# Patient Record
Sex: FEMALE | Race: BLACK OR AFRICAN AMERICAN | Employment: OTHER | RURAL
[De-identification: names, ages, dates, MRNs, and addresses within clinical notes are randomized per-mention and may not be internally consistent; named-entity substitution may affect disease eponyms.]

---

## 2017-01-10 ENCOUNTER — PATIENT OUTREACH (OUTPATIENT)
Dept: FAMILY MEDICINE CLINIC | Age: 72
End: 2017-01-10

## 2017-01-10 NOTE — PROGRESS NOTES
NNTOCED: [facility] 7/26/2016 Right hip pain s/p fall    This episode closed. Patient had no readmissions during 30 day POLY.

## 2017-02-15 ENCOUNTER — OFFICE VISIT (OUTPATIENT)
Dept: FAMILY MEDICINE CLINIC | Age: 72
End: 2017-02-15

## 2017-02-15 VITALS
DIASTOLIC BLOOD PRESSURE: 66 MMHG | HEART RATE: 69 BPM | TEMPERATURE: 98.3 F | HEIGHT: 70 IN | OXYGEN SATURATION: 96 % | WEIGHT: 280 LBS | BODY MASS INDEX: 40.09 KG/M2 | SYSTOLIC BLOOD PRESSURE: 134 MMHG | RESPIRATION RATE: 16 BRPM

## 2017-02-15 DIAGNOSIS — Z13.31 SCREENING FOR DEPRESSION: ICD-10-CM

## 2017-02-15 DIAGNOSIS — Z00.00 MEDICARE ANNUAL WELLNESS VISIT, SUBSEQUENT: Primary | ICD-10-CM

## 2017-02-15 DIAGNOSIS — M54.81 OCCIPITAL NEURITIS: Chronic | ICD-10-CM

## 2017-02-15 DIAGNOSIS — Z13.39 SCREENING FOR ALCOHOLISM: ICD-10-CM

## 2017-02-15 RX ORDER — SULFASALAZINE 500 MG/1
TABLET, DELAYED RELEASE ORAL
COMMUNITY
Start: 2017-01-30 | End: 2018-08-20

## 2017-02-15 RX ORDER — GABAPENTIN 300 MG/1
CAPSULE ORAL
Qty: 360 CAP | Refills: 3 | Status: SHIPPED | OUTPATIENT
Start: 2017-02-15 | End: 2018-08-20

## 2017-02-15 NOTE — PROGRESS NOTES
[address], [address] [place] [address]  [phone]           Date of visit: 2/15/2017     Cirilo Zaragoza MD    This is an Subsequent Medicare Annual Wellness Visit (AWV), (Performed more than 12 months after effective date of Medicare Part B enrollment and 12 months after last preventive visit, Once in a lifetime)    I have reviewed the patient's medical history in detail and updated the computerized patient record. Marco Mckeon is a 70 y.o. female   History obtained from: the patient.     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    Colon polyp, hyperplastic K63.5    Asthma J45.909    Primary generalized (osteo)arthritis M15.0    PAC (premature atrial contraction) I49.1    Lumbar spinal stenosis M48.06    Primary localized osteoarthritis of right hip M16.11     Past Medical History   Diagnosis Date    Anemia, unspecified 4/15/2010    Cardiac dysrhythmia, unspecified 4/15/2010     PAT- not on medication/ not seeing cardiologist    High cholesterol 4/30/2010    Obesity, unspecified 4/15/2010    Osteoarthrosis, unspecified whether generalized or localized, other specified sites 4/15/2010    Rheumatoid arthritis(714.0) 4/15/2010    Spinal stenosis, unspecified region other than cervical 4/15/2010    Unspecified asthma(493.90) 4/15/2010     rheumatoid lung disease      Past Surgical History   Procedure Laterality Date    Endoscopy, colon, diagnostic       7-10-13    Hx orthopaedic Right 2002     rotator cuff repair    Pr chest surgery procedure unlisted  lat 1990's     lung biopsy- diagnosed with rheumatoid lung disease     Allergies   Allergen Reactions    Orudis [Ketoprofen] Palpitations    Singulair [Montelukast] Rash    Arthrotec 50 [Diclofenac-Misoprostol] Palpitations    Levaquin [Levofloxacin] Other (comments)     Hip joints swelling and painful per pt report    Lyrica [Pregabalin] Other (comments)     Saw spiders. Current Outpatient Prescriptions   Medication Sig Dispense Refill    acetaminophen (TYLENOL) 500 mg tablet Take 1,000 mg by mouth every six (6) hours as needed for Pain.  traMADol (ULTRAM) 50 mg tablet Take 1 Tab by mouth every six (6) hours as needed for Pain. Max Daily Amount: 200 mg. 60 Tab 0    FOLIC ACID PO Take 3 mg by mouth daily.  gabapentin (NEURONTIN) 100 mg capsule Take 100 mg by mouth two (2) times a day.  GABAPENTIN, BULK, Take 300 mg by mouth nightly.  albuterol (PROVENTIL HFA, VENTOLIN HFA, PROAIR HFA) 90 mcg/actuation inhaler Take 2 Puffs by inhalation every four (4) hours as needed for Wheezing. 1 Inhaler 3    cholecalciferol, vitamin D3, (VITAMIN D3) 4,000 unit cap Take  by mouth daily.  METHOTREXATE 25 mg by IntraMUSCular route every seven (7) days. Saturdays      sulfaSALAzine EC (AZULFIDINE) 500 mg EC tablet       oxyCODONE-acetaminophen (PERCOCET 7.5) 7.5-325 mg per tablet Take 1-2 Tabs by mouth every four (4) hours as needed for Pain. Max Daily Amount: 12 Tabs. 70 Tab 0    oxyCODONE IR (ROXICODONE) 5 mg immediate release tablet Take 1 Tab by mouth every eight (8) hours as needed for Pain. Max Daily Amount: 15 mg. 60 Tab 0    ondansetron (ZOFRAN ODT) 8 mg disintegrating tablet Take 0.5 Tabs by mouth every eight (8) hours as needed for Nausea. 30 Tab 0    bisacodyl (DULCOLAX, BISACODYL,) 10 mg suppository Insert 10 mg into rectum daily.  2 Suppository 0    aspirin (ASPIRIN) 325 mg tablet Take 1 Tab by mouth two (2) times a day. 60 Tab 0    OTHER,NON-FORMULARY, Take 8 oz by mouth daily. Bottle of \"joint juice\" which contains glucosamine.  celecoxib (CELEBREX) 200 mg capsule Take 1 Cap by mouth daily. (Patient taking differently: Take 200 mg by mouth two (2) times a day.) 90 Cap 1    omega-3s-dha-epa-fish oil 300-1,000 mg cpDR Take  by mouth daily.  CALCIUM CARBONATE (CALCIUM 600 PO) Take  by mouth daily. Family History   Problem Relation Age of Onset    Cancer Mother      cervical, breast    Heart Disease Mother     Heart Attack Mother     Hypertension Mother     Arthritis-osteo Mother     Heart Disease Father     Heart Attack Father     Cancer Other      uterine and lung.  High Cholesterol Brother     Stroke Brother     Arthritis-rheumatoid Sister     Migraines Sister     Breast Cancer Maternal Aunt      Social History   Substance Use Topics    Smoking status: Former Smoker     Packs/day: 1.00     Years: 20.00     Quit date: 4/30/1985    Smokeless tobacco: Never Used    Alcohol use No       Specialists/Care Team   [facility] has established care with the following healthcare providers:  Patient Care Team:  Radha Morales MD as PCP - General  Shari Vyas RN as Nurse Navigator (Family Practice)  Danielle Barrera MD (Orthopedic Surgery)  Keke Villaseñor MD (Orthopedic Surgery)  Evette Bower RN as [address] [place] [address]     Demographics   female  70 y.o.     General Health Questions   -During the past 4 weeks:   -how would you rate your health in general? Fair   -how often have you been bothered by feeling dizzy when standing up? occasionally   -how much have you been bothered by bodily pain? mildly   -Have you noticed any hearing difficulties? no   -has your physical and emotional health limited your social activities with family or friends? no    Emotional Health Questions   -Do you have a history of depression, anxiety, or emotional problems? no  -Over the past 2 weeks, have you felt down, depressed or hopeless? no  -Over the past 2 weeks, have you felt little interest or pleasure in doing things? no    Health Habits   Please describe your diet habits: B grade  Do you get 5 servings of fruits or vegetables daily? yes  Do you exercise regularly? Yes, [facility]. Alcohol Risk Factor Screening: On any occasion during the past 3 months, have you had more than 4 drinks containing alcohol? No  Do you average more than 14 drinks per week? No       Activities of Daily Living and Functional Status   -Do you need help with eating, walking, dressing, bathing, toileting, the phone, transportation, shopping, preparing meals, housework, laundry, medications or managing money? no  -In the past four weeks, was someone available to help you if you needed and wanted help with anything? yes  -Are you confident are you that you can control and manage most of your health problems? yes  -Have you been given information to help you keep track of your medications? yes  -How often do you have trouble taking your medications as prescribed? occasionally    Fall Risk and Home Safety   Have you fallen 2 or more times in the past year? Yes, fell after right hip surgery, and outside  Does your home have rugs in the hallway, lack grab bars in the bathroom, lack handrails on the stairs or have poor lighting? no  Do you have smoke detectors and check them regularly? yes  Do you have difficulties driving a car? no  Do you always fasten your seat belt when you are in a car? yes    Review of Systems (if indicated for problems addressed today)   Review of Systems   Constitutional: Negative. Negative for chills, fever, malaise/fatigue and weight loss. HENT: Negative. Negative for hearing loss. Eyes: Negative. Negative for blurred vision and double vision. Respiratory: Positive for cough. Negative for hemoptysis, sputum production and shortness of breath. She has rheumatoid lung and pulmonary fibrosis. She has a chronic cough. Shortness of breath is stable. Cardiovascular: Negative. Negative for chest pain, palpitations and orthopnea. Gastrointestinal: Negative. Negative for abdominal pain, blood in stool, heartburn, nausea and vomiting. Genitourinary: Negative. Negative for dysuria, frequency and urgency. Musculoskeletal: Negative. Negative for back pain, myalgias and neck pain. She has diffuse rheumatoid arthritis. She is status post right hip replacement 7/16. She is still having to use a cane because of gluteal weakness on that side. Her pain is much improved. She is exercising 3 times weekly in the [facility]. Skin: Negative. Negative for rash. Neurological: Negative. Negative for dizziness, tingling, tremors, weakness and headaches. Endo/Heme/Allergies: Negative. Psychiatric/Behavioral: Negative. Negative for depression, substance abuse and suicidal ideas. Physical Examination     Wt Readings from Last 3 Encounters:   02/15/17 280 lb (127 kg)   10/21/16 272 lb 3.2 oz (123.5 kg)   07/26/16 266 lb (120.7 kg)     Temp Readings from Last 3 Encounters:   02/15/17 98.3 °F (36.8 °C) (Oral)   10/21/16 98.5 °F (36.9 °C) (Oral)   07/26/16 98.1 °F (36.7 °C)     BP Readings from Last 3 Encounters:   02/15/17 143/85   10/21/16 129/62   07/26/16 145/73     Pulse Readings from Last 3 Encounters:   02/15/17 69   10/21/16 80   07/26/16 78       Body mass index is 40.18 kg/(m^2). No exam data present  Was the patient's timed Up & Go test unsteady or longer than 10 seconds? yes    Evaluation of Cognitive Function   Mood/affect:  happy  Orientation: Person, Place and Time  Appearance: age appropriate, casually dressed and younger than stated age  Family member/caregiver input: no    Additional exam if indicated for problems addressed today:  Physical Exam   Constitutional: She is oriented to person, place, and time.  She appears well-developed and well-nourished. No distress. HENT:   Head: Normocephalic and atraumatic. Eyes: Conjunctivae are normal. Pupils are equal, round, and reactive to light. No scleral icterus. Neck: Normal range of motion. Neck supple. No JVD present. No tracheal deviation present. Cardiovascular: Normal rate, regular rhythm and normal heart sounds. Exam reveals no gallop and no friction rub. No murmur heard. Pulmonary/Chest: Effort normal. She has no wheezes. She has no rales. She has dry crackles in both bases consistent with fibrosis. Abdominal: Soft. Bowel sounds are normal. She exhibits no distension. Musculoskeletal:   Right hip pain is greatly improved. Her endurance is limited. She has fallen twice in the past year. Neurological: She is alert and oriented to person, place, and time. She has classic symptoms of left-sided occipital neuralgia. I has gotten better but not resolved. I will increase her gabapentin dose is recorded to deal with this. The sleepiness effect is worn off for her. Skin: Skin is warm and dry. No rash noted. She is not diaphoretic. No erythema. Psychiatric: She has a normal mood and affect.  Her behavior is normal. Judgment and thought content normal.         Advice/Referrals/Counseling (as indicated)   Education and counseling provided for any problems identified above:     Preventive Services     (Preventive care checklist to be included in patient instructions)  Discussed today Done Previously Not Needed    x   Pneumococcal vaccines    x  Flu vaccine    x x Hepatitis B vaccine (if at risk)     x Shingles vaccine    x  TDAP vaccine    x  Mammogram     x Pap smear     x Colorectal cancer screening     x Low-dose CT for lung cancer screening     x Bone density test     x Glaucoma screening    x  Cholesterol test    x  Diabetes screening test      x Diabetes self-management class     x Nutritionist referral for diabetes or renal disease     Discussion of Advance Directive   Discussed with Luis Ethan her ability to prepare and advance directive in the case that an injury or illness causes her to be unable to make health care decisions. Assessment/Plan   Z00.00    ICD-10-CM ICD-9-CM    1. Medicare annual wellness visit, subsequent   Z00.00 V70.0 REFERRAL TO OPHTHALMOLOGY   2. Screening for depression-negative   Z13.89 V79.0    3.  Screening for alcoholism-negative Z13.89 V79.1        Orders Placed This Encounter    sulfaSALAzine EC (AZULFIDINE) 500 mg EC tablet       Patient Care Team:  Sylvia Camarillo MD as PCP - General  Maame Aviles RN as Nurse Navigator (Family Practice)  Saurav Eden MD (Orthopedic Surgery)  Lisa York MD (Orthopedic Surgery)  Aaron Cummings RN as Ambulatory Care Navigator      Sylvia Camarillo MD

## 2017-02-15 NOTE — PATIENT INSTRUCTIONS

## 2017-02-15 NOTE — MR AVS SNAPSHOT
Visit Information Date & Time Provider Department Dept. Phone Encounter #  
 2/15/2017 11:30 AM Melanie Gillespie MD [address] [place] [address] [phone] [id number] Follow-up Instructions Return in about 2 months (around 4/15/2017) for Fasting. Fela Cooper Upcoming Health Maintenance Date Due  
 MEDICARE YEARLY EXAM 11/25/2016 Pneumococcal 65+ Low/Medium Risk (2 of 2 - PPSV23) 12/16/2016 GLAUCOMA SCREENING Q2Y 3/18/2017 BREAST CANCER SCRN MAMMOGRAM 11/3/2018 COLONOSCOPY 7/11/2023 DTaP/Tdap/Td series (2 - Td) 9/22/2024 Allergies as of 2/15/2017  Review Complete On: 2/15/2017 By: Melanie Gillespie MD  
  
 Severity Noted Reaction Type Reactions Orudis [Ketoprofen] High 04/29/2010    Palpitations Singulair [Montelukast] High 04/15/2010    Rash Arthrotec 50 [Diclofenac-misoprostol]  06/22/2016    Palpitations Levaquin [Levofloxacin]  07/12/2013    Other (comments) Hip joints swelling and painful per pt report Lyrica [Pregabalin]  09/18/2014    Other (comments) Saw spiders. Current Immunizations  Reviewed on 11/25/2015 Name Date Hepatitis B Vaccine 4/20/2009 Influenza Vaccine 10/6/2016, 11/11/2015, 11/4/2014, 10/24/2013 Influenza Vaccine Whole 10/5/2011, 10/15/2010 PPD 9/20/2011 Pneumococcal Conjugate (PCV-13) 12/16/2015 Pneumococcal Vaccine (Pcv) 2/10/2010 Pneumococcal Vaccine (Unspecified Type) 12/15/2005 Tdap 9/22/2014 Not reviewed this visit You Were Diagnosed With   
  
 Codes Comments Medicare annual wellness visit, subsequent    -  Primary ICD-10-CM: Z00.00 ICD-9-CM: V70.0 Screening for depression     ICD-10-CM: Z13.89 ICD-9-CM: V79.0 Screening for alcoholism     ICD-10-CM: Z13.89 ICD-9-CM: V79.1 Occipital neuritis     ICD-10-CM: M54.81 ICD-9-CM: 723.8 Vitals BP Pulse Temp Height(growth percentile) Weight(growth percentile) SpO2 134/66 69 98.3 °F (36.8 °C) (Oral) 5' 10\" (1.778 m) 280 lb (127 kg) 96% BMI OB Status Smoking Status 40.18 kg/m2 Postmenopausal Former Smoker Vitals History BMI and BSA Data Body Mass Index Body Surface Area  
 40.18 kg/m 2 2.5 m 2 Preferred Pharmacy Pharmacy Name Phone [facility] [address] [address] [phone] Your Updated Medication List  
  
   
This list is accurate as of: 2/15/17 12:23 PM.  Always use your most recent med list.  
  
  
  
  
 acetaminophen 500 mg tablet Commonly known as:  TYLENOL Take 1,000 mg by mouth every six (6) hours as needed for Pain. albuterol 90 mcg/actuation inhaler Commonly known as:  PROVENTIL HFA, VENTOLIN HFA, PROAIR HFA Take 2 Puffs by inhalation every four (4) hours as needed for Wheezing. aspirin 325 mg tablet Commonly known as:  ASPIRIN Take 1 Tab by mouth two (2) times a day. bisacodyl 10 mg suppository Commonly known as:  DULCOLAX (BISACODYL) Insert 10 mg into rectum daily. CALCIUM 600 PO Take  by mouth daily. celecoxib 200 mg capsule Commonly known as:  CeleBREX Take 1 Cap by mouth daily. FOLIC ACID PO Take 3 mg by mouth daily. gabapentin 300 mg capsule Commonly known as:  NEURONTIN  
1 in AM, 1 at lunch AND 2 hs. NEURALGIA. Indications: NEUROPATHIC PAIN  
  
 METHOTREXATE 25 mg by IntraMUSCular route every seven (7) days. Saturdays  
  
 omega-3s-dha-epa-fish oil 300-1,000 mg Cpdr  
Take  by mouth daily. ondansetron 8 mg disintegrating tablet Commonly known as:  ZOFRAN ODT Take 0.5 Tabs by mouth every eight (8) hours as needed for Nausea. OTHER(NON-FORMULARY) Take 8 oz by mouth daily. Bottle of \"joint juice\" which contains glucosamine. oxyCODONE IR 5 mg immediate release tablet Commonly known as:  Alsip Rice Take 1 Tab by mouth every eight (8) hours as needed for Pain.  Max Daily Amount: 15 mg.  
  
 oxyCODONE-acetaminophen 7.5-325 mg per tablet Commonly known as:  PERCOCET 7.5 Take 1-2 Tabs by mouth every four (4) hours as needed for Pain. Max Daily Amount: 12 Tabs. sulfaSALAzine  mg EC tablet Commonly known as:  AZULFIDINE  
  
 traMADol 50 mg tablet Commonly known as:  ULTRAM  
Take 1 Tab by mouth every six (6) hours as needed for Pain. Max Daily Amount: 200 mg. VITAMIN D3 4,000 unit Cap Generic drug:  cholecalciferol (vitamin D3) Take  by mouth daily. Prescriptions Sent to Pharmacy Refills  
 gabapentin (NEURONTIN) 300 mg capsule 3 Si in AM, 1 at lunch AND 2 hs. NEURALGIA. Indications: NEUROPATHIC PAIN Class: Normal  
 Pharmacy: [address] #: [phone] We Performed the Following REFERRAL TO OPHTHALMOLOGY [REF57 Custom] Comments:  
 Please evaluate patient for routine eye exam and pressure check, Dr. Lexus Chavez. Follow-up Instructions Return in about 2 months (around 4/15/2017) for Fasting. Bennynika Rick Referral Information Referral ID Referred By Referred To  
  
 [id number] Omid Birney Not Available Visits Status Start Date End Date 1 New Request 2/15/17 2/15/18 If your referral has a status of pending review or denied, additional information will be sent to support the outcome of this decision. Patient Instructions Preventing Falls: Care Instructions Your Care Instructions Getting around your home safely can be a challenge if you have injuries or health problems that make it easy for you to fall. Loose rugs and furniture in walkways are among the dangers for many older people who have problems walking or who have poor eyesight. People who have conditions such as arthritis, osteoporosis, or dementia also have to be careful not to fall. You can make your home safer with a few simple measures. Follow-up care is a key part of your treatment and safety. Be sure to make and go to all appointments, and call your doctor if you are having problems. It's also a good idea to know your test results and keep a list of the medicines you take. How can you care for yourself at home? Taking care of yourself · You may get dizzy if you do not drink enough water. To prevent dehydration, drink plenty of fluids, enough so that your urine is light yellow or clear like water. Choose water and other caffeine-free clear liquids. If you have kidney, heart, or liver disease and have to limit fluids, talk with your doctor before you increase the amount of fluids you drink. · Exercise regularly to improve your strength, muscle tone, and balance. Walk if you can. Swimming may be a good choice if you cannot walk easily. · Have your vision and hearing checked each year or any time you notice a change. If you have trouble seeing and hearing, you might not be able to avoid objects and could lose your balance. · Know the side effects of the medicines you take. Ask your doctor or pharmacist whether the medicines you take can affect your balance. Sleeping pills or sedatives can affect your balance. · Limit the amount of alcohol you drink. Alcohol can impair your balance and other senses. · Ask your doctor whether calluses or corns on your feet need to be removed. If you wear loose-fitting shoes because of calluses or corns, you can lose your balance and fall. · Talk to your doctor if you have numbness in your feet. Preventing falls at home · Remove raised doorway thresholds, throw rugs, and clutter. Repair loose carpet or raised areas in the floor. · Move furniture and electrical cords to keep them out of walking paths. · Use nonskid floor wax, and wipe up spills right away, especially on ceramic tile floors. · If you use a walker or cane, put rubber tips on it.  If you use crutches, clean the bottoms of them regularly with an abrasive pad, such as steel wool. · Keep your house well lit, especially [place], and outside walkways. Use night-lights in areas such as hallways and bathrooms. Add extra light switches or use remote switches (such as switches that go on or off when you clap your hands) to make it easier to turn lights on if you have to get up during the night. · Install sturdy handrails on stairways. · Move items in your cabinets so that the things you use a lot are on the lower shelves (about waist level). · Keep a cordless phone and a flashlight with new batteries by your bed. If possible, put a phone in each of the main rooms of your house, or carry a cell phone in case you fall and cannot reach a phone. Or, you can wear a device around your neck or wrist. You push a button that sends a signal for help. · Wear low-heeled shoes that fit well and give your feet good support. Use footwear with nonskid soles. Check the heels and soles of your shoes for wear. Repair or replace worn heels or soles. · Do not wear socks without shoes on wood floors. · Walk on the grass when the sidewalks are slippery. If you live in an area that gets snow and ice in the winter, sprinkle salt on slippery steps and sidewalks. Preventing falls in the bath · Install grab bars and nonskid mats inside and outside your shower or tub and near the toilet and sinks. · Use shower chairs and bath benches. · Use a hand-held shower head that will allow you to sit while showering. · Get into a tub or shower by putting the weaker leg in first. Get out of a tub or shower with your strong side first. 
· Repair loose toilet seats and consider installing a raised toilet seat to make getting on and off the toilet easier. · Keep your bathroom door unlocked while you are in the shower. Where can you learn more? Go to http://antonio-adrien.info/. Enter [phone] in the search box to learn more about \"Preventing Falls: Care Instructions. \" Current as of: August 4, 2016 Content Version: 11.1 © 9473-3642 [employer]. Care instructions adapted under license by [employer] (which disclaims liability or warranty for this information). If you have questions about a medical condition or this instruction, always ask your healthcare professional. [address] any warranty or liability for your use of this information. Introducing [facility] [place] [facility]! Dear Migdalia Lott: Thank you for requesting a [employer] account. Our records indicate that you already have an active [employer] account. You can access your account anytime at [email or web address] Did you know that you can access your hospital and ER discharge instructions at any time in [employer]? You can also review all of your test results from your hospital stay or ER visit. Additional Information If you have questions, please visit the Frequently Asked Questions section of the [employer] website at [email or web address] Remember, [employer] is NOT to be used for urgent needs. For medical emergencies, dial 911. Now available from your iPhone and Android! Please provide this summary of care documentation to your next provider. Your primary care clinician is listed as [address]. If you have any questions after today's visit, please call [phone].

## 2017-02-15 NOTE — PROGRESS NOTES
Reviewed record in preparation for visit and have necessary documentation  Pt did not bring medication to office visit for review  Information was offered to pt on Advanced Directives, Living Will, pt declined   opportunity was given for questions  Goals that were addressed and/or need to be completed during or after this appointment include   Health Maintenance Due   Topic Date Due    MEDICARE YEARLY EXAM  11/25/2016    Pneumococcal 65+ Low/Medium Risk (2 of 2 - PPSV23) 12/16/2016    GLAUCOMA SCREENING Q2Y  03/18/2017

## 2017-04-21 ENCOUNTER — OFFICE VISIT (OUTPATIENT)
Dept: FAMILY MEDICINE CLINIC | Age: 72
End: 2017-04-21

## 2017-04-21 VITALS
WEIGHT: 289 LBS | SYSTOLIC BLOOD PRESSURE: 134 MMHG | HEIGHT: 70 IN | TEMPERATURE: 98.1 F | BODY MASS INDEX: 41.37 KG/M2 | DIASTOLIC BLOOD PRESSURE: 62 MMHG | RESPIRATION RATE: 20 BRPM | HEART RATE: 82 BPM

## 2017-04-21 DIAGNOSIS — D64.9 ANEMIA, UNSPECIFIED TYPE: ICD-10-CM

## 2017-04-21 DIAGNOSIS — K11.5 SALIVARY DUCT STONE: Primary | ICD-10-CM

## 2017-04-21 DIAGNOSIS — E78.00 HIGH CHOLESTEROL: Chronic | ICD-10-CM

## 2017-04-21 DIAGNOSIS — J45.20 MILD INTERMITTENT ASTHMA WITHOUT COMPLICATION: Chronic | ICD-10-CM

## 2017-04-21 DIAGNOSIS — M06.9 RHEUMATOID ARTHRITIS INVOLVING MULTIPLE SITES, UNSPECIFIED RHEUMATOID FACTOR PRESENCE: Chronic | ICD-10-CM

## 2017-04-21 DIAGNOSIS — Z91.09 ENVIRONMENTAL ALLERGIES: ICD-10-CM

## 2017-04-21 DIAGNOSIS — Z96.641 S/P HIP REPLACEMENT, RIGHT: ICD-10-CM

## 2017-04-21 RX ORDER — CEPHALEXIN 500 MG/1
500 CAPSULE ORAL 4 TIMES DAILY
Qty: 40 CAP | Refills: 0 | Status: SHIPPED | OUTPATIENT
Start: 2017-04-21 | End: 2017-05-01

## 2017-04-21 NOTE — PATIENT INSTRUCTIONS
Salivary Gland Stone: Care Instructions  Your Care Instructions  Salivary glands make saliva, or spit. A salivary gland stone is a piece of hard material, usually calcium, that can form in any of the three main salivary glands in the mouth. Salivary gland stones are also called salivary duct stones. Stones form most often in the gland that releases saliva below the tongue. A stone can block saliva from flowing out of the gland. When saliva backs up behind the stone, it can make the gland swell. The gland swells while you are eating, and then the swelling goes down slowly afterward. The swelling and pain may be under the jaw or in the area in front of the ear, depending on which gland is affected. Your doctor will ask you about your symptoms. He or she may be able to see and feel the gland under your skin or in the floor of your mouth. You may get an imaging test, such as a CT scan or ultrasound. This will help your doctor know if you have a stone and not some other problem. Most stones come out into the mouth on their own. While the stone is in the gland, your doctor may have you take medicine for pain. There are also some things you can do at home to help move the stone. If the stone in your gland hasn't come out within a few weeks, your doctor will discuss treatment options with you. Follow-up care is a key part of your treatment and safety. Be sure to make and go to all appointments, and call your doctor if you are having problems. It's also a good idea to know your test results and keep a list of the medicines you take. How can you care for yourself at home? · Be safe with medicines. Read and follow all instructions on the label. ¨ If the doctor gave you a prescription medicine for pain, take it as prescribed. ¨ If you are not taking a prescription pain medicine, ask your doctor if you can take an over-the-counter medicine. · If your doctor prescribed antibiotics, take them as directed.  Do not stop taking them just because you feel better. You need to take the full course of antibiotics. · Use sugar-free gum or candies such as lemon drops, or suck on a lemon wedge. They increase saliva, which may help push the stone out. · Gently massage the affected gland to help move the stone. When should you call for help? Call your doctor now or seek immediate medical care if:  · You have symptoms of infection, such as:  ¨ Increased pain, swelling, warmth, or redness. ¨ Red streaks leading from the salivary gland. ¨ Pus draining from the area where the saliva comes out into the mouth. ¨ A fever. Watch closely for changes in your health, and be sure to contact your doctor if:  · You do not get better as expected. Where can you learn more? Go to http://antonio-adrien.info/. Enter D298 in the search box to learn more about \"Salivary Gland Stone: Care Instructions. \"  Current as of: July 29, 2016  Content Version: 11.2  © 1789-1610 [email or web address]. Care instructions adapted under license by [employer] (which disclaims liability or warranty for this information). If you have questions about a medical condition or this instruction, always ask your healthcare professional. [place] [address] any warranty or liability for your use of this information.

## 2017-04-21 NOTE — MR AVS SNAPSHOT
Visit Information Date & Time Provider Department Dept. Phone Encounter #  
 4/21/2017 10:30 AM Marichuy Mcduffie MD [address] [id number] Follow-up Instructions Return in about 4 months (around 8/21/2017) for fasting labs on Tuesday. Dahiana Turk Upcoming Health Maintenance Date Due  
 GLAUCOMA SCREENING Q2Y 3/18/2017 MEDICARE YEARLY EXAM 2/16/2018 BREAST CANCER SCRN MAMMOGRAM 11/3/2018 COLONOSCOPY 7/11/2023 DTaP/Tdap/Td series (2 - Td) 9/22/2024 Allergies as of 4/21/2017  Review Complete On: 4/21/2017 By: Cecilia Stokes LPN Severity Noted Reaction Type Reactions Orudis [Ketoprofen] High 04/29/2010    Palpitations Singulair [Montelukast] High 04/15/2010    Rash Arthrotec 50 [Diclofenac-misoprostol]  06/22/2016    Palpitations Levaquin [Levofloxacin]  07/12/2013    Other (comments) Hip joints swelling and painful per pt report Lyrica [Pregabalin]  09/18/2014    Other (comments) Saw spiders. Current Immunizations  Reviewed on 11/25/2015 Name Date Hepatitis B Vaccine 4/20/2009 Influenza Vaccine 10/6/2016, 11/11/2015, 11/4/2014, 10/24/2013 Influenza Vaccine Whole 10/5/2011, 10/15/2010 PPD 9/20/2011 Pneumococcal Conjugate (PCV-13) 12/16/2015 Pneumococcal Vaccine (Pcv) 2/10/2010 Pneumococcal Vaccine (Unspecified Type) 12/15/2005 Tdap 9/22/2014 Not reviewed this visit You Were Diagnosed With   
  
 Codes Comments Salivary duct stone    -  Primary ICD-10-CM: K11.5 ICD-9-CM: 527.5 High cholesterol     ICD-10-CM: E78.00 ICD-9-CM: 272.0 Rheumatoid arthritis involving multiple sites, unspecified rheumatoid factor presence ([facility]     ICD-10-CM: M06.9 ICD-9-CM: 714.0 S/P hip replacement, right     ICD-10-CM: S89.271 ICD-9-CM: V43.64 Mild intermittent asthma without complication     KAF-66-YT: J45.20 ICD-9-CM: 493.90   
 Environmental allergies     ICD-10-CM: Z91.09 
ICD-9-CM: V15.09 Anemia, unspecified type     ICD-10-CM: D64.9 ICD-9-CM: 670. 9 Vitals BP Pulse Temp Resp Height(growth percentile) Weight(growth percentile) 134/62 (BP 1 Location: Left arm, BP Patient Position: At rest) 82 98.1 °F (36.7 °C) (Oral) 20 5' 10\" (1.778 m) 289 lb (131.1 kg) BMI OB Status Smoking Status 41.47 kg/m2 Postmenopausal Former Smoker Vitals History BMI and BSA Data Body Mass Index Body Surface Area  
 41.47 kg/m 2 2.54 m 2 Preferred Pharmacy Pharmacy Name Phone [address] [place], [place] - [address] [phone] Your Updated Medication List  
  
   
This list is accurate as of: 4/21/17 11:06 AM.  Always use your most recent med list.  
  
  
  
  
 acetaminophen 500 mg tablet Commonly known as:  TYLENOL Take 1,000 mg by mouth every six (6) hours as needed for Pain. albuterol 90 mcg/actuation inhaler Commonly known as:  PROVENTIL HFA, VENTOLIN HFA, PROAIR HFA Take 2 Puffs by inhalation every four (4) hours as needed for Wheezing. CALCIUM 600 PO Take  by mouth daily. celecoxib 200 mg capsule Commonly known as:  CeleBREX Take 1 Cap by mouth daily. cephALEXin 500 mg capsule Commonly known as:  Olga Pluck Take 1 Cap by mouth four (4) times daily for 10 days. FOLIC ACID PO Take 3 mg by mouth daily. gabapentin 300 mg capsule Commonly known as:  NEURONTIN  
1 in AM, 1 at lunch AND 2 hs. NEURALGIA. Indications: NEUROPATHIC PAIN  
  
 METHOTREXATE 25 mg by IntraMUSCular route every seven (7) days. Saturdays  
  
 omega-3s-dha-epa-fish oil 300-1,000 mg Cpdr  
Take  by mouth daily. OTHER(NON-FORMULARY) Take 8 oz by mouth daily. Bottle of \"joint juice\" which contains glucosamine. sulfaSALAzine  mg EC tablet Commonly known as:  AZULFIDINE  
  
 traMADol 50 mg tablet Commonly known as:  ULTRAM  
Take 1 Tab by mouth every six (6) hours as needed for Pain. Max Daily Amount: 200 mg. VITAMIN D3 4,000 unit Cap Generic drug:  cholecalciferol (vitamin D3) Take  by mouth daily. Prescriptions Sent to Pharmacy Refills  
 cephALEXin (KEFLEX) 500 mg capsule 0 Sig: Take 1 Cap by mouth four (4) times daily for 10 days. Class: Normal  
 Pharmacy: [address] [place] [address] #: [phone] Route: Oral  
  
We Performed the Following CBC WITH AUTOMATED DIFF [57348 CPT(R)] IRON PROFILE [id number] CPT(R)] LIPID PANEL [35008 CPT(R)] METABOLIC PANEL, COMPREHENSIVE [72209 CPT(R)] RETICULOCYTE COUNT [id number] CPT(R)] Follow-up Instructions Return in about 4 months (around 8/21/2017) for fasting labs on Tuesday. Jacoby June Patient Instructions Salivary Gland Stone: Care Instructions Your Care Instructions Salivary glands make saliva, or spit. A salivary gland stone is a piece of hard material, usually calcium, that can form in any of the three main salivary glands in the mouth. Salivary gland stones are also called salivary duct stones. Stones form most often in the gland that releases saliva below the tongue. A stone can block saliva from flowing out of the gland. When saliva backs up behind the stone, it can make the gland swell. The gland swells while you are eating, and then the swelling goes down slowly afterward. The swelling and pain may be under the jaw or in the area in front of the ear, depending on which gland is affected. Your doctor will ask you about your symptoms. He or she may be able to see and feel the gland under your skin or in the floor of your mouth. You may get an imaging test, such as a CT scan or ultrasound. This will help your doctor know if you have a stone and not some other problem. Most stones come out into the mouth on their own.  While the stone is in the gland, your doctor may have you take medicine for pain. There are also some things you can do at home to help move the stone. If the stone in your gland hasn't come out within a few weeks, your doctor will discuss treatment options with you. Follow-up care is a key part of your treatment and safety. Be sure to make and go to all appointments, and call your doctor if you are having problems. It's also a good idea to know your test results and keep a list of the medicines you take. How can you care for yourself at home? · Be safe with medicines. Read and follow all instructions on the label. ¨ If the doctor gave you a prescription medicine for pain, take it as prescribed. ¨ If you are not taking a prescription pain medicine, ask your doctor if you can take an over-the-counter medicine. · If your doctor prescribed antibiotics, take them as directed. Do not stop taking them just because you feel better. You need to take the full course of antibiotics. · Use sugar-free gum or candies such as lemon drops, or suck on a lemon wedge. They increase saliva, which may help push the stone out. · Gently massage the affected gland to help move the stone. When should you call for help? Call your doctor now or seek immediate medical care if: 
· You have symptoms of infection, such as: 
¨ Increased pain, swelling, warmth, or redness. ¨ Red streaks leading from the salivary gland. ¨ Pus draining from the area where the saliva comes out into the mouth. ¨ A fever. Watch closely for changes in your health, and be sure to contact your doctor if: 
· You do not get better as expected. Where can you learn more? Go to http://antonio-adrien.info/. Enter S208 in the search box to learn more about \"Salivary Gland Stone: Care Instructions. \" Current as of: July 29, 2016 Content Version: 11.2 © 2956-7565 [employer].  Care instructions adapted under license by [address] (which disclaims liability or warranty for this information). If you have questions about a medical condition or this instruction, always ask your healthcare professional. [address] any warranty or liability for your use of this information. Introducing [facility]! Dear Mars Dhillon: Thank you for requesting a [employer] account. Our records indicate that you already have an active [employer] account. You can access your account anytime at [email or web address]. [email or web address] Did you know that you can access your hospital and ER discharge instructions at any time in [employer]? You can also review all of your test results from your hospital stay or ER visit. Additional Information If you have questions, please visit the Frequently Asked Questions section of the [employer] website at [email or web address] Remember, [employer] is NOT to be used for urgent needs. For medical emergencies, dial 911. Now available from your iPhone and Android! Please provide this summary of care documentation to your next provider. Your primary care clinician is listed as [address]. If you have any questions after today's visit, please call [phone].

## 2017-04-21 NOTE — PROGRESS NOTES
Reviewed record in preparation for visit and have necessary documentation  Pt did not bring medication to office visit for review    Goals that were addressed and/or need to be completed during or after this appointment include   Health Maintenance Due   Topic Date Due    Pneumococcal 65+ Low/Medium Risk (2 of 2 - PPSV23) 12/16/2016    GLAUCOMA SCREENING Q2Y  03/18/2017

## 2017-04-24 ENCOUNTER — TELEPHONE (OUTPATIENT)
Dept: FAMILY MEDICINE CLINIC | Age: 72
End: 2017-04-24

## 2017-04-24 DIAGNOSIS — K11.5 SIALOLITHIASIS: Primary | ICD-10-CM

## 2017-04-24 NOTE — TELEPHONE ENCOUNTER
Pt states she is still having issues with her mouth and that Dr. Erendira Loco said is she continues to call back today and he would send her to the ENT specialist. As soon as possible please. .thanks

## 2017-04-24 NOTE — PROGRESS NOTES
Progress Note    Patient: Caio Tena MRN: [id number]  SSN: [id number]    YOB: 1945  Age: 70 y.o. Sex: female        Chief Complaint   Patient presents with    Mouth Lesions         Subjective:     Encounter Diagnoses   Name Primary?  Salivary duct stone: Left submandibular-palpable on exam.  She had some swelling underneath her left jaw but is better now. I explained to her that these duct stones can clog because of cellulitis in the gland. I asked her to try to massage the tip of the opening where the stone appears to be lodged and if she is not better by Monday she is to call me for referral to ENT. Yes    High cholesterol:  Cardiovascular risks for her are: hyperlipidemia. Currently she takes no statin. LDL is borderline. Lab Results   Component Value Date/Time    Cholesterol, total 170 10/24/2016 08:58 AM    HDL Cholesterol 52 10/24/2016 08:58 AM    LDL, calculated 103 10/24/2016 08:58 AM    Triglyceride 73 10/24/2016 08:58 AM    CHOL/HDL Ratio 3.2 04/30/2010 10:37 AM     Lab Results   Component Value Date/Time    ALT (SGPT) 13 10/24/2016 08:58 AM    AST (SGOT) 16 10/24/2016 08:58 AM    Alk. phosphatase 80 10/24/2016 08:58 AM    Bilirubin, total 0.5 10/24/2016 08:58 AM      Myalgias: No   Fatigue: No   Other side effects: no  Wt Readings from Last 3 Encounters:   04/21/17 289 lb (131.1 kg)   02/15/17 280 lb (127 kg)   10/21/16 272 lb 3.2 oz (123.5 kg)     The patient is aware of our goal to reduce or eliminate the long term problems (such as strokes and heart attacks) related to poorly controlled hyperlipidemia. 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 She is seen regularly by Dr. Jay Maravilla.  S/P hip replacement, right: She has much less pain; she is still walking with a cane as she gets all of her strength back.            Mild intermittent asthma without complication: Combined with rheumatoid lung. asthma, not currently in exacerbation. Asthma symptoms occur: daily. Wheezing when present is described as moderate and easily relieved with rescue bronchodilator. Current limitations in activity from asthma: none. Frequency of use of quick-relief meds: rare. Medication compliance: Good  Patient does not smoke cigarettes. Monitors Peak Flow at home: no       Environmental allergies: Always better at this time of year.  Anemia, unspecified type: Felt to be ACD. Followed by Dr. Mars kitchen. Lab Results   Component Value Date/Time    HGB 11.1 10/24/2016 08:58 AM             Current and past medical information:    Current Medications after this visit[de-identified]   Current Outpatient Prescriptions   Medication Sig    cephALEXin (KEFLEX) 500 mg capsule Take 1 Cap by mouth four (4) times daily for 10 days.  sulfaSALAzine EC (AZULFIDINE) 500 mg EC tablet     gabapentin (NEURONTIN) 300 mg capsule 1 in AM, 1 at lunch AND 2 hs. NEURALGIA. Indications: NEUROPATHIC PAIN    traMADol (ULTRAM) 50 mg tablet Take 1 Tab by mouth every six (6) hours as needed for Pain. Max Daily Amount: 200 mg.  FOLIC ACID PO Take 3 mg by mouth daily.  OTHER,NON-FORMULARY, Take 8 oz by mouth daily. Bottle of \"joint juice\" which contains glucosamine.  albuterol (PROVENTIL HFA, VENTOLIN HFA, PROAIR HFA) 90 mcg/actuation inhaler Take 2 Puffs by inhalation every four (4) hours as needed for Wheezing.  celecoxib (CELEBREX) 200 mg capsule Take 1 Cap by mouth daily. (Patient taking differently: Take 200 mg by mouth two (2) times a day.)    cholecalciferol, vitamin D3, (VITAMIN D3) 4,000 unit cap Take  by mouth daily.  METHOTREXATE 25 mg by IntraMUSCular route every seven (7) days. Saturdays    CALCIUM CARBONATE (CALCIUM 600 PO) Take  by mouth daily.     acetaminophen (TYLENOL) 500 mg tablet Take 1,000 mg by mouth every six (6) hours as needed for Pain.    omega-3s-dha-epa-fish oil 300-1,000 mg cpDR Take  by mouth daily. No current facility-administered medications for this visit. Patient Active Problem List    Diagnosis Date Noted    High cholesterol 04/30/2010     Priority: 1 - One    Rheumatoid arthritis, adult ([facility] 04/15/2010     Priority: 1 - One    Unspecified asthma 04/15/2010     Priority: 1 - One    OA (osteoarthritis) 04/15/2010     Priority: 1 - One    Obesity, unspecified 04/15/2010     Priority: 1 - One    Spinal stenosis, unspecified region other than cervical 04/15/2010     Priority: 1 - One    Cardiac dysrhythmia, unspecified 04/15/2010     Priority: 2 - Two    Primary localized osteoarthritis of right hip 07/11/2016    Asthma 10/06/2015    Primary generalized (osteo)arthritis 10/06/2015    PAC (premature atrial contraction) 10/06/2015    Lumbar spinal stenosis 10/06/2015    Colon polyp, hyperplastic 06/02/2015    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[facility] 12/15/2010       Past Medical History:   Diagnosis Date    Anemia, unspecified 4/15/2010    Cardiac dysrhythmia, unspecified 4/15/2010    PAT- not on medication/ not seeing cardiologist    High cholesterol 4/30/2010    Obesity, unspecified 4/15/2010    Osteoarthrosis, unspecified whether generalized or localized, other specified sites 4/15/2010    Rheumatoid arthritis ([facility] 4/15/2010    Spinal stenosis, unspecified region other than cervical 4/15/2010    Unspecified asthma 4/15/2010    rheumatoid lung disease       Allergies   Allergen Reactions    Orudis [Ketoprofen] Palpitations    Singulair [Montelukast] Rash    Arthrotec 50 [Diclofenac-Misoprostol] Palpitations    Levaquin [Levofloxacin] Other (comments)     Hip joints swelling and painful per pt report    Lyrica [Pregabalin] Other (comments)     Saw spiders.        Past Surgical History:   Procedure Laterality Date    CHEST SURGERY PROCEDURE UNLISTED  lat 7161'A    lung biopsy- diagnosed with rheumatoid lung disease    ENDOSCOPY, COLON, DIAGNOSTIC      7-10-13    HX ORTHOPAEDIC Right 2002    rotator cuff repair       Social History     Social History    Marital status: SINGLE     Spouse name: N/A    Number of children: N/A    Years of education: N/A     Social History Main Topics    Smoking status: Former Smoker     Packs/day: 1.00     Years: 20.00     Quit date: 4/30/1985    Smokeless tobacco: Never Used    Alcohol use No    Drug use: No    Sexual activity: No     Other Topics Concern    None     Social History Narrative       Review of Systems   Constitutional: Negative. Negative for chills, fever, malaise/fatigue and weight loss. HENT: Negative. Negative for hearing loss. Eyes: Negative. Negative for blurred vision and double vision. Respiratory: Positive for sputum production and wheezing. Negative for cough, hemoptysis and shortness of breath. She has a very mild shortness of breath with exercise. Cardiovascular: Negative. Negative for chest pain, palpitations and orthopnea. Gastrointestinal: Negative. Negative for abdominal pain, blood in stool, heartburn, nausea and vomiting. Genitourinary: Negative. Negative for dysuria, frequency and urgency. Musculoskeletal: Positive for joint pain. Negative for back pain, myalgias and neck pain. Skin: Negative. Negative for rash. Neurological: Negative. Negative for dizziness, tingling, tremors, weakness and headaches. Endo/Heme/Allergies: Negative. Psychiatric/Behavioral: Negative. Negative for depression. Objective:     Vitals:    04/21/17 1039 04/21/17 1047   BP: 143/69 134/62   Pulse: 82    Resp: 20    Temp: 98.1 °F (36.7 °C)    TempSrc: Oral    Weight: 289 lb (131.1 kg)    Height: 5' 10\" (1.778 m)       Body mass index is 41.47 kg/(m^2). Physical Exam   Constitutional: She is oriented to person, place, and time and well-developed, well-nourished, and in no distress. No distress.    HENT:   Head: Normocephalic and atraumatic. Mouth/Throat: Oropharynx is clear and moist.   Eyes: Conjunctivae are normal.   Neck: No tracheal deviation present. No thyromegaly present. Cardiovascular: Normal rate, regular rhythm and normal heart sounds. No murmur heard. Pulmonary/Chest: Effort normal. No respiratory distress. She has decreased breath sounds. Abdominal: Soft. She exhibits no distension. Lymphadenopathy:     She has no cervical adenopathy. Neurological: She is alert and oriented to person, place, and time. Skin: Skin is warm. No rash noted. She is not diaphoretic. No erythema. Psychiatric: Mood and affect normal.   Nursing note and vitals reviewed. Health Maintenance Due   Topic Date Due    GLAUCOMA SCREENING Q2Y  03/18/2017         Assessment and orders:       ICD-10-CM ICD-9-CM    1. Salivary duct stone-started on Keflex she is to call me Monday if not better. K11.5 527.5    2. High cholesterol-retest  L56.17 254.2 METABOLIC PANEL, COMPREHENSIVE      LIPID PANEL   3. Rheumatoid arthritis involving multiple sites, unspecified rheumatoid factor presence ([facility]  -Continue follow-up with Dr. Arturo Fiore    M06.9 714.0 CBC WITH AUTOMATED DIFF   4. S/P hip replacement, right-continue PT    Z96.641 V43.64    5. Mild intermittent asthma without complication-no significant change    J45.20 493.90    6. Environmental allergies-she can use Flonase and antihistamines as needed  Z91.09 V15.09    7. Anemia, unspecified type D64.9 285.9 IRON PROFILE      CBC WITH AUTOMATED DIFF      RETICULOCYTE COUNT         Plan of care:  Discussed diagnoses in detail with patient. Medication risks/benefits/side effects discussed with patient. All of the patient's questions were addressed. The patient understands and agrees with our plan of care. The patient knows to call back if they are unsure of or forget any changes we discussed today or if the symptoms change.      The patient received an After-Visit Summary which contains VS, orders, medication list and allergy list. This can be used as a \"mini-medical record\" should they have to seek medical care while out of town. Patient Care Team:  Jp Marcum MD as PCP - General  Jennifer Almodovar RN as Nurse Navigator (Family Practice)  Sanna Benjamin MD (Orthopedic Surgery)  Noris Briseno MD (Orthopedic Surgery)  Damari Doan, APOLLO as Ambulatory Care Navigator    Follow-up Disposition:  Return in about 4 months (around 8/21/2017) for fasting labs on Tuesday. .    Future Appointments  Date Time Provider Department Center   8/21/2017 8:40 AM Jp Marcum MD [facility] SCHED       Signed By: Jp Marcum MD     April 24, 2017

## 2017-04-26 LAB
ALBUMIN SERPL-MCNC: 3.9 G/DL (ref 3.5–4.8)
ALBUMIN/GLOB SERPL: 1.3 {RATIO} (ref 1.2–2.2)
ALP SERPL-CCNC: 84 IU/L (ref 39–117)
ALT SERPL-CCNC: 13 IU/L (ref 0–32)
AST SERPL-CCNC: 20 IU/L (ref 0–40)
BASOPHILS # BLD AUTO: 0 X10E3/UL (ref 0–0.2)
BASOPHILS NFR BLD AUTO: 0 %
BILIRUB SERPL-MCNC: 0.4 MG/DL (ref 0–1.2)
BUN SERPL-MCNC: 20 MG/DL (ref 8–27)
BUN/CREAT SERPL: 32 (ref 12–28)
CALCIUM SERPL-MCNC: 8.9 MG/DL (ref 8.7–10.3)
CHLORIDE SERPL-SCNC: 104 MMOL/L (ref 96–106)
CHOLEST SERPL-MCNC: 189 MG/DL (ref 100–199)
CO2 SERPL-SCNC: 23 MMOL/L (ref 18–29)
CREAT SERPL-MCNC: 0.62 MG/DL (ref 0.57–1)
EOSINOPHIL # BLD AUTO: 0.2 X10E3/UL (ref 0–0.4)
EOSINOPHIL NFR BLD AUTO: 4 %
ERYTHROCYTE [DISTWIDTH] IN BLOOD BY AUTOMATED COUNT: 15.9 % (ref 12.3–15.4)
GLOBULIN SER CALC-MCNC: 3 G/DL (ref 1.5–4.5)
GLUCOSE SERPL-MCNC: 76 MG/DL (ref 65–99)
HCT VFR BLD AUTO: 34.2 % (ref 34–46.6)
HDLC SERPL-MCNC: 64 MG/DL
HGB BLD-MCNC: 11.1 G/DL (ref 11.1–15.9)
IMM GRANULOCYTES # BLD: 0 X10E3/UL (ref 0–0.1)
IMM GRANULOCYTES NFR BLD: 0 %
IRON SATN MFR SERPL: 27 % (ref 15–55)
IRON SERPL-MCNC: 68 UG/DL (ref 27–139)
LDLC SERPL CALC-MCNC: 117 MG/DL (ref 0–99)
LYMPHOCYTES # BLD AUTO: 1.1 X10E3/UL (ref 0.7–3.1)
LYMPHOCYTES NFR BLD AUTO: 22 %
MCH RBC QN AUTO: 29.6 PG (ref 26.6–33)
MCHC RBC AUTO-ENTMCNC: 32.5 G/DL (ref 31.5–35.7)
MCV RBC AUTO: 91 FL (ref 79–97)
MONOCYTES # BLD AUTO: 0.3 X10E3/UL (ref 0.1–0.9)
MONOCYTES NFR BLD AUTO: 6 %
NEUTROPHILS # BLD AUTO: 3.3 X10E3/UL (ref 1.4–7)
NEUTROPHILS NFR BLD AUTO: 68 %
PLATELET # BLD AUTO: 187 X10E3/UL (ref 150–379)
POTASSIUM SERPL-SCNC: 4 MMOL/L (ref 3.5–5.2)
PROT SERPL-MCNC: 6.9 G/DL (ref 6–8.5)
RBC # BLD AUTO: 3.75 X10E6/UL (ref 3.77–5.28)
RETICS/RBC NFR AUTO: 1.4 % (ref 0.6–2.6)
SODIUM SERPL-SCNC: 142 MMOL/L (ref 134–144)
TIBC SERPL-MCNC: 256 UG/DL (ref 250–450)
TRIGL SERPL-MCNC: 42 MG/DL (ref 0–149)
UIBC SERPL-MCNC: 188 UG/DL (ref 118–369)
VLDLC SERPL CALC-MCNC: 8 MG/DL (ref 5–40)
WBC # BLD AUTO: 5 X10E3/UL (ref 3.4–10.8)

## 2017-05-02 ENCOUNTER — HOSPITAL ENCOUNTER (OUTPATIENT)
Dept: MAMMOGRAPHY | Age: 72
Discharge: HOME | End: 2017-05-02
Attending: INTERNAL MEDICINE
Payer: MEDICARE

## 2017-05-02 DIAGNOSIS — M85.89 DISAPPEARING BONE DISEASE: ICD-10-CM

## 2017-05-02 PROCEDURE — 77080 DXA BONE DENSITY AXIAL: CPT

## 2017-05-05 ENCOUNTER — TELEPHONE (OUTPATIENT)
Dept: FAMILY MEDICINE CLINIC | Age: 72
End: 2017-05-05

## 2017-05-05 NOTE — TELEPHONE ENCOUNTER
Patient is willing to take statins. Please send it to [facility]. ENT didn't find anything but told her if she had problems with her glands swelling again, to come back. The place in her mouth had gone away. She can be reached Monday at [phone].

## 2017-05-05 NOTE — LETTER
5/8/2017 4:39 PM 
 
Ms. Zayra Bush [phone] [place] [phone] I have sent in Lipitor with refills to your pharmacy.  You should have your first lipid profile in approximately 2 months.  The labs are in the computer so all you have to do is call and make a fasting lab appointment.   
 
 
 
Sincerely, 
 
 
Candido Urbina MD

## 2017-05-08 DIAGNOSIS — E78.00 PURE HYPERCHOLESTEROLEMIA: Primary | ICD-10-CM

## 2017-05-08 DIAGNOSIS — E78.00 PURE HYPERCHOLESTEROLEMIA: ICD-10-CM

## 2017-05-08 RX ORDER — ATORVASTATIN CALCIUM 10 MG/1
10 TABLET, FILM COATED ORAL DAILY
Qty: 30 TAB | Refills: 5 | Status: SHIPPED | OUTPATIENT
Start: 2017-05-08 | End: 2018-04-02 | Stop reason: SDUPTHER

## 2017-05-08 NOTE — TELEPHONE ENCOUNTER
Lab mailed to patient informing her per Dr. Sigifredo To: Eugenie Loss in Lipitor with refills. Juliet Hartman should have her first lipid profile in approximately 2 months.  The labs are in the computer so all she has to do is call and make a fasting lab appointment.

## 2017-07-06 ENCOUNTER — TELEPHONE (OUTPATIENT)
Dept: FAMILY MEDICINE CLINIC | Age: 72
End: 2017-07-06

## 2017-07-06 NOTE — TELEPHONE ENCOUNTER
Patient stated that Dr. Mima Oconnor started her on a new statin and she is to get blood work. Please order.   She can be reached at [phone] or [phone]

## 2017-07-08 LAB
ALT SERPL-CCNC: 11 IU/L (ref 0–32)
AST SERPL-CCNC: 18 IU/L (ref 0–40)
CHOLEST SERPL-MCNC: 155 MG/DL (ref 100–199)
HDLC SERPL-MCNC: 61 MG/DL
LDLC SERPL CALC-MCNC: 84 MG/DL (ref 0–99)
TRIGL SERPL-MCNC: 52 MG/DL (ref 0–149)
VLDLC SERPL CALC-MCNC: 10 MG/DL (ref 5–40)

## 2017-08-21 ENCOUNTER — OFFICE VISIT (OUTPATIENT)
Dept: FAMILY MEDICINE CLINIC | Age: 72
End: 2017-08-21

## 2017-08-21 VITALS
OXYGEN SATURATION: 93 % | HEIGHT: 70 IN | SYSTOLIC BLOOD PRESSURE: 148 MMHG | TEMPERATURE: 98.3 F | WEIGHT: 293 LBS | DIASTOLIC BLOOD PRESSURE: 73 MMHG | BODY MASS INDEX: 41.95 KG/M2 | RESPIRATION RATE: 20 BRPM | HEART RATE: 77 BPM

## 2017-08-21 DIAGNOSIS — M05.10 RHEUMATOID LUNG DISEASE (HCC): Chronic | ICD-10-CM

## 2017-08-21 DIAGNOSIS — M48.00 SPINAL STENOSIS, UNSPECIFIED REGION OTHER THAN CERVICAL: Chronic | ICD-10-CM

## 2017-08-21 DIAGNOSIS — E78.00 HIGH CHOLESTEROL: Primary | Chronic | ICD-10-CM

## 2017-08-21 DIAGNOSIS — J45.20 MILD INTERMITTENT ASTHMA WITHOUT COMPLICATION: Chronic | ICD-10-CM

## 2017-08-21 DIAGNOSIS — M19.90 OSTEOARTHRITIS, UNSPECIFIED OSTEOARTHRITIS TYPE, UNSPECIFIED SITE: Chronic | ICD-10-CM

## 2017-08-21 DIAGNOSIS — M06.9 RHEUMATOID ARTHRITIS INVOLVING MULTIPLE SITES, UNSPECIFIED RHEUMATOID FACTOR PRESENCE: Chronic | ICD-10-CM

## 2017-08-21 DIAGNOSIS — M15.9 PRIMARY OSTEOARTHRITIS INVOLVING MULTIPLE JOINTS: Chronic | ICD-10-CM

## 2017-08-21 DIAGNOSIS — M15.0 PRIMARY GENERALIZED (OSTEO)ARTHRITIS: Chronic | ICD-10-CM

## 2017-08-21 DIAGNOSIS — H52.7 REFRACTION ERROR: ICD-10-CM

## 2017-08-21 RX ORDER — PREDNISONE 5 MG/1
TABLET ORAL
Qty: 21 TAB | Refills: 0 | Status: SHIPPED | OUTPATIENT
Start: 2017-08-21 | End: 2017-12-11 | Stop reason: SDUPTHER

## 2017-08-21 RX ORDER — CELECOXIB 200 MG/1
200 CAPSULE ORAL DAILY
Qty: 90 CAP | Refills: 1
Start: 2017-08-21 | End: 2019-03-07

## 2017-08-21 RX ORDER — GABAPENTIN 100 MG/1
CAPSULE ORAL
COMMUNITY
Start: 2017-08-01 | End: 2020-02-19 | Stop reason: DRUGHIGH

## 2017-08-21 NOTE — MR AVS SNAPSHOT
Visit Information Date & Time Provider Department Dept. Phone Encounter #  
 8/21/2017  8:40 AM Bob Hernandez MD [address] [id number] Follow-up Instructions Return in about 3 months (around 11/21/2017) for -Due for a Medicare Wellness Visit. Upcoming Health Maintenance Date Due  
 GLAUCOMA SCREENING Q2Y 3/18/2017 INFLUENZA AGE 9 TO ADULT 8/1/2017 MEDICARE YEARLY EXAM 2/16/2018 BREAST CANCER SCRN MAMMOGRAM 11/3/2018 COLONOSCOPY 7/11/2023 DTaP/Tdap/Td series (2 - Td) 9/22/2024 Allergies as of 8/21/2017  Review Complete On: 8/21/2017 By: Bob Hernandez MD  
  
 Severity Noted Reaction Type Reactions Orudis [Ketoprofen] High 04/29/2010    Palpitations Singulair [Montelukast] High 04/15/2010    Rash Arthrotec 50 [Diclofenac-misoprostol]  06/22/2016    Palpitations Levaquin [Levofloxacin]  07/12/2013    Other (comments) Hip joints swelling and painful per pt report Lyrica [Pregabalin]  09/18/2014    Other (comments) Saw spiders. Current Immunizations  Reviewed on 11/25/2015 Name Date Hepatitis B Vaccine 4/20/2009 Influenza Vaccine 10/6/2016, 11/11/2015, 11/4/2014, 10/24/2013 Influenza Vaccine Whole 10/5/2011, 10/15/2010 PPD 9/20/2011 Pneumococcal Conjugate (PCV-13) 12/16/2015 Pneumococcal Vaccine (Pcv) 2/10/2010 Tdap 9/22/2014 ZZZ-RETIRED (DO NOT USE) Pneumococcal Vaccine (Unspecified Type) 12/15/2005 Not reviewed this visit You Were Diagnosed With   
  
 Codes Comments High cholesterol    -  Primary ICD-10-CM: E78.00 ICD-9-CM: 272.0 Rheumatoid arthritis involving multiple sites, unspecified rheumatoid factor presence ([facility]     ICD-10-CM: M06.9 ICD-9-CM: 714.0 Spinal stenosis, unspecified region other than cervical     ICD-10-CM: M48.00 ICD-9-CM: 724.00 Primary osteoarthritis involving multiple joints     ICD-10-CM: M15.0 ICD-9-CM: 715.09 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[facility]     ICD-10-CM: M05.10 ICD-9-CM: 714.81 Primary generalized (osteo)arthritis     ICD-10-CM: M15.0 ICD-9-CM: 715.09 Mild intermittent asthma without complication     YPT-19-YW: J45.20 ICD-9-CM: 493.90 Refraction error     ICD-10-CM: H52.7 ICD-9-CM: 367.9 Osteoarthritis, unspecified osteoarthritis type, unspecified site     ICD-10-CM: M19.90 ICD-9-CM: 715.90 Vitals BP Pulse Temp Resp Height(growth percentile) Weight(growth percentile) 148/73 (BP 1 Location: Right arm, BP Patient Position: Sitting) 77 98.3 °F (36.8 °C) (Oral) 20 5' 10\" (1.778 m) 294 lb (133.4 kg) SpO2 BMI OB Status Smoking Status 93% 42.18 kg/m2 Postmenopausal Former Smoker Vitals History BMI and BSA Data Body Mass Index Body Surface Area  
 42.18 kg/m 2 2.57 m 2 Preferred Pharmacy Pharmacy Name Phone [address] [address] [phone] Your Updated Medication List  
  
   
This list is accurate as of: 8/21/17  9:28 AM.  Always use your most recent med list.  
  
  
  
  
 acetaminophen 500 mg tablet Commonly known as:  TYLENOL Take 1,000 mg by mouth every six (6) hours as needed for Pain. albuterol 90 mcg/actuation inhaler Commonly known as:  PROVENTIL HFA, VENTOLIN HFA, PROAIR HFA Take 2 Puffs by inhalation every four (4) hours as needed for Wheezing. atorvastatin 10 mg tablet Commonly known as:  LIPITOR Take 1 Tab by mouth daily. Indications: hypercholesterolemia CALCIUM 600 PO Take  by mouth daily. celecoxib 200 mg capsule Commonly known as:  CeleBREX Take 1 Cap by mouth daily. FOLIC ACID PO Take 3 mg by mouth daily. * gabapentin 300 mg capsule Commonly known as:  NEURONTIN  
1 in AM, 1 at lunch AND 2 hs. NEURALGIA. Indications: NEUROPATHIC PAIN  
  
 * gabapentin 100 mg capsule Commonly known as:  NEURONTIN  
  
 METHOTREXATE 25 mg by IntraMUSCular route every seven (7) days. Saturdays  
  
 omega-3s-dha-epa-fish oil 300-1,000 mg Cpdr  
Take  by mouth daily. OTHER(NON-FORMULARY) Take 8 oz by mouth daily. Bottle of \"joint juice\" which contains glucosamine. predniSONE 5 mg dose pack Commonly known as:  STERAPRED See administration instruction per 5mg dose pack  
  
 sulfaSALAzine  mg EC tablet Commonly known as:  AZULFIDINE  
  
 traMADol 50 mg tablet Commonly known as:  ULTRAM  
Take 1 Tab by mouth every six (6) hours as needed for Pain. Max Daily Amount: 200 mg. VITAMIN D3 4,000 unit Cap Generic drug:  cholecalciferol (vitamin D3) Take  by mouth daily. * Notice: This list has 2 medication(s) that are the same as other medications prescribed for you. Read the directions carefully, and ask your doctor or other care provider to review them with you. Prescriptions Sent to Pharmacy Refills  
 predniSONE (STERAPRED) 5 mg dose pack 0 Sig: See administration instruction per 5mg dose pack Class: Normal  
 Pharmacy: [address] [place] [address] #: [phone] We Performed the Following [facility] MYCHART BP FLOWSHEET [0383068201 CPT(R)] CBC W/O DIFF [48147 CPT(R)] LIPID PANEL [05248 CPT(R)] METABOLIC PANEL, COMPREHENSIVE [22018 CPT(R)] REFERRAL TO OPTOMETRY [id number] Custom] Comments:  
 Please evaluate patient for eye check with Dr. Basilia Solis. Follow-up Instructions Return in about 3 months (around 11/21/2017) for -Due for a Medicare Wellness Visit. Referral Information Referral ID Referred By Referred To  
  
 [id number] Dana Hernandez Not Available Visits Status Start Date End Date 1 New Request 8/21/17 8/21/18 If your referral has a status of pending review or denied, additional information will be sent to support the outcome of this decision. Patient Instructions Home Blood Pressure Test: About This Test 
What is it? A home blood pressure test allows you to keep track of your blood pressure at home. Blood pressure is a measure of the force of blood against the walls of your arteries. Blood pressure readings include two numbers, such as 130/80 (say \"130 over 80\"). The first number is the systolic pressure. The second number is the diastolic pressure. Why is this test done? You may do this test at home to: · Find out if you have high blood pressure. · Track your blood pressure if you have high blood pressure. · Track how well medicine is working to reduce high blood pressure. · Check how lifestyle changes, such as weight loss and exercise, are affecting blood pressure. How can you prepare for the test? 
· Do not use caffeine, tobacco, or medicines known to raise blood pressure (such as nasal decongestant sprays) for at least 30 minutes before taking your blood pressure. · Do not exercise for at least 30 minutes before taking your blood pressure. What happens before the test? 
Take your blood pressure while you feel comfortable and relaxed. Sit quietly with both feet on the floor for at least 5 minutes before the test. 
What happens during the test? 
· Sit with your arm slightly bent and resting on a table so that your upper arm is at the same level as your heart. · Roll up your sleeve or take off your shirt to expose your upper arm. · Wrap the blood pressure cuff around your upper arm so that the lower edge of the cuff is about 1 inch above the bend of your elbow. Proceed with the following steps depending on if you are using an automatic or manual pressure monitor. Automatic blood pressure monitors · Press the on/off button on the automatic monitor and wait until the ready-to-measure \"heart\" symbol appears next to zero in the display window. · Press the start button. The cuff will inflate and deflate by itself. · Your blood pressure numbers will appear on the screen. · Write your numbers in your log book, along with the date and time. Manual blood pressure monitors · Place the earpieces of a stethoscope in your ears, and place the bell of the stethoscope over the artery, just below the cuff. · Close the valve on the rubber inflating bulb. · Squeeze the bulb rapidly with your opposite hand to inflate the cuff until the dial or column of mercury reads about 30 mm Hg higher than your usual systolic pressure. If you do not know your usual pressure, inflate the cuff to 210 mm Hg or until the pulse at your wrist disappears. · Open the pressure valve just slightly by twisting or pressing the valve on the bulb. · As you watch the pressure slowly fall, note the level on the dial at which you first start to hear a pulsing or tapping sound through the stethoscope. This is your systolic blood pressure. · Continue letting the air out slowly. The sounds will become muffled and will finally disappear. Note the pressure when the sounds completely disappear. This is your diastolic blood pressure. Let out all the remaining air. · Write your numbers in your log book, along with the date and time. What else should you know about the test? 
Results for adults ages 25 and older (mm Hg): · Normal (ideal): Systolic 671 or below. Diastolic 79 or below. · Prehypertension: Systolic 547 to 097. Diastolic 80 to 89. · Hypertension: Systolic 189 or above. Diastolic 90 or above. Follow-up care is a key part of your treatment and safety. Be sure to make and go to all appointments, and call your doctor if you are having problems. It's also a good idea to keep a list of the medicines you take. Where can you learn more? Go to http://antonio-adrien.info/. Enter C427 in the search box to learn more about \"Home Blood Pressure Test: About This Test.\" Current as of: November 3, 2016 Content Version: 11.3 © 1767-5223 [employer]wise, Incorporated. Care instructions adapted under license by [employer] (which disclaims liability or warranty for this information). If you have questions about a medical condition or this instruction, always ask your healthcare professional. Martha Reeves any warranty or liability for your use of this information. [address] Affiliated with [address] [place] [address]., [address] 
[phone] Monitor blood pressure outside the office several times weekly at different times during the day and evening. Bring the record to me in 3 weeks for review. Blood Pressure Record Patient Name:  ______________________ :  ______________________ Date/Time BP Reading Pulse Introducing [facility] SERVICES! Dear Neena Chao: Thank you for requesting a [employer] account. Our records indicate that you already have an active [employer] account. You can access your account anytime at [email or web address]. "[employer]"/[employer] Did you know that you can access your hospital and ER discharge instructions at any time in [employer] Access? You can also review all of your test results from your hospital stay or ER visit. Additional Information If you have questions, please visit the Frequently Asked Questions section of the [employer] Access website at [email or web address]. "[employer]/. Remember, Wealth Access is NOT to be used for urgent needs. For medical emergencies, dial 911. Now available from your iPhone and Android! Please provide this summary of care documentation to your next provider. Your primary care clinician is listed as [address].  If you have any questions after today's visit, please call [phone].

## 2017-08-21 NOTE — PATIENT INSTRUCTIONS
Home Blood Pressure Test: About This Test  What is it? A home blood pressure test allows you to keep track of your blood pressure at home. Blood pressure is a measure of the force of blood against the walls of your arteries. Blood pressure readings include two numbers, such as 130/80 (say \"130 over 80\"). The first number is the systolic pressure. The second number is the diastolic pressure. Why is this test done? You may do this test at home to:  · Find out if you have high blood pressure. · Track your blood pressure if you have high blood pressure. · Track how well medicine is working to reduce high blood pressure. · Check how lifestyle changes, such as weight loss and exercise, are affecting blood pressure. How can you prepare for the test?  · Do not use caffeine, tobacco, or medicines known to raise blood pressure (such as nasal decongestant sprays) for at least 30 minutes before taking your blood pressure. · Do not exercise for at least 30 minutes before taking your blood pressure. What happens before the test?  Take your blood pressure while you feel comfortable and relaxed. Sit quietly with both feet on the floor for at least 5 minutes before the test.  What happens during the test?  · Sit with your arm slightly bent and resting on a table so that your upper arm is at the same level as your heart. · Roll up your sleeve or take off your shirt to expose your upper arm. · Wrap the blood pressure cuff around your upper arm so that the lower edge of the cuff is about 1 inch above the bend of your elbow. Proceed with the following steps depending on if you are using an automatic or manual pressure monitor. Automatic blood pressure monitors  · Press the on/off button on the automatic monitor and wait until the ready-to-measure \"heart\" symbol appears next to zero in the display window. · Press the start button. The cuff will inflate and deflate by itself.   · Your blood pressure numbers will appear on the screen. · Write your numbers in your log book, along with the date and time. Manual blood pressure monitors  · Place the earpieces of a stethoscope in your ears, and place the bell of the stethoscope over the artery, just below the cuff. · Close the valve on the rubber inflating bulb. · Squeeze the bulb rapidly with your opposite hand to inflate the cuff until the dial or column of mercury reads about 30 mm Hg higher than your usual systolic pressure. If you do not know your usual pressure, inflate the cuff to 210 mm Hg or until the pulse at your wrist disappears. · Open the pressure valve just slightly by twisting or pressing the valve on the bulb. · As you watch the pressure slowly fall, note the level on the dial at which you first start to hear a pulsing or tapping sound through the stethoscope. This is your systolic blood pressure. · Continue letting the air out slowly. The sounds will become muffled and will finally disappear. Note the pressure when the sounds completely disappear. This is your diastolic blood pressure. Let out all the remaining air. · Write your numbers in your log book, along with the date and time. What else should you know about the test?  Results for adults ages 25 and older (mm Hg):  · Normal (ideal): Systolic 609 or below. Diastolic 79 or below. · Prehypertension: Systolic 507 to 815. Diastolic 80 to 89. · Hypertension: Systolic 250 or above. Diastolic 90 or above. Follow-up care is a key part of your treatment and safety. Be sure to make and go to all appointments, and call your doctor if you are having problems. It's also a good idea to keep a list of the medicines you take. Where can you learn more? Go to http://antonio-adrien.info/. Enter C427 in the search box to learn more about \"Home Blood Pressure Test: About This Test.\"  Current as of: November 3, 2016  Content Version: 11.3  © 2132-7792 [employer].  Care instructions adapted under license by [employer] (which disclaims liability or warranty for this information). If you have questions about a medical condition or this instruction, always ask your healthcare professional. Martha Reeves any warranty or liability for your use of this information. Toby Mcmanus with [facility]  [address] [place] [address], [facility]., [address]  [phone]    Monitor blood pressure outside the office several times weekly at different times during the day and evening. Bring the record to me in 3 weeks for review.     Blood Pressure Record     Patient Name:  ______________________ :  ______________________    Date/Time BP Reading Pulse

## 2017-08-21 NOTE — PROGRESS NOTES
Reviewed record in preparation for visit and have necessary documentation  Pt did not bring medication to office visit for review    Goals that were addressed and/or need to be completed during or after this appointment include   Health Maintenance Due   Topic Date Due    GLAUCOMA SCREENING Q2Y  03/18/2017    INFLUENZA AGE 9 TO ADULT  08/01/2017

## 2017-08-22 LAB
ALBUMIN SERPL-MCNC: 4 G/DL (ref 3.5–4.8)
ALBUMIN/GLOB SERPL: 1.3 {RATIO} (ref 1.2–2.2)
ALP SERPL-CCNC: 86 IU/L (ref 39–117)
ALT SERPL-CCNC: 13 IU/L (ref 0–32)
AST SERPL-CCNC: 17 IU/L (ref 0–40)
BILIRUB SERPL-MCNC: 0.5 MG/DL (ref 0–1.2)
BUN SERPL-MCNC: 16 MG/DL (ref 8–27)
BUN/CREAT SERPL: 26 (ref 12–28)
CALCIUM SERPL-MCNC: 9.2 MG/DL (ref 8.7–10.3)
CHLORIDE SERPL-SCNC: 104 MMOL/L (ref 96–106)
CHOLEST SERPL-MCNC: 176 MG/DL (ref 100–199)
CO2 SERPL-SCNC: 26 MMOL/L (ref 18–29)
CREAT SERPL-MCNC: 0.61 MG/DL (ref 0.57–1)
ERYTHROCYTE [DISTWIDTH] IN BLOOD BY AUTOMATED COUNT: 14.9 % (ref 12.3–15.4)
GLOBULIN SER CALC-MCNC: 3.1 G/DL (ref 1.5–4.5)
GLUCOSE SERPL-MCNC: 73 MG/DL (ref 65–99)
HCT VFR BLD AUTO: 34.5 % (ref 34–46.6)
HDLC SERPL-MCNC: 63 MG/DL
HGB BLD-MCNC: 11.2 G/DL (ref 11.1–15.9)
LDLC SERPL CALC-MCNC: 101 MG/DL (ref 0–99)
MCH RBC QN AUTO: 29.3 PG (ref 26.6–33)
MCHC RBC AUTO-ENTMCNC: 32.5 G/DL (ref 31.5–35.7)
MCV RBC AUTO: 90 FL (ref 79–97)
PLATELET # BLD AUTO: 180 X10E3/UL (ref 150–379)
POTASSIUM SERPL-SCNC: 4.4 MMOL/L (ref 3.5–5.2)
PROT SERPL-MCNC: 7.1 G/DL (ref 6–8.5)
RBC # BLD AUTO: 3.82 X10E6/UL (ref 3.77–5.28)
SODIUM SERPL-SCNC: 142 MMOL/L (ref 134–144)
TRIGL SERPL-MCNC: 60 MG/DL (ref 0–149)
VLDLC SERPL CALC-MCNC: 12 MG/DL (ref 5–40)
WBC # BLD AUTO: 6.6 X10E3/UL (ref 3.4–10.8)

## 2017-08-22 NOTE — PROGRESS NOTES
Progress Note    Patient: Ilsa Toribio MRN: [id number]  SSN: [id number]    YOB: 1945  Age: 70 y.o. Sex: female        No chief complaint on file. Subjective:     Encounter Diagnoses   Name Primary?  High cholesterol:  Cardiovascular risksName for her are: LDL goal is under 100  hypertension  hyperlipidemia. Currently she takes Lipitor (atorvastatin) , 10 mg. Lab Results   Component Value Date/Time    Cholesterol, total 176 08/21/2017 12:09 PM    HDL Cholesterol 63 08/21/2017 12:09 PM    LDL, calculated 101 08/21/2017 12:09 PM    Triglyceride 60 08/21/2017 12:09 PM    CHOL/HDL Ratio 3.2 04/30/2010 10:37 AM     Lab Results   Component Value Date/Time    ALT (SGPT) 13 08/21/2017 12:09 PM    AST (SGOT) 17 08/21/2017 12:09 PM    Alk. phosphatase 86 08/21/2017 12:09 PM    Bilirubin, total 0.5 08/21/2017 12:09 PM      Myalgias: No   Fatigue: No   Other side effects: no  Wt Readings from Last 3 Encounters:   08/21/17 294 lb (133.4 kg)   04/21/17 289 lb (131.1 kg)   02/15/17 280 lb (127 kg)     The patient is aware of our goal to reduce or eliminate the long term problems (such as strokes and heart attacks) related to poorly controlled hyperlipidemia. Yes    Rheumatoid arthritis involving multiple sites, unspecified rheumatoid factor presence (HCC):Chronic pain and severe.  Spinal stenosis, unspecified region other than cervical: She has a flair of her low back pain symptoms near the right sciatic notch. Medication most likely to help this is prednisone. I have given her low dose prednisone dose pack to try to improve the symptoms         Primary osteoarthritis involving multiple joints: With osteoarthritis on top of rheumatoid arthritis she has many joints are painful. 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 She has a chronic productive cough easily with some color in it.  Primary generalized (osteo)arthritis: See above.  Mild intermittent asthma without complication: Her asthma and wheezing are stable. She takes albuterol as needed.  Refraction error: She will see Dr. Xochitl Rodgers soon. Current and past medical information:    Current Medications after this visit[de-identified]     Current Outpatient Prescriptions   Medication Sig    gabapentin (NEURONTIN) 100 mg capsule     predniSONE (STERAPRED) 5 mg dose pack See administration instruction per 5mg dose pack    celecoxib (CELEBREX) 200 mg capsule Take 1 Cap by mouth daily.  gabapentin (NEURONTIN) 300 mg capsule 1 in AM, 1 at lunch AND 2 hs. NEURALGIA. Indications: NEUROPATHIC PAIN    acetaminophen (TYLENOL) 500 mg tablet Take 1,000 mg by mouth every six (6) hours as needed for Pain.  traMADol (ULTRAM) 50 mg tablet Take 1 Tab by mouth every six (6) hours as needed for Pain. Max Daily Amount: 200 mg.  FOLIC ACID PO Take 3 mg by mouth daily.  OTHER,NON-FORMULARY, Take 8 oz by mouth daily. Bottle of \"joint juice\" which contains glucosamine.  albuterol (PROVENTIL HFA, VENTOLIN HFA, PROAIR HFA) 90 mcg/actuation inhaler Take 2 Puffs by inhalation every four (4) hours as needed for Wheezing.  omega-3s-dha-epa-fish oil 300-1,000 mg cpDR Take  by mouth daily.  cholecalciferol, vitamin D3, (VITAMIN D3) 4,000 unit cap Take  by mouth daily.  METHOTREXATE 25 mg by IntraMUSCular route every seven (7) days. Saturdays    CALCIUM CARBONATE (CALCIUM 600 PO) Take  by mouth daily.  atorvastatin (LIPITOR) 10 mg tablet Take 1 Tab by mouth daily. Indications: hypercholesterolemia    sulfaSALAzine EC (AZULFIDINE) 500 mg EC tablet      No current facility-administered medications for this visit.         Patient Active Problem List    Diagnosis Date Noted    High cholesterol 04/30/2010     Priority: 1 - One    Rheumatoid arthritis, adult ([facility] [address] 04/15/2010     Priority: 1 - One    Unspecified asthma 04/15/2010     Priority: 1 - One    OA (osteoarthritis) 04/15/2010     Priority: 1 - One    Obesity, unspecified 04/15/2010     Priority: 1 - One    Spinal stenosis, unspecified region other than cervical 04/15/2010     Priority: 1 - One    Cardiac dysrhythmia, unspecified 04/15/2010     Priority: 2 - Two    Primary localized osteoarthritis of right hip 07/11/2016    Asthma 10/06/2015    Primary generalized (osteo)arthritis 10/06/2015    PAC (premature atrial contraction) 10/06/2015    Lumbar spinal stenosis 10/06/2015    Colon polyp, hyperplastic 06/02/2015    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[facility] 12/15/2010       Past Medical History:   Diagnosis Date    Anemia, unspecified 4/15/2010    Cardiac dysrhythmia, unspecified 4/15/2010    PAT- not on medication/ not seeing cardiologist    High cholesterol 4/30/2010    Obesity, unspecified 4/15/2010    Osteoarthrosis, unspecified whether generalized or localized, other specified sites 4/15/2010    Rheumatoid arthritis ([facility] 4/15/2010    Spinal stenosis, unspecified region other than cervical 4/15/2010    Unspecified asthma 4/15/2010    rheumatoid lung disease       Allergies   Allergen Reactions    Orudis [Ketoprofen] Palpitations    Singulair [Montelukast] Rash    Arthrotec 50 [Diclofenac-Misoprostol] Palpitations    Levaquin [Levofloxacin] Other (comments)     Hip joints swelling and painful per pt report    Lyrica [Pregabalin] Other (comments)     Saw spiders.        Past Surgical History:   Procedure Laterality Date    CHEST SURGERY PROCEDURE UNLISTED  [address]    lung biopsy- diagnosed with rheumatoid lung disease    ENDOSCOPY, COLON, DIAGNOSTIC      7-10-13    HX ORTHOPAEDIC Right 2002    rotator cuff repair       Social History     Social History    Marital status: SINGLE     Spouse name: N/A    Number of children: N/A    Years of education: N/A     Social History Main Topics    Smoking status: Former Smoker     Packs/day: 1.00     Years: 20.00     Quit date: 4/30/1985    Smokeless tobacco: Never Used    Alcohol use No    Drug use: No    Sexual activity: No     Other Topics Concern    None     Social History Narrative       Review of Systems   Constitutional: Negative. Negative for chills, fever, malaise/fatigue and weight loss. HENT: Negative. Negative for hearing loss. Eyes: Negative. Negative for blurred vision and double vision. Respiratory: Negative. Negative for cough, hemoptysis, sputum production and shortness of breath. Cardiovascular: Negative. Negative for chest pain, palpitations and orthopnea. Gastrointestinal: Negative. Negative for abdominal pain, blood in stool, heartburn, nausea and vomiting. Genitourinary: Negative. Negative for dysuria, frequency and urgency. Musculoskeletal: Positive for joint pain. Negative for falls, myalgias and neck pain. Skin: Negative. Negative for rash. Neurological: Positive for sensory change. Negative for dizziness, tingling, tremors, weakness and headaches. Endo/Heme/Allergies: Negative. Psychiatric/Behavioral: Negative. Negative for depression. Objective:     Vitals:    08/21/17 0857 08/21/17 0901   BP: 161/76 148/73   Pulse: 77    Resp: 20    Temp: 98.3 °F (36.8 °C)    TempSrc: Oral    SpO2: 93%    Weight: 294 lb (133.4 kg)    Height: 5' 10\" (1.778 m)       Body mass index is 42.18 kg/(m^2). Physical Exam   Constitutional: She is oriented to person, place, and time and well-developed, well-nourished, and in no distress. No distress. Her blood pressure is elevated today without a history of hypertension. HENT:   Head: Normocephalic and atraumatic. Mouth/Throat: Oropharynx is clear and moist.   Eyes: Conjunctivae are normal.   Neck: No tracheal deviation present. No thyromegaly present. Cardiovascular: Normal rate, regular rhythm and normal heart sounds. No murmur heard. Pulmonary/Chest: Effort normal and breath sounds normal. No respiratory distress. Abdominal: Soft. She exhibits no distension. Lymphadenopathy:     She has no cervical adenopathy. Neurological: She is alert and oriented to person, place, and time. Skin: Skin is warm. No rash noted. She is not diaphoretic. No erythema. Psychiatric: Mood and affect normal.   Nursing note and vitals reviewed. Health Maintenance Due   Topic Date Due    GLAUCOMA SCREENING Q2Y  03/18/2017    INFLUENZA AGE 9 TO ADULT  08/01/2017         Assessment and orders:       ICD-10-CM ICD-9-CM    1. High cholesterol E78.00 272.0 BSHSI MYCHART BP FLOWSHEET      LIPID PANEL      METABOLIC PANEL, COMPREHENSIVE   2. Rheumatoid arthritis involving multiple sites, unspecified rheumatoid factor presence (HCC)   M06.9 714.0 CBC W/O DIFF   3. Spinal stenosis, unspecified region other than cervical   M48.00 724.00 Prednisone dose pack. 4. Primary osteoarthritis involving multiple joints   M15.0 715.09    5. 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[facility] [address]   M05.10 714.81 Stable   6. Primary generalized (osteo)arthritis   M15.0 715.09    7. Mild intermittent asthma without complication   R70.03 413.90    8. Refraction error   H52.7 367.9 REFERRAL TO OPTOMETRY       Plan of care:  Discussed diagnoses in detail with patient. Medication risks/benefits/side effects discussed with patient. All of the patient's questions were addressed. The patient understands and agrees with our plan of care. The patient knows to call back if they are unsure of or forget any changes we discussed today or if the symptoms change. The patient received an After-Visit Summary which contains VS, orders, medication list and allergy list. This can be used as a \"mini-medical record\" should they have to seek medical care while out of town.     Patient Care Team:  Zakia Fernandes MD as PCP - General  Felice Ferrer RN as Nurse Navigator (Family Practice)  Christine Richardson MD (Orthopedic Surgery)  Janelle Kumar MD (Orthopedic Surgery)  Arcelia Saldivar, RN as Ambulatory Care Navigator  Kristin Kruse Juwan Mandel MD (Otolaryngology)    Follow-up Disposition:  Return in about 3 months (around 11/21/2017) for -Due for a Medicare Wellness Visit. No future appointments.     Signed By: Nikkie Johnson MD     August 22, 2017

## 2017-08-28 ENCOUNTER — TELEPHONE (OUTPATIENT)
Dept: FAMILY MEDICINE CLINIC | Age: 72
End: 2017-08-28

## 2017-08-28 NOTE — TELEPHONE ENCOUNTER
Pt's BP is up. On  08/27 Sunday - Mid day - 195/83  And   Sunday Afternoon -183/76    Monday 186/86 - now. Pt has been on Prednisone since Tuesday 08/22. Could that make it go up?

## 2017-08-29 DIAGNOSIS — I10 ESSENTIAL HYPERTENSION: Primary | ICD-10-CM

## 2017-08-29 RX ORDER — AMLODIPINE BESYLATE 5 MG/1
5 TABLET ORAL DAILY
Qty: 30 TAB | Refills: 5 | Status: SHIPPED | OUTPATIENT
Start: 2017-08-29 | End: 2018-05-02 | Stop reason: SDUPTHER

## 2017-08-29 NOTE — TELEPHONE ENCOUNTER
Per Dr. Shearer All: I have sent in a new blood pressure medication for her called Amlodipine. She is to take her BP daily and call us in one week with the readings. I want her to f/u with us in 2 weeks. Call us if she has any side effects to the medication. Patient verbalized understanding. Appointment scheduled.

## 2017-08-29 NOTE — PROGRESS NOTES
See telephone call. New-onset hypertension. Amlodipine was started. Blood pressure check in 2 weeks.

## 2017-08-29 NOTE — TELEPHONE ENCOUNTER
Patient went to [facility] yesterday. /103 and has been higher since she saw Dr. Leslie Hickman on the 21st.  She would like to see Dr. Leslie Hickman to maybe get started on some medicine for her BP.   Please advise at [phone]

## 2017-09-11 ENCOUNTER — OFFICE VISIT (OUTPATIENT)
Dept: FAMILY MEDICINE CLINIC | Age: 72
End: 2017-09-11

## 2017-09-11 VITALS
DIASTOLIC BLOOD PRESSURE: 60 MMHG | RESPIRATION RATE: 18 BRPM | WEIGHT: 291.2 LBS | BODY MASS INDEX: 41.69 KG/M2 | HEART RATE: 84 BPM | TEMPERATURE: 97.1 F | SYSTOLIC BLOOD PRESSURE: 116 MMHG | OXYGEN SATURATION: 97 % | HEIGHT: 70 IN

## 2017-09-11 DIAGNOSIS — I10 ESSENTIAL HYPERTENSION: Primary | ICD-10-CM

## 2017-09-11 RX ORDER — ASPIRIN 81 MG/1
TABLET ORAL DAILY
COMMUNITY
End: 2019-03-07

## 2017-09-11 NOTE — PROGRESS NOTES
Chief Complaint   Patient presents with    Hypertension     Body mass index is 41.78 kg/(m^2).     Reviewed record in preparation for visit and have necessary documentation  Pt did not bring medication to office visit for review  Information was given to pt on Advanced Directives, Living Will  Information was given on Shingles Vaccine  Opportunity was given for questions  Goals that were addressed and/or need to be completed after this appointment include:     Health Maintenance Due   Topic Date Due    GLAUCOMA SCREENING Q2Y  03/18/2017    INFLUENZA AGE 9 TO ADULT  08/01/2017

## 2017-09-11 NOTE — PATIENT INSTRUCTIONS
Toby Mcmanus with [address]  [address] [place] [address],  [address] [place], [facility]  [phone]    Monitor blood pressure outside the office several times weekly at different times during the day and evening. Bring the record to me in 3 weeks for review.     Blood Pressure Record     Patient Name:  ______________________ :  ______________________    Date/Time BP Reading Pulse

## 2017-09-11 NOTE — MR AVS SNAPSHOT
Visit Information Date & Time Provider Department Dept. Phone Encounter #  
 9/11/2017  3:05 PM Rolly Monique MD Frankie Olmos [id number] Follow-up Instructions Return in about 1 month (around 10/11/2017). Upcoming Health Maintenance Date Due  
 GLAUCOMA SCREENING Q2Y 3/18/2017 INFLUENZA AGE 9 TO ADULT 8/1/2017 MEDICARE YEARLY EXAM 2/16/2018 BREAST CANCER SCRN MAMMOGRAM 11/3/2018 COLONOSCOPY 7/11/2023 DTaP/Tdap/Td series (2 - Td) 9/22/2024 Allergies as of 9/11/2017  Review Complete On: 9/11/2017 By: Rickey Rodriguez LPN Severity Noted Reaction Type Reactions Orudis [Ketoprofen] High 04/29/2010    Palpitations Singulair [Montelukast] High 04/15/2010    Rash Arthrotec 50 [Diclofenac-misoprostol]  06/22/2016    Palpitations Levaquin [Levofloxacin]  07/12/2013    Other (comments) Hip joints swelling and painful per pt report Lyrica [Pregabalin]  09/18/2014    Other (comments) Saw spiders. Current Immunizations  Reviewed on 11/25/2015 Name Date Hepatitis B Vaccine 4/20/2009 Influenza Vaccine 10/6/2016, 11/11/2015, 11/4/2014, 10/24/2013 Influenza Vaccine Whole 10/5/2011, 10/15/2010 PPD 9/20/2011 Pneumococcal Conjugate (PCV-13) 12/16/2015 Pneumococcal Vaccine (Pcv) 2/10/2010 Tdap 9/22/2014 ZZZ-RETIRED (DO NOT USE) Pneumococcal Vaccine (Unspecified Type) 12/15/2005 Not reviewed this visit You Were Diagnosed With   
  
 Codes Comments Essential hypertension    -  Primary ICD-10-CM: I10 
ICD-9-CM: 401.9 Vitals BP Pulse Temp Resp Height(growth percentile) Weight(growth percentile) 116/60 (BP 1 Location: Left arm, BP Patient Position: Sitting) 84 97.1 °F (36.2 °C) (Oral) 18 5' 10\" (1.778 m) 291 lb 3.2 oz (132.1 kg) SpO2 BMI OB Status Smoking Status 97% 41.78 kg/m2 Postmenopausal Former Smoker Vitals History BMI and BSA Data Body Mass Index Body Surface Area 41.78 kg/m 2 2.55 m 2 Preferred Pharmacy Pharmacy Name Phone [address] [facility] [address] [place] [address] [facility] [phone] Your Updated Medication List  
  
   
This list is accurate as of: 9/11/17  3:29 PM.  Always use your most recent med list.  
  
  
  
  
 acetaminophen 500 mg tablet Commonly known as:  TYLENOL Take 1,000 mg by mouth every six (6) hours as needed for Pain. albuterol 90 mcg/actuation inhaler Commonly known as:  PROVENTIL HFA, VENTOLIN HFA, PROAIR HFA Take 2 Puffs by inhalation every four (4) hours as needed for Wheezing. amLODIPine 5 mg tablet Commonly known as:  Jacraheema Couch Take 1 Tab by mouth daily. Indications: hypertension  
  
 aspirin delayed-release 81 mg tablet Take  by mouth daily. atorvastatin 10 mg tablet Commonly known as:  LIPITOR Take 1 Tab by mouth daily. Indications: hypercholesterolemia CALCIUM 600 PO Take  by mouth daily. celecoxib 200 mg capsule Commonly known as:  CeleBREX Take 1 Cap by mouth daily. COQ-10 PO Take  by mouth. FOLIC ACID PO Take 3 mg by mouth daily. * gabapentin 300 mg capsule Commonly known as:  NEURONTIN  
1 in AM, 1 at lunch AND 2 hs. NEURALGIA. Indications: NEUROPATHIC PAIN  
  
 * gabapentin 100 mg capsule Commonly known as:  NEURONTIN METHOTREXATE 25 mg by IntraMUSCular route every seven (7) days. Saturdays  
  
 omega-3s-dha-epa-fish oil 300-1,000 mg Cpdr  
Take  by mouth daily. OTHER(NON-FORMULARY) Take 8 oz by mouth daily. Bottle of \"joint juice\" which contains glucosamine. predniSONE 5 mg dose pack Commonly known as:  STERAPRED See administration instruction per 5mg dose pack  
  
 sulfaSALAzine  mg EC tablet Commonly known as:  AZULFIDINE  
  
 traMADol 50 mg tablet Commonly known as:  Susie Woodall  
 Take 1 Tab by mouth every six (6) hours as needed for Pain. Max Daily Amount: 200 mg. VITAMIN D3 4,000 unit Cap Generic drug:  cholecalciferol (vitamin D3) Take  by mouth daily. * Notice: This list has 2 medication(s) that are the same as other medications prescribed for you. Read the directions carefully, and ask your doctor or other care provider to review them with you. Follow-up Instructions Return in about 1 month (around 10/11/2017). Patient Instructions Kalyan Steele Affiliated with [address] [place] [address]., [facility], [address] [place] [address] 
[phone] Monitor blood pressure outside the office several times weekly at different times during the day and evening. Bring the record to me in 3 weeks for review. Blood Pressure Record Patient Name:  ______________________ :  ______________________ Date/Time BP Reading Pulse Introducing \[facility] [place] [facility]\"" & [facility] SERVICES! Dear Ml Gould: Thank you for requesting a [employer] account. Our records indicate that you already have an active [employer] account. You can access your account anytime at [email or web address] Did you know that you can access your hospital and ER discharge instructions at any time in [employer]? You can also review all of your test results from your hospital stay or ER visit. Additional Information If you have questions, please visit the Frequently Asked Questions section of the [employer] website at [email or web address] Remember, [employer] is NOT to be used for urgent needs. For medical emergencies, dial 911. Now available from your iPhone and Android! Please provide this summary of care documentation to your next provider. Your primary care clinician is listed as [address]. If you have any questions after today's visit, please call [phone].

## 2017-09-11 NOTE — PROGRESS NOTES
Progress Note    Patient: Cynthia Max MRN: [id number]  SSN: [id number]    YOB: 1945  Age: 70 y.o. Sex: female        Chief Complaint   Patient presents with    Hypertension         Subjective:     Encounter Diagnoses   Name Primary?  Essential hypertension: Readings from home as high as 978 systolic and as high as 695 diastolic. Reading here today is dramatically different than that. She went home to get her cuff for comparison and in fact her cuff is reading inaccurately even though it is relatively new and has a large cuff attached. She will either return or get a new cuff so that we can get accurate blood pressures for her. Yes       Current and past medical information:    Current Medications after this visit[de-identified]     Current Outpatient Prescriptions   Medication Sig    UBIDECARENONE (COQ-10 PO) Take  by mouth.  aspirin delayed-release 81 mg tablet Take  by mouth daily.  amLODIPine (NORVASC) 5 mg tablet Take 1 Tab by mouth daily. Indications: hypertension    gabapentin (NEURONTIN) 100 mg capsule     celecoxib (CELEBREX) 200 mg capsule Take 1 Cap by mouth daily.  atorvastatin (LIPITOR) 10 mg tablet Take 1 Tab by mouth daily. Indications: hypercholesterolemia    gabapentin (NEURONTIN) 300 mg capsule 1 in AM, 1 at lunch AND 2 hs. NEURALGIA. Indications: NEUROPATHIC PAIN    traMADol (ULTRAM) 50 mg tablet Take 1 Tab by mouth every six (6) hours as needed for Pain. Max Daily Amount: 200 mg.  FOLIC ACID PO Take 3 mg by mouth daily.  omega-3s-dha-epa-fish oil 300-1,000 mg cpDR Take  by mouth daily.  cholecalciferol, vitamin D3, (VITAMIN D3) 4,000 unit cap Take  by mouth daily.  METHOTREXATE 25 mg by IntraMUSCular route every seven (7) days.  Saturdays    predniSONE (STERAPRED) 5 mg dose pack See administration instruction per 5mg dose pack    sulfaSALAzine EC (AZULFIDINE) 500 mg EC tablet     acetaminophen (TYLENOL) 500 mg tablet Take 1,000 mg by mouth every six (6) hours as needed for Pain.  OTHER,NON-FORMULARY, Take 8 oz by mouth daily. Bottle of \"joint juice\" which contains glucosamine.  albuterol (PROVENTIL HFA, VENTOLIN HFA, PROAIR HFA) 90 mcg/actuation inhaler Take 2 Puffs by inhalation every four (4) hours as needed for Wheezing.  CALCIUM CARBONATE (CALCIUM 600 PO) Take  by mouth daily. No current facility-administered medications for this visit.         Patient Active Problem List    Diagnosis Date Noted    High cholesterol 04/30/2010     Priority: 1 - One    [facility] 04/15/2010     Priority: 1 - One    Unspecified asthma 04/15/2010     Priority: 1 - One    OA (osteoarthritis) 04/15/2010     Priority: 1 - One    Obesity, unspecified 04/15/2010     Priority: 1 - One    Spinal stenosis, unspecified region other than cervical 04/15/2010     Priority: 1 - One    Cardiac dysrhythmia, unspecified 04/15/2010     Priority: 2 - Two    Primary localized osteoarthritis of right hip 07/11/2016    Asthma 10/06/2015    Primary generalized (osteo)arthritis 10/06/2015    PAC (premature atrial contraction) 10/06/2015    Lumbar spinal stenosis 10/06/2015    Colon polyp, hyperplastic 06/02/2015    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[facility] 12/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       Past Medical History:   Diagnosis Date    Anemia, unspecified 4/15/2010    Cardiac dysrhythmia, unspecified 4/15/2010    PAT- not on medication/ not seeing cardiologist    High cholesterol 4/30/2010    Obesity, unspecified 4/15/2010    Osteoarthrosis, unspecified whether generalized or localized, other specified sites 4/15/2010    Rheumatoid arthritis ([facility] 4/15/2010    Spinal stenosis, unspecified region other than cervical 4/15/2010    Unspecified asthma 4/15/2010    rheumatoid lung disease       Allergies   Allergen Reactions    Orudis [Ketoprofen] Palpitations    Singulair [Montelukast] Rash    Arthrotec 50 [Diclofenac-Misoprostol] Palpitations    Levaquin [Levofloxacin] Other (comments)     Hip joints swelling and painful per pt report    Lyrica [Pregabalin] Other (comments)     Saw spiders. Past Surgical History:   Procedure Laterality Date    CHEST SURGERY PROCEDURE UNLISTED  lat 7866'A    lung biopsy- diagnosed with rheumatoid lung disease    ENDOSCOPY, COLON, DIAGNOSTIC      7-10-13    HX ORTHOPAEDIC Right 2002    rotator cuff repair       Social History     Social History    Marital status: SINGLE     Spouse name: N/A    Number of children: N/A    Years of education: N/A     Social History Main Topics    Smoking status: Former Smoker     Packs/day: 1.00     Years: 20.00     Quit date: 4/30/1985    Smokeless tobacco: Never Used    Alcohol use No    Drug use: No    Sexual activity: No     Other Topics Concern    None     Social History Narrative       Review of Systems   Constitutional: Negative. Negative for chills, fever, malaise/fatigue and weight loss. Respiratory: Negative. Negative for cough, hemoptysis, sputum production and shortness of breath. Cardiovascular: Negative. Negative for chest pain, palpitations and orthopnea. Gastrointestinal: Negative for blood in stool. Musculoskeletal: Positive for back pain and joint pain. Negative for myalgias and neck pain. She is having trouble sitting at [employer] because it makes her back hurt. I have advised her to get a desk jockey therapeutic cushion to sit on. I think this will help her back as well as comfort level. Skin: Negative for rash. Neurological: Negative. Negative for dizziness, tingling, tremors, weakness and headaches. Endo/Heme/Allergies: Negative. Psychiatric/Behavioral: Negative. Negative for depression. Objective:     Vitals:    09/11/17 1503   BP: 116/60   Pulse: 84   Resp: 18   Temp: 97.1 °F (36.2 °C)   TempSrc: Oral   SpO2: 97%   Weight: 291 lb 3.2 oz (132.1 kg)   Height: 5' 10\" (1.778 m)      Body mass index is 41.78 kg/(m^2).     Physical Exam   Constitutional: She is oriented to person, place, and time and well-developed, well-nourished, and in no distress. HENT:   Head: Normocephalic and atraumatic. Cardiovascular: Normal rate and regular rhythm. Pulmonary/Chest: Effort normal.   Musculoskeletal: She exhibits no edema. Neurological: She is alert and oriented to person, place, and time. Skin: No rash noted. Psychiatric: Memory and affect normal.         Health Maintenance Due   Topic Date Due    GLAUCOMA SCREENING Q2Y  03/18/2017    INFLUENZA AGE 9 TO ADULT  08/01/2017         Assessment and orders:       ICD-10-CM ICD-9-CM    1. Essential hypertension I10 401.9  no changes for now unless new blood pressure cuff shows she is having spikes at home. Plan of care:  Discussed diagnoses in detail with patient. Medication risks/benefits/side effects discussed with patient. All of the patient's questions were addressed. The patient understands and agrees with our plan of care. The patient knows to call back if they are unsure of or forget any changes we discussed today or if the symptoms change. The patient received an After-Visit Summary which contains VS, orders, medication list and allergy list. This can be used as a \"mini-medical record\" should they have to seek medical care while out of town. Patient Care Team:  Zakia Fernandes MD as PCP - General  Felice Ferrer RN as Nurse Navigator (Family Practice)  Christine Richardson MD (Orthopedic Surgery)  Janelle Kumar MD (Orthopedic Surgery)  Arcelia Saldivar, RN as Ambulatory Care Navigator  Billee Schwab, MD (Otolaryngology)    Follow-up Disposition:  Return in about 1 month (around 10/11/2017). No future appointments. Signed By: Zakia Fernandes MD     September 11, 2017            The patient reports:  taking medications as instructed, no medication side effects noted, no TIA's, no chest pain on exertion, no dyspnea on exertion, no swelling of ankles.      Lab Results Component Value Date/Time    Sodium 142 08/21/2017 12:09 PM    Potassium 4.4 08/21/2017 12:09 PM    Chloride 104 08/21/2017 12:09 PM    CO2 26 08/21/2017 12:09 PM    Anion gap 2 07/13/2016 03:25 AM    Glucose 73 08/21/2017 12:09 PM    BUN 16 08/21/2017 12:09 PM    Creatinine 0.61 08/21/2017 12:09 PM    BUN/Creatinine ratio 26 08/21/2017 12:09 PM    GFR est  08/21/2017 12:09 PM    GFR est non-AA 92 08/21/2017 12:09 PM    Calcium 9.2 08/21/2017 12:09 PM    Bilirubin, total 0.5 08/21/2017 12:09 PM    AST (SGOT) 17 08/21/2017 12:09 PM    Alk. phosphatase 86 08/21/2017 12:09 PM    Protein, total 7.1 08/21/2017 12:09 PM    Albumin 4.0 08/21/2017 12:09 PM    Globulin 4.2 04/19/2015 07:37 AM    A-G Ratio 1.3 08/21/2017 12:09 PM    ALT (SGPT) 13 08/21/2017 12:09 PM     Our goal is to normalize the blood pressure to decrease the risks of strokes and heart attacks. The patient is in agreement with the plan.

## 2017-12-04 ENCOUNTER — OFFICE VISIT (OUTPATIENT)
Dept: FAMILY MEDICINE CLINIC | Age: 72
End: 2017-12-04

## 2017-12-04 VITALS
WEIGHT: 286 LBS | DIASTOLIC BLOOD PRESSURE: 78 MMHG | SYSTOLIC BLOOD PRESSURE: 152 MMHG | OXYGEN SATURATION: 95 % | BODY MASS INDEX: 40.94 KG/M2 | TEMPERATURE: 98.4 F | RESPIRATION RATE: 22 BRPM | HEART RATE: 84 BPM | HEIGHT: 70 IN

## 2017-12-04 DIAGNOSIS — M05.10 RHEUMATOID LUNG DISEASE (HCC): Chronic | ICD-10-CM

## 2017-12-04 DIAGNOSIS — J45.909 ACUTE ASTHMATIC BRONCHITIS: Primary | ICD-10-CM

## 2017-12-04 DIAGNOSIS — R03.0 ELEVATED BP WITHOUT DIAGNOSIS OF HYPERTENSION: ICD-10-CM

## 2017-12-04 PROBLEM — E66.01 OBESITY, MORBID (HCC): Status: ACTIVE | Noted: 2017-12-04

## 2017-12-04 RX ORDER — CEFPROZIL 500 MG/1
500 TABLET, FILM COATED ORAL 2 TIMES DAILY
Qty: 20 TAB | Refills: 0 | Status: SHIPPED | OUTPATIENT
Start: 2017-12-04 | End: 2017-12-11 | Stop reason: SDUPTHER

## 2017-12-04 RX ORDER — ALBUTEROL SULFATE 0.83 MG/ML
2.5 SOLUTION RESPIRATORY (INHALATION)
Qty: 24 EACH | Refills: 5 | Status: SHIPPED | OUTPATIENT
Start: 2017-12-04 | End: 2018-12-14 | Stop reason: SDUPTHER

## 2017-12-04 RX ORDER — PREDNISONE 20 MG/1
20 TABLET ORAL 2 TIMES DAILY
Qty: 10 TAB | Refills: 1 | Status: SHIPPED | OUTPATIENT
Start: 2017-12-04 | End: 2017-12-11 | Stop reason: SDUPTHER

## 2017-12-04 RX ORDER — ALBUTEROL SULFATE 90 UG/1
2 AEROSOL, METERED RESPIRATORY (INHALATION)
Qty: 1 INHALER | Refills: 3 | Status: SHIPPED | OUTPATIENT
Start: 2017-12-04 | End: 2018-12-31 | Stop reason: SDUPTHER

## 2017-12-04 NOTE — MR AVS SNAPSHOT
Visit Information Date & Time Provider Department Dept. Phone Encounter #  
 12/4/2017  9:50 AM Malika Shankar MD [facility] [id number] Follow-up Instructions Return in about 1 week (around 12/11/2017). Your Appointments 2/7/2018 11:30 AM  
Medicare Physical with Malika Shankar MD  
[address]) Appt Note: -letter mailed [address] [address]  
[place] [address] [address] Upcoming Health Maintenance Date Due  
 GLAUCOMA SCREENING Q2Y 3/18/2017 Influenza Age 5 to Adult 8/1/2017 MEDICARE YEARLY EXAM 2/16/2018 BREAST CANCER SCRN MAMMOGRAM 11/3/2018 COLONOSCOPY 7/11/2023 DTaP/Tdap/Td series (2 - Td) 9/22/2024 Allergies as of 12/4/2017  Review Complete On: 12/4/2017 By: Malika Shankar MD  
  
 Severity Noted Reaction Type Reactions Orudis [Ketoprofen] High 04/29/2010    Palpitations Singulair [Montelukast] High 04/15/2010    Rash Arthrotec 50 [Diclofenac-misoprostol]  06/22/2016    Palpitations Levaquin [Levofloxacin]  07/12/2013    Other (comments) Hip joints swelling and painful per pt report Lyrica [Pregabalin]  09/18/2014    Other (comments) Saw spiders. Current Immunizations  Reviewed on 11/25/2015 Name Date Hepatitis B Vaccine 4/20/2009 Influenza Vaccine 10/6/2016, 11/11/2015, 11/4/2014, 10/24/2013 Influenza Vaccine Whole 10/5/2011, 10/15/2010 PPD 9/20/2011 Pneumococcal Conjugate (PCV-13) 12/16/2015 Pneumococcal Vaccine (Pcv) 2/10/2010 Tdap 9/22/2014 ZZZ-RETIRED (DO NOT USE) Pneumococcal Vaccine (Unspecified Type) 12/15/2005 Not reviewed this visit You Were Diagnosed With   
  
 Codes Comments Acute asthmatic bronchitis    -  Primary ICD-10-CM: J45.909 ICD-9-CM: 493.90   
 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[facility]     ICD-10-CM: M05.10 ICD-9-CM: 714.81 Elevated BP without diagnosis of hypertension     ICD-10-CM: R03.0 ICD-9-CM: 796.2 Vitals BP Pulse Temp Resp Height(growth percentile) Weight(growth percentile) 152/78 (BP 1 Location: Left arm, BP Patient Position: Sitting) 84 98.4 °F (36.9 °C) (Oral) 22 5' 10\" (1.778 m) 286 lb (129.7 kg) SpO2 BMI OB Status Smoking Status 95% 41.04 kg/m2 Postmenopausal Former Smoker Vitals History BMI and BSA Data Body Mass Index Body Surface Area 41.04 kg/m 2 2.53 m 2 [facility]erred [facility] [address] [facility] [address] [facility] [phone] Your Updated Medication List  
  
   
This list is accurate as of: 12/4/17 10:29 AM.  Always use your most recent med list.  
  
  
  
  
 acetaminophen 500 mg tablet Commonly known as:  TYLENOL Take 1,000 mg by mouth every six (6) hours as needed for Pain. * albuterol 90 mcg/actuation inhaler Commonly known as:  PROVENTIL HFA, VENTOLIN HFA, PROAIR HFA Take 2 Puffs by inhalation every four (4) hours as needed for Wheezing. * albuterol 2.5 mg /3 mL (0.083 %) nebulizer solution Commonly known as:  PROVENTIL VENTOLIN  
3 mL by Nebulization route every four (4) hours as needed for Wheezing. Indications: Acute Asthma Attack  
  
 amLODIPine 5 mg tablet Commonly known as:  Lennis Eagles Take 1 Tab by mouth daily. Indications: hypertension  
  
 aspirin delayed-release 81 mg tablet Take  by mouth daily. atorvastatin 10 mg tablet Commonly known as:  LIPITOR Take 1 Tab by mouth daily. Indications: hypercholesterolemia CALCIUM 600 PO Take  by mouth daily. cefPROZIL 500 mg tablet Commonly known as:  CEFZIL Take 1 Tab by mouth two (2) times a day for 10 days. celecoxib 200 mg capsule Commonly known as:  CeleBREX Take 1 Cap by mouth daily.   
  
 COQ-10 PO  
 Take  by mouth. FOLIC ACID PO Take 3 mg by mouth daily. * gabapentin 300 mg capsule Commonly known as:  NEURONTIN  
1 in AM, 1 at lunch AND 2 hs. NEURALGIA. Indications: NEUROPATHIC PAIN  
  
 * gabapentin 100 mg capsule Commonly known as:  NEURONTIN METHOTREXATE 25 mg by IntraMUSCular route every seven (7) days. Saturdays  
  
 omega-3s-dha-epa-fish oil 300-1,000 mg Cpdr  
Take  by mouth daily. OTHER(NON-FORMULARY) Take 8 oz by mouth daily. Bottle of \"joint juice\" which contains glucosamine. * predniSONE 5 mg dose pack Commonly known as:  STERAPRED See administration instruction per 5mg dose pack * predniSONE 20 mg tablet Commonly known as:  Bosie Spittle Take 1 Tab by mouth two (2) times a day. For 5 days. sulfaSALAzine  mg EC tablet Commonly known as:  AZULFIDINE  
  
 traMADol 50 mg tablet Commonly known as:  ULTRAM  
Take 1 Tab by mouth every six (6) hours as needed for Pain. Max Daily Amount: 200 mg. VITAMIN D3 4,000 unit Cap Generic drug:  cholecalciferol (vitamin D3) Take  by mouth daily. * Notice: This list has 6 medication(s) that are the same as other medications prescribed for you. Read the directions carefully, and ask your doctor or other care provider to review them with you. Prescriptions Sent to Pharmacy Refills  
 albuterol (PROVENTIL HFA, VENTOLIN HFA, PROAIR HFA) 90 mcg/actuation inhaler 3 Sig: Take 2 Puffs by inhalation every four (4) hours as needed for Wheezing. Class: Normal  
 Pharmacy: [address] [place] [address] Ph #: [phone] Route: Inhalation  
 cefPROZIL (CEFZIL) 500 mg tablet 0 Sig: Take 1 Tab by mouth two (2) times a day for 10 days. Class: Normal  
 Pharmacy: [address] [place] [address] Ph #: [phone]  Route: Oral  
 albuterol (PROVENTIL VENTOLIN) 2.5 mg /3 mL (0.083 %) nebulizer solution 5  
 Sig: 3 mL by Nebulization route every four (4) hours as needed for Wheezing. Indications: Acute Asthma Attack Class: Normal  
 Pharmacy: [address] [place] [address] #: [phone] Route: Nebulization  
 predniSONE (DELTASONE) 20 mg tablet 1 Sig: Take 1 Tab by mouth two (2) times a day. For 5 days. Class: Normal  
 Pharmacy: [address] [place] [address] #: [phone] Route: Oral  
  
Follow-up Instructions Return in about 1 week (around 12/11/2017). Patient Instructions Preventing Falls: Care Instructions Your Care Instructions Getting around your home safely can be a challenge if you have injuries or health problems that make it easy for you to fall. Loose rugs and furniture in walkways are among the dangers for many older people who have problems walking or who have poor eyesight. People who have conditions such as arthritis, osteoporosis, or dementia also have to be careful not to fall. You can make your home safer with a few simple measures. Follow-up care is a key part of your treatment and safety. Be sure to make and go to all appointments, and call your doctor if you are having problems. It's also a good idea to know your test results and keep a list of the medicines you take. How can you care for yourself at home? Taking care of yourself · You may get dizzy if you do not drink enough water. To prevent dehydration, drink plenty of fluids, enough so that your urine is light yellow or clear like water. Choose water and other caffeine-free clear liquids. If you have kidney, heart, or liver disease and have to limit fluids, talk with your doctor before you increase the amount of fluids you drink. · Exercise regularly to improve your strength, muscle tone, and balance. Walk if you can. Swimming may be a good choice if you cannot walk easily. · Have your vision and hearing checked each year or any time you notice a change. If you have trouble seeing and hearing, you might not be able to avoid objects and could lose your balance. · Know the side effects of the medicines you take. Ask your doctor or pharmacist whether the medicines you take can affect your balance. Sleeping pills or sedatives can affect your balance. · Limit the amount of alcohol you drink. Alcohol can impair your balance and other senses. · Ask your doctor whether calluses or corns on your feet need to be removed. If you wear loose-fitting shoes because of calluses or corns, you can lose your balance and fall. · Talk to your doctor if you have numbness in your feet. Preventing falls at home · Remove raised doorway thresholds, throw rugs, and clutter. Repair loose carpet or raised areas in the floor. · Move furniture and electrical cords to keep them out of walking paths. · Use nonskid floor wax, and wipe up spills right away, especially on ceramic tile floors. · If you use a walker or cane, put rubber tips on it. If you use crutches, clean the bottoms of them regularly with an abrasive pad, such as steel wool. · Keep your house well lit, especially Luciana Push, and outside walkways. Use night-lights in areas such as hallways and bathrooms. Add extra light switches or use remote switches (such as switches that go on or off when you clap your hands) to make it easier to turn lights on if you have to get up during the night. · Install sturdy handrails on stairways. · Move items in your cabinets so that the things you use a lot are on the lower shelves (about waist level). · Keep a cordless phone and a flashlight with new batteries by your bed. If possible, put a phone in each of the main rooms of your house, or carry a cell phone in case you fall and cannot reach a phone. Or, you can wear a device around your neck or wrist. You push a button that sends a signal for help. · Wear low-heeled shoes that fit well and give your feet good support. Use footwear with nonskid soles. Check the heels and soles of your shoes for wear. Repair or replace worn heels or soles. · Do not wear socks without shoes on wood floors. · Walk on the grass when the sidewalks are slippery. If you live in an area that gets snow and ice in the winter, sprinkle salt on slippery steps and sidewalks. Preventing falls in the bath · Install grab bars and nonskid mats inside and outside your shower or tub and near the toilet and sinks. · Use shower chairs and bath benches. · Use a hand-held shower head that will allow you to sit while showering. · Get into a tub or shower by putting the weaker leg in first. Get out of a tub or shower with your strong side first. 
· Repair loose toilet seats and consider installing a raised toilet seat to make getting on and off the toilet easier. · Keep your bathroom door unlocked while you are in the shower. Where can you learn more? Go to http://antonioDiavibeadrien.info/. Enter [id number] [id number] [phone] in the search box to learn more about \"Preventing Falls: Care Instructions. \" Current as of: May 12, 2017 Content Version: 11.4 © 6321-9231 [employer]. Care instructions adapted under license by [employer] (which disclaims liability or warranty for this information). If you have questions about a medical condition or this instruction, always ask your healthcare professional. [place] [address] any warranty or liability for your use of this information. Preventing Outdoor Falls: Care Instructions Your Care Instructions Worries about falls don't need to keep you indoors. Outdoor activities like walking have big benefits for your health. You will need to watch your step and learn a few safety measures.  
If you are worried about having a fall outdoors, ask your doctor about exercises, classes, or physical therapy that may help. You can learn ways to gain strength, flexibility, and balance. Ask if it might help to use a cane or walker. You can make your time outdoors safer with a few simple measures. Follow-up care is a key part of your treatment and safety. Be sure to make and go to all appointments, and call your doctor if you are having problems. It's also a good idea to know your test results and keep a list of the medicines you take. How can you prevent falls outdoors? · Wear shoes with firm soles and low heels. If you have to walk on an icy surface, use grippers that can be worn over your shoes in bad weather. · Be extra careful if weather is bad. Walk on the grass when the sidewalks are slick. If you live in a place that gets snow and ice in the winter, sprinkle salt on slippery stairs and sidewalks. · Be careful getting on or off buses and trains or getting in and out of cars. If handrails are available, use them. · Be careful when you cross the street. Look for crosswalks or places where curb cuts or ramps are present. · Try not to hurry, especially if you are carrying something. · Be cautious in parking lots or garages. There may be curbs or changes in pavement, or the height of the pavement may vary. · Make sure to wear the correct eyeglasses, if you need them. Reading glasses or bifocals can make it harder to see hazards that might be in your way. · If you are walking outdoors for exercise, try to: 
¨ Walk in well-lighted, well-maintained areas. These include high school or college tracks, shopping malls, and public spaces. ¨ Walk with a partner. ¨ Watch out for cracked sidewalks, curbs, changes in the height of the pavement, exposed tree roots, and debris such as fallen leaves or branches. Where can you learn more? Go to http://antonio-adrien.info/.  
Enter C529 in the search box to learn more about \"Preventing Outdoor Falls: Care Instructions. \" Current as of: May 12, 2017 Content Version: 11.4 © 0664-0192 [employer]. Care instructions adapted under license by [employer] (which disclaims liability or warranty for this information). If you have questions about a medical condition or this instruction, always ask your healthcare professional. [address]  any warranty or liability for your use of this information. Introducing [facility] & HEALTH SERVICES! Dear Uyen Borden: Thank you for requesting a [employer] account. Our records indicate that you already have an active [employer] account. You can access your account anytime at [email or web address] Did you know that you can access your hospital and ER discharge instructions at any time in [employer]? You can also review all of your test results from your hospital stay or ER visit. Additional Information If you have questions, please visit the Frequently Asked Questions section of the [employer] website at [email or web address] Remember, [employer] is NOT to be used for urgent needs. For medical emergencies, dial 911. Now available from your iPhone and Android! Please provide this summary of care documentation to your next provider. Your primary care clinician is listed as [address]. If you have any questions after today's visit, please call [phone].

## 2017-12-04 NOTE — PATIENT INSTRUCTIONS
Preventing Falls: Care Instructions  Your Care Instructions    Getting around your home safely can be a challenge if you have injuries or health problems that make it easy for you to fall. Loose rugs and furniture in walkways are among the dangers for many older people who have problems walking or who have poor eyesight. People who have conditions such as arthritis, osteoporosis, or dementia also have to be careful not to fall. You can make your home safer with a few simple measures. Follow-up care is a key part of your treatment and safety. Be sure to make and go to all appointments, and call your doctor if you are having problems. It's also a good idea to know your test results and keep a list of the medicines you take. How can you care for yourself at home? Taking care of yourself  · You may get dizzy if you do not drink enough water. To prevent dehydration, drink plenty of fluids, enough so that your urine is light yellow or clear like water. Choose water and other caffeine-free clear liquids. If you have kidney, heart, or liver disease and have to limit fluids, talk with your doctor before you increase the amount of fluids you drink. · Exercise regularly to improve your strength, muscle tone, and balance. Walk if you can. Swimming may be a good choice if you cannot walk easily. · Have your vision and hearing checked each year or any time you notice a change. If you have trouble seeing and hearing, you might not be able to avoid objects and could lose your balance. · Know the side effects of the medicines you take. Ask your doctor or pharmacist whether the medicines you take can affect your balance. Sleeping pills or sedatives can affect your balance. · Limit the amount of alcohol you drink. Alcohol can impair your balance and other senses. · Ask your doctor whether calluses or corns on your feet need to be removed.  If you wear loose-fitting shoes because of calluses or corns, you can lose your balance and fall. · Talk to your doctor if you have numbness in your feet. Preventing falls at home  · Remove raised doorway thresholds, throw rugs, and clutter. Repair loose carpet or raised areas in the floor. · Move furniture and electrical cords to keep them out of walking paths. · Use nonskid floor wax, and wipe up spills right away, especially on ceramic tile floors. · If you use a walker or cane, put rubber tips on it. If you use crutches, clean the bottoms of them regularly with an abrasive pad, such as steel wool. · Keep your house well lit, especially [place], and outside walkways. Use night-lights in areas such as hallways and bathrooms. Add extra light switches or use remote switches (such as switches that go on or off when you clap your hands) to make it easier to turn lights on if you have to get up during the night. · Install sturdy handrails on stairways. · Move items in your cabinets so that the things you use a lot are on the lower shelves (about waist level). · Keep a cordless phone and a flashlight with new batteries by your bed. If possible, put a phone in each of the main rooms of your house, or carry a cell phone in case you fall and cannot reach a phone. Or, you can wear a device around your neck or wrist. You push a button that sends a signal for help. · Wear low-heeled shoes that fit well and give your feet good support. Use footwear with nonskid soles. Check the heels and soles of your shoes for wear. Repair or replace worn heels or soles. · Do not wear socks without shoes on wood floors. · Walk on the grass when the sidewalks are slippery. If you live in an area that gets snow and ice in the winter, sprinkle salt on slippery steps and sidewalks. Preventing falls in the bath  · Install grab bars and nonskid mats inside and outside your shower or tub and near the toilet and sinks. · Use shower chairs and bath benches.   · Use a hand-held shower head that will allow you to sit while showering. · Get into a tub or shower by putting the weaker leg in first. Get out of a tub or shower with your strong side first.  · Repair loose toilet seats and consider installing a raised toilet seat to make getting on and off the toilet easier. · Keep your bathroom door unlocked while you are in the shower. Where can you learn more? Go to http://antonio-adrien.info/. Enter 0476 [id number] 69 [id number] in the search box to learn more about \"Preventing Falls: Care Instructions. \"  Current as of: May 12, 2017  Content Version: 11.4  © 1614-2549 [employer]. Care instructions adapted under license by [employer] (which disclaims liability or warranty for this information). If you have questions about a medical condition or this instruction, always ask your healthcare professional. [place] [address] any warranty or liability for your use of this information. Preventing Outdoor Falls: Care Instructions  Your Care Instructions    Worries about falls don't need to keep you indoors. Outdoor activities like walking have big benefits for your health. You will need to watch your step and learn a few safety measures. If you are worried about having a fall outdoors, ask your doctor about exercises, classes, or physical therapy that may help. You can learn ways to gain strength, flexibility, and balance. Ask if it might help to use a cane or walker. You can make your time outdoors safer with a few simple measures. Follow-up care is a key part of your treatment and safety. Be sure to make and go to all appointments, and call your doctor if you are having problems. It's also a good idea to know your test results and keep a list of the medicines you take. How can you prevent falls outdoors? · Wear shoes with firm soles and low heels. If you have to walk on an icy surface, use grippers that can be worn over your shoes in bad weather.   · Be extra careful if weather is bad. Walk on the grass when the sidewalks are slick. If you live in a place that gets snow and ice in the winter, sprinkle salt on slippery stairs and sidewalks. · Be careful getting on or off buses and trains or getting in and out of cars. If handrails are available, use them. · Be careful when you cross the street. Look for crosswalks or places where curb cuts or ramps are present. · Try not to hurry, especially if you are carrying something. · Be cautious in parking lots or garages. There may be curbs or changes in pavement, or the height of the pavement may vary. · Make sure to wear the correct eyeglasses, if you need them. Reading glasses or bifocals can make it harder to see hazards that might be in your way. · If you are walking outdoors for exercise, try to:  ¨ Walk in well-lighted, well-maintained areas. These include high school or college tracks, shopping malls, and public spaces. ¨ Walk with a partner. ¨ Watch out for cracked sidewalks, curbs, changes in the height of the pavement, exposed tree roots, and debris such as fallen leaves or branches. Where can you learn more? Go to http://antonio-adrien.info/. Enter C169 in the search box to learn more about \"Preventing Outdoor Falls: Care Instructions. \"  Current as of: May 12, 2017  Content Version: 11.4  © 5185-0005 [employer]. Care instructions adapted under license by [employer] (which disclaims liability or warranty for this information). If you have questions about a medical condition or this instruction, always ask your healthcare professional. [place] [address] any warranty or liability for your use of this information.

## 2017-12-04 NOTE — PROGRESS NOTES
Progress Note    Patient: Magalie Edwards MRN: [id number]  SSN: [id number]    YOB: 1945  Age: 67 y.o. Sex: female        Chief Complaint   Patient presents with    Hypertension    Cough     Possible bronchitis? Subjective:     Encounter Diagnoses   Name Primary?  Acute asthmatic bronchitis:   Duration of symptoms:2 weeks, started as an upper respiratory infection. Smoker: No   Fever: No   Chills: No   Sweats: No   Shortness of breath: Yes- last night   Wheezing: Yes, last night   History of asthma: No but she has rheumatoid lung disease. Sputum: Yellow-green     Yes    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HCC)-predisposes her to get lung infections as well as wheeze         Elevated BP without diagnosis of hypertension: She has had some elevated blood pressures at home in the 150-155 range. We will need to reassess this in a week after her acute infection is better  BP Readings from Last 3 Encounters:   12/04/17 152/78   09/11/17 116/60   08/21/17 148/73           Current and past medical information:    Current Medications after this visit[de-identified]   Current Outpatient Prescriptions   Medication Sig    albuterol (PROVENTIL HFA, VENTOLIN HFA, PROAIR HFA) 90 mcg/actuation inhaler Take 2 Puffs by inhalation every four (4) hours as needed for Wheezing.  cefPROZIL (CEFZIL) 500 mg tablet Take 1 Tab by mouth two (2) times a day for 10 days.  albuterol (PROVENTIL VENTOLIN) 2.5 mg /3 mL (0.083 %) nebulizer solution 3 mL by Nebulization route every four (4) hours as needed for Wheezing. Indications: Acute Asthma Attack    predniSONE (DELTASONE) 20 mg tablet Take 1 Tab by mouth two (2) times a day. For 5 days.  UBIDECARENONE (COQ-10 PO) Take  by mouth.  amLODIPine (NORVASC) 5 mg tablet Take 1 Tab by mouth daily. Indications: hypertension    gabapentin (NEURONTIN) 100 mg capsule     celecoxib (CELEBREX) 200 mg capsule Take 1 Cap by mouth daily.     atorvastatin (LIPITOR) 10 mg tablet Take 1 Tab by mouth daily. Indications: hypercholesterolemia    gabapentin (NEURONTIN) 300 mg capsule 1 in AM, 1 at lunch AND 2 hs. NEURALGIA. Indications: NEUROPATHIC PAIN    traMADol (ULTRAM) 50 mg tablet Take 1 Tab by mouth every six (6) hours as needed for Pain. Max Daily Amount: 200 mg.  FOLIC ACID PO Take 3 mg by mouth daily.  omega-3s-dha-epa-fish oil 300-1,000 mg cpDR Take  by mouth daily.  cholecalciferol, vitamin D3, (VITAMIN D3) 4,000 unit cap Take  by mouth daily.  METHOTREXATE 25 mg by IntraMUSCular route every seven (7) days. Saturdays    aspirin delayed-release 81 mg tablet Take  by mouth daily.  predniSONE (STERAPRED) 5 mg dose pack See administration instruction per 5mg dose pack    sulfaSALAzine EC (AZULFIDINE) 500 mg EC tablet     acetaminophen (TYLENOL) 500 mg tablet Take 1,000 mg by mouth every six (6) hours as needed for Pain.  OTHER,NON-FORMULARY, Take 8 oz by mouth daily. Bottle of \"joint juice\" which contains glucosamine.  CALCIUM CARBONATE (CALCIUM 600 PO) Take  by mouth daily. No current facility-administered medications for this visit.         Patient Active Problem List    Diagnosis Date Noted    High cholesterol 04/30/2010     Priority: 1 - One    Rheumatoid arthritis, adult ([facility] [address] 04/15/2010     Priority: 1 - One    Unspecified asthma(493.90) 04/15/2010     Priority: 1 - One    OA (osteoarthritis) 04/15/2010     Priority: 1 - One    Obesity, unspecified 04/15/2010     Priority: 1 - One    Spinal stenosis, unspecified region other than cervical 04/15/2010     Priority: 1 - One    Cardiac dysrhythmia, unspecified 04/15/2010     Priority: 2 - Two    Obesity, morbid ([facility] [address] 12/04/2017    Primary localized osteoarthritis of right hip 07/11/2016    Asthma 10/06/2015    Primary generalized (osteo)arthritis 10/06/2015    PAC (premature atrial contraction) 10/06/2015    Lumbar spinal stenosis 10/06/2015    Colon polyp, hyperplastic 06/02/2015    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[facility] [address] 12/15/2010       Past Medical History:   Diagnosis Date    Anemia, unspecified 4/15/2010    Cardiac dysrhythmia, unspecified 4/15/2010    PAT- not on medication/ not seeing cardiologist    High cholesterol 4/30/2010    Obesity, unspecified 4/15/2010    Osteoarthrosis, unspecified whether generalized or localized, other specified sites 4/15/2010    Rheumatoid arthritis(714.0) 4/15/2010    Spinal stenosis, unspecified region other than cervical 4/15/2010    Unspecified asthma(493.90) 4/15/2010    rheumatoid lung disease       Allergies   Allergen Reactions    Orudis [Ketoprofen] Palpitations    Singulair [Montelukast] Rash    Arthrotec 50 [Diclofenac-Misoprostol] Palpitations    Levaquin [Levofloxacin] Other (comments)     Hip joints swelling and painful per pt report    Lyrica [Pregabalin] Other (comments)     Saw spiders. Past Surgical History:   Procedure Laterality Date    CHEST SURGERY PROCEDURE UNLISTED  lat 7742'R    lung biopsy- diagnosed with rheumatoid lung disease    ENDOSCOPY, COLON, DIAGNOSTIC      7-10-13    HX ORTHOPAEDIC Right 2002    rotator cuff repair       Social History     Social History    Marital status: SINGLE     Spouse name: N/A    Number of children: N/A    Years of education: N/A     Social History Main Topics    Smoking status: Former Smoker     Packs/day: 1.00     Years: 20.00     Quit date: 4/30/1985    Smokeless tobacco: Never Used    Alcohol use No    Drug use: No    Sexual activity: No     Other Topics Concern    None     Social History Narrative       Review of Systems   Constitutional: Negative. Negative for chills, fever, malaise/fatigue and weight loss. HENT: Negative. Negative for hearing loss. Eyes: Negative. Negative for blurred vision and double vision. Respiratory: Positive for cough, sputum production, shortness of breath and wheezing. Negative for hemoptysis.          And severe wheezing last night and did not have her inhaler. Cardiovascular: Negative. Negative for chest pain, palpitations and orthopnea. Gastrointestinal: Negative. Negative for abdominal pain, blood in stool, heartburn, nausea and vomiting. Genitourinary: Negative. Negative for dysuria, frequency and urgency. Musculoskeletal: Positive for back pain and joint pain. Negative for myalgias and neck pain. She walks with a cane in her left hand. She walks leaning forward. She is taking aqua therapy for weak muscles that she can stand up straighter. She has severe rheumatoid arthritis. She is status post hip replacement. She has been off her methotrexate for 2 weeks trying to fight this respiratory infection. We will keep her off of it another week since I am starting prednisone. Skin: Negative. Negative for rash. Neurological: Negative. Negative for dizziness, tingling, tremors, weakness and headaches. Endo/Heme/Allergies: Negative. Psychiatric/Behavioral: Negative. Negative for depression. Objective:     Vitals:    12/04/17 1006   BP: 152/78   Pulse: 84   Resp: 22   Temp: 98.4 °F (36.9 °C)   TempSrc: Oral   SpO2: 95%   Weight: 286 lb (129.7 kg)   Height: 5' 10\" (1.778 m)      Body mass index is 41.04 kg/(m^2). Physical Exam   Constitutional: She is oriented to person, place, and time and well-developed, well-nourished, and in no distress. No distress. HENT:   Head: Normocephalic and atraumatic. Mouth/Throat: Oropharynx is clear and moist.   Eyes: Conjunctivae are normal.   Neck: No tracheal deviation present. No thyromegaly present. Cardiovascular: Normal rate, regular rhythm and normal heart sounds. No murmur heard. Pulmonary/Chest: Effort normal. No respiratory distress. She has faint wheezing bilaterally. She has wet rhonchi with coughing. Her respiratory rate is slightly elevated but her pulse ox is normal.   Abdominal: Soft. She exhibits no distension.    Lymphadenopathy:     She has no cervical adenopathy. Neurological: She is alert and oriented to person, place, and time. Skin: Skin is warm. No rash noted. She is not diaphoretic. No erythema. Psychiatric: Mood and affect normal.   Nursing note and vitals reviewed. Health Maintenance Due   Topic Date Due    GLAUCOMA SCREENING Q2Y  03/18/2017    Influenza Age 5 to Adult  08/01/2017         Assessment and orders:       ICD-10-CM ICD-9-CM    1. Acute asthmatic bronchitis: severe    Patient was seen today in the office and we discussed ordering a nebulizer for her due to her Rheumatoid Lung Disease. J45.909 493.90  Cefzil 500 mg twice daily ×10 days  Albuterol inhaler every 6 hours as needed  Prednisone 20 mg twice daily for 5 days  Home nebulizer kit with albuterol treatment every 4 hours as needed     2. 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HCC)-predisposes her to get secondary bronchial infection   M05.10 714.81  prednisone should help this also   3. Elevated BP without diagnosis of hypertension-reassess next week. R03.0 796.2  hold off on starting any blood pressure medication while sick         Plan of care:  Discussed diagnoses in detail with patient. Medication risks/benefits/side effects discussed with patient. All of the patient's questions were addressed. The patient understands and agrees with our plan of care. The patient knows to call back if they are unsure of or forget any changes we discussed today or if the symptoms change. The patient received an After-Visit Summary which contains VS, orders, medication list and allergy list. This can be used as a \"mini-medical record\" should they have to seek medical care while out of town.     Patient Care Team:  Sylvia Camarillo MD as PCP - General  Maame Aviles RN as Nurse Navigator (Family Practice)  Saurav Eden MD (Orthopedic Surgery)  Annika Hartman MD (Orthopedic Surgery)  Aaron Cummings RN as Ambulatory Care Navigator  Betito Romero MD (Otolaryngology)    Follow-up Disposition:  Return in about 1 week (around 12/11/2017).     Future Appointments  Date Time Provider David [place]   2/7/2018 11:30 AM Shonda Byrne MD [facility] SCHED       Signed By: Shonda Byrne MD     December 4, 2017

## 2017-12-04 NOTE — PROGRESS NOTES
Chief Complaint   Patient presents with    Hypertension    Cough     Possible bronchitis? Body mass index is 41.04 kg/(m^2). 1. Have you been to the ER, urgent care clinic since your last visit? Hospitalized since your last visit? No    2. Have you seen or consulted any other health care providers outside of the [address] [place] [address] since your last visit? Include any pap smears or colon screening.  No    Reviewed record in preparation for visit and have necessary documentation  Pt did not bring medication to office visit for review  Information was given to pt on Advanced Directives, Living Will  Information was given on Shingles Vaccine  Opportunity was given for questions  Goals that were addressed and/or need to be completed after this appointment include:     Health Maintenance Due   Topic Date Due    GLAUCOMA SCREENING Q2Y  03/18/2017    Influenza Age 5 to Adult  08/01/2017     Patient reports that she had her Flu Shot at [address] [place] [address] request records  Patient reports that she is due for an eye exam and will schedule her own appointment

## 2017-12-04 NOTE — PROGRESS NOTES
Patient was seen today in the office and we discussed ordering a nebulizer for her due to her Rheumatoid Lung Disease and acute asthmatic bronchitis. Dawna Sabillon

## 2017-12-08 ENCOUNTER — TELEPHONE (OUTPATIENT)
Dept: FAMILY MEDICINE CLINIC | Age: 72
End: 2017-12-08

## 2017-12-08 NOTE — TELEPHONE ENCOUNTER
Anne Smith from [facility] called about the nebulizer that they placed with the patient. She needs an order and insurance will not pay for it with the diagnosis listed. Asthma is listed on her problem list.  Can this be used? Also, the order needs to be dated for 12/4/17 when the nebulizer was placed in the home.   Anne Smith can be reached at [phone]

## 2017-12-08 NOTE — TELEPHONE ENCOUNTER
Faxed new order to [address] with the date of 12/4/17. Okay to use Asthma as a diagnosis code.

## 2017-12-11 ENCOUNTER — OFFICE VISIT (OUTPATIENT)
Dept: FAMILY MEDICINE CLINIC | Age: 72
End: 2017-12-11

## 2017-12-11 ENCOUNTER — TELEPHONE (OUTPATIENT)
Dept: FAMILY MEDICINE CLINIC | Age: 72
End: 2017-12-11

## 2017-12-11 VITALS
WEIGHT: 291.8 LBS | TEMPERATURE: 97.5 F | BODY MASS INDEX: 41.78 KG/M2 | RESPIRATION RATE: 20 BRPM | OXYGEN SATURATION: 96 % | HEIGHT: 70 IN | HEART RATE: 82 BPM | DIASTOLIC BLOOD PRESSURE: 60 MMHG | SYSTOLIC BLOOD PRESSURE: 136 MMHG

## 2017-12-11 DIAGNOSIS — J45.909 ACUTE ASTHMATIC BRONCHITIS: ICD-10-CM

## 2017-12-11 DIAGNOSIS — M05.10 RHEUMATOID LUNG DISEASE (HCC): Primary | Chronic | ICD-10-CM

## 2017-12-11 RX ORDER — CEFPROZIL 500 MG/1
500 TABLET, FILM COATED ORAL 2 TIMES DAILY
Qty: 8 TAB | Refills: 0 | Status: SHIPPED | OUTPATIENT
Start: 2017-12-11 | End: 2017-12-15

## 2017-12-11 RX ORDER — PREDNISONE 20 MG/1
20 TABLET ORAL 2 TIMES DAILY
Qty: 8 TAB | Refills: 0 | Status: SHIPPED | OUTPATIENT
Start: 2017-12-11 | End: 2017-12-15

## 2017-12-11 NOTE — TELEPHONE ENCOUNTER
Patient called back. Informed her per Dr. Mendel Mancia - lung scarring seen, but no pneumonia. Patient voiced understanding.

## 2017-12-11 NOTE — MR AVS SNAPSHOT
Ish Banks 
 
 
 [address] [facility] [address] [place] [id number] 
[phone] Patient: Steffany Muro MRN: [id number] MHR:[id number] Visit Information Date & Time Provider Department Dept. Phone Encounter #  
 12/11/2017 10:10 AM Jeanne Rg MD [address] [id number] Follow-up Instructions Return if symptoms worsen or fail to improve. Your Appointments 2/7/2018 11:30 AM  
Medicare Physical with Jeanne Rg MD  
[address]) Appt Note: -letter mailed [address] [facility] [address] [facility] [address] [address]  
[place] [address] [place] [id number] Upcoming Health Maintenance Date Due  
 GLAUCOMA SCREENING Q2Y 3/18/2017 Influenza Age 5 to Adult 8/1/2017 MEDICARE YEARLY EXAM 2/16/2018 BREAST CANCER SCRN MAMMOGRAM 11/3/2018 COLONOSCOPY 7/11/2023 DTaP/Tdap/Td series (2 - Td) 9/22/2024 Allergies as of 12/11/2017  Review Complete On: 12/4/2017 By: Jeanne Rg MD  
  
 Severity Noted Reaction Type Reactions Orudis [Ketoprofen] High 04/29/2010    Palpitations Singulair [Montelukast] High 04/15/2010    Rash Arthrotec 50 [Diclofenac-misoprostol]  06/22/2016    Palpitations Levaquin [Levofloxacin]  07/12/2013    Other (comments) Hip joints swelling and painful per pt report Lyrica [Pregabalin]  09/18/2014    Other (comments) Saw spiders. Current Immunizations  Reviewed on 11/25/2015 Name Date Hepatitis B Vaccine 4/20/2009 Influenza Vaccine 10/6/2016, 11/11/2015, 11/4/2014, 10/24/2013 Influenza Vaccine Whole 10/5/2011, 10/15/2010 PPD 9/20/2011 Pneumococcal Conjugate (PCV-13) 12/16/2015 Pneumococcal Vaccine (Pcv) 2/10/2010 Tdap 9/22/2014 ZZZ-RETIRED (DO NOT USE) Pneumococcal Vaccine (Unspecified Type) 12/15/2005 Not reviewed this visit You Were Diagnosed With   
  
 Codes Comments 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[facility]    -  Primary ICD-10-CM: M05.10 ICD-9-CM: 714.81 Acute asthmatic bronchitis     ICD-10-CM: J45.909 ICD-9-CM: 493.90 Vitals BP Pulse Temp Resp Height(growth percentile) Weight(growth percentile) 136/60 (BP 1 Location: Left arm, BP Patient Position: Sitting) 82 97.5 °F (36.4 °C) (Oral) 20 5' 10\" (1.778 m) 291 lb 12.8 oz (132.4 kg) SpO2 BMI OB Status Smoking Status 96% 41.87 kg/m2 Postmenopausal Former Smoker Vitals History BMI and BSA Data Body Mass Index Body Surface Area  
 41.87 kg/m 2 2.56 m 2 Preferred Pharmacy Pharmacy Name Phone [address] [place], [place] - [address] [phone] Your Updated Medication List  
  
   
This list is accurate as of: 12/11/17 10:34 AM.  Always use your most recent med list.  
  
  
  
  
 acetaminophen 500 mg tablet Commonly known as:  TYLENOL Take 1,000 mg by mouth every six (6) hours as needed for Pain. * albuterol 90 mcg/actuation inhaler Commonly known as:  PROVENTIL HFA, VENTOLIN HFA, PROAIR HFA Take 2 Puffs by inhalation every four (4) hours as needed for Wheezing. * albuterol 2.5 mg /3 mL (0.083 %) nebulizer solution Commonly known as:  PROVENTIL VENTOLIN  
3 mL by Nebulization route every four (4) hours as needed for Wheezing. Indications: Acute Asthma Attack  
  
 amLODIPine 5 mg tablet Commonly known as:  Geena Colon Take 1 Tab by mouth daily. Indications: hypertension  
  
 aspirin delayed-release 81 mg tablet Take  by mouth daily. atorvastatin 10 mg tablet Commonly known as:  LIPITOR Take 1 Tab by mouth daily. Indications: hypercholesterolemia CALCIUM 600 PO Take  by mouth daily. cefPROZIL 500 mg tablet Commonly known as:  CEFZIL Take 1 Tab by mouth two (2) times a day for 4 days. Additional 4 days. celecoxib 200 mg capsule Commonly known as:  CeleBREX Take 1 Cap by mouth daily. COQ-10 PO Take  by mouth. FOLIC ACID PO Take 3 mg by mouth daily. * gabapentin 300 mg capsule Commonly known as:  NEURONTIN  
1 in AM, 1 at lunch AND 2 hs. NEURALGIA. Indications: NEUROPATHIC PAIN  
  
 * gabapentin 100 mg capsule Commonly known as:  NEURONTIN METHOTREXATE 25 mg by IntraMUSCular route every seven (7) days. Saturdays  
  
 omega-3s-dha-epa-fish oil 300-1,000 mg Cpdr  
Take  by mouth daily. OTHER(NON-FORMULARY) Take 8 oz by mouth daily. Bottle of \"joint juice\" which contains glucosamine. predniSONE 20 mg tablet Commonly known as:  Qian Jorge Take 1 Tab by mouth two (2) times a day for 4 days. sulfaSALAzine  mg EC tablet Commonly known as:  AZULFIDINE  
  
 traMADol 50 mg tablet Commonly known as:  ULTRAM  
Take 1 Tab by mouth every six (6) hours as needed for Pain. Max Daily Amount: 200 mg. VITAMIN D3 4,000 unit Cap Generic drug:  cholecalciferol (vitamin D3) Take  by mouth daily. * Notice: This list has 4 medication(s) that are the same as other medications prescribed for you. Read the directions carefully, and ask your doctor or other care provider to review them with you. Prescriptions Sent to Pharmacy Refills  
 cefPROZIL (CEFZIL) 500 mg tablet 0 Sig: Take 1 Tab by mouth two (2) times a day for 4 days. Additional 4 days. Class: Normal  
 Pharmacy: [address] [place] [address] Ph #: [phone] Route: Oral  
 predniSONE (DELTASONE) 20 mg tablet 0 Sig: Take 1 Tab by mouth two (2) times a day for 4 days. Class: Normal  
 Pharmacy: [address] [place] [address] Ph #: [phone] Route: Oral  
  
Follow-up Instructions Return if symptoms worsen or fail to improve. To-Do List   
 12/11/2017   Imaging:  XR CHEST PA LAT   
  
 12/11/2017 10:35 AM  
 [facility] [address] [place] [address] ([phone])  
  
 12/14/2017 3:30 PM  
  Appointment with [facility] ARYA 2 at [facility] Mammography ([phone]) Shower or bathe using soap and water. Do not use deodorant, powder, perfumes, or lotion the day of your exam.  If your prior mammograms were not performed at [place] [address] please bring films with you or forward prior images 2 days before your procedure. Check in at registration 15min before your appointment time unless you were instructed to do otherwise. A script is not necessary, but if you have one, please bring it on the day of the mammogram or have it faxed to the department. [facility] [phone] [facility]  [phone] [facility] [address] JU[address]  [phone] [phone] [place] [id number] [facility] [phone] Patient Instructions Reactive Airway Disease: Care Instructions Your Care Instructions Reactive airway disease is a breathing problem that appears as wheezing, a whistling noise in your airways. It may be caused by a viral or bacterial infection, allergies, tobacco smoke, or something else in the environment. When you are around these triggers, your body releases chemicals that make the airways get tight. Reactive airway disease is a lot like asthma. Both can cause wheezing. But asthma is ongoing, while reactive airway disease may occur only now and then. Tests can be done to tell whether you have asthma. You may take the same medicines used to treat asthma. Good home care and follow-up care with your doctor can help you recover. Follow-up care is a key part of your treatment and safety. Be sure to make and go to all appointments, and call your doctor if you are having problems. It's also a good idea to know your test results and keep a list of the medicines you take. How can you care for yourself at home? · Take your medicines exactly as prescribed.  Call your doctor if you think you are having a problem with your medicine. · Do not smoke or allow others to smoke around you. If you need help quitting, talk to your doctor about stop-smoking programs and medicines. These can increase your chances of quitting for good. · If you know what caused your wheezing (such as perfume or the odor of household chemicals), try to avoid it in the future. · Wash your hands several times a day, and consider using hand gels or wipes that contain alcohol. This can prevent colds and other infections. When should you call for help? Call 911 anytime you think you may need emergency care. For example, call if: 
? · You have severe trouble breathing. ? Watch closely for changes in your health, and be sure to contact your doctor if: 
? · You cough up yellow, dark brown, or bloody mucus. ? · You have a fever. ? · Your wheezing gets worse. Where can you learn more? Go to http://antonio-adrien.info/. Enter U355 in the search box to learn more about \"Reactive Airway Disease: Care Instructions. \" Current as of: May 12, 2017 Content Version: 11.4 © 7584-3129 [employer]. Care instructions adapted under license by BuzzVote (which disclaims liability or warranty for this information). If you have questions about a medical condition or this instruction, always ask your healthcare professional. [address] any warranty or liability for your use of this information. Introducing [facility]! Dear Elvira Bear: Thank you for requesting a ZoomSafer account. Our records indicate that you already have an active ZoomSafer account. You can access your account anytime at [email or web address] Did you know that you can access your hospital and ER discharge instructions at any time in ZoomSafer? You can also review all of your test results from your hospital stay or ER visit. Additional Information If you have questions, please visit the Frequently Asked Questions section of the Graphiclyt website at [email or web address] Remember, Twinklr is NOT to be used for urgent needs. For medical emergencies, dial 911. Now available from your iPhone and Android! Please provide this summary of care documentation to your next provider. Your primary care clinician is listed as [address]. If you have any questions after today's visit, please call [phone].

## 2017-12-11 NOTE — PROGRESS NOTES
Chief Complaint   Patient presents with    Hypertension    Wheezing     Body mass index is 41.87 kg/(m^2).'    1. Have you been to the ER, urgent care clinic since your last visit? Hospitalized since your last visit? No    2. Have you seen or consulted any other health care providers outside of the [address] [place] [address] since your last visit? Include any pap smears or colon screening.  No    Reviewed record in preparation for visit and have necessary documentation  Pt did not bring medication to office visit for review  Information was given to pt on Advanced Directives, Living Will  Opportunity was given for questions  Goals that were addressed and/or need to be completed after this appointment include:     Health Maintenance Due   Topic Date Due    GLAUCOMA SCREENING Q2Y  03/18/2017    Influenza Age 9 to Adult  08/01/2017

## 2017-12-11 NOTE — PATIENT INSTRUCTIONS
Reactive Airway Disease: Care Instructions  Your Care Instructions    Reactive airway disease is a breathing problem that appears as wheezing, a whistling noise in your airways. It may be caused by a viral or bacterial infection, allergies, tobacco smoke, or something else in the environment. When you are around these triggers, your body releases chemicals that make the airways get tight. Reactive airway disease is a lot like asthma. Both can cause wheezing. But asthma is ongoing, while reactive airway disease may occur only now and then. Tests can be done to tell whether you have asthma. You may take the same medicines used to treat asthma. Good home care and follow-up care with your doctor can help you recover. Follow-up care is a key part of your treatment and safety. Be sure to make and go to all appointments, and call your doctor if you are having problems. It's also a good idea to know your test results and keep a list of the medicines you take. How can you care for yourself at home? · Take your medicines exactly as prescribed. Call your doctor if you think you are having a problem with your medicine. · Do not smoke or allow others to smoke around you. If you need help quitting, talk to your doctor about stop-smoking programs and medicines. These can increase your chances of quitting for good. · If you know what caused your wheezing (such as perfume or the odor of household chemicals), try to avoid it in the future. · Wash your hands several times a day, and consider using hand gels or wipes that contain alcohol. This can prevent colds and other infections. When should you call for help? Call 911 anytime you think you may need emergency care. For example, call if:  ? · You have severe trouble breathing. ? Watch closely for changes in your health, and be sure to contact your doctor if:  ? · You cough up yellow, dark brown, or bloody mucus. ? · You have a fever. ? · Your wheezing gets worse. Where can you learn more? Go to http://antonio-adrien.info/. Enter O024 in the search box to learn more about \"Reactive Airway Disease: Care Instructions. \"  Current as of: May 12, 2017  Content Version: 11.4  © 6394-0436 Healthwise, Ziebel. Care instructions adapted under license by Likeastore (which disclaims liability or warranty for this information). If you have questions about a medical condition or this instruction, always ask your healthcare professional. [address] any warranty or liability for your use of this information.

## 2017-12-11 NOTE — PROGRESS NOTES
tablet Take 1 Tab by mouth every six (6) hours as needed for Pain. Max Daily Amount: 200 mg.  FOLIC ACID PO Take 3 mg by mouth daily.  omega-3s-dha-epa-fish oil 300-1,000 mg cpDR Take  by mouth daily.  cholecalciferol, vitamin D3, (VITAMIN D3) 4,000 unit cap Take  by mouth daily.  METHOTREXATE 25 mg by IntraMUSCular route every seven (7) days. Saturdays    aspirin delayed-release 81 mg tablet Take  by mouth daily.  sulfaSALAzine EC (AZULFIDINE) 500 mg EC tablet     OTHER,NON-FORMULARY, Take 8 oz by mouth daily. Bottle of \"joint juice\" which contains glucosamine.  CALCIUM CARBONATE (CALCIUM 600 PO) Take  by mouth daily. No current facility-administered medications for this visit.         Patient Active Problem List    Diagnosis Date Noted    High cholesterol 04/30/2010     Priority: 1 - One    [facility] 04/15/2010     Priority: 1 - One    Unspecified asthma(493.90) 04/15/2010     Priority: 1 - One    OA (osteoarthritis) 04/15/2010     Priority: 1 - One    Obesity, unspecified 04/15/2010     Priority: 1 - One    Spinal stenosis, unspecified region other than cervical 04/15/2010     Priority: 1 - One    Cardiac dysrhythmia, unspecified 04/15/2010     Priority: 2 - Two    Obesity, morbid ([facility] 12/04/2017    Primary localized osteoarthritis of right hip 07/11/2016    Asthma 10/06/2015    Primary generalized (osteo)arthritis 10/06/2015    PAC (premature atrial contraction) 10/06/2015    Lumbar spinal stenosis 10/06/2015    Colon polyp, hyperplastic 06/02/2015    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[facility] 12/15/2010       Past Medical History:   Diagnosis Date    Anemia, unspecified 4/15/2010    Cardiac dysrhythmia, unspecified 4/15/2010    PAT- not on medication/ not seeing cardiologist    High cholesterol 4/30/2010    Obesity, unspecified 4/15/2010    Osteoarthrosis, unspecified whether generalized or localized, other specified sites 4/15/2010    Rheumatoid arthritis(714.0) 4/15/2010    Spinal stenosis, unspecified region other than cervical 4/15/2010    Unspecified asthma(493.90) 4/15/2010    rheumatoid lung disease       Allergies   Allergen Reactions    Orudis [Ketoprofen] Palpitations    Singulair [Montelukast] Rash    Arthrotec 50 [Diclofenac-Misoprostol] Palpitations    Levaquin [Levofloxacin] Other (comments)     Hip joints swelling and painful per pt report    Lyrica [Pregabalin] Other (comments)     Saw spiders. Past Surgical History:   Procedure Laterality Date    CHEST SURGERY PROCEDURE UNLISTED  lat 9217'D    lung biopsy- diagnosed with rheumatoid lung disease    ENDOSCOPY, COLON, DIAGNOSTIC      7-10-13    HX ORTHOPAEDIC Right 2002    rotator cuff repair       Social History     Social History    Marital status: SINGLE     Spouse name: N/A    Number of children: N/A    Years of education: N/A     Social History Main Topics    Smoking status: Former Smoker     Packs/day: 1.00     Years: 20.00     Quit date: 4/30/1985    Smokeless tobacco: Never Used    Alcohol use No    Drug use: No    Sexual activity: No     Other Topics Concern    Not on file     Social History Narrative       Review of Systems   Constitutional: Negative. Negative for chills, fever, malaise/fatigue and weight loss. HENT: Negative. Negative for hearing loss. Eyes: Negative. Negative for blurred vision and double vision. Respiratory: Positive for sputum production and wheezing. Negative for cough, hemoptysis and shortness of breath. Cardiovascular: Negative. Negative for chest pain, palpitations and orthopnea. Gastrointestinal: Negative. Negative for abdominal pain, blood in stool, heartburn, nausea and vomiting. Genitourinary: Negative. Negative for dysuria, frequency and urgency. Musculoskeletal: Negative. Negative for back pain, myalgias and neck pain. Skin: Negative. Negative for rash. Neurological: Negative.   Negative for dizziness, tingling, tremors, weakness and headaches. Endo/Heme/Allergies: Negative. Psychiatric/Behavioral: Negative. Negative for depression. Objective:     Vitals:    12/11/17 1016   BP: 136/60   Pulse: 82   Resp: 20   Temp: 97.5 °F (36.4 °C)   TempSrc: Oral   SpO2: 96%   Weight: 291 lb 12.8 oz (132.4 kg)   Height: 5' 10\" (1.778 m)      Body mass index is 41.87 kg/(m^2). Physical Exam   Constitutional: She is oriented to person, place, and time and well-developed, well-nourished, and in no distress. No distress. HENT:   Head: Normocephalic and atraumatic. Mouth/Throat: Oropharynx is clear and moist.   Eyes: Conjunctivae are normal.   Neck: No tracheal deviation present. No thyromegaly present. Cardiovascular: Normal rate, regular rhythm and normal heart sounds. No murmur heard. Pulmonary/Chest: Effort normal. No respiratory distress. She has wheezes. She has bilateral wet rhonchi and some dry crackles at the base. I do not hear any significant wheezing except on forced expiration today. Abdominal: Soft. She exhibits no distension. Lymphadenopathy:     She has no cervical adenopathy. Neurological: She is alert and oriented to person, place, and time. Skin: Skin is warm. No rash noted. She is not diaphoretic. No erythema. Psychiatric: Mood and affect normal.   Nursing note and vitals reviewed. Health Maintenance Due   Topic Date Due    GLAUCOMA SCREENING Q2Y  03/18/2017    Influenza Age 5 to Adult  08/01/2017         Assessment and orders:       ICD-10-CM ICD-9-CM    1. 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HCC)-I will extend her antibiotic and prednisone for an additional 4 days. As soon as she finishes her prednisone she can go back on her methotrexate for her rheumatoid arthritis. M05.10 714.81 cefPROZIL (CEFZIL) 500 mg tablet      predniSONE (DELTASONE) 20 mg tablet      XR CHEST PA LAT   2. Acute asthmatic bronchitis-chest x-ray showed some pulmonary scarring but no acute infiltrate. With her oxygen level at 96% I doubt that she has any pneumonia. J45.909 493.90 cefPROZIL (CEFZIL) 500 mg tablet      predniSONE (DELTASONE) 20 mg tablet      XR CHEST PA LAT       Antonette Pham is a back  Plan of care:  Discussed diagnoses in detail with patient. Medication risks/benefits/side effects discussed with patient. All of the patient's questions were addressed. The patient understands and agrees with our plan of care. The patient knows to call back if they are unsure of or forget any changes we discussed today or if the symptoms change. The patient received an After-Visit Summary which contains VS, orders, medication list and allergy list. This can be used as a \"mini-medical record\" should they have to seek medical care while out of town. Patient Care Team:  Hue Fernández MD as PCP - General Radhika Virk RN as Nurse Navigator (Family Practice)  Nirali Tinsley MD (Orthopedic Surgery)  Kvng Romero MD (Orthopedic Surgery)  Aracely Hinds RN as Ambulatory Care Navigator  Shar Carpio MD (Otolaryngology)    Follow-up Disposition:  Return if symptoms worsen or fail to improve. Future Appointments  Date Time Provider David Chester   12/14/2017 3:30 PM [facility] 2 [facility] Taina Kramer   2/7/2018 11:30 AM Hue Fernández MD [facility] KATI SCHED       Signed By: Hue Fernández MD     December 11, 2017

## 2017-12-11 NOTE — TELEPHONE ENCOUNTER
----- Message from Catalina Ching MD sent at 12/11/2017 11:26 AM EST -----  Please call the patient. She has lung scarring. No pneumonia seen.

## 2017-12-14 ENCOUNTER — HOSPITAL ENCOUNTER (OUTPATIENT)
Dept: MAMMOGRAPHY | Age: 72
Discharge: HOME | End: 2017-12-14
Attending: FAMILY MEDICINE
Payer: MEDICARE

## 2017-12-14 DIAGNOSIS — Z12.39 SCREENING BREAST EXAMINATION: ICD-10-CM

## 2017-12-14 PROCEDURE — 77063 BREAST TOMOSYNTHESIS BI: CPT

## 2018-02-07 ENCOUNTER — OFFICE VISIT (OUTPATIENT)
Dept: FAMILY MEDICINE CLINIC | Age: 73
End: 2018-02-07

## 2018-02-07 ENCOUNTER — TELEPHONE (OUTPATIENT)
Dept: FAMILY MEDICINE CLINIC | Age: 73
End: 2018-02-07

## 2018-02-07 VITALS
HEIGHT: 70 IN | OXYGEN SATURATION: 94 % | DIASTOLIC BLOOD PRESSURE: 69 MMHG | RESPIRATION RATE: 22 BRPM | BODY MASS INDEX: 41.57 KG/M2 | WEIGHT: 290.4 LBS | HEART RATE: 76 BPM | TEMPERATURE: 98.5 F | SYSTOLIC BLOOD PRESSURE: 150 MMHG

## 2018-02-07 DIAGNOSIS — Z13.39 SCREENING FOR ALCOHOLISM: ICD-10-CM

## 2018-02-07 DIAGNOSIS — Z13.31 SCREENING FOR DEPRESSION: ICD-10-CM

## 2018-02-07 DIAGNOSIS — J32.0 CHRONIC MAXILLARY SINUSITIS: ICD-10-CM

## 2018-02-07 DIAGNOSIS — Z00.00 MEDICARE ANNUAL WELLNESS VISIT, SUBSEQUENT: Primary | ICD-10-CM

## 2018-02-07 DIAGNOSIS — J47.1 BRONCHIECTASIS WITH ACUTE EXACERBATION (HCC): ICD-10-CM

## 2018-02-07 RX ORDER — DOXYCYCLINE 100 MG/1
100 CAPSULE ORAL 2 TIMES DAILY
Qty: 28 CAP | Refills: 0 | Status: SHIPPED | OUTPATIENT
Start: 2018-02-07 | End: 2018-02-21

## 2018-02-07 NOTE — PROGRESS NOTES
Health Maintenance Due   Topic Date Due    GLAUCOMA SCREENING Q2Y  03/18/2017    Influenza Age 5 to Adult  08/01/2017     There is no height or weight on file to calculate BMI. 1. Have you been to the ER, urgent care clinic since your last visit? Hospitalized since your last visit? No    2. Have you seen or consulted any other health care providers outside of the [address] [place] [address] since your last visit? Include any pap smears or colon screening.  No  Reviewed record in preparation for visit and have necessary documentation  Pt did not bring medication to office visit for review  Information was given to pt on Advanced Directives, Living Will  Information was given on Shingles Vaccine  opportunity was given for questions  Goals that were addressed and/or need to be completed during or after this appointment include

## 2018-02-07 NOTE — TELEPHONE ENCOUNTER
----- Message from Jarad Machado MD sent at 2/7/2018 12:03 PM EST -----  Please call the patient. Her sinus x-rays were unremarkable however with her symptoms I want her to take 2 weeks worth of doxycycline twice daily. I have sent in this prescription for her. Hopefully she can get to see the pulmonary specialist by then. Doxycycline is a good medication for bronchiectasis.   She needs to hold calcium supplements while she is on this medication

## 2018-02-07 NOTE — PATIENT INSTRUCTIONS

## 2018-02-07 NOTE — MR AVS SNAPSHOT
[facility] [address] 
 
 
 [address] [facility] [address] [address] 
[phone] Patient: Madelin Martins MRN: [id number] Visit Information Date & Time Provider Department Dept. Phone Encounter #  
 2/7/2018 11:30 AM Roberto Lanes, [facility] [id number] Follow-up Instructions Return in about 3 months (around 5/7/2018). Your Appointments 2/7/2018 11:30 AM  
Medicare Physical with Roberto Lanes, MD  
[address] [place] [address] Appt Note: -letter mailed [address] [facility] [address] [address]  
[place] [address] [place] [address] [address] [address] Upcoming Health Maintenance Date Due  
 GLAUCOMA SCREENING Q2Y 3/18/2017 MEDICARE YEARLY EXAM 2/16/2018 BREAST CANCER SCRN MAMMOGRAM 12/14/2019 COLONOSCOPY 7/11/2023 DTaP/Tdap/Td series (2 - Td) 9/22/2024 Allergies as of 2/7/2018  Review Complete On: 2/7/2018 By: Roberto Lanes, MD  
  
 Severity Noted Reaction Type Reactions Orudis [Ketoprofen] High 04/29/2010    Palpitations Singulair [Montelukast] High 04/15/2010    Rash Arthrotec 50 [Diclofenac-misoprostol]  06/22/2016    Palpitations Levaquin [Levofloxacin]  07/12/2013    Other (comments) Hip joints swelling and painful per pt report Lyrica [Pregabalin]  09/18/2014    Other (comments) Saw spiders. Current Immunizations  Reviewed on 11/25/2015 Name Date Hepatitis B Vaccine 4/20/2009 Influenza Vaccine 10/6/2016, 11/11/2015, 11/4/2014, 10/24/2013 Influenza Vaccine Whole 10/5/2011, 10/15/2010 PPD 9/20/2011 Pneumococcal Conjugate (PCV-13) 12/16/2015 Pneumococcal Vaccine (Pcv) 2/10/2010 Tdap 9/22/2014 ZZZ-RETIRED (DO NOT USE) Pneumococcal Vaccine (Unspecified Type) 12/15/2005 Not reviewed this visit You Were Diagnosed With   
  
 Codes Comments Medicare annual wellness visit, subsequent    -  Primary ICD-10-CM: Z00.00 ICD-9-CM: V70.0 Chronic maxillary sinusitis     ICD-10-CM: J32.0 ICD-9-CM: 473.0 Screening for depression     ICD-10-CM: Z13.89 ICD-9-CM: V79.0 Screening for alcoholism     ICD-10-CM: Z13.89 ICD-9-CM: V79.1 Bronchiectasis with acute exacerbation ([address]     ICD-10-CM: J47.1 ICD-9-CM: 494.1 Vitals BP Pulse Temp Resp Height(growth percentile) Weight(growth percentile) 150/69 76 98.5 °F (36.9 °C) (Oral) 22 5' 10\" (1.778 m) 290 lb 6.4 oz (131.7 kg) SpO2 BMI OB Status Smoking Status 94% 41.67 kg/m2 Postmenopausal Former Smoker Vitals History BMI and BSA Data Body Mass Index Body Surface Area  
 41.67 kg/m 2 2.55 m 2 Preferred Pharmacy Pharmacy Name Phone [address] [place], [place] - [address] [phone] Your Updated Medication List  
  
   
This list is accurate as of: 2/7/18 10:59 AM.  Always use your most recent med list.  
  
  
  
  
 acetaminophen 500 mg tablet Commonly known as:  TYLENOL Take 1,000 mg by mouth every six (6) hours as needed for Pain. * albuterol 90 mcg/actuation inhaler Commonly known as:  PROVENTIL HFA, VENTOLIN HFA, PROAIR HFA Take 2 Puffs by inhalation every four (4) hours as needed for Wheezing. * albuterol 2.5 mg /3 mL (0.083 %) nebulizer solution Commonly known as:  PROVENTIL VENTOLIN  
3 mL by Nebulization route every four (4) hours as needed for Wheezing. Indications: Acute Asthma Attack  
  
 amLODIPine 5 mg tablet Commonly known as:  He[place]ly [place] Take 1 Tab by mouth daily. Indications: hypertension  
  
 aspirin delayed-release 81 mg tablet Take  by mouth daily. atorvastatin 10 mg tablet Commonly known as:  LIPITOR Take 1 Tab by mouth daily. Indications: hypercholesterolemia CALCIUM 600 PO Take  by mouth daily. celecoxib 200 mg capsule Commonly known as:  CeleBREX Take 1 Cap by mouth daily. COQ-10 PO Take  by mouth. FOLIC ACID PO Take 3 mg by mouth daily. * gabapentin 300 mg capsule Commonly known as:  NEURONTIN  
1 in AM, 1 at lunch AND 2 hs. NEURALGIA. Indications: NEUROPATHIC PAIN  
  
 * gabapentin 100 mg capsule Commonly known as:  NEURONTIN METHOTREXATE 25 mg by IntraMUSCular route every seven (7) days. Saturdays  
  
 omega-3s-dha-epa-fish oil 300-1,000 mg Cpdr  
Take  by mouth daily. OTHER(NON-FORMULARY) Take 8 oz by mouth daily. Bottle of \"joint juice\" which contains glucosamine. sulfaSALAzine  mg EC tablet Commonly known as:  AZULFIDINE  
  
 traMADol 50 mg tablet Commonly known as:  ULTRAM  
Take 1 Tab by mouth every six (6) hours as needed for Pain. Max Daily Amount: 200 mg. VITAMIN D3 4,000 unit Cap Generic drug:  cholecalciferol (vitamin D3) Take  by mouth daily. * Notice: This list has 4 medication(s) that are the same as other medications prescribed for you. Read the directions carefully, and ask your doctor or other care provider to review them with you. Follow-up Instructions Return in about 3 months (around 5/7/2018). To-Do List   
 02/07/2018 Imaging:  XR SINUSES PARANASAL MIN 3 V Patient Instructions Well Visit, Over 72: Care Instructions Your Care Instructions Physical exams can help you stay healthy. Your doctor has checked your overall health and may have suggested ways to take good care of yourself. He or she also may have recommended tests. At home, you can help prevent illness with healthy eating, regular exercise, and other steps. Follow-up care is a key part of your treatment and safety. Be sure to make and go to all appointments, and call your doctor if you are having problems. It's also a good idea to know your test results and keep a list of the medicines you take. How can you care for yourself at home? · Reach and stay at a healthy weight. This will lower your risk for many problems, such as obesity, diabetes, heart disease, and high blood pressure. · Get at least 30 minutes of exercise on most days of the week. Walking is a good choice. You also may want to do other activities, such as running, swimming, cycling, or playing tennis or team sports. · Do not smoke. Smoking can make health problems worse. If you need help quitting, talk to your doctor about stop-smoking programs and medicines. These can increase your chances of quitting for good. · Protect your skin from too much sun. When you're outdoors from 10 a.m. to 4 p.m., stay in the shade or cover up with clothing and a hat with a wide brim. Wear sunglasses that block UV rays. Even when it's cloudy, put broad-spectrum sunscreen (SPF 30 or higher) on any exposed skin. · See a dentist one or two times a year for checkups and to have your teeth cleaned. · Wear a seat belt in the car. · Limit alcohol to 2 drinks a day for men and 1 drink a day for women. Too much alcohol can cause health problems. Follow your doctor's advice about when to have certain tests. These tests can spot problems early. For men and women · Cholesterol. Your doctor will tell you how often to have this done based on your overall health and other things that can increase your risk for heart attack and stroke. · Blood pressure. Have your blood pressure checked during a routine doctor visit. Your doctor will tell you how often to check your blood pressure based on your age, your blood pressure results, and other factors. · Diabetes. Ask your doctor whether you should have tests for diabetes. · Vision. Experts recommend that you have yearly exams for glaucoma and other age-related eye problems. · Hearing. Tell your doctor if you notice any change in your hearing. You can have tests to find out how well you hear. · Colon cancer tests. Keep having colon cancer tests as your doctor recommends. You can have one of several types of tests. · Heart attack and stroke risk. At least every 4 to 6 years, you should have your risk for heart attack and stroke assessed. Your doctor uses factors such as your age, blood pressure, cholesterol, and whether you smoke or have diabetes to show what your risk for a heart attack or stroke is over the next 10 years. · Osteoporosis. Talk to your doctor about whether you should have a bone density test to find out whether you have thinning bones. Also ask your doctor about whether you should take calcium and vitamin D supplements. For women · Pap test and pelvic exam. You may no longer need a Pap test. Talk with your doctor about whether to stop or continue to have Pap tests. · Breast exam and mammogram. Ask how often you should have a mammogram, which is an X-ray of your breasts. A mammogram can spot breast cancer before it can be felt and when it is easiest to treat. · Thyroid disease. Talk to your doctor about whether to have your thyroid checked as part of a regular physical exam. Women have an increased chance of a thyroid problem. For men · Prostate exam. Talk to your doctor about whether you should have a blood test (called a PSA test) for prostate cancer. Experts disagree on whether men should have this test. Some experts recommend that you discuss the benefits and risks of the test with your doctor. · Abdominal aortic aneurysm. Ask your doctor whether you should have a test to check for an aneurysm. You may need a test if you ever smoked or if your parent, brother, sister, or child has had an aneurysm. When should you call for help? Watch closely for changes in your health, and be sure to contact your doctor if you have any problems or symptoms that concern you. Where can you learn more? Go to http://antonio-adrien.info/. Enter R270 in the search box to learn more about \"Well Visit, Over 65: Care Instructions. \" Current as of: May 12, 2017 Content Version: 11.4 © 2421-9442 [employer]. Care instructions adapted under license by [employer] (which disclaims liability or warranty for this information). If you have questions about a medical condition or this instruction, always ask your healthcare professional. [address] any warranty or liability for your use of this information. Introducing [facility]! Dear Ginger Mac: Thank you for requesting a [employer] account. Our records indicate that you already have an active [employer] account. You can access your account anytime at [email or web address] Did you know that you can access your hospital and ER discharge instructions at any time in [employer]? You can also review all of your test results from your hospital stay or ER visit. Additional Information If you have questions, please visit the Frequently Asked Questions section of the [employer] website at [email or web address] Remember, [employer] is NOT to be used for urgent needs. For medical emergencies, dial 911. Now available from your iPhone and Android! Please provide this summary of care documentation to your next provider. Your primary care clinician is listed as [address]. If you have any questions after today's visit, please call [phone].

## 2018-02-07 NOTE — TELEPHONE ENCOUNTER
Spoke with patient and informed her per Dr. Oliver Seen:     \"Please call the patient. Jade Mercado sinus x-rays were unremarkable however with her symptoms I want her to take 2 weeks worth of doxycycline twice daily.  I have sent in this prescription for her.  Hopefully she can get to see the pulmonary specialist by then.  Doxycycline is a good medication for bronchiectasis.  She needs to hold calcium supplements while she is on this medication. \"     Patient verbalzied understanding.

## 2018-02-07 NOTE — PROGRESS NOTES
[address], [address] [place] [place] [address]  [phone]           Date of visit: 2/7/2018     Genevieve Barrett MD    This is an Subsequent Medicare Annual Wellness Visit (AWV), (Performed more than 12 months after effective date of Medicare Part B enrollment and 12 months after last preventive visit, Once in a lifetime)    I have reviewed the patient's medical history in detail and updated the computerized patient record. Woody Patel is a 67 y.o. female   History obtained from: the patient.     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    Colon polyp, hyperplastic K63.5    Asthma J45.909    Primary generalized (osteo)arthritis M15.0    PAC (premature atrial contraction) I49.1    Lumbar spinal stenosis M48.061    Primary localized osteoarthritis of right hip M16.11    Obesity, morbid ([facility] E66.01     Past Medical History:   Diagnosis Date    Anemia, unspecified 4/15/2010    Cardiac dysrhythmia, unspecified 4/15/2010    PAT- not on medication/ not seeing cardiologist    High cholesterol 4/30/2010    Obesity, unspecified 4/15/2010    Osteoarthrosis, unspecified whether generalized or localized, other specified sites 4/15/2010    Rheumatoid arthritis(714.0) 4/15/2010    Spinal stenosis, unspecified region other than cervical 4/15/2010    Unspecified asthma(493.90) 4/15/2010    rheumatoid lung disease      Past Surgical History:   Procedure Laterality Date    CHEST SURGERY PROCEDURE UNLISTED  lat 1990's    lung biopsy- diagnosed with rheumatoid lung disease    ENDOSCOPY, COLON, DIAGNOSTIC      7-10-13    HX ORTHOPAEDIC Right 2002    rotator cuff repair     Allergies   Allergen Reactions    Orudis [Ketoprofen] Palpitations    Singulair [Montelukast] Rash    Arthrotec 50 [Diclofenac-Misoprostol] Palpitations    Levaquin [Levofloxacin] Other (comments)     Hip joints swelling and painful per pt report    Lyrica [Pregabalin] Other (comments)     Saw spiders. Current Outpatient Prescriptions   Medication Sig Dispense Refill    albuterol (PROVENTIL HFA, VENTOLIN HFA, PROAIR HFA) 90 mcg/actuation inhaler Take 2 Puffs by inhalation every four (4) hours as needed for Wheezing. 1 Inhaler 3    albuterol (PROVENTIL VENTOLIN) 2.5 mg /3 mL (0.083 %) nebulizer solution 3 mL by Nebulization route every four (4) hours as needed for Wheezing. Indications: Acute Asthma Attack 24 Each 5    UBIDECARENONE (COQ-10 PO) Take  by mouth.  aspirin delayed-release 81 mg tablet Take  by mouth daily.  amLODIPine (NORVASC) 5 mg tablet Take 1 Tab by mouth daily. Indications: hypertension 30 Tab 5    gabapentin (NEURONTIN) 100 mg capsule       celecoxib (CELEBREX) 200 mg capsule Take 1 Cap by mouth daily. 90 Cap 1    atorvastatin (LIPITOR) 10 mg tablet Take 1 Tab by mouth daily. Indications: hypercholesterolemia 30 Tab 5    gabapentin (NEURONTIN) 300 mg capsule 1 in AM, 1 at lunch AND 2 hs. NEURALGIA. Indications: NEUROPATHIC PAIN 360 Cap 3    acetaminophen (TYLENOL) 500 mg tablet Take 1,000 mg by mouth every six (6) hours as needed for Pain.  traMADol (ULTRAM) 50 mg tablet Take 1 Tab by mouth every six (6) hours as needed for Pain. Max Daily Amount: 200 mg. 60 Tab 0    FOLIC ACID PO Take 3 mg by mouth daily.  OTHER,NON-FORMULARY, Take 8 oz by mouth daily. Bottle of \"joint juice\" which contains glucosamine.  omega-3s-dha-epa-fish oil 300-1,000 mg cpDR Take  by mouth daily.  cholecalciferol, vitamin D3, (VITAMIN D3) 4,000 unit cap Take  by mouth daily.  METHOTREXATE 25 mg by IntraMUSCular route every seven (7) days. Saturdays      sulfaSALAzine EC (AZULFIDINE) 500 mg EC tablet       CALCIUM CARBONATE (CALCIUM 600 PO) Take  by mouth daily. Family History   Problem Relation Age of Onset    Cancer Mother      cervical, breast    Heart Disease Mother     Heart Attack Mother     Hypertension Mother     Arthritis-osteo Mother     Heart Disease Father     Heart Attack Father     Cancer Other      uterine and lung.  High Cholesterol Brother     Stroke Brother     Arthritis-rheumatoid Sister     Migraines Sister     Breast Cancer Maternal Aunt      Social History   Substance Use Topics    Smoking status: Former Smoker     Packs/day: 1.00     Years: 20.00     Quit date: 4/30/1985    Smokeless tobacco: Never Used    Alcohol use No       Specialists/Care Team   Tita Cevallos has established care with the following healthcare providers:  Patient Care Team:  Radha Velasco MD as PCP - General  Lucian Irby RN as Nurse Navigator (Family Practice)  Mason Tate MD (Orthopedic Surgery)  Linda Cortes MD (Orthopedic Surgery)  Damon Ghotra RN as Ambulatory Care Navigator  Jack Junior MD (Otolaryngology)      Health Risk Assessment     Demographics   female  67 y.o.     General Health Questions   -During the past 4 weeks:   -how would you rate your health in general? Fair   -how often have you been bothered by feeling dizzy when standing up? never   -how much have you been bothered by bodily pain? mildly   -Have you noticed any hearing difficulties? no   -has your physical and emotional health limited your social activities with family or friends? no    Emotional Health Questions   -Do you have a history of depression, anxiety, or emotional problems? no  -Over the past 2 weeks, have you felt down, depressed or hopeless? no  -Over the past 2 weeks, have you felt little interest or pleasure in doing things? no    Health Habits   Please describe your diet habits: self grade B  Do you get 5 servings of fruits or vegetables daily? yes  Do you exercise regularly? yes    Alcohol Risk Factor Screening: On any occasion during the past 3 months, have you had more than 4 drinks containing alcohol? No     Do you average more than 14 drinks per week? No       Activities of Daily Living and Functional Status   -Do you need help with eating, walking, dressing, bathing, toileting, the phone, transportation, shopping, preparing meals, housework, laundry, medications or managing money? no  -In the past four weeks, was someone available to help you if you needed and wanted help with anything? yes  -Are you confident are you that you can control and manage most of your health problems? yes  -Have you been given information to help you keep track of your medications? yes  -How often do you have trouble taking your medications as prescribed? occasionally    Fall Risk and Home Safety   Have you fallen 2 or more times in the past year? Yes, tripped on rug and lost balance  Does your home have rugs in the hallway, lack grab bars in the bathroom, lack handrails on the stairs or have poor lighting? no  Do you have smoke detectors and check them regularly? yes  Do you have difficulties driving a car? no  Do you always fasten your seat belt when you are in a car? yes    Review of Systems (if indicated for problems addressed today)   Review of Systems   Constitutional: Negative. Negative for chills, fever, malaise/fatigue and weight loss. HENT: Positive for congestion and nosebleeds. Negative for hearing loss. She has symptoms of chronic sinusitis with purulent sinus drainage each daily. Problem is very thick nasal discharge and difficult to blow out. Eyes: Negative. Negative for blurred vision and double vision.    Respiratory: Positive for cough, sputum production and wheezing. Negative for hemoptysis and shortness of breath. Cardiovascular: Negative. Negative for chest pain, palpitations and orthopnea. Gastrointestinal: Negative. Negative for abdominal pain, blood in stool, heartburn, nausea and vomiting. Genitourinary: Negative. Negative for dysuria, frequency and urgency. Musculoskeletal: Negative. Negative for back pain, myalgias and neck pain. Skin: Negative. Negative for rash. Neurological: Negative. Negative for dizziness, tingling, tremors, weakness and headaches. Endo/Heme/Allergies: Negative. Psychiatric/Behavioral: Negative. Negative for depression. Physical Examination     Wt Readings from Last 3 Encounters:   02/07/18 290 lb 6.4 oz (131.7 kg)   12/11/17 291 lb 12.8 oz (132.4 kg)   12/04/17 286 lb (129.7 kg)     Temp Readings from Last 3 Encounters:   02/07/18 98.5 °F (36.9 °C) (Oral)   12/11/17 97.5 °F (36.4 °C) (Oral)   12/04/17 98.4 °F (36.9 °C) (Oral)     BP Readings from Last 3 Encounters:   02/07/18 150/69   12/11/17 136/60   12/04/17 152/78     Pulse Readings from Last 3 Encounters:   02/07/18 76   12/11/17 82   12/04/17 84       Body mass index is 41.67 kg/(m^2). No exam data present  Was the patient's timed Up & Go test unsteady or longer than 10 seconds? no    Evaluation of Cognitive Function   Mood/affect:  happy  Orientation: Person, Place, Time and Situation  Appearance: age appropriate and casually dressed  Family member/caregiver input: no    Additional exam if indicated for problems addressed today:  Physical Exam   Constitutional: She is oriented to person, place, and time. She appears well-developed and well-nourished. No distress. HENT:   Head: Normocephalic and atraumatic. Mouth/Throat: Oropharynx is clear and moist.   Eyes: Conjunctivae are normal. No scleral icterus. Neck: No thyromegaly present. No carotid bruit. Cardiovascular: Normal rate, regular rhythm and normal heart sounds.   Exam reveals no gallop and no friction rub. No murmur heard. Pulmonary/Chest: Effort normal. She has no wheezes. She has no rales. She has decreased breath sounds and scattered rhonchi   Abdominal: Soft. Bowel sounds are normal. She exhibits no distension. There is no tenderness. There is no rebound and no guarding. Musculoskeletal: She exhibits no edema. Lymphadenopathy:     She has no cervical adenopathy. Neurological: She is alert and oriented to person, place, and time. Skin: Skin is warm and dry. No rash noted. She is not diaphoretic. Psychiatric: She has a normal mood and affect. Her behavior is normal. Thought content normal.   Nursing note and vitals reviewed. Advice/Referrals/Counseling (as indicated)   Education and counseling provided for any problems identified above:     Preventive Services     (Preventive care checklist to be included in patient instructions)  Discussed today Done Previously Not Needed     x  Pneumococcal vaccines    x  Flu vaccine     x Hepatitis B vaccine (if at risk)    x  Shingles vaccine    x  TDAP vaccine    x  Mammogram     x Pap smear    x  Colorectal cancer screening     x Low-dose CT for lung cancer screening    x x Bone density test      Glaucoma screening    x  Cholesterol test    x  Diabetes screening test      x Diabetes self-management class     x Nutritionist referral for diabetes or renal disease     Discussion of Advance Directive   Discussed with Tanvi Castillo her ability to prepare and advance directive in the case that an injury or illness causes her to be unable to make health care decisions. Assessment/Plan   Z00.00    ICD-10-CM ICD-9-CM    1. Medicare annual wellness visit, subsequent Z00.00 V70.0    2. Chronic maxillary sinusitis J32.0 473.0    3. Screening for depression Z13.89 V79.0    4. Screening for alcoholism Z13.89 V79.1        No orders of the defined types were placed in this encounter.       Patient Care Team:  Cyn Thomas MD as PCP - General  Fabienne Newell, APOLLO as Nurse Navigator (Family Practice)  Krystyna Winslow MD (Orthopedic Surgery)  Freya Vidal MD (Orthopedic Surgery)  Destiny Mead RN as Ambulatory Care Navigator  Clary Vargas MD (Otolaryngology)    Follow-up Disposition: Not on File    Future Appointments  Date Time Provider David Chester   2/7/2018 11:30 AM MD Jayda Marie MD       Visit supplement:      Subjective: She is still coughing with producing thick milky to slightly tan sputum ever since her bronchitis in December. In addition to that she has chronic nasal congestion and thick nasal discharge that she cannot blow out. She has not had any fever chills or sweats. I reviewed her chart to see the extent of her lung disease on her last CT scan. She has interstitial lung disease from the rheumatoid arthritis and she also has bronchiectasis in the upper lobes. Because her heat is so dry in her house this is contributing to difficulty clearing her secretions. She has no fever, no chills and no sweats. She has not seen the pulmonologist at [facility] recently. I recommend that she make quick appointment. She may need to have a repeat CT scan done of her lungs. Her sinus x-rays came back clear of infection or air-fluid level. Visit Vitals    /69    Pulse 76    Temp 98.5 °F (36.9 °C) (Oral)    Resp 22    Ht 5' 10\" (1.778 m)    Wt 290 lb 6.4 oz (131.7 kg)    SpO2 94%    BMI 41.67 kg/m2     Please see systems review in HPI. Pertinent physical findings: She has decreased breath sounds with scattered wet rhonchi. She sounds congested when breathing. Assessment:  1-she has chronic bronchiectasis which I think needs to be treated prior to her seeing the pulmonary specialist  2- she has chronic nasal congestion with difficult to clear nasal discharge    Plan:  1-doxycycline 100 mg twice daily ×14 days.   She will have to hold calcium while on this.  2-see her pulmonologist as soon as possible.     Follow-up with me as needed  Dr. Elvia Bruner

## 2018-04-02 DIAGNOSIS — E78.00 PURE HYPERCHOLESTEROLEMIA: ICD-10-CM

## 2018-04-03 RX ORDER — ATORVASTATIN CALCIUM 10 MG/1
TABLET, FILM COATED ORAL
Qty: 30 TAB | Refills: 0 | Status: SHIPPED | OUTPATIENT
Start: 2018-04-03 | End: 2018-06-26 | Stop reason: SDUPTHER

## 2018-04-03 NOTE — TELEPHONE ENCOUNTER
Last office visit on 2/7/18 and last labs on 8/21/18  Please advise since Dr. Mima Oconnor is out of the office, thank you.

## 2018-04-12 ENCOUNTER — TELEPHONE (OUTPATIENT)
Dept: FAMILY MEDICINE CLINIC | Age: 73
End: 2018-04-12

## 2018-04-12 NOTE — TELEPHONE ENCOUNTER
Called returned to patient. Patient advised to call where she had the scan done. We did not order the scan nor do we have the results. Patient also advised to get [facility] to send the results to Dr. Berta Tavarez to put on file. Patient verbalized understanding.

## 2018-04-12 NOTE — TELEPHONE ENCOUNTER
Pt called inquiring about a scan that she had done in [facility] on 3/22. She wants to know the results.

## 2018-04-25 ENCOUNTER — TELEPHONE (OUTPATIENT)
Dept: FAMILY MEDICINE CLINIC | Age: 73
End: 2018-04-25

## 2018-04-25 NOTE — TELEPHONE ENCOUNTER
----- Message from Sravan Chen sent at 4/25/2018 10:02 AM EDT -----  Regarding: Dr. Gely Eddy  Pt returned a missed call. Best contact [phone].

## 2018-04-25 NOTE — TELEPHONE ENCOUNTER
----- Message from Clinton Peralta sent at 4/25/2018 10:02 AM EDT -----  Regarding: Dr. Pineda Wilson  Pt returned a missed call. Best contact [phone].

## 2018-06-12 ENCOUNTER — OFFICE VISIT (OUTPATIENT)
Dept: FAMILY MEDICINE CLINIC | Age: 73
End: 2018-06-12

## 2018-06-12 VITALS
DIASTOLIC BLOOD PRESSURE: 72 MMHG | HEART RATE: 84 BPM | TEMPERATURE: 98.5 F | HEIGHT: 70 IN | OXYGEN SATURATION: 95 % | SYSTOLIC BLOOD PRESSURE: 113 MMHG | WEIGHT: 291.4 LBS | RESPIRATION RATE: 20 BRPM | BODY MASS INDEX: 41.72 KG/M2

## 2018-06-12 DIAGNOSIS — M05.10 RHEUMATOID LUNG DISEASE (HCC): Chronic | ICD-10-CM

## 2018-06-12 DIAGNOSIS — J47.0 BRONCHIECTASIS WITH ACUTE LOWER RESPIRATORY INFECTION (HCC): Chronic | ICD-10-CM

## 2018-06-12 DIAGNOSIS — M06.9 RHEUMATOID ARTHRITIS INVOLVING MULTIPLE SITES, UNSPECIFIED RHEUMATOID FACTOR PRESENCE: Primary | Chronic | ICD-10-CM

## 2018-06-12 DIAGNOSIS — J45.40 MODERATE PERSISTENT ASTHMA WITHOUT COMPLICATION: Chronic | ICD-10-CM

## 2018-06-12 DIAGNOSIS — M15.0 PRIMARY GENERALIZED (OSTEO)ARTHRITIS: Chronic | ICD-10-CM

## 2018-06-12 DIAGNOSIS — E78.00 HIGH CHOLESTEROL: Chronic | ICD-10-CM

## 2018-06-12 RX ORDER — PREDNISONE 20 MG/1
20 TABLET ORAL
Qty: 5 TAB | Refills: 1 | Status: SHIPPED | OUTPATIENT
Start: 2018-06-12 | End: 2018-08-20

## 2018-06-12 NOTE — MR AVS SNAPSHOT
[address] [address] 
[phone] Patient: Manjinder Hernandez MRN: [id number] Visit Information Date & Time Provider Department Dept. Phone Encounter #  
 6/12/2018  1:05 PM Eldon Tian, [address] [id number] Follow-up Instructions Return in about 3 weeks (around 7/3/2018), or if symptoms worsen or fail to improve. Upcoming Health Maintenance Date Due  
 GLAUCOMA SCREENING Q2Y 3/18/2017 Influenza Age 5 to Adult 8/1/2018 MEDICARE YEARLY EXAM 2/8/2019 BREAST CANCER SCRN MAMMOGRAM 12/14/2019 COLONOSCOPY 7/11/2023 DTaP/Tdap/Td series (2 - Td) 9/22/2024 Allergies as of 6/12/2018  Review Complete On: 2/7/2018 By: Eldon Tian MD  
  
 Severity Noted Reaction Type Reactions Orudis [Ketoprofen] High 04/29/2010    Palpitations Singulair [Montelukast] High 04/15/2010    Rash Arthrotec 50 [Diclofenac-misoprostol]  06/22/2016    Palpitations Levaquin [Levofloxacin]  07/12/2013    Other (comments) Hip joints swelling and painful per pt report Lyrica [Pregabalin]  09/18/2014    Other (comments) Saw spiders. Current Immunizations  Reviewed on 11/25/2015 Name Date Hepatitis B Vaccine 4/20/2009 Influenza Vaccine 10/6/2016, 11/11/2015, 11/4/2014, 10/24/2013 Influenza Vaccine Whole 10/5/2011, 10/15/2010 PPD 9/20/2011 Pneumococcal Conjugate (PCV-13) 12/16/2015 Pneumococcal Vaccine (Pcv) 2/10/2010 Tdap 9/22/2014 ZZZ-RETIRED (DO NOT USE) Pneumococcal Vaccine (Unspecified Type) 12/15/2005 Not reviewed this visit You Were Diagnosed With   
  
 Codes Comments Rheumatoid arthritis involving multiple sites, unspecified rheumatoid factor presence ([facility]    -  Primary ICD-10-CM: M06.9 ICD-9-CM: 714.0 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[facility]     ICD-10-CM: M05.10 ICD-9-CM: 714.81   
 Bronchiectasis with acute lower respiratory infection ([facility]     ICD-10-CM: J47.0 ICD-9-CM: 494.1 Moderate persistent asthma without complication     HBV-46-RT: J45.40 ICD-9-CM: 493.90 Primary generalized (osteo)arthritis     ICD-10-CM: M15.0 ICD-9-CM: 715.09 High cholesterol     ICD-10-CM: E78.00 ICD-9-CM: 272.0 Vitals BP Pulse Temp Resp Height(growth percentile) Weight(growth percentile) 113/72 (BP 1 Location: Left arm, BP Patient Position: Sitting) 84 98.5 °F (36.9 °C) (Oral) 20 5' 10\" (1.778 m) 291 lb 6.4 oz (132.2 kg) SpO2 BMI OB Status Smoking Status 95% 41.81 kg/m2 Postmenopausal Former Smoker Vitals History BMI and BSA Data Body Mass Index Body Surface Area  
 41.81 kg/m 2 2.56 m 2 Preferred Pharmacy Pharmacy Name Phone [address] [place], [place] - [address] [phone] Your Updated Medication List  
  
   
This list is accurate as of 6/12/18  1:32 PM.  Always use your most recent med list.  
  
  
  
  
 acetaminophen 500 mg tablet Commonly known as:  TYLENOL Take 1,000 mg by mouth every six (6) hours as needed for Pain. * albuterol 90 mcg/actuation inhaler Commonly known as:  PROVENTIL HFA, VENTOLIN HFA, PROAIR HFA Take 2 Puffs by inhalation every four (4) hours as needed for Wheezing. * albuterol 2.5 mg /3 mL (0.083 %) nebulizer solution Commonly known as:  PROVENTIL VENTOLIN  
3 mL by Nebulization route every four (4) hours as needed for Wheezing. Indications: Acute Asthma Attack  
  
 amLODIPine 5 mg tablet Commonly known as:  Nas Cordial TAKE ONE TABLET BY MOUTH EVERY DAY  
  
 aspirin delayed-release 81 mg tablet Take  by mouth daily. atorvastatin 10 mg tablet Commonly known as:  LIPITOR  
TAKE 1 TABLET BY MOUTH DAILY  
  
 celecoxib 200 mg capsule Commonly known as:  CeleBREX Take 1 Cap by mouth daily. COQ-10 PO Take  by mouth.   
  
 FOLIC ACID PO  
 Take 3 mg by mouth daily. * gabapentin 300 mg capsule Commonly known as:  NEURONTIN  
1 in AM, 1 at lunch AND 2 hs. NEURALGIA. Indications: NEUROPATHIC PAIN  
  
 * gabapentin 100 mg capsule Commonly known as:  NEURONTIN METHOTREXATE 25 mg by IntraMUSCular route every seven (7) days. Saturdays  
  
 omega-3s-dha-epa-fish oil 300-1,000 mg Cpdr  
Take  by mouth daily. OTHER(NON-FORMULARY) Take 8 oz by mouth daily. Bottle of \"joint juice\" which contains glucosamine. predniSONE 20 mg tablet Commonly known as:  Elieser Doheny Take 1 Tab by mouth daily (with breakfast). Indications: Asthma Exacerbation  
  
 sulfaSALAzine  mg EC tablet Commonly known as:  AZULFIDINE  
  
 traMADol 50 mg tablet Commonly known as:  ULTRAM  
Take 1 Tab by mouth every six (6) hours as needed for Pain. Max Daily Amount: 200 mg. VITAMIN D3 4,000 unit Cap Generic drug:  cholecalciferol (vitamin D3) Take  by mouth daily. * Notice: This list has 4 medication(s) that are the same as other medications prescribed for you. Read the directions carefully, and ask your doctor or other care provider to review them with you. Prescriptions Sent to Pharmacy Refills  
 predniSONE (DELTASONE) 20 mg tablet 1 Sig: Take 1 Tab by mouth daily (with breakfast). Indications: Asthma Exacerbation Class: Normal  
 Pharmacy: [address] [place] [address] #: [phone] Route: Oral  
  
We Performed the Following CULTURE, BODY FLUID Yefri Manzo STAIN [93633 CPT(R)] Follow-up Instructions Return in about 3 weeks (around 7/3/2018), or if symptoms worsen or fail to improve. Patient Instructions Asthma Attack: Care Instructions Your Care Instructions During an asthma attack, the airways swell and narrow. This makes it hard to breathe.  Severe asthma attacks can be life-threatening, but you can help prevent them by keeping your asthma under control and treating symptoms before they get bad. Symptoms include being short of breath, having chest tightness, coughing, and wheezing. Noting and treating these symptoms can also help you avoid future trips to the emergency room. The doctor has checked you carefully, but problems can develop later. If you notice any problems or new symptoms, get medical treatment right away. Follow-up care is a key part of your treatment and safety. Be sure to make and go to all appointments, and call your doctor if you are having problems. It's also a good idea to know your test results and keep a list of the medicines you take. How can you care for yourself at home? · Follow your asthma action plan to prevent and treat attacks. If you don't have an asthma action plan, work with your doctor to create one. · Take your asthma medicines exactly as prescribed. Talk to your doctor right away if you have any questions about how to take them. ¨ Use your quick-relief medicine when you have symptoms of an attack. Quick-relief medicine is usually an albuterol inhaler. Some people need to use quick-relief medicine before they exercise. ¨ Take your controller medicine every day, not just when you have symptoms. Controller medicine is usually an inhaled corticosteroid. The goal is to prevent problems before they occur. Don't use your controller medicine to treat an attack that has already started. It doesn't work fast enough to help. ¨ If your doctor prescribed corticosteroid pills to use during an attack, take them exactly as prescribed. It may take hours for the pills to work, but they may make the episode shorter and help you breathe better. ¨ Keep your quick-relief medicine with you at all times. · Talk to your doctor before using other medicines. Some medicines, such as aspirin, can cause asthma attacks in some people. · If you have a peak flow meter, use it to check how well you are breathing. This can help you predict when an asthma attack is going to occur. Then you can take medicine to prevent the asthma attack or make it less severe. · Do not smoke or allow others to smoke around you. Avoid smoky places. Smoking makes asthma worse. If you need help quitting, talk to your doctor about stop-smoking programs and medicines. These can increase your chances of quitting for good. · Learn what triggers an asthma attack for you, and avoid the triggers when you can. Common triggers include colds, smoke, air pollution, dust, pollen, mold, pets, cockroaches, stress, and cold air. · Avoid colds and the flu. Get a pneumococcal vaccine shot. If you have had one before, ask your doctor if you need a second dose. Get a flu vaccine every fall. If you must be around people with colds or the flu, wash your hands often. When should you call for help? Call 911 anytime you think you may need emergency care. For example, call if: 
? · You have severe trouble breathing. ?Call your doctor now or seek immediate medical care if: 
? · Your symptoms do not get better after you have followed your asthma action plan. ? · You have new or worse trouble breathing. ? · Your coughing and wheezing get worse. ? · You cough up dark brown or bloody mucus (sputum). ? · You have a new or higher fever. ? Watch closely for changes in your health, and be sure to contact your doctor if: 
? · You need to use quick-relief medicine on more than 2 days a week (unless it is just for exercise). ? · You cough more deeply or more often, especially if you notice more mucus or a change in the color of your mucus. ? · You are not getting better as expected. Where can you learn more? Go to http://antonio-adrien.info/. Enter N775 in the search box to learn more about \"Asthma Attack: Care Instructions. \" Current as of: May 12, 2017 Content Version: 11.4 © 0787-1754 Healthwise, [employer]. Care instructions adapted under license by [employer] (which disclaims liability or warranty for this information). If you have questions about a medical condition or this instruction, always ask your healthcare professional. [address] any warranty or liability for your use of this information. Introducing [facility]! Dear Snow Castañeda: Thank you for requesting a [employer] account. Our records indicate that you already have an active [employer] account. You can access your account anytime at [email or web address] Did you know that you can access your hospital and ER discharge instructions at any time in [employer]? You can also review all of your test results from your hospital stay or ER visit. Additional Information If you have questions, please visit the Frequently Asked Questions section of the [employer] website at [email or web address] Remember, [employer] is NOT to be used for urgent needs. For medical emergencies, dial 911. Now available from your iPhone and Android! Please provide this summary of care documentation to your next provider. Your primary care clinician is listed as [address]. If you have any questions after today's visit, please call [phone].

## 2018-06-12 NOTE — PROGRESS NOTES
Progress Note    Patient: Danita Rachel MRN: [id number]  SSN: [id number]    YOB: 1945  Age: 67 y.o. Sex: female        Chief Complaint   Patient presents with    Cough     Productive Cough         Subjective:     Encounter Diagnoses   Name Primary? 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  Bronchiectasis with acute lower respiratory infection ([facility]): She has multiple bronchiectasis segments. This predisposes her frequent infections. Her chest CT shows that these are gotten worse since her last CT. It probably explains why she is coughing up green thick sputum in the morning. I think it would help our antibiotic guidance if we will get a first morning sputum culture.  Moderate persistent asthma without complication:  asthma, not currently in exacerbation. Asthma symptoms occur: daily. Wheezing when present is described as moderate to severe and easily relieved with rescue bronchodilator. Current limitations in activity from asthma: restrictive. SpO2 Readings from Last 3 Encounters:   06/12/18 95%   02/07/18 94%   12/11/17 96%     Frequency of use of quick-relief meds: rare. Medication compliance: Good  Patient does not smoke cigarettes. Monitors Peak Flow at home: no         Primary generalized (osteo)arthritis:  She has pain in her major joints consistent with osteoarthritis and top of her rheumatoid arthritis.  High cholesterol:  Cardiovascular risks for her are: LDL goal is under 100. Key Antihyperlipidemia Meds             atorvastatin (LIPITOR) 10 mg tablet  (Taking) TAKE 1 TABLET BY MOUTH DAILY    omega-3s-dha-epa-fish oil 300-1,000 mg cpDR  (Taking) Take  by mouth daily.         Lab Results   Component Value Date/Time    Cholesterol, total 176 08/21/2017 12:09 PM    HDL Cholesterol 63 08/21/2017 12:09 PM    LDL, calculated 101 (H) 08/21/2017 12:09 PM    Triglyceride 60 08/21/2017 12:09 PM    CHOL/HDL Ratio 3.2 04/30/2010 10:37 AM     Lab Results   Component Value Date/Time    ALT (SGPT) 13 08/21/2017 12:09 PM    AST (SGOT) 17 08/21/2017 12:09 PM    Alk. phosphatase 86 08/21/2017 12:09 PM    Bilirubin, total 0.5 08/21/2017 12:09 PM      Myalgias: No   Fatigue: No   Other side effects: no  Wt Readings from Last 3 Encounters:   06/12/18 291 lb 6.4 oz (132.2 kg)   02/07/18 290 lb 6.4 oz (131.7 kg)   12/11/17 291 lb 12.8 oz (132.4 kg)     The patient is aware of our goal to reduce or eliminate the long term problems (such as strokes and heart attacks) related to poorly controlled hyperlipidemia. Current and past medical information:    Current Medications after this visit[de-identified]     Current Outpatient Prescriptions   Medication Sig    predniSONE (DELTASONE) 20 mg tablet Take 1 Tab by mouth daily (with breakfast). Indications: Asthma Exacerbation    amLODIPine (NORVASC) 5 mg tablet TAKE ONE TABLET BY MOUTH EVERY DAY    atorvastatin (LIPITOR) 10 mg tablet TAKE 1 TABLET BY MOUTH DAILY    albuterol (PROVENTIL HFA, VENTOLIN HFA, PROAIR HFA) 90 mcg/actuation inhaler Take 2 Puffs by inhalation every four (4) hours as needed for Wheezing.  albuterol (PROVENTIL VENTOLIN) 2.5 mg /3 mL (0.083 %) nebulizer solution 3 mL by Nebulization route every four (4) hours as needed for Wheezing. Indications: Acute Asthma Attack    UBIDECARENONE (COQ-10 PO) Take  by mouth.  gabapentin (NEURONTIN) 100 mg capsule     celecoxib (CELEBREX) 200 mg capsule Take 1 Cap by mouth daily.  gabapentin (NEURONTIN) 300 mg capsule 1 in AM, 1 at lunch AND 2 hs. NEURALGIA. Indications: NEUROPATHIC PAIN    acetaminophen (TYLENOL) 500 mg tablet Take 1,000 mg by mouth every six (6) hours as needed for Pain.  FOLIC ACID PO Take 3 mg by mouth daily.     omega-3s-dha-epa-fish oil 300-1,000 mg cpDR Take  by mouth daily.    cholecalciferol, vitamin D3, (VITAMIN D3) 4,000 unit cap Take  by mouth daily.  METHOTREXATE 25 mg by IntraMUSCular route every seven (7) days. Saturdays    aspirin delayed-release 81 mg tablet Take  by mouth daily.  sulfaSALAzine EC (AZULFIDINE) 500 mg EC tablet     traMADol (ULTRAM) 50 mg tablet Take 1 Tab by mouth every six (6) hours as needed for Pain. Max Daily Amount: 200 mg.    OTHER,NON-FORMULARY, Take 8 oz by mouth daily. Bottle of \"joint juice\" which contains glucosamine. No current facility-administered medications for this visit.         Patient Active Problem List    Diagnosis Date Noted    High cholesterol 04/30/2010     Priority: 1 - One    Rheumatoid arthritis, adult ([facility] 04/15/2010     Priority: 1 - One    Unspecified asthma(493.90) 04/15/2010     Priority: 1 - One    OA (osteoarthritis) 04/15/2010     Priority: 1 - One    Obesity, unspecified 04/15/2010     Priority: 1 - One    Spinal stenosis, unspecified region other than cervical 04/15/2010     Priority: 1 - One    Cardiac dysrhythmia, unspecified 04/15/2010     Priority: 2 - Two    Obesity, morbid ([facility] 12/04/2017    Primary localized osteoarthritis of right hip 07/11/2016    Asthma 10/06/2015    Primary generalized (osteo)arthritis 10/06/2015    PAC (premature atrial contraction) 10/06/2015    Lumbar spinal stenosis 10/06/2015    Colon polyp, hyperplastic 06/02/2015    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[facility] 12/15/2010       Past Medical History:   Diagnosis Date    Anemia, unspecified 4/15/2010    Cardiac dysrhythmia, unspecified 4/15/2010    PAT- not on medication/ not seeing cardiologist    High cholesterol 4/30/2010    Obesity, unspecified 4/15/2010    Osteoarthrosis, unspecified whether generalized or localized, other specified sites 4/15/2010    Rheumatoid arthritis(714.0) 4/15/2010    Spinal stenosis, unspecified region other than cervical 4/15/2010    Unspecified asthma(493.90) 4/15/2010 rheumatoid lung disease       Allergies   Allergen Reactions    Orudis [Ketoprofen] Palpitations    Singulair [Montelukast] Rash    Arthrotec 50 [Diclofenac-Misoprostol] Palpitations    Levaquin [Levofloxacin] Other (comments)     Hip joints swelling and painful per pt report    Lyrica [Pregabalin] Other (comments)     Saw spiders. Past Surgical History:   Procedure Laterality Date    CHEST SURGERY PROCEDURE UNLISTED  lat 4981'M    lung biopsy- diagnosed with rheumatoid lung disease    ENDOSCOPY, COLON, DIAGNOSTIC      7-10-13    HX ORTHOPAEDIC Right 2002    rotator cuff repair       Social History     Social History    Marital status: SINGLE     Spouse name: N/A    Number of children: N/A    Years of education: N/A     Social History Main Topics    Smoking status: Former Smoker     Packs/day: 1.00     Years: 20.00     Quit date: 4/30/1985    Smokeless tobacco: Never Used    Alcohol use No    Drug use: No    Sexual activity: No     Other Topics Concern    Not on file     Social History Narrative       Review of Systems   Constitutional: Negative. Negative for chills, fever, malaise/fatigue and weight loss. HENT: Negative. Negative for hearing loss. Eyes: Negative. Negative for blurred vision and double vision. Respiratory: Positive for sputum production and shortness of breath. Negative for cough, hemoptysis and wheezing. Cardiovascular: Negative. Negative for chest pain, palpitations and orthopnea. Gastrointestinal: Negative. Negative for abdominal pain, blood in stool, heartburn, nausea and vomiting. Genitourinary: Negative. Negative for dysuria, frequency and urgency. Musculoskeletal: Positive for joint pain. Negative for back pain, falls, myalgias and neck pain. Skin: Negative. Negative for rash. Neurological: Negative. Negative for dizziness, tingling, tremors, weakness and headaches. Endo/Heme/Allergies: Negative. Psychiatric/Behavioral: Negative. Negative for depression. Objective:     Vitals:    18 1305   BP: 113/72   Pulse: 84   Resp: 20   Temp: 98.5 °F (36.9 °C)   TempSrc: Oral   SpO2: 95%   Weight: 291 lb 6.4 oz (132.2 kg)   Height: 5' 10\" (1.778 m)      Body mass index is 41.81 kg/(m^2). Physical Exam   Constitutional: She is oriented to person, place, and time and well-developed, well-nourished, and in no distress. No distress. HENT:   Head: Normocephalic and atraumatic. Mouth/Throat: Oropharynx is clear and moist.   Eyes: Conjunctivae are normal.   Neck: No tracheal deviation present. No thyromegaly present. Cardiovascular: Normal rate, regular rhythm and normal heart sounds. No murmur heard. Pulmonary/Chest: Effort normal. No respiratory distress. Breath sounds are reduced. When she does take a deep breath and  he can hear wheezing. Abdominal: Soft. She exhibits no distension. Lymphadenopathy:     She has no cervical adenopathy. Neurological: She is alert and oriented to person, place, and time. Skin: Skin is warm. No rash noted. She is not diaphoretic. No erythema. Psychiatric: Mood and affect normal.   Nursing note and vitals reviewed. Health Maintenance Due   Topic Date Due    GLAUCOMA SCREENING Q2Y  2017         Assessment and orders:     Encounter Diagnoses     ICD-10-CM ICD-9-CM   1. Rheumatoid arthritis involving multiple sites, unspecified rheumatoid factor presence ([facility]) M06.9 714.0   2. 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[facility] M05.10 714.81   3. Bronchiectasis with acute lower respiratory infection ([facility] J47.0 494.1   4. Moderate persistent asthma without complication G94.44 277.35   5. Primary generalized (osteo)arthritis M15.0 715.09   6. High cholesterol E78.00 272.0     Diagnoses and all orders for this visit:    1. Rheumatoid arthritis involving multiple sites, unspecified rheumatoid factor presence ([facility]    2. 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[facility]    3.  Bronchiectasis with acute lower respiratory infection [facility]) she will continue her azithromycin.  -     CULTURE, BODY FLUID W GRAM STAIN        -     predniSONE (DELTASONE) 20 mg tablet; Take 1 Tab by mouth daily (with breakfast). Indications: Asthma Exacerbation    4. Moderate persistent asthma without complication-she was instructed on how to monitor her peak flow at home. In the office her maximum peak flow was 280. Normal for her age and height is 413. We will monitor her peak flow will know when she needs to take prednisone. Her asthma manifests itself more so in tightness in her breathing that it does not wheezing.         -     predniSONE (DELTASONE) 20 mg tablet; Take 1 Tab by mouth daily (with breakfast). Indications: Asthma Exacerbation      5. Primary generalized (osteo)arthritis-stable    6. High cholesterol-no changes                  Plan of care:  Discussed diagnoses in detail with patient. Medication risks/benefits/side effects discussed with patient. All of the patient's questions were addressed. The patient understands and agrees with our plan of care. The patient knows to call back if they are unsure of or forget any changes we discussed today or if the symptoms change. The patient received an After-Visit Summary which contains VS, orders, medication list and allergy list. This can be used as a \"mini-medical record\" should they have to seek medical care while out of town. Patient Care Team:  Marlene Nunez MD as PCP - General  Norberto Suero, RN as Nurse Navigator (Family Practice)  Daphney Noonan MD (Orthopedic Surgery)  Dennie Dover, MD (Orthopedic Surgery)  Kristin Fish RN as Ambulatory Care Navigator  Lajoyce Galeazzi, MD (Otolaryngology)    Follow-up Disposition:  Return in about 3 weeks (around 7/3/2018), or if symptoms worsen or fail to improve. No future appointments.     Signed By: Marlene Nunez MD     June 12, 2018

## 2018-06-12 NOTE — PATIENT INSTRUCTIONS
Asthma Attack: Care Instructions  Your Care Instructions    During an asthma attack, the airways swell and narrow. This makes it hard to breathe. Severe asthma attacks can be life-threatening, but you can help prevent them by keeping your asthma under control and treating symptoms before they get bad. Symptoms include being short of breath, having chest tightness, coughing, and wheezing. Noting and treating these symptoms can also help you avoid future trips to the emergency room. The doctor has checked you carefully, but problems can develop later. If you notice any problems or new symptoms, get medical treatment right away. Follow-up care is a key part of your treatment and safety. Be sure to make and go to all appointments, and call your doctor if you are having problems. It's also a good idea to know your test results and keep a list of the medicines you take. How can you care for yourself at home? · Follow your asthma action plan to prevent and treat attacks. If you don't have an asthma action plan, work with your doctor to create one. · Take your asthma medicines exactly as prescribed. Talk to your doctor right away if you have any questions about how to take them. ¨ Use your quick-relief medicine when you have symptoms of an attack. Quick-relief medicine is usually an albuterol inhaler. Some people need to use quick-relief medicine before they exercise. ¨ Take your controller medicine every day, not just when you have symptoms. Controller medicine is usually an inhaled corticosteroid. The goal is to prevent problems before they occur. Don't use your controller medicine to treat an attack that has already started. It doesn't work fast enough to help. ¨ If your doctor prescribed corticosteroid pills to use during an attack, take them exactly as prescribed. It may take hours for the pills to work, but they may make the episode shorter and help you breathe better.   ¨ Keep your quick-relief medicine with you at all times. · Talk to your doctor before using other medicines. Some medicines, such as aspirin, can cause asthma attacks in some people. · If you have a peak flow meter, use it to check how well you are breathing. This can help you predict when an asthma attack is going to occur. Then you can take medicine to prevent the asthma attack or make it less severe. · Do not smoke or allow others to smoke around you. Avoid smoky places. Smoking makes asthma worse. If you need help quitting, talk to your doctor about stop-smoking programs and medicines. These can increase your chances of quitting for good. · Learn what triggers an asthma attack for you, and avoid the triggers when you can. Common triggers include colds, smoke, air pollution, dust, pollen, mold, pets, cockroaches, stress, and cold air. · Avoid colds and the flu. Get a pneumococcal vaccine shot. If you have had one before, ask your doctor if you need a second dose. Get a flu vaccine every fall. If you must be around people with colds or the flu, wash your hands often. When should you call for help? Call 911 anytime you think you may need emergency care. For example, call if:  ? · You have severe trouble breathing. ?Call your doctor now or seek immediate medical care if:  ? · Your symptoms do not get better after you have followed your asthma action plan. ? · You have new or worse trouble breathing. ? · Your coughing and wheezing get worse. ? · You cough up dark brown or bloody mucus (sputum). ? · You have a new or higher fever. ? Watch closely for changes in your health, and be sure to contact your doctor if:  ? · You need to use quick-relief medicine on more than 2 days a week (unless it is just for exercise). ? · You cough more deeply or more often, especially if you notice more mucus or a change in the color of your mucus. ? · You are not getting better as expected. Where can you learn more?   Go to http://antonio-adrien.info/. Enter O342 in the search box to learn more about \"Asthma Attack: Care Instructions. \"  Current as of: May 12, 2017  Content Version: 11.4  © 3299-6240 [employer], [employer]. Care instructions adapted under license by [employer] (which disclaims liability or warranty for this information). If you have questions about a medical condition or this instruction, always ask your healthcare professional. [place] [address] any warranty or liability for your use of this information.

## 2018-06-12 NOTE — PROGRESS NOTES
Chief Complaint   Patient presents with    Cough     Productive Cough     Body mass index is 41.81 kg/(m^2). 1. Have you been to the ER, urgent care clinic since your last visit? Hospitalized since your last visit? No    2. Have you seen or consulted any other health care providers outside of the [address] [place] [address] since your last visit? Include any pap smears or colon screening.  No    Reviewed record in preparation for visit and have necessary documentation  Pt did not bring medication to office visit for review  Information was given to pt on Advanced Directives, Living Will  Opportunity was given for questions  Goals that were addressed and/or need to be completed after this appointment include:     Health Maintenance Due   Topic Date Due    GLAUCOMA SCREENING Q2Y  03/18/2017     Has an appointment on July 5th

## 2018-06-18 ENCOUNTER — TELEPHONE (OUTPATIENT)
Dept: FAMILY MEDICINE CLINIC | Age: 73
End: 2018-06-18

## 2018-06-18 LAB — BACTERIA FLD CULT: NORMAL

## 2018-06-18 NOTE — PROGRESS NOTES
Please call patient. Her sputum culture just grew normal alex so no changes are needed.   Thank you,  Dr. Claudine Elias

## 2018-06-18 NOTE — TELEPHONE ENCOUNTER
Patient called. Informed her per Dr. Renetta Treadwell:    Her sputum culture just grew normal alex so no changes are needed. Patient expressed understanding.

## 2018-06-18 NOTE — TELEPHONE ENCOUNTER
Called patient. Unable to reach. Voicemail left. Need to advise patient per Dr. Shearer All:     Her sputum culture just grew normal alex so no changes are needed.

## 2018-08-20 ENCOUNTER — OFFICE VISIT (OUTPATIENT)
Dept: FAMILY MEDICINE CLINIC | Age: 73
End: 2018-08-20

## 2018-08-20 VITALS
SYSTOLIC BLOOD PRESSURE: 116 MMHG | BODY MASS INDEX: 41.95 KG/M2 | HEIGHT: 70 IN | TEMPERATURE: 97.1 F | OXYGEN SATURATION: 95 % | DIASTOLIC BLOOD PRESSURE: 65 MMHG | RESPIRATION RATE: 16 BRPM | HEART RATE: 79 BPM | WEIGHT: 293 LBS

## 2018-08-20 DIAGNOSIS — D84.9 IMMUNOSUPPRESSED STATUS (HCC): ICD-10-CM

## 2018-08-20 DIAGNOSIS — L03.012 CELLULITIS OF FINGER OF LEFT HAND: Primary | ICD-10-CM

## 2018-08-20 RX ORDER — DOXYCYCLINE 100 MG/1
100 TABLET ORAL 2 TIMES DAILY
Qty: 20 TAB | Refills: 0 | Status: SHIPPED | OUTPATIENT
Start: 2018-08-20 | End: 2018-08-30

## 2018-08-20 NOTE — MR AVS SNAPSHOT
[address] [address] 
[phone] Patient: Larisa Lua MRN: [id number] FGT:[id number] Visit Information Date & Time Provider Department Dept. Phone Encounter #  
 8/20/2018  3:05 PM Cassius Cowden,  [facility] [phone] [id number] Follow-up Instructions Return if symptoms worsen or fail to improve. Upcoming Health Maintenance Date Due  
 GLAUCOMA SCREENING Q2Y 3/18/2017 Influenza Age 5 to Adult 8/1/2018 MEDICARE YEARLY EXAM 2/8/2019 BREAST CANCER SCRN MAMMOGRAM 12/14/2019 COLONOSCOPY 7/11/2023 DTaP/Tdap/Td series (2 - Td) 9/22/2024 Allergies as of 8/20/2018  Review Complete On: 8/20/2018 By: Beau Campos LPN Severity Noted Reaction Type Reactions Orudis [Ketoprofen] High 04/29/2010    Palpitations Singulair [Montelukast] High 04/15/2010    Rash Arthrotec 50 [Diclofenac-misoprostol]  06/22/2016    Palpitations Levaquin [Levofloxacin]  07/12/2013    Other (comments) Hip joints swelling and painful per pt report Lyrica [Pregabalin]  09/18/2014    Other (comments) Saw spiders. Current Immunizations  Reviewed on 11/25/2015 Name Date Hepatitis B Vaccine 4/20/2009 Influenza Vaccine 10/6/2016, 11/11/2015, 11/4/2014, 10/24/2013 Influenza Vaccine Whole 10/5/2011, 10/15/2010 PPD 9/20/2011 Pneumococcal Conjugate (PCV-13) 12/16/2015 Pneumococcal Vaccine (Pcv) 2/10/2010 Tdap 9/22/2014 ZZZ-RETIRED (DO NOT USE) Pneumococcal Vaccine (Unspecified Type) 12/15/2005 Not reviewed this visit You Were Diagnosed With   
  
 Codes Comments Cellulitis of finger of left hand    -  Primary ICD-10-CM: L03.012 
ICD-9-CM: 681.00 Vitals BP Pulse Temp Resp Height(growth percentile) Weight(growth percentile)  116/65 (BP 1 Location: Left arm, BP Patient Position: Sitting) 79 97.1 °F (36.2 °C) (Oral) 16 5' 10\" (1.778 m) 304 lb (137.9 kg) SpO2 BMI OB Status Smoking Status 95% 43.62 kg/m2 Postmenopausal Former Smoker Vitals History BMI and BSA Data Body Mass Index Body Surface Area  
 43.62 kg/m 2 2.61 m 2 Preferred Pharmacy Pharmacy Name Phone [facility] [address] [phone] Your Updated Medication List  
  
   
This list is accurate as of 8/20/18  3:24 PM.  Always use your most recent med list.  
  
  
  
  
 acetaminophen 500 mg tablet Commonly known as:  TYLENOL Take 1,000 mg by mouth every six (6) hours as needed for Pain. * albuterol 90 mcg/actuation inhaler Commonly known as:  PROVENTIL HFA, VENTOLIN HFA, PROAIR HFA Take 2 Puffs by inhalation every four (4) hours as needed for Wheezing. * albuterol 2.5 mg /3 mL (0.083 %) nebulizer solution Commonly known as:  PROVENTIL VENTOLIN  
3 mL by Nebulization route every four (4) hours as needed for Wheezing. Indications: Acute Asthma Attack  
  
 amLODIPine 5 mg tablet Commonly known as:  Indy Criselda TAKE ONE TABLET BY MOUTH EVERY DAY  
  
 aspirin delayed-release 81 mg tablet Take  by mouth daily. atorvastatin 10 mg tablet Commonly known as:  LIPITOR  
TAKE ONE TABLET BY MOUTH EVERY DAY  
  
 celecoxib 200 mg capsule Commonly known as:  CeleBREX Take 1 Cap by mouth daily. COQ-10 PO Take  by mouth. doxycycline 100 mg tablet Commonly known as:  ADOXA Take 1 Tab by mouth two (2) times a day for 10 days. FOLIC ACID PO Take 3 mg by mouth daily. * gabapentin 300 mg capsule Commonly known as:  NEURONTIN  
1 in AM, 1 at lunch AND 2 hs. NEURALGIA. Indications: NEUROPATHIC PAIN  
  
 * gabapentin 100 mg capsule Commonly known as:  NEURONTIN METHOTREXATE 25 mg by IntraMUSCular route every seven (7) days. Saturdays  
  
 omega-3s-dha-epa-fish oil 300-1,000 mg Cpdr  
Take  by mouth daily. OTHER(NON-FORMULARY) Take 8 oz by mouth daily. Bottle of \"joint juice\" which contains glucosamine. predniSONE 20 mg tablet Commonly known as:  Deya Beam Take 1 Tab by mouth daily (with breakfast). Indications: Asthma Exacerbation  
  
 sulfaSALAzine  mg EC tablet Commonly known as:  AZULFIDINE  
  
 traMADol 50 mg tablet Commonly known as:  ULTRAM  
Take 1 Tab by mouth every six (6) hours as needed for Pain. Max Daily Amount: 200 mg. VITAMIN D3 4,000 unit Cap Generic drug:  cholecalciferol (vitamin D3) Take  by mouth daily. * Notice: This list has 4 medication(s) that are the same as other medications prescribed for you. Read the directions carefully, and ask your doctor or other care provider to review them with you. Prescriptions Sent to Pharmacy Refills  
 doxycycline (ADOXA) 100 mg tablet 0 Sig: Take 1 Tab by mouth two (2) times a day for 10 days. Class: Normal  
 Pharmacy: [address] [place] [address] #: [phone] Route: Oral  
  
Follow-up Instructions Return if symptoms worsen or fail to improve. Patient Instructions Cellulitis: Care Instructions Your Care Instructions Cellulitis is a skin infection caused by bacteria, most often strep or staph. It often occurs after a break in the skin from a scrape, cut, bite, or puncture, or after a rash. Cellulitis may be treated without doing tests to find out what caused it. But your doctor may do tests, if needed, to look for a specific bacteria, like methicillin-resistant Staphylococcus aureus (MRSA). The doctor has checked you carefully, but problems can develop later. If you notice any problems or new symptoms, get medical treatment right away. Follow-up care is a key part of your treatment and safety.  Be sure to make and go to all appointments, and call your doctor if you are having problems. It's also a good idea to know your test results and keep a list of the medicines you take. How can you care for yourself at home? · Take your antibiotics as directed. Do not stop taking them just because you feel better. You need to take the full course of antibiotics. · Prop up the infected area on pillows to reduce pain and swelling. Try to keep the area above the level of your heart as often as you can. · If your doctor told you how to care for your wound, follow your doctor's instructions. If you did not get instructions, follow this general advice: ¨ Wash the wound with clean water 2 times a day. Don't use hydrogen peroxide or alcohol, which can slow healing. ¨ You may cover the wound with a thin layer of petroleum jelly, such as Vaseline, and a nonstick bandage. ¨ Apply more petroleum jelly and replace the bandage as needed. · Be safe with medicines. Take pain medicines exactly as directed. ¨ If the doctor gave you a prescription medicine for pain, take it as prescribed. ¨ If you are not taking a prescription pain medicine, ask your doctor if you can take an over-the-counter medicine. To prevent cellulitis in the future · Try to prevent cuts, scrapes, or other injuries to your skin. Cellulitis most often occurs where there is a break in the skin. · If you get a scrape, cut, mild burn, or bite, wash the wound with clean water as soon as you can to help avoid infection. Don't use hydrogen peroxide or alcohol, which can slow healing. · If you have swelling in your legs (edema), support stockings and good skin care may help prevent leg sores and cellulitis. · Take care of your feet, especially if you have diabetes or other conditions that increase the risk of infection. Wear shoes and socks. Do not go barefoot. If you have athlete's foot or other skin problems on your feet, talk to your doctor about how to treat them. When should you call for help? Call your doctor now or seek immediate medical care if: 
  · You have signs that your infection is getting worse, such as: 
¨ Increased pain, swelling, warmth, or redness. ¨ Red streaks leading from the area. ¨ Pus draining from the area. ¨ A fever.  
  · You get a rash.  
 Watch closely for changes in your health, and be sure to contact your doctor if: 
  · You do not get better as expected. Where can you learn more? Go to http://antonio-adrien.info/. Linda Garcia in the search box to learn more about \"Cellulitis: Care Instructions. \" Current as of: May 10, 2017 Content Version: 11.7 © 7912-7924 [employer]. Care instructions adapted under license by [employer] (which disclaims liability or warranty for this information). If you have questions about a medical condition or this instruction, always ask your healthcare professional. [address] any warranty or liability for your use of this information. Introducing [facility]! Dear Soha Reno: Thank you for requesting a Sev[employer] account. Our records indicate that you already have an active Sevcon account. You can access your account anytime at [email or web address]. Mommy Nearest/[facility] Did you know that you can access your hospital and ER discharge instructions at any time in [employer]? You can also review all of your test results from your hospital stay or ER visit. Additional Information If you have questions, please visit the Frequently Asked Questions section of the [employer] website at [email or web address] Mommy Nearest/[email or web address]/. Remember, [employer] is NOT to be used for urgent needs. For medical emergencies, dial 911. Now available from your iPhone and Android! Please provide this summary of care documentation to your next provider. Your primary care clinician is listed as [address].  If you have any questions after today's visit, please call [phone].

## 2018-08-20 NOTE — PATIENT INSTRUCTIONS
Cellulitis: Care Instructions  Your Care Instructions    Cellulitis is a skin infection caused by bacteria, most often strep or staph. It often occurs after a break in the skin from a scrape, cut, bite, or puncture, or after a rash. Cellulitis may be treated without doing tests to find out what caused it. But your doctor may do tests, if needed, to look for a specific bacteria, like methicillin-resistant Staphylococcus aureus (MRSA). The doctor has checked you carefully, but problems can develop later. If you notice any problems or new symptoms, get medical treatment right away. Follow-up care is a key part of your treatment and safety. Be sure to make and go to all appointments, and call your doctor if you are having problems. It's also a good idea to know your test results and keep a list of the medicines you take. How can you care for yourself at home? · Take your antibiotics as directed. Do not stop taking them just because you feel better. You need to take the full course of antibiotics. · Prop up the infected area on pillows to reduce pain and swelling. Try to keep the area above the level of your heart as often as you can. · If your doctor told you how to care for your wound, follow your doctor's instructions. If you did not get instructions, follow this general advice:  ¨ Wash the wound with clean water 2 times a day. Don't use hydrogen peroxide or alcohol, which can slow healing. ¨ You may cover the wound with a thin layer of petroleum jelly, such as Vaseline, and a nonstick bandage. ¨ Apply more petroleum jelly and replace the bandage as needed. · Be safe with medicines. Take pain medicines exactly as directed. ¨ If the doctor gave you a prescription medicine for pain, take it as prescribed. ¨ If you are not taking a prescription pain medicine, ask your doctor if you can take an over-the-counter medicine.   To prevent cellulitis in the future  · Try to prevent cuts, scrapes, or other injuries to your skin. Cellulitis most often occurs where there is a break in the skin. · If you get a scrape, cut, mild burn, or bite, wash the wound with clean water as soon as you can to help avoid infection. Don't use hydrogen peroxide or alcohol, which can slow healing. · If you have swelling in your legs (edema), support stockings and good skin care may help prevent leg sores and cellulitis. · Take care of your feet, especially if you have diabetes or other conditions that increase the risk of infection. Wear shoes and socks. Do not go barefoot. If you have athlete's foot or other skin problems on your feet, talk to your doctor about how to treat them. When should you call for help? Call your doctor now or seek immediate medical care if:    · You have signs that your infection is getting worse, such as:  ¨ Increased pain, swelling, warmth, or redness. ¨ Red streaks leading from the area. ¨ Pus draining from the area. ¨ A fever.     · You get a rash.    Watch closely for changes in your health, and be sure to contact your doctor if:    · You do not get better as expected. Where can you learn more? Go to http://antonio-adrien.info/. Dorina Kovacs in the search box to learn more about \"Cellulitis: Care Instructions. \"  Current as of: May 10, 2017  Content Version: 11.7  © 8281-8565 [employer]. Care instructions adapted under license by [employer] (which disclaims liability or warranty for this information). If you have questions about a medical condition or this instruction, always ask your healthcare professional. [place] [address] any warranty or liability for your use of this information.

## 2018-08-20 NOTE — PROGRESS NOTES
1. Have you been to the ER, urgent care clinic since your last visit? Hospitalized since your last visit? no    2. Have you seen or consulted any other health care providers outside of the [facility] since your last visit? Include any pap smears or colon screening. yes  Reviewed record in preparation for visit and have obtained necessary documentation. Patient did not bring medications to visit for review. Information provided on Advanced Directive, Living Will. Body mass index is 43.62 kg/(m^2).    Health Maintenance Due   Topic Date Due    GLAUCOMA SCREENING Q2Y  03/18/2017    Influenza Age 5 to Adult  08/01/2018

## 2018-08-20 NOTE — PROGRESS NOTES
CC: Finger injury    HPI: Pt is a 67 y.o. female who presents for finger injury. Pt states that last week she cut her finger on a piece of glass and has been having pain and swelling of the fingertip since there. She thinks the fingertip has been red and occasionally has throbbing pain. Denies drainage or fevers. Last TDaP in 2014. Past Medical History:   Diagnosis Date    Anemia, unspecified 4/15/2010    Cardiac dysrhythmia, unspecified 4/15/2010    PAT- not on medication/ not seeing cardiologist    High cholesterol 4/30/2010    Obesity, unspecified 4/15/2010    Osteoarthrosis, unspecified whether generalized or localized, other specified sites 4/15/2010    Rheumatoid arthritis(714.0) 4/15/2010    Spinal stenosis, unspecified region other than cervical 4/15/2010    Unspecified asthma(493.90) 4/15/2010    rheumatoid lung disease       Family History   Problem Relation Age of Onset    Cancer Mother      cervical, breast    Heart Disease Mother     Heart Attack Mother     Hypertension Mother     Arthritis-osteo Mother     Heart Disease Father     Heart Attack Father     Cancer Other      uterine and lung.     High Cholesterol Brother     Stroke Brother     Arthritis-rheumatoid Sister     Migraines Sister     Breast Cancer Maternal Aunt        Social History   Substance Use Topics    Smoking status: Former Smoker     Packs/day: 1.00     Years: 20.00     Quit date: 4/30/1985    Smokeless tobacco: Never Used    Alcohol use No       ROS:  Positive only when bolded  Constitutional: F/C  Respiratory: SOB, wheezing, cough  Cardiovascular: CP, palpitations  Integument/breast: Changes in skin, moles or hair, rash    PE:  Visit Vitals    /65 (BP 1 Location: Left arm, BP Patient Position: Sitting)    Pulse 79    Temp 97.1 °F (36.2 °C) (Oral)    Resp 16    Ht 5' 10\" (1.778 m)    Wt 304 lb (137.9 kg)    SpO2 95%    BMI 43.62 kg/m2     Gen: Pt sitting in chair, in NAD  Head: Normocephalic, atraumatic  Eyes: Sclera anicteric, EOM grossly intact, PERRL  Throat: MMM  Neck: Supple  CVS: Normal S1, S2, no m/r/g  Resp: CTAB, no wheezes or rales  Extrem: Atraumatic, no cyanosis  Pulses: 2+   Skin: Warm, dry. 3mm laceration on tip of 3rd digit of L hand. No active drainage but appears to have some pus around the wound site. Fingertip is swollen and red without streaking. No foreign bodies visualized in wound. Neuro: Alert, oriented, appropriate      A/P: Pt is a 67 y.o. female who presents for fingertip infection.   - Discussed with pt, given her age, medical history and multiple immunosuppressant medications, would like to treat this with PO abx to prevent worsening of infection. She cannot take Bactrim 2/2 interaction with MTX but has tolerated doxycycline well in the past  - Doxycycline 100mg BID x 10 days  - RTC prn if symptoms worsen or fail to improve        Discussed diagnoses in detail with patient. Medication risks/benefits/side effects discussed with patient. All of the patient's questions were addressed. The patient understands and agrees with our plan of care. The patient knows to call back if they are unsure of or forget any changes we discussed today or if the symptoms change. The patient received an After-Visit Summary which contains VS, orders, medication list and allergy list. This can be used as a \"mini-medical record\" should they have to seek medical care while out of town. Current Outpatient Prescriptions on File Prior to Visit   Medication Sig Dispense Refill    atorvastatin (LIPITOR) 10 mg tablet TAKE ONE TABLET BY MOUTH EVERY DAY 90 Tab 3    amLODIPine (NORVASC) 5 mg tablet TAKE ONE TABLET BY MOUTH EVERY DAY 90 Tab 3    albuterol (PROVENTIL HFA, VENTOLIN HFA, PROAIR HFA) 90 mcg/actuation inhaler Take 2 Puffs by inhalation every four (4) hours as needed for Wheezing.  1 Inhaler 3    albuterol (PROVENTIL VENTOLIN) 2.5 mg /3 mL (0.083 %) nebulizer solution 3 mL by Nebulization route every four (4) hours as needed for Wheezing. Indications: Acute Asthma Attack 24 Each 5    UBIDECARENONE (COQ-10 PO) Take  by mouth.  aspirin delayed-release 81 mg tablet Take  by mouth daily.  gabapentin (NEURONTIN) 100 mg capsule       celecoxib (CELEBREX) 200 mg capsule Take 1 Cap by mouth daily. 90 Cap 1    acetaminophen (TYLENOL) 500 mg tablet Take 1,000 mg by mouth every six (6) hours as needed for Pain.  FOLIC ACID PO Take 3 mg by mouth daily.  omega-3s-dha-epa-fish oil 300-1,000 mg cpDR Take  by mouth daily.  cholecalciferol, vitamin D3, (VITAMIN D3) 4,000 unit cap Take  by mouth daily.  METHOTREXATE 25 mg by IntraMUSCular route every seven (7) days. Saturdays      predniSONE (DELTASONE) 20 mg tablet Take 1 Tab by mouth daily (with breakfast). Indications: Asthma Exacerbation 5 Tab 1    sulfaSALAzine EC (AZULFIDINE) 500 mg EC tablet       gabapentin (NEURONTIN) 300 mg capsule 1 in AM, 1 at lunch AND 2 hs. NEURALGIA. Indications: NEUROPATHIC PAIN 360 Cap 3    traMADol (ULTRAM) 50 mg tablet Take 1 Tab by mouth every six (6) hours as needed for Pain. Max Daily Amount: 200 mg. 60 Tab 0    OTHER,NON-FORMULARY, Take 8 oz by mouth daily. Bottle of \"joint juice\" which contains glucosamine. No current facility-administered medications on file prior to visit.

## 2018-10-05 ENCOUNTER — CLINICAL SUPPORT (OUTPATIENT)
Dept: FAMILY MEDICINE CLINIC | Age: 73
End: 2018-10-05

## 2018-10-05 VITALS — TEMPERATURE: 98.8 F

## 2018-10-05 DIAGNOSIS — Z23 ENCOUNTER FOR IMMUNIZATION: Primary | ICD-10-CM

## 2018-11-27 ENCOUNTER — OFFICE VISIT (OUTPATIENT)
Dept: FAMILY MEDICINE CLINIC | Age: 73
End: 2018-11-27

## 2018-11-27 VITALS
BODY MASS INDEX: 41.95 KG/M2 | DIASTOLIC BLOOD PRESSURE: 59 MMHG | SYSTOLIC BLOOD PRESSURE: 130 MMHG | RESPIRATION RATE: 16 BRPM | WEIGHT: 293 LBS | TEMPERATURE: 98.2 F | OXYGEN SATURATION: 95 % | HEIGHT: 70 IN | HEART RATE: 73 BPM

## 2018-11-27 DIAGNOSIS — J45.41 MODERATE PERSISTENT ASTHMA WITH ACUTE EXACERBATION: Primary | Chronic | ICD-10-CM

## 2018-11-27 DIAGNOSIS — M05.10 RHEUMATOID LUNG DISEASE (HCC): ICD-10-CM

## 2018-11-27 DIAGNOSIS — J47.1 BRONCHIECTASIS WITH ACUTE EXACERBATION (HCC): ICD-10-CM

## 2018-11-27 RX ORDER — PREDNISONE 10 MG/1
TABLET ORAL
Qty: 59 TAB | Refills: 0 | Status: SHIPPED | OUTPATIENT
Start: 2018-11-27 | End: 2018-12-13

## 2018-11-27 RX ORDER — CEFDINIR 300 MG/1
300 CAPSULE ORAL 2 TIMES DAILY
Qty: 20 CAP | Refills: 0 | Status: SHIPPED | OUTPATIENT
Start: 2018-11-27 | End: 2018-12-07

## 2018-11-27 RX ORDER — BUDESONIDE AND FORMOTEROL FUMARATE DIHYDRATE 160; 4.5 UG/1; UG/1
2 AEROSOL RESPIRATORY (INHALATION) 2 TIMES DAILY
COMMUNITY
End: 2019-04-15 | Stop reason: SDUPTHER

## 2018-11-27 RX ORDER — AMOXICILLIN AND CLAVULANATE POTASSIUM 875; 125 MG/1; MG/1
1 TABLET, FILM COATED ORAL EVERY 12 HOURS
Qty: 20 TAB | Refills: 0 | Status: CANCELLED | OUTPATIENT
Start: 2018-11-27 | End: 2018-12-07

## 2018-11-27 NOTE — PROGRESS NOTES
1. Have you been to the ER, urgent care clinic since your last visit? Hospitalized since your last visit? No 
 
2. Have you seen or consulted any other health care providers outside of the [facility] since your last visit? Include any pap smears or colon screening. No 
Reviewed record in preparation for visit and have necessary documentation Pt did not bring medication to office visit for review 
opportunity was given for questions Goals that were addressed and/or need to be completed during or after this appointment include Health Maintenance Due Topic Date Due  Shingrix Vaccine Age 50> (1 of 2) 09/20/1995  Pneumococcal 65+ High/Highest Risk (2 of 2 - PPSV23) 02/10/2016

## 2018-11-27 NOTE — PATIENT INSTRUCTIONS
Saline Nasal Washes: Care Instructions Your Care Instructions Saline nasal washes help keep the nasal passages open by washing out thick or dried mucus. This simple remedy can help relieve symptoms of allergies, sinusitis, and colds. It also can make the nose feel more comfortable by keeping the mucous membranes moist. You may notice a little burning sensation in your nose the first few times you use the solution, but this usually gets better in a few days. Follow-up care is a key part of your treatment and safety. Be sure to make and go to all appointments, and call your doctor if you are having problems. It's also a good idea to know your test results and keep a list of the medicines you take. How can you care for yourself at home? · You can buy premixed saline solution in a squeeze bottle or other sinus rinse products at a drugstore. Read and follow the instructions on the label. · You also can make your own saline solution by adding 1 teaspoon of salt and 1 teaspoon of baking soda to 2 cups of distilled water. · If you use a homemade solution, pour a small amount into a clean bowl. Using a rubber bulb syringe, squeeze the syringe and place the tip in the salt water. Pull a small amount of the salt water into the syringe by relaxing your hand. · Sit down with your head tilted slightly back. Do not lie down. Put the tip of the bulb syringe or the squeeze bottle a little way into one of your nostrils. Gently drip or squirt a few drops into the nostril. Repeat with the other nostril. Some sneezing and gagging are normal at first. 
· Gently blow your nose. · Wipe the syringe or bottle tip clean after each use. · Repeat this 2 or 3 times a day. · Use nasal washes gently if you have nosebleeds often. When should you call for help? Watch closely for changes in your health, and be sure to contact your doctor if: 
  · You often get nosebleeds.  
  · You have problems doing the nasal washes. Where can you learn more? Go to http://antonio-adrien.info/. Enter [id number] [id number] in the search box to learn more about \"Saline Nasal Washes: Care Instructions. \" Current as of: March 28, 2018 Content Version: 11.8 © 5354-2019 [employer]wise, [employer]. Care instructions adapted under license by [employer] (which disclaims liability or warranty for this information). If you have questions about a medical condition or this instruction, always ask your healthcare professional. [address] any warranty or liability for your use of this information.

## 2018-11-27 NOTE — PROGRESS NOTES
Alvino Cao 68 y.o. female 1945 [address] [place] 
[id number] [facility] Practice: Progress Note Encounter Date: 11/27/2018 Chief Complaint Patient presents with  
[facility]  Cough History provided by patient History of Present Illness Alvino Cao is a 68 y.o. female who presents to clinic today for: 
 
Cough Patient present with cc of cough since >2 weeks. Associated with hoarseness of voice, productive cough with increase sputum amounts and SOB/wheezing. Denies fever, chills. Patient has a history of RA lung disease and bronchiectasis. Patient reports that she has been taking prednisone 20 mg every day for 5 days and 10 mg every day for 5 days which was written by her pulmonologist from [facility]. She is currently on day 9 of treatment. She has been using albuterol nebs 2-3 times per day but is still having persistent symptoms. She also reports that she took some left over keflex 500 mg BID or TID? For 4 days. Patient delayed her remicade infusion. Health Maintenance Patient sick today. Health Maintenance Due Topic Date Due  Shingrix Vaccine Age 50> (1 of 2) 09/20/1995  Pneumococcal 65+ High/Highest Risk (2 of 2 - PPSV23) 02/10/2016 Review of Systems Review of Systems Constitutional: Negative for chills and fever. HENT: Positive for congestion and sinus pain. Negative for ear discharge and ear pain. Respiratory: Positive for cough, sputum production, shortness of breath and wheezing. Cardiovascular: Negative for chest pain and palpitations. Gastrointestinal: Negative for abdominal pain, constipation, diarrhea, nausea and vomiting. Skin: Negative for itching and rash. Vitals/Objective:  
 
Vitals:  
 11/27/18 7797 BP: 130/59 Pulse: 73 Resp: 16 Temp: 98.2 °F (36.8 °C) TempSrc: Oral  
SpO2: 95% Weight: 298 lb (135.2 kg) Height: 5' 10\" (1.778 m) Body mass index is 42.76 kg/m². Wt Readings from Last 3 Encounters:  
11/27/18 298 lb (135.2 kg) 08/20/18 304 lb (137.9 kg) 06/12/18 291 lb 6.4 oz (132.2 kg) Physical Exam  
Constitutional: She is oriented to person, place, and time. She appears well-developed and well-nourished. HENT:  
Head: Normocephalic and atraumatic. Right Ear: External ear normal.  
Left Ear: External ear normal.  
Mouth/Throat: Oropharyngeal exudate present. Eyes: Right eye exhibits no discharge. Left eye exhibits no discharge. Cardiovascular: Normal rate and regular rhythm. Pulmonary/Chest: She has wheezes. She has rhonchi in the right middle field, the right lower field and the left lower field. Musculoskeletal: She exhibits no edema or deformity. Neurological: She is oriented to person, place, and time. Skin: Skin is warm and dry. Capillary refill takes less than 2 seconds. No results found for this or any previous visit (from the past 24 hour(s)). Assessment and Plan:  
 
Encounter Diagnoses ICD-10-CM ICD-9-CM 1. Moderate persistent asthma with acute exacerbation J45.41 493.92  
2. 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[facility] M05.10 714.81  
3. Bronchiectasis with acute exacerbation ([facility]) J47.1 494.1 1. Moderate persistent asthma with acute exacerbation 2. 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[facility] 3. Bronchiectasis with acute exacerbation ([facility] Given no improvement on prior prednisone therapy, a long taper has been written. With increase in sputum and rhonchi, abx for patient. I have counseled her to complete total course as written and to not take left-over abx without speaking with a physician. Due to flouroquinolone allergy and interaction with methotrexate, will proceed with omnicef. - predniSONE (DELTASONE) 10 mg tablet; Take 60 mg by mouth daily for 5 days, THEN 40 mg daily for 5 days, THEN 20 mg daily for 3 days, THEN 10 mg daily for 3 days. Dispense: 59 Tab;  Refill: 0 
 - guaiFENesin (MUCINEX) 1,200 mg Ta12 ER tablet; Take 1 Tab by mouth two (2) times a day for 10 days. Dispense: 20 Tab; Refill: 0 
- cefdinir (OMNICEF) 300 mg capsule; Take 1 Cap by mouth two (2) times a day for 10 days. Dispense: 20 Cap; Refill: 0 I have discussed the diagnosis with the patient and the intended plan as seen in the above orders. she has expressed understanding. The patient has received an after-visit summary and questions were answered concerning future plans. I have discussed medication side effects and warnings with the patient as well. Electronically Signed: Nola Powell MD 
  
History/Allergies Patients past medical, surgical and family histories were reviewed and updated. Past Medical History:  
Diagnosis Date  Anemia, unspecified 4/15/2010  Cardiac dysrhythmia, unspecified 4/15/2010 PAT- not on medication/ not seeing cardiologist  
 High cholesterol 4/30/2010  Obesity, unspecified 4/15/2010  Osteoarthrosis, unspecified whether generalized or localized, other specified sites 4/15/2010  Rheumatoid arthritis(714.0) 4/15/2010  Spinal stenosis, unspecified region other than cervical 4/15/2010  Unspecified asthma(493.90) 4/15/2010  
 rheumatoid lung disease Past Surgical History:  
Procedure Laterality Date  CHEST SURGERY PROCEDURE UNLISTED  lat 1990's  
 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    
 7-10-13  HX ORTHOPAEDIC Right 2002  
 rotator cuff repair Family History Problem Relation Age of Onset  Cancer Mother   
     cervical, breast  
 Heart Disease Mother  Heart Attack Mother  Hypertension Mother Gratia.Gabriela Arthritis-osteo Mother  Heart Disease Father  Heart Attack Father  Cancer Other   
     uterine and lung.  High Cholesterol Brother  Stroke Brother Janett.Gabriela Arthritis-rheumatoid Sister  Migraines Sister  Breast Cancer Maternal Aunt Social History Socioeconomic History  Marital status: SINGLE Spouse name: Not on file  Number of children: Not on file  Years of education: Not on file  Highest education level: Not on file Social Needs  Financial resource strain: Not on file  Food insecurity - worry: Not on file  Food insecurity - inability: Not on file  Transportation needs - medical: Not on file  Transportation needs - non-medical: Not on file Occupational History  Not on file Tobacco Use  Smoking status: Former Smoker Packs/day: 1.00 Years: 20.00 Pack years: 20.00 Last attempt to quit: 1985 Years since quittin.6  Smokeless tobacco: Never Used Substance and Sexual Activity  Alcohol use: No  
 Drug use: No  
 Sexual activity: No  
Other Topics Concern  Not on file Social History Narrative  Not on file Allergies Allergen Reactions  Orudis [Ketoprofen] Palpitations  Singulair [Montelukast] Rash  Arthrotec 50 [Diclofenac-Misoprostol] Palpitations  Levaquin [Levofloxacin] Other (comments) Hip joints swelling and painful per pt report  Lyrica [Pregabalin] Other (comments) Saw spiders. Disposition Follow-up Disposition: 
Return in about 1 week (around 2018) for f/u asthma flare with Dr. Irena De La Torre. . 
 
Future Appointments Date Time Provider David Chester 2019  1:25 PM Anne Mendes MD [facility] Current Medications after this visit Current Outpatient Medications Medication Sig  budesonide-formoterol (SYMBICORT) 160-4.5 mcg/actuation HFAA Take 2 Puffs by inhalation two (2) times a day.  infliximab (REMICADE IV) by IntraVENous route. Every 8 weeks  predniSONE (DELTASONE) 10 mg tablet Take 60 mg by mouth daily for 5 days, THEN 40 mg daily for 5 days, THEN 20 mg daily for 3 days, THEN 10 mg daily for 3 days.  guaiFENesin (MUCINEX) 1,200 mg Ta12 ER tablet Take 1 Tab by mouth two (2) times a day for 10 days.  cefdinir (OMNICEF) 300 mg capsule Take 1 Cap by mouth two (2) times a day for 10 days.  atorvastatin (LIPITOR) 10 mg tablet TAKE ONE TABLET BY MOUTH EVERY DAY  amLODIPine (NORVASC) 5 mg tablet TAKE ONE TABLET BY MOUTH EVERY DAY  albuterol (PROVENTIL HFA, VENTOLIN HFA, PROAIR HFA) 90 mcg/actuation inhaler Take 2 Puffs by inhalation every four (4) hours as needed for Wheezing.  albuterol (PROVENTIL VENTOLIN) 2.5 mg /3 mL (0.083 %) nebulizer solution 3 mL by Nebulization route every four (4) hours as needed for Wheezing. Indications: Acute Asthma Attack  UBIDECARENONE (COQ-10 PO) Take  by mouth.  aspirin delayed-release 81 mg tablet Take  by mouth daily.  gabapentin (NEURONTIN) 100 mg capsule  celecoxib (CELEBREX) 200 mg capsule Take 1 Cap by mouth daily.  acetaminophen (TYLENOL) 500 mg tablet Take 1,000 mg by mouth every six (6) hours as needed for Pain.  FOLIC ACID PO Take 3 mg by mouth daily.  omega-3s-dha-epa-fish oil 300-1,000 mg cpDR Take  by mouth daily.  cholecalciferol, vitamin D3, (VITAMIN D3) 4,000 unit cap Take  by mouth daily.  METHOTREXATE 25 mg by IntraMUSCular route every seven (7) days. Saturdays No current facility-administered medications for this visit. Medications Discontinued During This Encounter Medication Reason  guaiFENesin (MUCINEX) 1,200 mg Ta12 ER tablet Reorder

## 2018-12-05 ENCOUNTER — OFFICE VISIT (OUTPATIENT)
Dept: FAMILY MEDICINE CLINIC | Age: 73
End: 2018-12-05

## 2018-12-05 VITALS
OXYGEN SATURATION: 95 % | BODY MASS INDEX: 41.95 KG/M2 | HEART RATE: 71 BPM | WEIGHT: 293 LBS | TEMPERATURE: 98.1 F | DIASTOLIC BLOOD PRESSURE: 64 MMHG | HEIGHT: 70 IN | RESPIRATION RATE: 20 BRPM | SYSTOLIC BLOOD PRESSURE: 131 MMHG

## 2018-12-05 DIAGNOSIS — J45.41 MODERATE PERSISTENT ASTHMA WITH ACUTE EXACERBATION: Primary | ICD-10-CM

## 2018-12-05 NOTE — PROGRESS NOTES
Guanako Tran 68 y.o. female 1945 [address] [place] 
[id number] [facility] Practice: Progress Note Encounter Date: 12/5/2018 Chief Complaint Patient presents with  Breathing Problem History provided by patient History of Present Illness Guanako Tran is a 68 y.o. female who presents to clinic today for: 
 
Asthma follow up. Patient present with cc of asthma follow up. Patient reports that she has been feeling somewhat better, She has been bringing up thick sputum for past several days. Endorses improved ease of breathing. Currently using albuterol 2-3 times a day. Review of Systems Review of Systems Constitutional: Negative for chills and fever. HENT: Negative for congestion and sinus pain. Respiratory: Positive for cough, sputum production and shortness of breath. Negative for wheezing. Cardiovascular: Negative for chest pain and palpitations. Gastrointestinal: Negative for abdominal pain, constipation, diarrhea, nausea and vomiting. Neurological: Negative for dizziness and headaches. Vitals/Objective:  
 
Vitals:  
 12/05/18 1029 BP: 131/64 Pulse: 71 Resp: 20 Temp: 98.1 °F (36.7 °C) TempSrc: Oral  
SpO2: 95% Weight: 312 lb (141.5 kg) Height: 5' 10\" (1.778 m) Body mass index is 44.77 kg/m². Wt Readings from Last 3 Encounters:  
12/05/18 312 lb (141.5 kg)  
11/27/18 298 lb (135.2 kg) 08/20/18 304 lb (137.9 kg) Physical Exam  
Constitutional: She is oriented to person, place, and time. She appears well-developed and well-nourished. HENT:  
Head: Normocephalic and atraumatic. Right Ear: External ear normal.  
Left Ear: External ear normal.  
Mouth/Throat: Oropharynx is clear and moist. No oropharyngeal exudate. Cardiovascular: Normal rate and regular rhythm. Pulmonary/Chest: Effort normal. No stridor. No respiratory distress.  She has no wheezes. She has rhonchi in the right middle field and the right lower field. Neurological: She is alert and oriented to person, place, and time. No cranial nerve deficit. Coordination normal.  
  
 
No results found for this or any previous visit (from the past 24 hour(s)). Assessment and Plan:  
 
Encounter Diagnoses ICD-10-CM ICD-9-CM 1. Moderate persistent asthma with acute exacerbation J45.41 493.92  
 
 
1. Moderate persistent asthma with acute exacerbation Follow up patient is improving. Continue abx and prednisone taper. I have discussed the diagnosis with the patient and the intended plan as seen in the above orders. she has expressed understanding. The patient has received an after-visit summary and questions were answered concerning future plans. I have discussed medication side effects and warnings with the patient as well. Electronically Signed: Frances Pa MD 
  
History/Allergies Patients past medical, surgical and family histories were reviewed and updated. Past Medical History:  
Diagnosis Date  Anemia, unspecified 4/15/2010  Cardiac dysrhythmia, unspecified 4/15/2010 PAT- not on medication/ not seeing cardiologist  
 High cholesterol 4/30/2010  Obesity, unspecified 4/15/2010  Osteoarthrosis, unspecified whether generalized or localized, other specified sites 4/15/2010  Rheumatoid arthritis(714.0) 4/15/2010  Spinal stenosis, unspecified region other than cervical 4/15/2010  Unspecified asthma(493.90) 4/15/2010  
 rheumatoid lung disease Past Surgical History:  
Procedure Laterality Date  CHEST SURGERY PROCEDURE UNLISTED  lat 1990's  
 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    
 7-10-13  HX ORTHOPAEDIC Right 2002  
 rotator cuff repair Family History Problem Relation Age of Onset  Cancer Mother   
     cervical, breast  
 Heart Disease Mother  Heart Attack Mother  Hypertension Mother [address] [facility] Arthritis-osteo Mother  Heart Disease Father  Heart Attack Father  Cancer Other   
     uterine and lung.  High Cholesterol Brother  Stroke Brother [address] [facility] Arthritis-rheumatoid Sister  Migraines Sister  Breast Cancer Maternal Aunt Social History Socioeconomic History  Marital status: SINGLE Spouse name: Not on file  Number of children: Not on file  Years of education: Not on file  Highest education level: Not on file Social Needs  Financial resource strain: Not on file  Food insecurity - worry: Not on file  Food insecurity - inability: Not on file  Transportation needs - medical: Not on file  Transportation needs - non-medical: Not on file Occupational History  Not on file Tobacco Use  Smoking status: Former Smoker Packs/day: 1.00 Years: 20.00 Pack years: 20.00 Last attempt to quit: 1985 Years since quittin.6  Smokeless tobacco: Never Used Substance and Sexual Activity  Alcohol use: No  
 Drug use: No  
 Sexual activity: No  
Other Topics Concern  Not on file Social History Narrative  Not on file Allergies Allergen Reactions  Orudis [Ketoprofen] Palpitations  Singulair [Montelukast] Rash  Arthrotec 50 [Diclofenac-Misoprostol] Palpitations  Levaquin [Levofloxacin] Other (comments) Hip joints swelling and painful per pt report  Lyrica [Pregabalin] Other (comments) Saw spiders. Disposition Follow-up Disposition: 
Return if symptoms worsen or fail to improve. Future Appointments Date Time Provider David Chester 2018  2:30 PM [facility] 2 [facility]  
2019  1:25 PM Homer Duran MD [facility] Current Medications after this visit Current Outpatient Medications Medication Sig  budesonide-formoterol (SYMBICORT) 160-4.5 mcg/actuation HFAA Take 2 Puffs by inhalation two (2) times a day.  infliximab (REMICADE IV) by IntraVENous route. Every 8 weeks  predniSONE (DELTASONE) 10 mg tablet Take 60 mg by mouth daily for 5 days, THEN 40 mg daily for 5 days, THEN 20 mg daily for 3 days, THEN 10 mg daily for 3 days.  guaiFENesin (MUCINEX) 1,200 mg Ta12 ER tablet Take 1 Tab by mouth two (2) times a day for 10 days.  cefdinir (OMNICEF) 300 mg capsule Take 1 Cap by mouth two (2) times a day for 10 days.  atorvastatin (LIPITOR) 10 mg tablet TAKE ONE TABLET BY MOUTH EVERY DAY  amLODIPine (NORVASC) 5 mg tablet TAKE ONE TABLET BY MOUTH EVERY DAY  albuterol (PROVENTIL HFA, VENTOLIN HFA, PROAIR HFA) 90 mcg/actuation inhaler Take 2 Puffs by inhalation every four (4) hours as needed for Wheezing.  albuterol (PROVENTIL VENTOLIN) 2.5 mg /3 mL (0.083 %) nebulizer solution 3 mL by Nebulization route every four (4) hours as needed for Wheezing. Indications: Acute Asthma Attack  UBIDECARENONE (COQ-10 PO) Take  by mouth.  gabapentin (NEURONTIN) 100 mg capsule  acetaminophen (TYLENOL) 500 mg tablet Take 1,000 mg by mouth every six (6) hours as needed for Pain.  FOLIC ACID PO Take 3 mg by mouth daily.  omega-3s-dha-epa-fish oil 300-1,000 mg cpDR Take  by mouth daily.  cholecalciferol, vitamin D3, (VITAMIN D3) 4,000 unit cap Take  by mouth daily.  METHOTREXATE 25 mg by IntraMUSCular route every seven (7) days. Saturdays  aspirin delayed-release 81 mg tablet Take  by mouth daily.  celecoxib (CELEBREX) 200 mg capsule Take 1 Cap by mouth daily. No current facility-administered medications for this visit. There are no discontinued medications.

## 2018-12-05 NOTE — PROGRESS NOTES
1. Have you been to the ER, urgent care clinic since your last visit? Hospitalized since your last visit? no 
 
2. Have you seen or consulted any other health care providers outside of the [address] [place] [address] since your last visit? Include any pap smears or colon screening. no 
Reviewed record in preparation for visit and have obtained necessary documentation. Patient did not bring medications to visit for review. Information provided on Advanced Directive, Living Will. Body mass index is 44.77 kg/m². Health Maintenance Due Topic Date Due  Shingrix Vaccine Age 50> (1 of 2) 09/20/1995  Pneumococcal 65+ High/Highest Risk (2 of 2 - PPSV23) 02/10/2016

## 2018-12-05 NOTE — PATIENT INSTRUCTIONS
A Healthy Lifestyle: Care Instructions Your Care Instructions A healthy lifestyle can help you feel good, stay at a healthy weight, and have plenty of energy for both work and play. A healthy lifestyle is something you can share with your whole family. A healthy lifestyle also can lower your risk for serious health problems, such as high blood pressure, heart disease, and diabetes. You can follow a few steps listed below to improve your health and the health of your family. Follow-up care is a key part of your treatment and safety. Be sure to make and go to all appointments, and call your doctor if you are having problems. It's also a good idea to know your test results and keep a list of the medicines you take. How can you care for yourself at home? · Do not eat too much sugar, fat, or fast foods. You can still have dessert and treats now and then. The goal is moderation. · Start small to improve your eating habits. Pay attention to portion sizes, drink less juice and soda pop, and eat more fruits and vegetables. ? Eat a healthy amount of food. A 3-ounce serving of meat, for example, is about the size of a deck of cards. Fill the rest of your plate with vegetables and whole grains. ? Limit the amount of soda and sports drinks you have every day. Drink more water when you are thirsty. ? Eat at least 5 servings of fruits and vegetables every day. It may seem like a lot, but it is not hard to reach this goal. A serving or helping is 1 piece of fruit, 1 cup of vegetables, or 2 cups of leafy, raw vegetables. Have an apple or some carrot sticks as an afternoon snack instead of a candy bar. Try to have fruits and/or vegetables at every meal. 
· Make exercise part of your daily routine. You may want to start with simple activities, such as walking, bicycling, or slow swimming. Try to be active 30 to 60 minutes every day.  You do not need to do all 30 to 60 minutes all at once. For example, you can exercise 3 times a day for 10 or 20 minutes. Moderate exercise is safe for most people, but it is always a good idea to talk to your doctor before starting an exercise program. 
· Keep moving. Jagdish Jyothi the lawn, work in the garden, or [employer]. Take the stairs instead of the elevator at work. · If you smoke, quit. People who smoke have an increased risk for heart attack, stroke, cancer, and other lung illnesses. Quitting is hard, but there are ways to boost your chance of quitting tobacco for good. ? Use nicotine gum, patches, or lozenges. ? Ask your doctor about stop-smoking programs and medicines. ? Keep trying. In addition to reducing your risk of diseases in the future, you will notice some benefits soon after you stop using tobacco. If you have shortness of breath or asthma symptoms, they will likely get better within a few weeks after you quit. · Limit how much alcohol you drink. Moderate amounts of alcohol (up to 2 drinks a day for men, 1 drink a day for women) are okay. But drinking too much can lead to liver problems, high blood pressure, and other health problems. Family health If you have a family, there are many things you can do together to improve your health. · Eat meals together as a family as often as possible. · Eat healthy foods. This includes fruits, vegetables, lean meats and dairy, and whole grains. · Include your family in your fitness plan. Most people think of activities such as jogging or tennis as the way to fitness, but there are many ways you and your family can be more active. Anything that makes you breathe hard and gets your heart pumping is exercise. Here are some tips: 
? Walk to do errands or to take your child to school or the bus. 
? Go for a family bike ride after dinner instead of watching TV. Where can you learn more? Go to http://antonio-adrien.info/. Enter V608 in the search box to learn more about \"A Healthy Lifestyle: Care Instructions. \" Current as of: December 7, 2017 Content Version: 11.8 © 5058-2084 [employer], [employer]. Care instructions adapted under license by [employer] (which disclaims liability or warranty for this information). If you have questions about a medical condition or this instruction, always ask your healthcare professional. [place] [address] any warranty or liability for your use of this information.

## 2018-12-17 ENCOUNTER — HOSPITAL ENCOUNTER (OUTPATIENT)
Dept: MAMMOGRAPHY | Age: 73
Discharge: HOME | End: 2018-12-17
Attending: FAMILY MEDICINE
Payer: MEDICARE

## 2018-12-17 DIAGNOSIS — Z12.39 SCREENING BREAST EXAMINATION: ICD-10-CM

## 2018-12-17 PROCEDURE — 77063 BREAST TOMOSYNTHESIS BI: CPT

## 2018-12-31 RX ORDER — ALBUTEROL SULFATE 90 UG/1
AEROSOL, METERED RESPIRATORY (INHALATION)
Qty: 2 INHALER | Refills: 8 | Status: SHIPPED | OUTPATIENT
Start: 2018-12-31 | End: 2020-12-11

## 2019-02-08 ENCOUNTER — OFFICE VISIT (OUTPATIENT)
Dept: FAMILY MEDICINE CLINIC | Age: 74
End: 2019-02-08

## 2019-02-08 VITALS
BODY MASS INDEX: 41.95 KG/M2 | HEART RATE: 85 BPM | HEIGHT: 70 IN | RESPIRATION RATE: 22 BRPM | TEMPERATURE: 98.4 F | DIASTOLIC BLOOD PRESSURE: 74 MMHG | SYSTOLIC BLOOD PRESSURE: 115 MMHG | OXYGEN SATURATION: 96 % | WEIGHT: 293 LBS

## 2019-02-08 DIAGNOSIS — D84.9 IMMUNOSUPPRESSED STATUS (HCC): ICD-10-CM

## 2019-02-08 DIAGNOSIS — Z00.00 MEDICARE ANNUAL WELLNESS VISIT, SUBSEQUENT: Primary | ICD-10-CM

## 2019-02-08 DIAGNOSIS — K29.50 CHRONIC GASTRITIS WITHOUT BLEEDING, UNSPECIFIED GASTRITIS TYPE: ICD-10-CM

## 2019-02-08 DIAGNOSIS — M05.10 RHEUMATOID LUNG DISEASE (HCC): ICD-10-CM

## 2019-02-08 DIAGNOSIS — R10.12 LUQ PAIN: ICD-10-CM

## 2019-02-08 DIAGNOSIS — J47.1 BRONCHIECTASIS WITH ACUTE EXACERBATION (HCC): ICD-10-CM

## 2019-02-08 RX ORDER — RANITIDINE 300 MG/1
300 TABLET ORAL DAILY
Qty: 30 TAB | Refills: 5 | Status: SHIPPED | OUTPATIENT
Start: 2019-02-08 | End: 2019-09-09 | Stop reason: SDUPTHER

## 2019-02-08 NOTE — PATIENT INSTRUCTIONS

## 2019-02-08 NOTE — PROGRESS NOTES
[facility] 2005 A [facility], [address] [place] [address] Date of visit: 2/8/2019 This is a Subsequent Medicare Annual Wellness Visit (AWV), (Performed more than 12 months after effective date of Medicare Part B enrollment and 12 months after last wellness visit) I have reviewed the patient's medical history in detail and updated the computerized patient record. Ayala Perez is a 68 y.o. female History obtained from: the patient. Concerns today (Patient understands that medical problems addressed today may incur additional notes andcost as this is a preventive visit) History Patient Active Problem List  
Diagnosis Code  Rheumatoid arthritis, adult ([facility] M06.9  Unspecified asthma(493.90) J45.909  Cardiac dysrhythmia, unspecified I49.9  OA (osteoarthritis) M19.90  Obesity, unspecified E66.9  Spinal stenosis, unspecified region other than cervical M48.00  
 High cholesterol E78.00  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[facility] M05.10  Colon polyp, hyperplastic K63.5  Asthma J45.909  Primary generalized (osteo)arthritis M15.0  
 PAC (premature atrial contraction) I49.1  Lumbar spinal stenosis M48.061  
 Primary localized osteoarthritis of right hip M16.11  
 Obesity, morbid (HCC) E66.01  
 Immunosuppressed status ([facility] D89.9 Past Medical History:  
Diagnosis Date  Anemia, unspecified 4/15/2010  Cardiac dysrhythmia, unspecified 4/15/2010 PAT- not on medication/ not seeing cardiologist  
 High cholesterol 4/30/2010  Obesity, unspecified 4/15/2010  Osteoarthrosis, unspecified whether generalized or localized, other specified sites 4/15/2010  Rheumatoid arthritis(714.0) 4/15/2010  Spinal stenosis, unspecified region other than cervical 4/15/2010  Unspecified asthma(493.90) 4/15/2010  
 rheumatoid lung disease Past Surgical History:  
Procedure Laterality Date  CHEST SURGERY PROCEDURE UNLISTED  lat 1990's  
 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    
 7-10-13  HX ORTHOPAEDIC Right 2002  
 rotator cuff repair Allergies Allergen Reactions  Orudis [Ketoprofen] Palpitations  Singulair [Montelukast] Rash  Arthrotec 50 [Diclofenac-Misoprostol] Palpitations  Levaquin [Levofloxacin] Other (comments) Hip joints swelling and painful per pt report  Lyrica [Pregabalin] Other (comments) Saw spiders. Current Outpatient Medications Medication Sig Dispense Refill  PROAIR HFA 90 mcg/actuation inhaler INHALE 2 PUFFS BY MOUTH EVERY 4 HOURS AS NEEDED FOR WHEEZING 2 Inhaler 8  
 albuterol (PROVENTIL VENTOLIN) 2.5 mg /3 mL (0.083 %) nebulizer solution USE 1 VIAL IN NEBULIZER EVERY 4 HOURS AS NEEDED FOR WHEEZING 100 Each 4  
 budesonide-formoterol (SYMBICORT) 160-4.5 mcg/actuation HFAA Take 2 Puffs by inhalation two (2) times a day.  infliximab (REMICADE IV) by IntraVENous route. Every 8 weeks  atorvastatin (LIPITOR) 10 mg tablet TAKE ONE TABLET BY MOUTH EVERY DAY 90 Tab 3  
 amLODIPine (NORVASC) 5 mg tablet TAKE ONE TABLET BY MOUTH EVERY DAY 90 Tab 3  
 UBIDECARENONE (COQ-10 PO) Take  by mouth.  celecoxib (CELEBREX) 200 mg capsule Take 1 Cap by mouth daily. 90 Cap 1  
 acetaminophen (TYLENOL) 500 mg tablet Take 1,000 mg by mouth every six (6) hours as needed for Pain.  FOLIC ACID PO Take 3 mg by mouth daily.  omega-3s-dha-epa-fish oil 300-1,000 mg cpDR Take  by mouth daily.  cholecalciferol, vitamin D3, (VITAMIN D3) 4,000 unit cap Take  by mouth daily.  METHOTREXATE 25 mg by IntraMUSCular route every seven (7) days. Saturdays  aspirin delayed-release 81 mg tablet Take  by mouth daily.  gabapentin (NEURONTIN) 100 mg capsule Family History Problem Relation Age of Onset  Cancer Mother   
     cervical, breast  
 Heart Disease Mother  Heart Attack Mother  Hypertension Mother [facility] Arthritis-osteo Mother  Heart Disease Father  Heart Attack Father  Cancer Other   
     uterine and lung.  High Cholesterol Brother  Stroke Brother [facility] Arthritis-rheumatoid Sister  Migraines Sister  Breast Cancer Maternal Aunt Social History Tobacco Use  Smoking status: Former Smoker Packs/day: 1.00 Years: 20.00 Pack years: 20.00 Last attempt to quit: 1985 Years since quittin.8  Smokeless tobacco: Never Used Substance Use Topics  Alcohol use: No  
 
 
Specialists/Care Team  
Patti Taylor has established care with the following healthcare providers: 
Patient Care Team: 
Lindaann Severs, MD as PCP - General 
Lora Saldivar RN as Nurse Navigator [facility]) Ish Elmore MD (Orthopedic Surgery) Amalia Clemons MD (Orthopedic Surgery) Fredo Estrada RN as Ambulatory Care Navigator Pinky Mooney MD (Otolaryngology) Health Risk Assessment Demographics  
female 
68 y.o. General Health Questions  
-During the past 4 weeks: 
 -how would you rate your health in general? Fair -how often have you been bothered by feeling dizzy when standing up? never -how much have you been bothered by bodily pain? moderately 
 -Have you noticed any hearing difficulties? no 
 -has your physical and emotional health limited your social activities with family or friends? yes Emotional Health Questions  
-Do you have a history of depression, anxiety, or emotional problems? no 
-Over the past 2 weeks, have you felt down, depressed or hopeless? no 
-Over the past 2 weeks, have you felt little interest or pleasure in doing things? no 
 
Health Habits Please describe your diet habits: self grade C Do you get 5 servings of fruits or vegetables daily? yes Do you exercise regularly? no 
 
Alcohol Risk Factor Screening: On any occasion during the past 3 months, have you had more than 4 drinks containing alcohol? No 
 Do you average more than 14 drinks per week? No 
  
Activities of Daily Living and Functional Status  
-Do you need help with eating, walking, dressing, bathing, toileting, the phone, transportation, shopping, preparing meals, housework, laundry, medications or managing money? Yes, cleaning 
-In the past four weeks, was someone available to help you if you needed and wanted help with anything? yes 
-Are you confident are you that you can control and manage most of your health problems? yes 
-Have you been given information to help you keep track of your medications? yes 
-How often do you have trouble taking your medications as prescribed? occasionally Fall Risk and Home Safety Have you fallen 2 or more times in the past year? no 
Does your home have rugs in the hallway, lack grab bars in the bathroom, lack handrails on the stairs or have poor lighting? no 
Do you have smoke detectors and check them regularly? yes Do you have difficulties driving a car? no 
Do you always fasten your seat belt when you are in a car? yes Review of Systems (if indicated for problems addressed today) Review of Systems Constitutional: Negative. Negative for chills, fever, malaise/fatigue and weight loss. She has a prescription to get a pneumonia 23 vaccine. She will need to ask her rheumatologist about the Shingrix vaccine. HENT: Negative. Negative for hearing loss. Eyes: Negative. Negative for blurred vision and double vision. Respiratory: Positive for cough, sputum production and shortness of breath. Negative for hemoptysis. She has a chronic cough and mild shortness of breath from the rheumatoid lung disease. Cardiovascular: Negative. Negative for chest pain, palpitations and orthopnea. Gastrointestinal: Positive for heartburn. Negative for abdominal pain, blood in stool, nausea and vomiting. She has symptoms of GERD and left upper quadrant pain postprandially which may be related to a nonsteroidal anti-inflammatory agent gastritis. Prilosec interferes with her methotrexate so I will give her a trial of ranitidine 300 mg daily. She will call back in 1-2 weeks with a progress report. If this does not help she would need a GI referral.  
Genitourinary: Negative. Negative for dysuria, frequency and urgency. Musculoskeletal: Positive for back pain and joint pain. Negative for myalgias and neck pain. Severe rheumatoid arthritis. Skin: Negative. Negative for rash. Neurological: Negative. Negative for dizziness, tingling, tremors, weakness and headaches. Endo/Heme/Allergies: Negative. Psychiatric/Behavioral: Negative. Negative for depression. Physical Examination Wt Readings from Last 3 Encounters:  
02/08/19 298 lb (135.2 kg) 12/05/18 312 lb (141.5 kg)  
11/27/18 298 lb (135.2 kg) Temp Readings from Last 3 Encounters:  
02/08/19 98.4 °F (36.9 °C) (Oral) 12/05/18 98.1 °F (36.7 °C) (Oral)  
11/27/18 98.2 °F (36.8 °C) (Oral) BP Readings from Last 3 Encounters:  
02/08/19 115/74  
12/05/18 131/64  
11/27/18 130/59 Pulse Readings from Last 3 Encounters:  
02/08/19 85  
12/05/18 71  
11/27/18 73 Body mass index is 42.76 kg/m². No exam data present Was the patient's timed Up & Go test unsteady or longer than 10 seconds? no 
 
Evaluation of Cognitive Function Mood/affect:  happy Orientation: Person, Place and Time Appearance: age appropriate and casually dressed Family member/caregiver input: no Additional exam if indicated for problems addressed today: 
Physical Exam  
Constitutional: She is oriented to person, place, and time. She appears well-developed and well-nourished. No distress. HENT:  
Head: Normocephalic and atraumatic. Mouth/Throat: Oropharynx is clear and moist.  
Eyes: Conjunctivae are normal. No scleral icterus. Neck: No thyromegaly present. No carotid bruit. Cardiovascular: Normal rate, regular rhythm and normal heart sounds. Exam reveals no gallop and no friction rub. No murmur heard. Pulmonary/Chest: Effort normal. She has no wheezes. She has no rales. She has mild decreased breath sounds however I do not hear rales rhonchi or wheezes today. Abdominal: Soft. Bowel sounds are normal. She exhibits no distension. There is no tenderness. There is no rebound and no guarding. Musculoskeletal: She exhibits no edema. Lymphadenopathy:  
  She has no cervical adenopathy. Neurological: She is alert and oriented to person, place, and time. Skin: Skin is warm and dry. No rash noted. She is not diaphoretic. Psychiatric: She has a normal mood and affect. Her behavior is normal. Thought content normal.  
Nursing note and vitals reviewed. Preventive Services (Preventive care checklist to be included in patient instructions) Discussed today Done Previously Not Needed   
x   Pneumococcal vaccines  
 x  Flu vaccine  
  x Hepatitis B vaccine (if at risk) x   Shingles vaccine  
 x  TDAP vaccine  
 x  Mammogram  
 x  Colorectal cancer screening  
  x Low-dose CT for lung cancer screening  
 x  Bone density test  
 x  Glaucoma screening  
 x  Cholesterol test  
 x  Diabetes screening test   
  x Diabetes self-management class  
  x Nutritionist referral for diabetes or renal disease Discussion of Advance Directive Discussed with Oli Ramos her ability to prepare and advance directive in the case that an injury or illness causes her to be unable to make health care decisions. Given Forms. To fill out in near future. Assessment/Plan  
Z00.00 ICD-10-CM ICD-9-CM 1. Medicare annual wellness visit, subsequent Z00.00 V70.0 Diagnoses and all orders for this visit: 
 
1. Medicare annual wellness visit, subsequent have been completed 2. Bronchiectasis with acute exacerbation (HCC)-stable 3. 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HCC)-stable 4. Immunosuppressed status (HCC)-chronic 5. LUQ pain-likely to be NSAID induced gastritis plus GERD. -     raNITIdine (ZANTAC) 300 mg tab; Take 1 Tab by mouth daily. 6. Chronic gastritis without bleeding, unspecified gastritis type 
      -     raNITIdine (ZANTAC) 300 mg tab; Take 1 Tab by mouth daily. No orders of the defined types were placed in this encounter. Patient Care Team: 
Rebekah Hampton MD as PCP - General Mechelle Diaz RN as Nurse Navigator [facility]) Madan Leiva MD (Orthopedic Surgery) Khris Boudreaux MD (Orthopedic Surgery) Paola Dumont, APOLLO as Ambulatory Care Navigator Lina Carvalho MD (Otolaryngology) Thang Marroquin MD

## 2019-02-08 NOTE — PROGRESS NOTES
Chief Complaint Patient presents with Jono Prior Annual Wellness Visit Visit Vitals /74 (BP 1 Location: Right arm, BP Patient Position: Sitting) Pulse 85 Temp 98.4 °F (36.9 °C) (Oral) Resp 22 Ht 5' 10\" (1.778 m) Wt 298 lb (135.2 kg) SpO2 96% BMI 42.76 kg/m² 1. Have you been to the ER, urgent care clinic since your last visit? Hospitalized since your last visit? No 
 
2. Have you seen or consulted any other health care providers outside of the [address] [place] [address] since your last visit? Include any pap smears or colon screening. No 
 
Reviewed record in preparation for visit and have necessary documentation Pt did not bring medication to office visit for review 
opportunity was given for questions Goals that were addressed and/or need to be completed during or after this appointment include Health Maintenance Due Topic Date Due  Shingrix Vaccine Age 50> (1 of 2) 09/20/1995  Pneumococcal 65+ High/Highest Risk (2 of 2 - PPSV23) 02/10/2016  MEDICARE YEARLY EXAM  02/08/2019 (0) independent

## 2019-02-11 ENCOUNTER — TELEPHONE (OUTPATIENT)
Dept: FAMILY MEDICINE CLINIC | Age: 74
End: 2019-02-11

## 2019-02-11 NOTE — TELEPHONE ENCOUNTER
Pt misplaced a Rx for PREVNAR 23. She states Dr. David Meyers advised if she was unable to find it that he would write a Rx for her. Pt requesting a call once Rx has been written. She can be reached on home or cell phone.

## 2019-02-12 DIAGNOSIS — Z23 ENCOUNTER FOR IMMUNIZATION: Primary | ICD-10-CM

## 2019-03-07 ENCOUNTER — OFFICE VISIT (OUTPATIENT)
Dept: FAMILY MEDICINE CLINIC | Age: 74
End: 2019-03-07

## 2019-03-07 ENCOUNTER — PATIENT OUTREACH (OUTPATIENT)
Dept: FAMILY MEDICINE CLINIC | Age: 74
End: 2019-03-07

## 2019-03-07 VITALS
OXYGEN SATURATION: 91 % | BODY MASS INDEX: 40.52 KG/M2 | HEIGHT: 70 IN | RESPIRATION RATE: 20 BRPM | TEMPERATURE: 98.5 F | DIASTOLIC BLOOD PRESSURE: 64 MMHG | HEART RATE: 100 BPM | SYSTOLIC BLOOD PRESSURE: 130 MMHG | WEIGHT: 283 LBS

## 2019-03-07 DIAGNOSIS — I27.20 PULMONARY HTN (HCC): ICD-10-CM

## 2019-03-07 DIAGNOSIS — Z09 HOSPITAL DISCHARGE FOLLOW-UP: ICD-10-CM

## 2019-03-07 DIAGNOSIS — I26.99 PULMONARY EMBOLISM, BILATERAL (HCC): Primary | ICD-10-CM

## 2019-03-07 DIAGNOSIS — I82.491 ACUTE DEEP VEIN THROMBOSIS (DVT) OF OTHER SPECIFIED VEIN OF RIGHT LOWER EXTREMITY (HCC): ICD-10-CM

## 2019-03-07 DIAGNOSIS — R91.8 GROUND GLASS OPACITY PRESENT ON IMAGING OF LUNG: ICD-10-CM

## 2019-03-07 DIAGNOSIS — J84.10 PULMONARY FIBROSIS (HCC): ICD-10-CM

## 2019-03-07 DIAGNOSIS — J96.01 ACUTE RESPIRATORY FAILURE WITH HYPOXIA (HCC): ICD-10-CM

## 2019-03-07 RX ORDER — RIVAROXABAN 15 MG/1
TABLET, FILM COATED ORAL
Refills: 0 | COMMUNITY
Start: 2019-03-05 | End: 2019-03-26 | Stop reason: SDUPTHER

## 2019-03-07 NOTE — PROGRESS NOTES
[address]  [phone]        Transition of Care Visit    Patient: Karolina Lopes MRN: [id number]  SSN: [id number]    YOB: 1945  Age: 68 y.o. Sex: female      Hospital: [address] [place] [address]  Dates of admission: 3/3/19-3/5/19  Discharge diagnoses: Acute Bilateral PE/DVT, Hypertension, HLD, Rhematoid Arthritis with lung Disease  State of health at discharge: Stabe  Surgical or invasive procedures done: None    Amount and/or Complexity of Data Reviewed:   Clinical lab tests: Reviewed or ordered   Tests in the radiology section: reviewed or ordered  Discussion of test results with the patient-yes  Obtain previous medical records or obtain history from someone other than the patient: No  Obtain history from someone other than the patient: no  Review and address past medical records: yes  Discuss the patient with another provider: no  Independant visualization of image, tracing, or specimen: no    Risk of Significant Complications, Morbidity, and/or Mortality:   Presenting problems: High   Diagnostic procedures: Moderate   Management options: Moderate     Transition of Care time spent:   Total time providing care and documentation: 40-60 minutes   Progress at discharge:   Stable on Discharge      Progress Note    Patient: Karolina Lopes MRN: [id number]  SSN: [id number]    YOB: 1945  Age: 68 y.o. Sex: female        Chief Complaint   Patient presents with   [facility] Follow Up         Subjective:     Encounter Diagnoses   Name Primary?     Pulmonary embolism, bilateral (HCC) Yes    Acute deep vein thrombosis (DVT) of other specified vein of right lower extremity (HCC)     Ground glass opacity present on imaging of lung     Pulmonary fibrosis ([address]     Pulmonary HTN ([facility]    [facility] discharge follow-up      Patient developed weakness, fatigue, SOB, chills starting 9 days ago (2/27/19) and went to [address] [place] [address] ED on 3/3/19 for persistent symptms, initially started on Broad spectrum Antibiotics and started on treatment for sepsis/PNA before CTA showed bilateral PE's. Patient was started on Heparin Drip before being discharged on Xarelto and and told to stop Methotrexate, Aspirin, and Celebrex. Did not do walking oximetry testing before discharge. Denies any periods of prolonged recumbency, no prolonged trips, or significant illnesses prior to developing symptoms. This morning still noting SOB and coughing are improving, but not resolved at this time. At rest symptoms are tolerable, but exacerbated significantly with walking/activity. CTA Chest: showing Multiple bilateral branching pulmonary Emboli, Bilateral patchy ground glass opacities likely chronic, and bronchiectasis consistent with pulmonary fibrosis (Rhematoid related lung disease). Similar Groundglass component and Bronchiectasis on previous CT in our system. Duplex Legs: Acute Deep vein thrombosis of the right peroneal vein    Echocardiogram: EF , normal v=cavity size, no wall thickening. Grade 1 Diastolic Dysfunction, Moderate Pulmonary HTN. Current and past medical information:    Current Medications after this visit[de-identified]     Current Outpatient Medications   Medication Sig    XARELTO 15 mg tab tablet     raNITIdine (ZANTAC) 300 mg tab Take 1 Tab by mouth daily.  PROAIR HFA 90 mcg/actuation inhaler INHALE 2 PUFFS BY MOUTH EVERY 4 HOURS AS NEEDED FOR WHEEZING    albuterol (PROVENTIL VENTOLIN) 2.5 mg /3 mL (0.083 %) nebulizer solution USE 1 VIAL IN NEBULIZER EVERY 4 HOURS AS NEEDED FOR WHEEZING    budesonide-formoterol (SYMBICORT) 160-4.5 mcg/actuation HFAA Take 2 Puffs by inhalation two (2) times a day.  atorvastatin (LIPITOR) 10 mg tablet TAKE ONE TABLET BY MOUTH EVERY DAY    amLODIPine (NORVASC) 5 mg tablet TAKE ONE TABLET BY MOUTH EVERY DAY    UBIDECARENONE (COQ-10 PO) Take  by mouth.     acetaminophen (TYLENOL) 500 mg tablet Take 1,000 mg by mouth every six (6) hours as needed for Pain.  FOLIC ACID PO Take 3 mg by mouth daily.  omega-3s-dha-epa-fish oil 300-1,000 mg cpDR Take  by mouth daily.  cholecalciferol, vitamin D3, (VITAMIN D3) 4,000 unit cap Take  by mouth daily.  infliximab (REMICADE IV) by IntraVENous route. Every 8 weeks    aspirin delayed-release 81 mg tablet Take  by mouth daily.  gabapentin (NEURONTIN) 100 mg capsule     celecoxib (CELEBREX) 200 mg capsule Take 1 Cap by mouth daily.  METHOTREXATE 25 mg by IntraMUSCular route every seven (7) days. Saturdays     No current facility-administered medications for this visit.         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[facility] 12/15/2010    High cholesterol 04/30/2010    Rheumatoid arthritis, adult ([facility] 04/15/2010    Unspecified asthma(493.90) 04/15/2010    Cardiac dysrhythmia, unspecified 04/15/2010    OA (osteoarthritis) 04/15/2010    Obesity, unspecified 04/15/2010    Spinal stenosis, unspecified region other than cervical 04/15/2010       Past Medical History:   Diagnosis Date    Anemia, unspecified 4/15/2010    Cardiac dysrhythmia, unspecified 4/15/2010    PAT- not on medication/ not seeing cardiologist    High cholesterol 4/30/2010    Obesity, unspecified 4/15/2010    Osteoarthrosis, unspecified whether generalized or localized, other specified sites 4/15/2010    Rheumatoid arthritis(714.0) 4/15/2010    Spinal stenosis, unspecified region other than cervical 4/15/2010    Unspecified asthma(493.90) 4/15/2010    rheumatoid lung disease       Allergies   Allergen Reactions    Orudis [Ketoprofen] Palpitations    Singulair [Montelukast] Rash    Arthrotec 50 [Diclofenac-Misoprostol] Palpitations    Levaquin [Levofloxacin] Other (comments)     Hip joints swelling and painful per pt report    Lyrica [Pregabalin] Other (comments)     Saw spiders. Past Surgical History:   Procedure Laterality Date    CHEST SURGERY PROCEDURE UNLISTED  lat 3074'M    lung biopsy- diagnosed with rheumatoid lung disease    ENDOSCOPY, COLON, DIAGNOSTIC      7-10-13    HX ORTHOPAEDIC Right 2002    rotator cuff repair       Social History     Socioeconomic History    Marital status: SINGLE     Spouse name: Not on file    Number of children: Not on file    Years of education: Not on file    Highest education level: Not on file   Tobacco Use    Smoking status: Former Smoker     Packs/day: 1.00     Years: 20.00     Pack years: 20.00     Last attempt to quit: 1985     Years since quittin.8    Smokeless tobacco: Never Used   Substance and Sexual Activity    Alcohol use: No    Drug use: No    Sexual activity: No       Review of Systems   Constitutional: Positive for malaise/fatigue. Negative for chills and fever. Respiratory: Positive for shortness of breath. Negative for hemoptysis. Cardiovascular: Negative for chest pain, palpitations and leg swelling. Gastrointestinal: Negative for abdominal pain, nausea and vomiting. Genitourinary: Negative for dysuria, frequency and urgency. Musculoskeletal: Negative for myalgias. Neurological: Negative for dizziness and headaches. Objective:     Vitals:    19 1422   BP: 130/64   Pulse: 100   Resp: 20   Temp: 98.5 °F (36.9 °C)   TempSrc: Oral   SpO2: 91%   Weight: 283 lb (128.4 kg)   Height: 5' 10\" (1.778 m)      Body mass index is 40.61 kg/m². Physical Exam   Constitutional: She is oriented to person, place, and time. She appears well-developed and well-nourished. No distress.    HENT:   Head: Normocephalic and atraumatic. Neck: Normal range of motion. Neck supple. Cardiovascular: Normal rate, regular rhythm, normal heart sounds and intact distal pulses. Exam reveals no gallop and no friction rub. No murmur heard. Pulmonary/Chest: Effort normal.   Faint wheeze left lung base   Abdominal: Soft. Bowel sounds are normal. She exhibits no distension. There is no tenderness. Musculoskeletal: Normal range of motion. She exhibits no edema. Neurological: She is alert and oriented to person, place, and time. Skin: Skin is warm and dry. Nursing note and vitals reviewed. Walking oximetry with desaturation to 86% with activity, recovery to 91% with 2 LPM.    Health Maintenance Due   Topic Date Due    Shingrix Vaccine Age 49> (1 of 2) 09/20/1995    Pneumococcal 65+ High/Highest Risk (2 of 2 - PPSV23) 02/10/2016       Assessment and orders:       ICD-10-CM ICD-9-CM    1. Pulmonary embolism, bilateral (HCC) I26.99 415.19    2. Acute deep vein thrombosis (DVT) of other specified vein of right lower extremity (HCC) I82.491 453.40    3. Ground glass opacity present on imaging of lung R91.8 793.19    4. Pulmonary fibrosis ([address] J84.10 515    5. Pulmonary HTN (HCC) I27.20 416.8    6. Hospital discharge follow-up Z09 V67.59        Diagnoses and all orders for this visit:    1. Pulmonary embolism, bilateral (HCC)/Acute deep vein thrombosis (DVT) of other specified vein of right lower extremity ([address]): Continue Xarelto, consider repeat CTA and Duplex in 3-6 months. Patient at increased risk due to RA and use of methotrexate. Holding Methotrexate. Also holding Aspirin and Celebrex. -     rivaroxaban (XARELTO) 20 mg tab tablet; Take 1 Tab by mouth daily. 2. Acute respiratory failure with hypoxia ([address]): Secondary to ILD and PE. Failed Walking oximetry testing.     Oxygen face to face: Diagnosis relating to her need for O2: Acute Hypoxic respiratory failure, ILD, PE      Oxygen was discussed with patient, and patient is in agreement to receive oxygen therapy on discharge. She needs 2L O2 via nasal canula with portability with activity until she undergoes repeat walking oximetry. She is to follow up her PCP to discuss this need and any additional needs further.     3. Ground glass opacity present on imaging of lung/Pulmonary fibrosis ([address]): Secondary to Rheumatoid Arthritis, presence consistent with previous CT on file. 4. Pulmonary HTN ([address]): Patient with Grade 1 diastolic Dysfunction, but likely secondary to PE presence as well. Consider repeat after PE resolution. 5. Hospital discharge follow-up      Follow-up Disposition:  Return in about 1 week (around 3/14/2019) for Follow up Dr. David Meyers, Follow up PE. Plan of care:  Discussed diagnoses in detail with patient. Medication risks/benefits/side effects discussed with patient. All of the patient's questions were addressed. The patient understands and agrees with our plan of care. The patient knows to call back if they are unsure of or forget any changes we discussed today or if the symptoms change. The patient received an After-Visit Summary which contains VS, orders, medication list and allergy list. This can be used as a \"mini-medical record\" should they have to seek medical care while out of town. Follow-up Disposition:  Return in about 1 week (around 3/14/2019) for Follow up Dr. David Meyers, Follow up PE.     Future Appointments   Date Time Provider David Chester   3/11/2019  9:50 AM Brittani Horowitz MD [facility] SCHED       Signed By: Maximus Quan MD     March 7, 2019      Patient discussed and seen with Dr. David Meyers, Attending Physician

## 2019-03-07 NOTE — PROGRESS NOTES
Hospital Discharge Follow-Up      Date/Time:  3/7/2019 3:25 PM    Patient was admitted to [facility] on 3/3/19 and discharged on 3/5/19 for acute bilateral PE/DVT RLE. The physician discharge summary was available at the time of outreach. Patient was contacted within 2 business days of discharge. Top Challenges reviewed with the provider   Echo during admission shows EF 55-60%  Venous duplex with acute deep vein thrombosis involving the right peroneal veins  Respiratory culture not done 2/2 poor quality specimen  Respiratory virus panel negative  Legionella urinary antigen negative  CTA chest shows multifocal bilateral pulmonary emboli with secondary pulmonary artery hypertension. Chronic abnormalities of bilateral lungs, with multifocal parenchymal fibrosis and traction bronchiectasis, especially involving the right apex. Chest xray shows advanced fibrosis. Normal heart and mediastinum  Two sets of blood cultures negative         Method of communication with provider :none    Inpatient RRAT score: admitted to outside hospital  Was this a readmission? no   Patient stated reason for the readmission: n/a    Nurse Navigator (NN) contacted the patient by telephone to perform post hospital discharge assessment. Verified name and  with patient as identifiers. Provided introduction to self, and explanation of the Nurse Navigator role. Reviewed discharge instructions and red flags with patient who verbalized understanding. Patient given an opportunity to ask questions and does not have any further questions or concerns at this time. The patient agrees to contact the PCP office for questions related to their healthcare. NN provided contact information for future reference. Disease Specific:   N/A    Summary of patient's top problems:  1. Acute bilateral PE/DVT RLE - presents to ED with several complaints. She reports she has been weak for the past month. She reported a cough since February.  She saw her pulmonologist, who put her on steroids which then improved symptoms to some degree. She states symptoms were not improving today. She reported significant dizziness. Additionally, for the last 2 days, she has had decreased appetite and p.o. Intake. She did have some nausea earlier and a small amount of vomit. She has had known sick contacts at her [employer]. Given her chills as well as subjective fever, she presented to ER. Here in the ER, the patient was febrile with a temperature of 101.4. Her white count was within normal limits, but she does have a left shift. Interesting, her lactate is also within normal limits. She had imaging including chest x-ray, which was unremarkable without acute findings, which showed advanced fibrosis. She does report some hemoptysis as well. She also reports sputum has been intermittent between yellow and green. Home Health orders at discharge: [address]: n/a  Date of initial visit: [facility] ordered/company: n/a  Durable Medical Equipment received: n/a    Barriers to care? none identified    Advance Care Planning:   Does patient have an Advance Directive:  not on file     Medication(s):   New Medications at Discharge: Xarelto, Mucinex, Robitussin DM  Changed Medications at Discharge: none  Discontinued Medications at Discharge: Methotrexate, Celebrex    Medication reconciliation was performed with patient, who verbalizes understanding of administration of home medications. There were no barriers to obtaining medications identified at this time.     Referral to Pharm D needed: no     Current Outpatient Medications   Medication Sig    XARELTO 15 mg tab tablet     PROAIR HFA 90 mcg/actuation inhaler INHALE 2 PUFFS BY MOUTH EVERY 4 HOURS AS NEEDED FOR WHEEZING    albuterol (PROVENTIL VENTOLIN) 2.5 mg /3 mL (0.083 %) nebulizer solution USE 1 VIAL IN NEBULIZER EVERY 4 HOURS AS NEEDED FOR WHEEZING    budesonide-formoterol (SYMBICORT) 160-4.5 mcg/actuation HFAA Take 2 Puffs by inhalation two (2) times a day.  infliximab (REMICADE IV) by IntraVENous route. Every 8 weeks    atorvastatin (LIPITOR) 10 mg tablet TAKE ONE TABLET BY MOUTH EVERY DAY    amLODIPine (NORVASC) 5 mg tablet TAKE ONE TABLET BY MOUTH EVERY DAY    UBIDECARENONE (COQ-10 PO) Take  by mouth.  acetaminophen (TYLENOL) 500 mg tablet Take 1,000 mg by mouth every six (6) hours as needed for Pain.  FOLIC ACID PO Take 3 mg by mouth daily.  omega-3s-dha-epa-fish oil 300-1,000 mg cpDR Take  by mouth daily.  cholecalciferol, vitamin D3, (VITAMIN D3) 4,000 unit cap Take  by mouth daily.  rivaroxaban (XARELTO) 20 mg tab tablet Take 1 Tab by mouth daily.  raNITIdine (ZANTAC) 300 mg tab Take 1 Tab by mouth daily.  gabapentin (NEURONTIN) 100 mg capsule      No current facility-administered medications for this visit. There are no discontinued medications. [facility] follow up appointment(s):   Future Appointments   Date Time Provider David Chester   3/11/2019  9:50 AM [facility]      Non-[facility] follow up appointment(s): Rheumatology 3/7, Pulmonology 3/12  [facility]  [address] [place] [address] Attends follow-up appointments as directed. 3/7/19:  PCP 3/7/19 and again on 3/11/19  Rheumatology 3/7/19. Will call for f/u after appt with Pulmonary on 3/12/19.  Identification of barriers to adherence to a plan of care such as inability to afford medications, lack of insurance, lack of transportation, etc.      3/7/19:  Patient lives alone, but has family close by. She has been independent with ADLs and usually drives herself. She is depending on family for transportation right now. NN contact information given to patient.

## 2019-03-07 NOTE — PROGRESS NOTES
1. Have you been to the ER, urgent care clinic since your last visit? Hospitalized since your last visit? Yes JW    2. Have you seen or consulted any other health care providers outside of the [address] [place] [address] since your last visit? Include any pap smears or colon screening.  No  Reviewed record in preparation for visit and have necessary documentation  Pt did not bring medication to office visit for review    Goals that were addressed and/or need to be completed during or after this appointment include   Health Maintenance Due   Topic Date Due    Shingrix Vaccine Age 49> (1 of 2) 09/20/1995    Pneumococcal 65+ High/Highest Risk (2 of 2 - PPSV23) 02/10/2016

## 2019-03-08 NOTE — PROGRESS NOTES
I saw and evaluated the patient with the resident, performing the key elements of the exam and service. I discussed the findings, assessment and plan with the resident and agree with the resident's findings and plan as documented in the resident's note. She needs Oxygen for walking. Bentley Scott M.D.

## 2019-03-11 ENCOUNTER — OFFICE VISIT (OUTPATIENT)
Dept: FAMILY MEDICINE CLINIC | Age: 74
End: 2019-03-11

## 2019-03-11 VITALS
SYSTOLIC BLOOD PRESSURE: 108 MMHG | DIASTOLIC BLOOD PRESSURE: 63 MMHG | OXYGEN SATURATION: 96 % | BODY MASS INDEX: 40.8 KG/M2 | WEIGHT: 285 LBS | HEART RATE: 77 BPM | TEMPERATURE: 98.1 F | HEIGHT: 70 IN | RESPIRATION RATE: 20 BRPM

## 2019-03-11 DIAGNOSIS — R09.02 HYPOXIA: ICD-10-CM

## 2019-03-11 DIAGNOSIS — M05.10 RHEUMATOID LUNG DISEASE (HCC): ICD-10-CM

## 2019-03-11 DIAGNOSIS — R04.0 BLEEDING FROM THE NOSE: ICD-10-CM

## 2019-03-11 DIAGNOSIS — I26.99 OTHER ACUTE PULMONARY EMBOLISM WITHOUT ACUTE COR PULMONALE (HCC): Primary | ICD-10-CM

## 2019-03-11 NOTE — PROGRESS NOTES
[facility]  2005 [facility], [address] [place] [address]  [phone]        Transition of Care Visit    Patient: Jaylen Sutherland MRN: [id number]  SSN: [id number]    YOB: 1945  Age: 68 y.o. Sex: female      Hospital: [address] [place] [address],[address]. State of health at discharge:improved  Surgical or invasive procedures done: cautery of left nostril    Amount and/or Complexity of Data Reviewed:   Clinical lab tests:N/A  Tests in the radiology section: n/a  Discussion of test results with the patient-yes  Obtain previous medical records or obtain history from someone other than the patient: No  Obtain history from someone other than the patient: no  Review and address past medical records: yes  Discuss the patient with another provider: no  Independant visualization of image, tracing, or specimen: no    Risk of Significant Complications, Morbidity, and/or Mortality:   Presenting problems: Moderate  Diagnostic procedures: Moderate   Management options: Moderate     Progress at discharge:   Stable on Discharge      Progress Note    Patient: Jaylen Sutherland MRN: [id number]  SSN: [id number]    YOB: 1945  Age: 68 y.o. Sex: female        Chief Complaint   Patient presents with   [facility] Follow Up     Epistaxis     Labs     Fasting     O2/Oxygen     Humidifier for oxygen          Subjective:     Encounter Diagnoses   Name Primary?  Other acute pulmonary embolism without acute cor pulmonale ([facility] [address]): recent diagnosis of multiple pulmonary emboli. She was started on Xarelto. This weekend she started with a nosebleed from her left nostril. Yes    Hypoxia: She feels much better on the nasal oxygen. 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  Bleeding from the nose: Apparently she had a vessel cauterized in her left nostril.   She has not had any nosebleeds since then. On questioning her oxygen does not have a humidifier in line so we will have to reorder that. Current and past medical information:    Current Medications after this visit[de-identified]     Current Outpatient Medications   Medication Sig    Hot Water Bottle (WATER BOTTLE) misc Please Dispense a humidifier bottle to patient to use with her oxygen.  XARELTO 15 mg tab tablet     rivaroxaban (XARELTO) 20 mg tab tablet Take 1 Tab by mouth daily.  raNITIdine (ZANTAC) 300 mg tab Take 1 Tab by mouth daily.  PROAIR HFA 90 mcg/actuation inhaler INHALE 2 PUFFS BY MOUTH EVERY 4 HOURS AS NEEDED FOR WHEEZING    albuterol (PROVENTIL VENTOLIN) 2.5 mg /3 mL (0.083 %) nebulizer solution USE 1 VIAL IN NEBULIZER EVERY 4 HOURS AS NEEDED FOR WHEEZING    budesonide-formoterol (SYMBICORT) 160-4.5 mcg/actuation HFAA Take 2 Puffs by inhalation two (2) times a day.  infliximab (REMICADE IV) by IntraVENous route. Every 8 weeks    atorvastatin (LIPITOR) 10 mg tablet TAKE ONE TABLET BY MOUTH EVERY DAY    amLODIPine (NORVASC) 5 mg tablet TAKE ONE TABLET BY MOUTH EVERY DAY    UBIDECARENONE (COQ-10 PO) Take  by mouth.  acetaminophen (TYLENOL) 500 mg tablet Take 1,000 mg by mouth every six (6) hours as needed for Pain.  FOLIC ACID PO Take 3 mg by mouth daily.  omega-3s-dha-epa-fish oil 300-1,000 mg cpDR Take  by mouth daily.  cholecalciferol, vitamin D3, (VITAMIN D3) 4,000 unit cap Take  by mouth daily.  gabapentin (NEURONTIN) 100 mg capsule      No current facility-administered medications for this visit.         Patient Active Problem List    Diagnosis Date Noted    High cholesterol 04/30/2010     Priority: 1 - One    Rheumatoid arthritis, adult ([facility] [address] 04/15/2010     Priority: 1 - One    Unspecified asthma(493.90) 04/15/2010     Priority: 1 - One    OA (osteoarthritis) 04/15/2010     Priority: 1 - One    Obesity, unspecified 04/15/2010     Priority: 1 - One    Spinal stenosis, unspecified region other than cervical 04/15/2010     Priority: 1 - One    Cardiac dysrhythmia, unspecified 04/15/2010     Priority: 2 - Two    Immunosuppressed status ([facility] 08/20/2018    Obesity, morbid ([facility] 12/04/2017    Primary localized osteoarthritis of right hip 07/11/2016    Asthma 10/06/2015    Primary generalized (osteo)arthritis 10/06/2015    PAC (premature atrial contraction) 10/06/2015    Lumbar spinal stenosis 10/06/2015    Colon polyp, hyperplastic 06/02/2015    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[facility] 12/15/2010       Past Medical History:   Diagnosis Date    Anemia, unspecified 4/15/2010    Cardiac dysrhythmia, unspecified 4/15/2010    PAT- not on medication/ not seeing cardiologist    High cholesterol 4/30/2010    Obesity, unspecified 4/15/2010    Osteoarthrosis, unspecified whether generalized or localized, other specified sites 4/15/2010    Rheumatoid arthritis(714.0) 4/15/2010    Spinal stenosis, unspecified region other than cervical 4/15/2010    Unspecified asthma(493.90) 4/15/2010    rheumatoid lung disease       Allergies   Allergen Reactions    Methotrexate Other (comments)     DVT and PE    Orudis [Ketoprofen] Palpitations    Singulair [Montelukast] Rash    Arthrotec 50 [Diclofenac-Misoprostol] Palpitations    Levaquin [Levofloxacin] Other (comments)     Hip joints swelling and painful per pt report    Lyrica [Pregabalin] Other (comments)     Saw spiders.        Past Surgical History:   Procedure Laterality Date    CHEST SURGERY PROCEDURE UNLISTED  lat 3114'R    lung biopsy- diagnosed with rheumatoid lung disease    ENDOSCOPY, COLON, DIAGNOSTIC      7-10-13    HX ORTHOPAEDIC Right 2002    rotator cuff repair       Social History     Socioeconomic History    Marital status: SINGLE     Spouse name: Not on file    Number of children: Not on file    Years of education: Not on file    Highest education level: Not on file   Tobacco Use    Smoking status: Former Smoker     Packs/day: 1.00 Years: 20.00     Pack years: 20.00     Last attempt to quit: 1985     Years since quittin.8    Smokeless tobacco: Never Used   Substance and Sexual Activity    Alcohol use: No    Drug use: No    Sexual activity: No       Review of Systems   Constitutional: Negative. Negative for chills, fever, malaise/fatigue and weight loss. HENT: Negative. Negative for hearing loss. Nosebleeds this weekend. Left nostril only. Eyes: Negative. Negative for blurred vision and double vision. Respiratory: Negative. Negative for cough, hemoptysis, sputum production and shortness of breath. Cardiovascular: Negative. Negative for chest pain, palpitations and orthopnea. Gastrointestinal: Negative. Negative for abdominal pain, blood in stool, heartburn, nausea and vomiting. Genitourinary: Negative. Musculoskeletal: Negative. Skin: Negative. Negative for rash. Neurological: Negative. Negative for dizziness, tingling, tremors, weakness and headaches. Endo/Heme/Allergies: Negative. Psychiatric/Behavioral: Negative. Negative for depression. Objective:     Vitals:    19 0938   BP: 108/63   Pulse: 77   Resp: 20   Temp: 98.1 °F (36.7 °C)   TempSrc: Oral   SpO2: 96%   Weight: 285 lb (129.3 kg)   Height: 5' 10\" (1.778 m)      Body mass index is 40.89 kg/m². Physical Exam   Constitutional: She is oriented to person, place, and time. She appears well-developed and well-nourished. HENT:   Head: Normocephalic and atraumatic. Mouth/Throat: Oropharynx is clear and moist.   No clot seen in the left nostril. Eyes: Conjunctivae are normal. No scleral icterus. Neck: No thyromegaly present. Cardiovascular: Normal rate, regular rhythm and normal heart sounds. Exam reveals no gallop and no friction rub. No murmur heard. Pulmonary/Chest: No respiratory distress. She has no wheezes. She has no rales. Decreased breath sounds. No wheezes no rales and no rhonchi. Abdominal: Soft. She exhibits no distension. Musculoskeletal: She exhibits no deformity. Lymphadenopathy:     She has no cervical adenopathy. Neurological: She is alert and oriented to person, place, and time. Skin: Skin is warm and dry. No rash noted. Psychiatric: She has a normal mood and affect. Judgment normal.   Nursing note and vitals reviewed. Health Maintenance Due   Topic Date Due    Shingrix Vaccine Age 49> (1 of 2) 09/20/1995    Pneumococcal 65+ High/Highest Risk (2 of 2 - PPSV23) 02/10/2016       Assessment and orders:       ICD-10-CM ICD-9-CM    1. Other acute pulmonary embolism without acute cor pulmonale (HCC) I26.99 415.19    2. Hypoxia R09.02 799.02    3. 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[facility] M05.10 714.81    4. Bleeding from the nose R04.0 784.7        Diagnoses and all orders for this visit:    1. Other acute pulmonary embolism without acute cor pulmonale (HCC)-stable symptoms with exception of her nosebleed. 2. Hypoxia-corrected on 2 L of oxygen. She is less short of breath when she walks but not normal yet. 3. 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HCC)-chronic scarring with interstitial changes on chest x-ray. 4. Bleeding from the nose-left nostril only. She bled several times before going to the emergency room this weekend. I cauterized her left nostril vessel. She was instructed to lean forward and put pressure on her nose if the bleeding recurs. She will also use an Afrin soaked tissue rolled up pledget placed in her nose. If the bleeding recurs we will need to have her seen by ENT doc. She has a severe nosebleed she knows to go back to the emergency room by 911 if necessary. Other orders  -     Hot Water Bottle (WATER BOTTLE) misc; Please Dispense a humidifier bottle to patient to use with her oxygen. Plan of care:  Discussed diagnoses in detail with patient. Medication risks/benefits/side effects discussed with patient.      All of the patient's questions were addressed. The patient understands and agrees with our plan of care. The patient knows to call back if they are unsure of or forget any changes we discussed today or if the symptoms change. The patient received an After-Visit Summary which contains VS, orders, medication list and allergy list. This can be used as a \"mini-medical record\" should they have to seek medical care while out of town. Follow-up Disposition:  Return in about 1 week (around 3/18/2019), or if symptoms worsen or fail to improve.     Future Appointments   Date Time Provider David Chester   3/18/2019  2:45 PM Lisette Barcenas MD [facility] SCHED       Signed By: Dedra Ly MD     March 11, 2019

## 2019-03-11 NOTE — PROGRESS NOTES
Chief Complaint   Patient presents with   [facility] Follow Up     Epistaxis     Labs     Fasting     O2/Oxygen     Humidifier for oxygen        Body mass index is 40.89 kg/m². 1. Have you been to the ER, urgent care clinic since your last visit? Hospitalized since your last visit? Yes    2. Have you seen or consulted any other health care providers outside of the [address] [place] [address] since your last visit? Include any pap smears or colon screening.  No     Reviewed record in preparation for visit and have necessary documentation  Pt did not bring medication to office visit for review  Information was given to pt on Advanced Directives, Living Will  Information was given on Shingles Vaccine  Opportunity was given for questions  Goals that were addressed and/or need to be completed after this appointment include:     Health Maintenance Due   Topic Date Due    Shingrix Vaccine Age 49> (1 of 2) 09/20/1995    Pneumococcal 65+ High/Highest Risk (2 of 2 - PPSV23) 02/10/2016

## 2019-03-18 ENCOUNTER — OFFICE VISIT (OUTPATIENT)
Dept: FAMILY MEDICINE CLINIC | Age: 74
End: 2019-03-18

## 2019-03-18 VITALS
OXYGEN SATURATION: 96 % | SYSTOLIC BLOOD PRESSURE: 122 MMHG | HEIGHT: 70 IN | WEIGHT: 280 LBS | RESPIRATION RATE: 20 BRPM | BODY MASS INDEX: 40.09 KG/M2 | HEART RATE: 77 BPM | TEMPERATURE: 97.6 F | DIASTOLIC BLOOD PRESSURE: 76 MMHG

## 2019-03-18 DIAGNOSIS — I26.99 OTHER ACUTE PULMONARY EMBOLISM WITHOUT ACUTE COR PULMONALE (HCC): Primary | Chronic | ICD-10-CM

## 2019-03-18 DIAGNOSIS — J45.50 SEVERE PERSISTENT ASTHMA WITHOUT COMPLICATION: Chronic | ICD-10-CM

## 2019-03-18 DIAGNOSIS — M06.9 RHEUMATOID ARTHRITIS INVOLVING MULTIPLE SITES, UNSPECIFIED RHEUMATOID FACTOR PRESENCE: Chronic | ICD-10-CM

## 2019-03-18 DIAGNOSIS — M05.10 RHEUMATOID LUNG DISEASE (HCC): Chronic | ICD-10-CM

## 2019-03-18 PROBLEM — I82.4Z1 ACUTE DEEP VEIN THROMBOSIS (DVT) OF DISTAL VEIN OF RIGHT LOWER EXTREMITY (HCC): Chronic | Status: ACTIVE | Noted: 2019-03-18

## 2019-03-18 NOTE — PATIENT INSTRUCTIONS
Deep Vein Thrombosis: Care Instructions  Your Care Instructions    A deep vein thrombosis (DVT) is a blood clot in certain veins of the legs, pelvis, or arms. Blood clots in these veins need to be treated because they can get bigger, break loose, and travel through the bloodstream to the lungs. A blood clot in a lung can be life-threatening. The doctor may have given you a blood thinner (anticoagulant). A blood thinner can stop the blood clot from growing larger and prevent new clots from forming. You will need to take a blood thinner for 3 to 6 months or longer. The doctor has checked you carefully, but problems can develop later. If you notice any problems or new symptoms, get medical treatment right away. Follow-up care is a key part of your treatment and safety. Be sure to make and go to all appointments, and call your doctor if you are having problems. It's also a good idea to know your test results and keep a list of the medicines you take. How can you care for yourself at home? · Take your medicines exactly as prescribed. Call your doctor if you think you are having a problem with your medicine. · If you are taking a blood thinner, be sure you get instructions about how to take your medicine safely. Blood thinners can cause serious bleeding problems. · Wear compression stockings if your doctor recommends them. These stockings are tighter at the feet than on the legs. They may reduce pain and swelling in your legs. But there are different types of stockings, and they need to fit right. So your doctor will recommend what you need. · When you sit, use a pillow to raise the arm or leg that has the blood clot. Try to keep it above the level of your heart. When should you call for help? Call 911 anytime you think you may need emergency care. For example, call if:    · You passed out (lost consciousness).     · You have symptoms of a blood clot in your lung (called a pulmonary embolism).  These include:  ? Sudden chest pain. ? Trouble breathing. ? Coughing up blood.    Call your doctor now or seek immediate medical care if:    · You have new or worse trouble breathing.     · You are dizzy or lightheaded, or you feel like you may faint.     · You have symptoms of a blood clot in your arm or leg. These may include:  ? Pain in the arm, calf, back of the knee, thigh, or groin. ? Redness and swelling in the arm, leg, or groin.    Watch closely for changes in your health, and be sure to contact your doctor if:    · You do not get better as expected. Where can you learn more? Go to http://antonio-adrien.info/. Enter Y110 in the search box to learn more about \"Deep Vein Thrombosis: Care Instructions. \"  Current as of: September 26, 2018  Content Version: 11.9  © 3188-7972 [employer]. Care instructions adapted under license by [employer] (which disclaims liability or warranty for this information). If you have questions about a medical condition or this instruction, always ask your healthcare professional. [address] any warranty or liability for your use of this information.

## 2019-03-18 NOTE — PROGRESS NOTES
[facility], [address] [place] [address]  [phone]           Progress Note    Patient: Noel Main MRN: [id number]  SSN: [id number]    YOB: 1945  Age: 68 y.o. Sex: female        Chief Complaint   Patient presents with    Follow-up         Subjective:     Encounter Diagnoses   Name Primary?  Other acute pulmonary embolism without acute cor pulmonale ([facility]):  Breathing is stable on oxygen. She still gets short of breath with the least bit of exercise. Advised her to exercise her comfort level to build up her stamina and keep her oxygen on. Also asked her previously to get a pulse ox so she can check her exercise oxygen level. Because of her complex medical problems want her to see the hematologist regarding lengths of anticoagulant therapy. She has such impaired lung function I am afraid that another round of pulmonary emboli would jeopardize her health significantly. She may need lifelong anticoagulation. Yes         Severe persistent asthma without complication: She said her asthma and wheezing started with the onset of her rheumatoid lung disease back in the 90s.  Rheumatoid arthritis involving multiple sites, unspecified rheumatoid factor presence ([facility].): She has been off methotrexate for about 2 weeks now and has not really noticed any flare of her rheumatoid symptoms. Makes me wonder if the methotrexate was not helping. He was advised to call Dr. Ozuna Mini office and see when they would like to see her back to discuss whether or not she should have other treatment.  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HCC)This makes having pulmonary emboli more problematic. On examination she has bilateral dry rales consistent with pulmonary fibrosis. Her baseline resting respiratory rate is 20.         Current and past medical information:    Current Medications after this visit[de-identified]     Current Outpatient Medications   Medication Sig    Hot Water Bottle (WATER BOTTLE) misc Please Dispense a humidifier bottle to patient to use with her oxygen.  XARELTO 15 mg tab tablet     rivaroxaban (XARELTO) 20 mg tab tablet Take 1 Tab by mouth daily.  raNITIdine (ZANTAC) 300 mg tab Take 1 Tab by mouth daily.  PROAIR HFA 90 mcg/actuation inhaler INHALE 2 PUFFS BY MOUTH EVERY 4 HOURS AS NEEDED FOR WHEEZING    albuterol (PROVENTIL VENTOLIN) 2.5 mg /3 mL (0.083 %) nebulizer solution USE 1 VIAL IN NEBULIZER EVERY 4 HOURS AS NEEDED FOR WHEEZING    budesonide-formoterol (SYMBICORT) 160-4.5 mcg/actuation HFAA Take 2 Puffs by inhalation two (2) times a day.  infliximab (REMICADE IV) by IntraVENous route. Every 8 weeks    atorvastatin (LIPITOR) 10 mg tablet TAKE ONE TABLET BY MOUTH EVERY DAY    amLODIPine (NORVASC) 5 mg tablet TAKE ONE TABLET BY MOUTH EVERY DAY    UBIDECARENONE (COQ-10 PO) Take  by mouth.  acetaminophen (TYLENOL) 500 mg tablet Take 1,000 mg by mouth every six (6) hours as needed for Pain.  FOLIC ACID PO Take 3 mg by mouth daily.  omega-3s-dha-epa-fish oil 300-1,000 mg cpDR Take  by mouth daily.  cholecalciferol, vitamin D3, (VITAMIN D3) 4,000 unit cap Take  by mouth daily.  gabapentin (NEURONTIN) 100 mg capsule      No current facility-administered medications for this visit.         Patient Active Problem List    Diagnosis Date Noted    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[facility] 12/15/2010     Priority: 1 - One    High cholesterol 04/30/2010     Priority: 1 - One    Rheumatoid arthritis, adult ([facility] 04/15/2010     Priority: 1 - One    Severe persistent asthma without complication 71/72/8655     Priority: 1 - One    OA (osteoarthritis) 04/15/2010     Priority: 1 - One    Obesity, unspecified 04/15/2010     Priority: 1 - One    Spinal stenosis, unspecified region other than cervical 04/15/2010     Priority: 1 - One    Cardiac dysrhythmia, unspecified 04/15/2010     Priority: 2 - Two    Acute pulmonary embolism ([facility] 03/18/2019    Acute deep vein thrombosis (DVT) of distal vein of right lower extremity ([facility] 03/18/2019    Immunosuppressed status ([facility] 08/20/2018    Obesity, morbid ([facility] 12/04/2017    Primary localized osteoarthritis of right hip 07/11/2016    Asthma 10/06/2015    Primary generalized (osteo)arthritis 10/06/2015    PAC (premature atrial contraction) 10/06/2015    Lumbar spinal stenosis 10/06/2015    Colon polyp, hyperplastic 06/02/2015       Past Medical History:   Diagnosis Date    Anemia, unspecified 4/15/2010    Cardiac dysrhythmia, unspecified 4/15/2010    PAT- not on medication/ not seeing cardiologist    High cholesterol 4/30/2010    Obesity, unspecified 4/15/2010    Osteoarthrosis, unspecified whether generalized or localized, other specified sites 4/15/2010    Rheumatoid arthritis(714.0) 4/15/2010    Spinal stenosis, unspecified region other than cervical 4/15/2010    Unspecified asthma(493.90) 4/15/2010    rheumatoid lung disease       Allergies   Allergen Reactions    Methotrexate Other (comments)     DVT and PE    Orudis [Ketoprofen] Palpitations    Singulair [Montelukast] Rash    Arthrotec 50 [Diclofenac-Misoprostol] Palpitations    Levaquin [Levofloxacin] Other (comments)     Hip joints swelling and painful per pt report    Lyrica [Pregabalin] Other (comments)     Saw spiders.        Past Surgical History:   Procedure Laterality Date    CHEST SURGERY PROCEDURE UNLISTED  lat 7157'O    lung biopsy- diagnosed with rheumatoid lung disease    ENDOSCOPY, COLON, DIAGNOSTIC      7-10-13    HX ORTHOPAEDIC Right 2002    rotator cuff repair       Social History     Socioeconomic History    Marital status: SINGLE     Spouse name: Not on file    Number of children: Not on file    Years of education: Not on file    Highest education level: Not on file   Tobacco Use    Smoking status: Former Smoker     Packs/day: 1.00     Years: 20.00     Pack years: 20.00     Last attempt to quit: 1985     Years since quittin.9    Smokeless tobacco: Never Used   Substance and Sexual Activity    Alcohol use: No    Drug use: No    Sexual activity: No       Review of Systems   Constitutional: Positive for malaise/fatigue. Negative for chills, fever and weight loss. Tired and easily fatigued. She may need an echocardiogram in the future to see if she developed pulmonary hypertension and what her left ventricular function is. HENT: Negative. Negative for hearing loss. Eyes: Negative. Negative for blurred vision and double vision. Respiratory: Positive for cough and shortness of breath. Negative for hemoptysis, sputum production and wheezing. Cardiovascular: Negative. Negative for chest pain, palpitations and orthopnea. Gastrointestinal: Negative. Negative for abdominal pain, blood in stool, heartburn, nausea and vomiting. Genitourinary: Negative. Negative for dysuria, frequency and urgency. Musculoskeletal: Negative. Negative for back pain, myalgias and neck pain. Skin: Negative. Negative for rash. Neurological: Negative. Negative for dizziness, tingling, tremors, weakness and headaches. Endo/Heme/Allergies: Negative. Psychiatric/Behavioral: Negative. Negative for depression. Objective:     Vitals:    19 1449   BP: 122/76   Pulse: 77   Resp: 20   Temp: 97.6 °F (36.4 °C)   TempSrc: Oral   SpO2: 96%   Weight: 280 lb (127 kg)   Height: 5' 10\" (1.778 m)      Body mass index is 40.18 kg/m². Physical Exam   Constitutional: She is oriented to person, place, and time and well-developed, well-nourished, and in no distress. No distress. HENT:   Head: Normocephalic and atraumatic. Mouth/Throat: Oropharynx is clear and moist.   Eyes: Conjunctivae are normal.   Neck: No thyromegaly present. Cardiovascular: Normal rate, regular rhythm and normal heart sounds. No murmur heard. Pulmonary/Chest: Effort normal. No respiratory distress.  She has no wheezes. She has rales. She has dry rales. Decreased breath sounds. No rhonchi and no wheezing. Abdominal: Soft. She exhibits no distension. Musculoskeletal: She exhibits edema. The right leg swelling has gotten better since her hospitalization. Neurological: She is alert and oriented to person, place, and time. Skin: Skin is warm. No rash noted. She is not diaphoretic. No erythema. Psychiatric: Mood and affect normal.   Nursing note and vitals reviewed. Health Maintenance Due   Topic Date Due    Shingrix Vaccine Age 49> (1 of 2) 09/20/1995    Pneumococcal 65+ High/Highest Risk (2 of 2 - PPSV23) 02/10/2016         Assessment and orders:     Encounter Diagnoses     ICD-10-CM ICD-9-CM   1. Other acute pulmonary embolism without acute cor pulmonale (HCC) I26.99 415.19   2. Other pulmonary embolism without acute cor pulmonale, unspecified chronicity (HCC) I26.99 415.19   3. Severe persistent asthma without complication U32.94 681.07   4. Rheumatoid arthritis involving multiple sites, unspecified rheumatoid factor presence (HCC) M06.9 714.0   5. 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[facility] M05.10 714.81     Diagnoses and all orders for this visit:    1. Other acute pulmonary embolism without acute cor pulmonale (HCC)-clinically stable but significantly impaired. Previous to this she can walk without getting shortness of breath.  -     REFERRAL TO HEMATOLOGY    2. Severe persistent asthma without complication-wheezing is a small component of her dyspnea now. 3. Rheumatoid arthritis involving multiple sites, unspecified rheumatoid factor presence (HCC)-severe    4. 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HCC)-severe            Plan of care:  Discussed diagnoses in detail with patient. Medication risks/benefits/side effects discussed with patient. All of the patient's questions were addressed. The patient understands and agrees with our plan of care.     The patient knows to call back if they are unsure of or forget any changes we discussed today or if the symptoms change. The patient received an After-Visit Summary which contains VS, orders, medication list and allergy list. This can be used as a \"mini-medical record\" should they have to seek medical care while out of town. Patient Care Team:  Arthur Cowan MD as PCP - Alanna Houston MD (Orthopedic Surgery)  Ary Zarate MD (Orthopedic Surgery)  Doris Sanabria MD (Otolaryngology)  Drew Ho RN as Ambulatory Care Navigator    Follow-up Disposition:  Return in about 4 weeks (around 4/15/2019). Future Appointments   Date Time Provider Department Center   4/15/2019  2:05 PM Arthur Cowan MD [facility] SCHED       Signed By: Finn Pereira MD     March 18, 2019        This note was created using voice recognition software. Despite editing, there may be syntax errors.

## 2019-03-18 NOTE — PROGRESS NOTES
Chief Complaint   Patient presents with    Follow-up     Body mass index is 40.18 kg/m². 1. Have you been to the ER, urgent care clinic since your last visit? Hospitalized since your last visit? No    2. Have you seen or consulted any other health care providers outside of the [address] [place] [address] since your last visit? Include any pap smears or colon screening.  No    Reviewed record in preparation for visit and have necessary documentation  Pt did not bring medication to office visit for review  Information was given to pt on Advanced Directives, Living Will  Information was given on Shingles Vaccine  Opportunity was given for questions  Goals that were addressed and/or need to be completed after this appointment include:     Health Maintenance Due   Topic Date Due    Shingrix Vaccine Age 49> (1 of 2) 09/20/1995    Pneumococcal 65+ High/Highest Risk (2 of 2 - PPSV23) 02/10/2016

## 2019-03-26 DIAGNOSIS — R53.81 PHYSICAL DECONDITIONING: Primary | ICD-10-CM

## 2019-03-26 DIAGNOSIS — I26.99 PULMONARY EMBOLISM, BILATERAL (HCC): ICD-10-CM

## 2019-03-26 RX ORDER — RIVAROXABAN 15 MG/1
TABLET, FILM COATED ORAL
Refills: 0 | Status: CANCELLED | OUTPATIENT
Start: 2019-03-26

## 2019-03-26 NOTE — TELEPHONE ENCOUNTER
Pt also would like an order for Physical Therapy be sent to [facility].

## 2019-03-26 NOTE — TELEPHONE ENCOUNTER
Spoke with patient to clarify current dose of Xarelto. She says that she was taking Xarelto 15 mg BID and was suppose to switch to 20 mg once daily on day 22.

## 2019-03-26 NOTE — TELEPHONE ENCOUNTER
Pt would like Rx to go to [facility]    Phone#:[phone] opt 2. She states that she spoke w/ someone name Radha Abdalla from [address] and was advised that the provider had to call in Rx to the number above and choose option 2. It is cheaper for pt to receive a 90 day supply. Pt also would like an order for Physical Therapy be sent to [facility].

## 2019-03-28 ENCOUNTER — OFFICE VISIT (OUTPATIENT)
Dept: ONCOLOGY | Age: 74
End: 2019-03-28

## 2019-03-28 VITALS
WEIGHT: 285 LBS | RESPIRATION RATE: 20 BRPM | HEART RATE: 74 BPM | BODY MASS INDEX: 40.8 KG/M2 | SYSTOLIC BLOOD PRESSURE: 132 MMHG | HEIGHT: 70 IN | DIASTOLIC BLOOD PRESSURE: 58 MMHG | OXYGEN SATURATION: 96 % | TEMPERATURE: 97.7 F

## 2019-03-28 DIAGNOSIS — I26.99 OTHER ACUTE PULMONARY EMBOLISM WITHOUT ACUTE COR PULMONALE (HCC): Primary | ICD-10-CM

## 2019-03-28 DIAGNOSIS — I82.451 ACUTE DEEP VEIN THROMBOSIS (DVT) OF RIGHT PERONEAL VEIN (HCC): ICD-10-CM

## 2019-03-28 DIAGNOSIS — R04.0 EPISTAXIS: ICD-10-CM

## 2019-03-28 DIAGNOSIS — M05.10 RHEUMATOID LUNG DISEASE (HCC): Chronic | ICD-10-CM

## 2019-03-28 NOTE — PROGRESS NOTES
[address] [facility] [place]  [phone]    Hematology / Oncology Consult    Reason for Visit:   Lynn Clark is a 68 y.o. female who is seen in consultation at the request of Dr. David Meyers for evaluation of PE. History of Present Illness:   Ms. Marya Irene is a 67 y/o female with RA with lung involvement who comes in for evaluation and management of PE. She was recently put on home O2. She was hospitalized at [address] [place] [address] 3/3/19 - 3/5/19 after p/w weakness, fatigue, SOB, chills. She denies R leg swelling but has had subacute pain in R leg from knee down which started in early Feb 2019. She was found to have bilateral pulmonary emboli as well as RLE DVT. She was started on Heparin infusion and transitioned to Xarelto. At time of discharge, she was told to stop MTX, Aspirin and Celebrex. Since then, she reported to the ED at [facility] for 2 episodes of bleeding from left naris. She also coughed up blood. She denies any 8 hr trips, surgeries, fractures, OCPs or hormone replacement. No prior h/o VTE. No family h/o VTE. For RA, she takes MTX, but this was put on hold. She was also started on Remicaide 1 yr ago. She is currently taking Xarelto as prescribed, states her SOB is much better than prior. The R leg pain is much better, but feels really stiff per patient.     -Labs 3/10/19: WBC 9.5, Hgb 10.8, , HIV/HCV Ab/HepB Surface Ag all negative.     Past Medical History:   Diagnosis Date    Anemia, unspecified 4/15/2010    Cardiac dysrhythmia, unspecified 4/15/2010    PAT- not on medication/ not seeing cardiologist    High cholesterol 4/30/2010    Obesity, unspecified 4/15/2010    Osteoarthrosis, unspecified whether generalized or localized, other specified sites 4/15/2010    Rheumatoid arthritis(714.0) 4/15/2010    Spinal stenosis, unspecified region other than cervical 4/15/2010    Unspecified asthma(493.90) 4/15/2010    rheumatoid lung disease      Past Surgical History: Procedure Laterality Date    CHEST SURGERY PROCEDURE UNLISTED  lat     lung biopsy- diagnosed with rheumatoid lung disease    ENDOSCOPY, COLON, DIAGNOSTIC      7-10-13    HX ORTHOPAEDIC Right     rotator cuff repair      Social History     Tobacco Use    Smoking status: Former Smoker     Packs/day: 1.00     Years: 20.00     Pack years: 20.00     Last attempt to quit: 1985     Years since quittin.9    Smokeless tobacco: Never Used   Substance Use Topics    Alcohol use: No      Family History   Problem Relation Age of Onset    Cancer Mother         cervical, breast    Heart Disease Mother     Heart Attack Mother     Hypertension Mother     Arthritis-osteo Mother     Heart Disease Father     Heart Attack Father     Cancer Other         uterine and lung.  High Cholesterol Brother     Stroke Brother     Arthritis-rheumatoid Sister     Migraines Sister     Breast Cancer Maternal Aunt      Current Outpatient Medications   Medication Sig    rivaroxaban (XARELTO) 20 mg tab tablet Take 1 Tab by mouth daily (with breakfast).  Hot Water Bottle (WATER BOTTLE) misc Please Dispense a humidifier bottle to patient to use with her oxygen.  raNITIdine (ZANTAC) 300 mg tab Take 1 Tab by mouth daily.  PROAIR HFA 90 mcg/actuation inhaler INHALE 2 PUFFS BY MOUTH EVERY 4 HOURS AS NEEDED FOR WHEEZING    albuterol (PROVENTIL VENTOLIN) 2.5 mg /3 mL (0.083 %) nebulizer solution USE 1 VIAL IN NEBULIZER EVERY 4 HOURS AS NEEDED FOR WHEEZING    budesonide-formoterol (SYMBICORT) 160-4.5 mcg/actuation HFAA Take 2 Puffs by inhalation two (2) times a day.  infliximab (REMICADE IV) by IntraVENous route. Every 8 weeks    atorvastatin (LIPITOR) 10 mg tablet TAKE ONE TABLET BY MOUTH EVERY DAY    amLODIPine (NORVASC) 5 mg tablet TAKE ONE TABLET BY MOUTH EVERY DAY    UBIDECARENONE (COQ-10 PO) Take  by mouth.     gabapentin (NEURONTIN) 100 mg capsule     acetaminophen (TYLENOL) 500 mg tablet Take 1,000 mg by mouth every six (6) hours as needed for Pain.  FOLIC ACID PO Take 3 mg by mouth daily.  omega-3s-dha-epa-fish oil 300-1,000 mg cpDR Take  by mouth daily.  cholecalciferol, vitamin D3, (VITAMIN D3) 4,000 unit cap Take  by mouth daily. No current facility-administered medications for this visit. Allergies   Allergen Reactions    Methotrexate Other (comments)     DVT and PE    Orudis [Ketoprofen] Palpitations    Singulair [Montelukast] Rash    Arthrotec 50 [Diclofenac-Misoprostol] Palpitations    Levaquin [Levofloxacin] Other (comments)     Hip joints swelling and painful per pt report    Lyrica [Pregabalin] Other (comments)     Saw spiders. Review of Systems: A complete review of systems was obtained, negative except as described above. Physical Exam:     Visit Vitals  /58   Pulse 74   Temp 97.7 °F (36.5 °C) (Oral)   Resp 20   Ht 5' 10\" (1.778 m)   Wt 285 lb (129.3 kg)   SpO2 96%   BMI 40.89 kg/m²     ECOG PS: 2  General: Well developed, no acute distress, using rolling walker  Eyes: PERRLA, EOMI, anicteric sclerae  HENT: Atraumatic, OP clear, TMs intact without erythema  Neck: Supple  Lymphatic: No cervical, supraclavicular, axillary or inguinal adenopathy  Respiratory: RLL velcro crackles, otherwise CTA, normal respiratory effort  CV: Normal rate, regular rhythm, no murmurs, no peripheral edema  GI: Soft, nontender, nondistended, no masses, no hepatomegaly, no splenomegaly  MS: Normal gait and station. Digits without clubbing or cyanosis. Skin: No rashes, ecchymoses, or petechiae. Normal temperature, turgor, and texture. Neuro/Psych: Alert, oriented. 5/5 strength in all 4 extremities. Appropriate affect, normal judgment/insight. Results:     Lab Results   Component Value Date/Time    WBC 6.6 08/21/2017 12:09 PM    HGB 11.2 08/21/2017 12:09 PM    HCT 34.5 08/21/2017 12:09 PM    PLATELET 942 14/81/4442 12:09 PM    MCV 90 08/21/2017 12:09 PM    ABS.  NEUTROPHILS 3.3 04/25/2017 08:37 AM     Lab Results   Component Value Date/Time    Sodium 142 08/21/2017 12:09 PM    Potassium 4.4 08/21/2017 12:09 PM    Chloride 104 08/21/2017 12:09 PM    CO2 26 08/21/2017 12:09 PM    Glucose 73 08/21/2017 12:09 PM    BUN 16 08/21/2017 12:09 PM    Creatinine 0.61 08/21/2017 12:09 PM    GFR est  08/21/2017 12:09 PM    GFR est non-AA 92 08/21/2017 12:09 PM    Calcium 9.2 08/21/2017 12:09 PM     Lab Results   Component Value Date/Time    Bilirubin, total 0.5 08/21/2017 12:09 PM    ALT (SGPT) 13 08/21/2017 12:09 PM    AST (SGOT) 17 08/21/2017 12:09 PM    Alk. phosphatase 86 08/21/2017 12:09 PM    Protein, total 7.1 08/21/2017 12:09 PM    Albumin 4.0 08/21/2017 12:09 PM    Globulin 4.2 (H) 04/19/2015 07:37 AM     Lab Results   Component Value Date/Time    Iron 68 04/25/2017 08:37 AM    TIBC 256 04/25/2017 08:37 AM    Iron % saturation 27 04/25/2017 08:37 AM       Lab Results   Component Value Date/Time    Vitamin B12 666 04/30/2010 10:37 AM     Lab Results   Component Value Date/Time    TSH 1.160 06/14/2016 10:21 AM     Lab Results   Component Value Date/Time    HEP C VIRUS AB <0.1 10/09/2015 08:07 AM         Imaging:     Imaging at [address] [place] [address]:     CTA Chest 3/2019: showing Multiple bilateral branching pulmonary Emboli, Bilateral patchy ground glass opacities likely chronic, and bronchiectasis consistent with pulmonary fibrosis (Rhematoid related lung disease). Similar Groundglass component and Bronchiectasis on previous CT in our system.       Duplex Legs 3/2019: Acute Deep vein thrombosis of the right peroneal vein    Echo: Mild concentric LVH with normal cavity size and systolic function. Trivial to mild regurgitation. Moderate LV diastolic dysfunction with elevated left atrial pressure. Assessment & Plan:   Aryan Uribe is a 68 y.o. female with RA comes in for evaluation of DVT/PE. 1. Venous thromboembolism: PE and RLE DVT occurred in March 2019. No obvious risk factors.  My review of MTX shows no increased risk of VTE; Remicaide associated with < 1% risk of thrombophelebitis, MI, etc. Between the 2 medications, the Remicaide is more likely a culprit. The dose and frequency were recently increased in early February. I recommend patient discuss further with management with her Rheumatologist, Dr. Christopher Coats. Patient is tolerating the Xarelto well aside from nosebleeds (improving). Patient will likely need to remain on anticoagulation indefinitely as it is not clear whether this was provoked by medication or not. -- Return in 3 months    2. RA with lung involvement: MTX and Remicaide both on hold currently. I feel pt can likely restart MTX. I recommend she discuss an alternate agent to the Remicaide with her physician. Home O2 was started early Feb 2019 - likely due to PE and will likely be able to come off of supplemental O2. Her O2 sats are 96% on RA today in our office. -- Sees Dr. Hesham Barba in Pulmonary and Dr. Corbin Halsted in Rheumatology. Pt will be switching for Dr. Carlos Gutierrez for location convenience. 3. Diastolic CHF: Seen on echo and likely due to HTN. Not on diuretics. On Norvasc. 4. Epistaxis: Occurred even before starting Xarelto, but more frequent since starting Xarelto. Currently managing with clamping physical pressure and Afrin prn.  -- If this becomes bothersome, we can try switching to Eliquis. Emotional well being: Pt is coping well with his/her disease and has excellent support. I appreciate the opportunity to participate in Ms. Polanco Galina Viveros's care.     Signed By: Omar Orellana MD     March 28, 2019

## 2019-04-02 DIAGNOSIS — I26.99 PULMONARY EMBOLISM, BILATERAL (HCC): ICD-10-CM

## 2019-04-02 NOTE — TELEPHONE ENCOUNTER
Pt states that [facility] just mailed out her medication today and she took her last pill today. Pt would like a 7 day supply sent to [email or web address], if possible.

## 2019-04-15 ENCOUNTER — OFFICE VISIT (OUTPATIENT)
Dept: FAMILY MEDICINE CLINIC | Age: 74
End: 2019-04-15

## 2019-04-15 VITALS
HEART RATE: 80 BPM | OXYGEN SATURATION: 96 % | DIASTOLIC BLOOD PRESSURE: 81 MMHG | HEIGHT: 70 IN | BODY MASS INDEX: 40.66 KG/M2 | TEMPERATURE: 97.7 F | RESPIRATION RATE: 20 BRPM | SYSTOLIC BLOOD PRESSURE: 152 MMHG | WEIGHT: 284 LBS

## 2019-04-15 DIAGNOSIS — I82.4Z1 ACUTE DEEP VEIN THROMBOSIS (DVT) OF DISTAL VEIN OF RIGHT LOWER EXTREMITY (HCC): Chronic | ICD-10-CM

## 2019-04-15 DIAGNOSIS — I10 ESSENTIAL HYPERTENSION: Chronic | ICD-10-CM

## 2019-04-15 DIAGNOSIS — J47.9 BRONCHIECTASIS WITHOUT COMPLICATION (HCC): ICD-10-CM

## 2019-04-15 DIAGNOSIS — I26.99 OTHER ACUTE PULMONARY EMBOLISM WITHOUT ACUTE COR PULMONALE (HCC): Primary | Chronic | ICD-10-CM

## 2019-04-15 DIAGNOSIS — M05.10 RHEUMATOID LUNG DISEASE (HCC): Chronic | ICD-10-CM

## 2019-04-15 DIAGNOSIS — M48.062 SPINAL STENOSIS OF LUMBAR REGION WITH NEUROGENIC CLAUDICATION: Chronic | ICD-10-CM

## 2019-04-15 DIAGNOSIS — M48.061 SPINAL STENOSIS OF LUMBAR REGION WITHOUT NEUROGENIC CLAUDICATION: ICD-10-CM

## 2019-04-15 DIAGNOSIS — J45.41 MODERATE PERSISTENT ASTHMA WITH ACUTE EXACERBATION: Chronic | ICD-10-CM

## 2019-04-15 DIAGNOSIS — M06.9 RHEUMATOID ARTHRITIS INVOLVING MULTIPLE SITES, UNSPECIFIED RHEUMATOID FACTOR PRESENCE: Chronic | ICD-10-CM

## 2019-04-15 DIAGNOSIS — E78.00 HIGH CHOLESTEROL: Chronic | ICD-10-CM

## 2019-04-15 RX ORDER — BUDESONIDE AND FORMOTEROL FUMARATE DIHYDRATE 160; 4.5 UG/1; UG/1
2 AEROSOL RESPIRATORY (INHALATION) 2 TIMES DAILY
Qty: 3 INHALER | Refills: 3 | Status: SHIPPED | OUTPATIENT
Start: 2019-04-15 | End: 2020-09-17

## 2019-04-15 NOTE — PROGRESS NOTES
Chief Complaint   Patient presents with    Arthritis     Left shoulder, right arm, & bilateral hands    Back Pain    Follow-up     Pulmonary embolism     Body mass index is 40.75 kg/m². 1. Have you been to the ER, urgent care clinic since your last visit? Hospitalized since your last visit? No    2. Have you seen or consulted any other health care providers outside of the [address] [place] [address] since your last visit? Include any pap smears or colon screening.  no    Reviewed record in preparation for visit and have necessary documentation  Pt did not bring medication to office visit for review  Information was given to pt on Advanced Directives, Living Will  Information was given on Shingles Vaccine  Opportunity was given for questions  Goals that were addressed and/or need to be completed after this appointment include:     Health Maintenance Due   Topic Date Due    Shingrix Vaccine Age 49> (1 of 2) 09/20/1995    Pneumococcal 65+ years (2 of 2 - PPSV23) 12/16/2016

## 2019-04-15 NOTE — PROGRESS NOTES
[facility], [address] [place] [address]  [phone]           Progress Note    Patient: Tasia Cespedes MRN: [id number]  SSN: [id number]    YOB: 1945  Age: 68 y.o. Sex: female        Chief Complaint   Patient presents with    Arthritis     Left shoulder, right arm, & bilateral hands    Back Pain    Follow-up     Pulmonary embolism         Subjective:     Encounter Diagnoses   Name Primary?  Other acute pulmonary embolism without acute cor pulmonale ([facility]): She reports that her breathing is getting better. She has no oxygen on today. She will continue to wear oxygen when walking. Yes    Acute deep vein thrombosis (DVT) of distal vein of right lower extremity ([facility]): She does not report any significant leg pain but some mild discomfort.  Rheumatoid arthritis involving multiple sites, unspecified rheumatoid factor presence ([facility] off both of her medications for rheumatoid arthritis. It is the same her rheumatoid arthritis is acting up and her sed rate is also elevated today. Is Dr. Macho Alberto opinion that neither of her medications contributed to her pulmonary embolus. Advised that she restart  both of the medications. From Dr. Murillo Ranks evaluation she recommended long-term anticoagulation though I do not see any contraindication to restarting those medications that being the case.  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[facility]): He has noticed an increase in difficulty breathing since her pulmonary emboli but she thinks her rheumatoid lung disease is also getting worse being off medication. That is another reason to get her back on rheumatoid medication.  Moderate persistent asthma with acute exacerbation: He has mild chronic wheezing and mucopurulent sputum with chronic cough.  Spinal stenosis of lumbar region with neurogenic claudication:   Her back pain is stable  .     Bronchiectasis without complication ([facility]): High risk for recurrent pulmonary infections         High cholesterol:  Cardiovascular risks for her are: LDL goal is under 100  hypertension  hyperlipidemia. Key Antihyperlipidemia Meds             atorvastatin (LIPITOR) 10 mg tablet (Taking) TAKE ONE TABLET BY MOUTH EVERY DAY    omega-3s-dha-epa-fish oil 300-1,000 mg cpDR (Taking) Take  by mouth daily. Lab Results   Component Value Date/Time    Cholesterol, total 160 04/16/2019 01:55 PM    HDL Cholesterol 46 04/16/2019 01:55 PM    LDL, calculated 98 04/16/2019 01:55 PM    Triglyceride 81 04/16/2019 01:55 PM    CHOL/HDL Ratio 3.2 04/30/2010 10:37 AM     Lab Results   Component Value Date/Time    ALT (SGPT) 17 04/16/2019 01:55 PM    AST (SGOT) 21 04/16/2019 01:55 PM    Alk. phosphatase 70 04/16/2019 01:55 PM    Bilirubin, total 0.3 04/16/2019 01:55 PM      Myalgias: No   Fatigue: No   Other side effects: no  Wt Readings from Last 3 Encounters:   04/15/19 284 lb (128.8 kg)   03/28/19 285 lb (129.3 kg)   03/18/19 280 lb (127 kg)     The patient is aware of our goal to reduce or eliminate the long term problems (such as strokes and heart attacks) related to poorly controlled hyperlipidemia.  Spinal stenosis of lumbar region without neurogenic claudication: Above          Essential hypertension:  BP Readings from Last 3 Encounters:   04/15/19 152/81   03/28/19 132/58   03/18/19 122/76     The patient reports:  taking medications as instructed, no medication side effects noted, no TIA's, no chest pain on exertion, no dyspnea on exertion, noting swelling of ankles. Key CAD CHF Meds             rivaroxaban (XARELTO) 20 mg tab tablet (Taking) Take 1 Tab by mouth daily (with breakfast). atorvastatin (LIPITOR) 10 mg tablet (Taking) TAKE ONE TABLET BY MOUTH EVERY DAY    omega-3s-dha-epa-fish oil 300-1,000 mg cpDR (Taking) Take  by mouth daily.     amLODIPine (NORVASC) 5 mg tablet TAKE ONE TABLET BY MOUTH EVERY DAY           Lab Results   Component Value Date/Time    Sodium 145 (H) 04/16/2019 01:55 PM    Potassium 4.1 04/16/2019 01:55 PM    Chloride 108 (H) 04/16/2019 01:55 PM    CO2 25 04/16/2019 01:55 PM    Anion gap 2 (L) 07/13/2016 03:25 AM    Glucose 79 04/16/2019 01:55 PM    BUN 11 04/16/2019 01:55 PM    Creatinine 0.56 (L) 04/16/2019 01:55 PM    BUN/Creatinine ratio 20 04/16/2019 01:55 PM    GFR est  04/16/2019 01:55 PM    GFR est non-AA 93 04/16/2019 01:55 PM    Calcium 9.2 04/16/2019 01:55 PM    Bilirubin, total 0.3 04/16/2019 01:55 PM    AST (SGOT) 21 04/16/2019 01:55 PM    Alk. phosphatase 70 04/16/2019 01:55 PM    Protein, total 7.0 04/16/2019 01:55 PM    Albumin 3.8 04/16/2019 01:55 PM    Globulin 4.2 (H) 04/19/2015 07:37 AM    A-G Ratio 1.2 04/16/2019 01:55 PM    ALT (SGPT) 17 04/16/2019 01:55 PM     Low salt diet? yes  Aerobic exercise? no  Our goal is to normalize the blood pressure to decrease the risks of strokes and heart attacks. The patient is in agreement with the plan. Current and past medical information:    Current Medications after this visit[de-identified]     Current Outpatient Medications   Medication Sig    budesonide-formoterol (SYMBICORT) 160-4.5 mcg/actuation HFAA Take 2 Puffs by inhalation two (2) times a day.  rivaroxaban (XARELTO) 20 mg tab tablet Take 1 Tab by mouth daily (with breakfast).  Hot Water Bottle (WATER BOTTLE) misc Please Dispense a humidifier bottle to patient to use with her oxygen.  raNITIdine (ZANTAC) 300 mg tab Take 1 Tab by mouth daily.  PROAIR HFA 90 mcg/actuation inhaler INHALE 2 PUFFS BY MOUTH EVERY 4 HOURS AS NEEDED FOR WHEEZING    albuterol (PROVENTIL VENTOLIN) 2.5 mg /3 mL (0.083 %) nebulizer solution USE 1 VIAL IN NEBULIZER EVERY 4 HOURS AS NEEDED FOR WHEEZING    atorvastatin (LIPITOR) 10 mg tablet TAKE ONE TABLET BY MOUTH EVERY DAY    UBIDECARENONE (COQ-10 PO) Take  by mouth.     acetaminophen (TYLENOL) 500 mg tablet Take 1,000 mg by mouth every six (6) hours as needed for Pain.    FOLIC ACID PO Take 3 mg by mouth daily.  omega-3s-dha-epa-fish oil 300-1,000 mg cpDR Take  by mouth daily.  cholecalciferol, vitamin D3, (VITAMIN D3) 4,000 unit cap Take  by mouth daily.  infliximab (REMICADE IV) by IntraVENous route. Every 8 weeks    amLODIPine (NORVASC) 5 mg tablet TAKE ONE TABLET BY MOUTH EVERY DAY    gabapentin (NEURONTIN) 100 mg capsule      No current facility-administered medications for this visit.         Patient Active Problem List    Diagnosis Date Noted    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[facility] 12/15/2010     Priority: 1 - One    High cholesterol 04/30/2010     Priority: 1 - One    Rheumatoid arthritis, adult ([facility] 04/15/2010     Priority: 1 - One    Severe persistent asthma without complication 62/71/8319     Priority: 1 - One    OA (osteoarthritis) 04/15/2010     Priority: 1 - One    Obesity, unspecified 04/15/2010     Priority: 1 - One    Spinal stenosis of lumbar region with neurogenic claudication 04/15/2010     Priority: 1 - One    Cardiac dysrhythmia, unspecified 04/15/2010     Priority: 2 - Two    Essential hypertension 04/15/2019    Acute pulmonary embolism ([facility] 03/18/2019    Acute deep vein thrombosis (DVT) of distal vein of right lower extremity ([facility] 03/18/2019    Immunosuppressed status ([facility] 08/20/2018    Obesity, morbid ([facility] 12/04/2017    Primary localized osteoarthritis of right hip 07/11/2016    Asthma 10/06/2015    Primary generalized (osteo)arthritis 10/06/2015    PAC (premature atrial contraction) 10/06/2015    Lumbar spinal stenosis 10/06/2015    Colon polyp, hyperplastic 06/02/2015       Past Medical History:   Diagnosis Date    Anemia, unspecified 4/15/2010    Cardiac dysrhythmia, unspecified 4/15/2010    PAT- not on medication/ not seeing cardiologist    High cholesterol 4/30/2010    Obesity, unspecified 4/15/2010    Osteoarthrosis, unspecified whether generalized or localized, other specified sites 4/15/2010    Rheumatoid arthritis(714.0) 4/15/2010    Spinal stenosis, unspecified region other than cervical 4/15/2010    Unspecified asthma(493.90) 4/15/2010    rheumatoid lung disease       Allergies   Allergen Reactions    Methotrexate Other (comments)     DVT and PE    Orudis [Ketoprofen] Palpitations    Singulair [Montelukast] Rash    Arthrotec 50 [Diclofenac-Misoprostol] Palpitations    Levaquin [Levofloxacin] Other (comments)     Hip joints swelling and painful per pt report    Lyrica [Pregabalin] Other (comments)     Saw spiders. Past Surgical History:   Procedure Laterality Date    CHEST SURGERY PROCEDURE UNLISTED  lat 8702'R    lung biopsy- diagnosed with rheumatoid lung disease    ENDOSCOPY, COLON, DIAGNOSTIC      7-10-13    HX ORTHOPAEDIC Right 2002    rotator cuff repair       Social History     Socioeconomic History    Marital status: SINGLE     Spouse name: Not on file    Number of children: Not on file    Years of education: Not on file    Highest education level: Not on file   Tobacco Use    Smoking status: Former Smoker     Packs/day: 1.00     Years: 20.00     Pack years: 20.00     Last attempt to quit: 1985     Years since quittin.9    Smokeless tobacco: Never Used   Substance and Sexual Activity    Alcohol use: No    Drug use: No    Sexual activity: Never       Review of Systems   Constitutional: Negative. Negative for chills, fever, malaise/fatigue and weight loss. HENT: Positive for congestion. Negative for hearing loss. Eyes: Negative. Negative for blurred vision and double vision. Respiratory: Positive for cough, sputum production, shortness of breath and wheezing. Negative for hemoptysis. Cardiovascular: Negative. Negative for chest pain, palpitations and orthopnea. Gastrointestinal: Negative. Negative for abdominal pain, blood in stool, heartburn, nausea and vomiting. Genitourinary: Negative. Negative for dysuria, frequency and urgency.    Musculoskeletal: Positive for back pain and joint pain. Negative for falls, myalgias and neck pain. Skin: Negative. Negative for rash. Neurological: Negative. Negative for dizziness, tingling, tremors, weakness and headaches. Endo/Heme/Allergies: Negative. Psychiatric/Behavioral: Negative. Negative for depression. Objective:     Vitals:    04/15/19 1400   BP: 152/81   Pulse: 80   Resp: 20   Temp: 97.7 °F (36.5 °C)   TempSrc: Oral   SpO2: 96%   Weight: 284 lb (128.8 kg)   Height: 5' 10\" (1.778 m)      Body mass index is 40.75 kg/m². Physical Exam   Constitutional: She is oriented to person, place, and time and well-developed, well-nourished, and in no distress. No distress. HENT:   Head: Normocephalic and atraumatic. Mouth/Throat: Oropharynx is clear and moist.   Eyes: Conjunctivae are normal.   Neck: No thyromegaly present. Cardiovascular: Normal rate, regular rhythm and normal heart sounds. No murmur heard. Pulmonary/Chest: Effort normal. No respiratory distress. She has wheezes. She has no rales. He has scattered wet rhonchi and faint inspiratory wheezing   Abdominal: Soft. She exhibits no distension. There is no tenderness. There is no rebound. Neurological: She is alert and oriented to person, place, and time. Skin: Skin is warm. No rash noted. She is not diaphoretic. No erythema. Psychiatric: Mood and affect normal.   Nursing note and vitals reviewed. Health Maintenance Due   Topic Date Due    Shingrix Vaccine Age 49> (1 of 2) 09/20/1995    Pneumococcal 65+ years (2 of 2 - PPSV23) 12/16/2016         Assessment and orders:     Encounter Diagnoses     ICD-10-CM ICD-9-CM   1. Other acute pulmonary embolism without acute cor pulmonale ([facility]) I26.99 415.19   2. Acute deep vein thrombosis (DVT) of distal vein of right lower extremity ([facility]) I82.4Z1 453.42   3. Rheumatoid arthritis involving multiple sites, unspecified rheumatoid factor presence ([facility]) M06.9 714.0   4.  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[facility] M05.10 714.81   5. Moderate persistent asthma with acute exacerbation J45.41 493.92   6. Spinal stenosis of lumbar region with neurogenic claudication M48.062 724.03   7. Bronchiectasis without complication ([facility]) Z14.6 494.0   8. High cholesterol E78.00 272.0   9. Spinal stenosis of lumbar region without neurogenic claudication M48.061 724.02   10. Essential hypertension I10 401.9     Diagnoses and all orders for this visit:    1. Other acute pulmonary embolism without acute cor pulmonale (HCC)-improving slowly  -     METABOLIC PANEL, COMPREHENSIVE  -     CBC WITH AUTOMATED DIFF    2. Acute deep vein thrombosis (DVT) of distal vein of right lower extremity (HCC)-still on blood thinner. Xarelto recommended long-term hematology. -     CBC WITH AUTOMATED DIFF    3. Rheumatoid arthritis involving multiple sites, unspecified rheumatoid factor presence (HCC)-she will discuss with Dr. Silver Lie restarting her medications  -     SED RATE (ESR)    4. 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HCC)  -     SED RATE (ESR)    5. Moderate persistent asthma with acute exacerbation-likely to get worse if her rheumatoid arthritis medicines are not restarted. 6. Spinal stenosis of lumbar region with neurogenic claudication-stable    7. Bronchiectasis without complication (HCC)-chronic increased risk of infection  -     CBC WITH AUTOMATED DIFF    8. High cholesterol-retest is at goal  -     METABOLIC PANEL, COMPREHENSIVE  -     LIPID PANEL    9. Spinal stenosis of lumbar region without neurogenic claudication-above    10. Essential hypertension-controlled  -     T4, FREE  -     METABOLIC PANEL, COMPREHENSIVE  -     LIPID PANEL  -     CBC WITH AUTOMATED DIFF    Other orders  -     budesonide-formoterol (SYMBICORT) 160-4.5 mcg/actuation HFAA; Take 2 Puffs by inhalation two (2) times a day. -     folic acid (FOLVITE) 1 mg tablet; Take 3 Tabs by mouth daily. Plan of care:  Discussed diagnoses in detail with patient. Medication risks/benefits/side effects discussed with patient. All of the patient's questions were addressed. The patient understands and agrees with our plan of care. The patient knows to call back if they are unsure of or forget any changes we discussed today or if the symptoms change. The patient received an After-Visit Summary which contains VS, orders, medication list and allergy list. This can be used as a \"mini-medical record\" should they have to seek medical care while out of town. Patient Care Team:  Elle Atkins MD as PCP - Joy Diop MD (Orthopedic Surgery)  Romina Faulkner MD (Orthopedic Surgery)  Shemar Sheriff MD (Otolaryngology)  Yolis Almodovar RN as Ambulatory Care Navigator    Follow-up and Dispositions    · Return in about 2 months (around 6/15/2019). Future Appointments   Date Time Provider Department Center   6/19/2019 11:10 AM Elle Atkins MD [facility]   6/20/2019  1:00 PM Vishal Wells MD [facility]       Signed By: Chavo Beth MD     April 15, 2019        This note was created using voice recognition software. Despite editing, there may be syntax errors.

## 2019-04-16 RX ORDER — FOLIC ACID 1 MG/1
3 TABLET ORAL DAILY
Qty: 90 TAB | Refills: 2 | Status: SHIPPED | OUTPATIENT
Start: 2019-04-16

## 2019-04-17 LAB
ALBUMIN SERPL-MCNC: 3.8 G/DL (ref 3.5–4.8)
ALBUMIN/GLOB SERPL: 1.2 {RATIO} (ref 1.2–2.2)
ALP SERPL-CCNC: 70 IU/L (ref 39–117)
ALT SERPL-CCNC: 17 IU/L (ref 0–32)
AST SERPL-CCNC: 21 IU/L (ref 0–40)
BASOPHILS # BLD AUTO: 0 X10E3/UL (ref 0–0.2)
BASOPHILS NFR BLD AUTO: 0 %
BILIRUB SERPL-MCNC: 0.3 MG/DL (ref 0–1.2)
BUN SERPL-MCNC: 11 MG/DL (ref 8–27)
BUN/CREAT SERPL: 20 (ref 12–28)
CALCIUM SERPL-MCNC: 9.2 MG/DL (ref 8.7–10.3)
CHLORIDE SERPL-SCNC: 108 MMOL/L (ref 96–106)
CHOLEST SERPL-MCNC: 160 MG/DL (ref 100–199)
CO2 SERPL-SCNC: 25 MMOL/L (ref 20–29)
CREAT SERPL-MCNC: 0.56 MG/DL (ref 0.57–1)
EOSINOPHIL # BLD AUTO: 0.6 X10E3/UL (ref 0–0.4)
EOSINOPHIL NFR BLD AUTO: 7 %
ERYTHROCYTE [DISTWIDTH] IN BLOOD BY AUTOMATED COUNT: 15.9 % (ref 12.3–15.4)
ERYTHROCYTE [SEDIMENTATION RATE] IN BLOOD BY WESTERGREN METHOD: 91 MM/HR (ref 0–40)
GLOBULIN SER CALC-MCNC: 3.2 G/DL (ref 1.5–4.5)
GLUCOSE SERPL-MCNC: 79 MG/DL (ref 65–99)
HCT VFR BLD AUTO: 35.1 % (ref 34–46.6)
HDLC SERPL-MCNC: 46 MG/DL
HGB BLD-MCNC: 10.8 G/DL (ref 11.1–15.9)
IMM GRANULOCYTES # BLD AUTO: 0 X10E3/UL (ref 0–0.1)
IMM GRANULOCYTES NFR BLD AUTO: 0 %
LDLC SERPL CALC-MCNC: 98 MG/DL (ref 0–99)
LYMPHOCYTES # BLD AUTO: 2 X10E3/UL (ref 0.7–3.1)
LYMPHOCYTES NFR BLD AUTO: 26 %
MCH RBC QN AUTO: 28.1 PG (ref 26.6–33)
MCHC RBC AUTO-ENTMCNC: 30.8 G/DL (ref 31.5–35.7)
MCV RBC AUTO: 91 FL (ref 79–97)
MONOCYTES # BLD AUTO: 0.4 X10E3/UL (ref 0.1–0.9)
MONOCYTES NFR BLD AUTO: 5 %
NEUTROPHILS # BLD AUTO: 4.8 X10E3/UL (ref 1.4–7)
NEUTROPHILS NFR BLD AUTO: 62 %
PLATELET # BLD AUTO: 206 X10E3/UL (ref 150–379)
POTASSIUM SERPL-SCNC: 4.1 MMOL/L (ref 3.5–5.2)
PROT SERPL-MCNC: 7 G/DL (ref 6–8.5)
RBC # BLD AUTO: 3.85 X10E6/UL (ref 3.77–5.28)
SODIUM SERPL-SCNC: 145 MMOL/L (ref 134–144)
T4 FREE SERPL-MCNC: 0.96 NG/DL (ref 0.82–1.77)
TRIGL SERPL-MCNC: 81 MG/DL (ref 0–149)
VLDLC SERPL CALC-MCNC: 16 MG/DL (ref 5–40)
WBC # BLD AUTO: 7.9 X10E3/UL (ref 3.4–10.8)

## 2019-05-23 ENCOUNTER — OFFICE VISIT (OUTPATIENT)
Dept: FAMILY MEDICINE CLINIC | Age: 74
End: 2019-05-23

## 2019-05-23 VITALS
HEIGHT: 70 IN | RESPIRATION RATE: 18 BRPM | HEART RATE: 80 BPM | WEIGHT: 280 LBS | DIASTOLIC BLOOD PRESSURE: 68 MMHG | TEMPERATURE: 97.7 F | BODY MASS INDEX: 40.09 KG/M2 | SYSTOLIC BLOOD PRESSURE: 117 MMHG | OXYGEN SATURATION: 94 %

## 2019-05-23 DIAGNOSIS — L23.9 ALLERGIC CONTACT DERMATITIS, UNSPECIFIED TRIGGER: ICD-10-CM

## 2019-05-23 DIAGNOSIS — A69.20 LYME DISEASE: Primary | ICD-10-CM

## 2019-05-23 PROBLEM — J31.0 CHRONIC RHINITIS: Status: ACTIVE | Noted: 2019-05-23

## 2019-05-23 RX ORDER — TRIAMCINOLONE ACETONIDE 1 MG/G
OINTMENT TOPICAL 2 TIMES DAILY
Qty: 1 TUBE | Refills: 0 | Status: SHIPPED | OUTPATIENT
Start: 2019-05-23 | End: 2021-07-01 | Stop reason: SDUPTHER

## 2019-05-23 RX ORDER — DOXYCYCLINE 100 MG/1
100 TABLET ORAL 2 TIMES DAILY
Qty: 28 TAB | Refills: 0 | Status: SHIPPED | OUTPATIENT
Start: 2019-05-23 | End: 2019-06-06

## 2019-05-23 NOTE — PROGRESS NOTES
I saw and evaluated the patient with the resident, performing the key elements of the exam and service. I discussed the findings, assessment and plan with the resident and agree with the resident's findings and plan as documented in the resident's note. Early ECM. Hallie Machado M.D.

## 2019-05-23 NOTE — PROGRESS NOTES
[facility]     Outpatient Resident Progress Note    Encounter Date: 5/23/2019    Chief Complaint   Patient presents with    Rash     itches       History of Present Illness    Patient is a 68 y.o. female, who presents to clinic for Rash (itches)  Patient presents with a round rash over her left forearm that have been present for 2 days. The center of the rash there is a luzmaria that resembles an insect bite. She is not sure if she was bitten by a tick or not. There is no itchiness, tenderness, or warmth. She also reports a rash over her left leg that is very itchy, being present for about 3 days. Denies fever, fatigue, dizziness, lightheadedness, headaches, changes in vision, CP, SOB, abdominal pain or tenderness, nausea, vomiting, dysuria, hematuria, diarrhea, melena, hematochezia, leg swelling, or any other complains at this moment. Review of Systems    A complete ROS was reviewed and only pertinent items documented on HPI. Allergies - reviewed: Allergies   Allergen Reactions    Methotrexate Other (comments)     DVT and PE    Orudis [Ketoprofen] Palpitations    Singulair [Montelukast] Rash    Arthrotec 50 [Diclofenac-Misoprostol] Palpitations    Levaquin [Levofloxacin] Other (comments)     Hip joints swelling and painful per pt report    Lyrica [Pregabalin] Other (comments)     Saw spiders. Medications - reviewed:   Current Outpatient Medications   Medication Sig    doxycycline (ADOXA) 100 mg tablet Take 1 Tab by mouth two (2) times a day for 14 days.  triamcinolone acetonide (KENALOG) 0.1 % ointment Apply  to affected area two (2) times a day. use thin layer in the affected area of the left leg    budesonide-formoterol (SYMBICORT) 160-4.5 mcg/actuation HFAA Take 2 Puffs by inhalation two (2) times a day.  rivaroxaban (XARELTO) 20 mg tab tablet Take 1 Tab by mouth daily (with breakfast).     Hot Water Bottle (WATER BOTTLE) misc Please Dispense a humidifier bottle to patient to use with her oxygen.  raNITIdine (ZANTAC) 300 mg tab Take 1 Tab by mouth daily.  infliximab (REMICADE IV) by IntraVENous route. Every 8 weeks    atorvastatin (LIPITOR) 10 mg tablet TAKE ONE TABLET BY MOUTH EVERY DAY    amLODIPine (NORVASC) 5 mg tablet TAKE ONE TABLET BY MOUTH EVERY DAY    UBIDECARENONE (COQ-10 PO) Take  by mouth.  acetaminophen (TYLENOL) 500 mg tablet Take 1,000 mg by mouth every six (6) hours as needed for Pain.  omega-3s-dha-epa-fish oil 300-1,000 mg cpDR Take  by mouth daily.  cholecalciferol, vitamin D3, (VITAMIN D3) 4,000 unit cap Take  by mouth daily.  folic acid (FOLVITE) 1 mg tablet Take 3 Tabs by mouth daily.  PROAIR HFA 90 mcg/actuation inhaler INHALE 2 PUFFS BY MOUTH EVERY 4 HOURS AS NEEDED FOR WHEEZING    albuterol (PROVENTIL VENTOLIN) 2.5 mg /3 mL (0.083 %) nebulizer solution USE 1 VIAL IN NEBULIZER EVERY 4 HOURS AS NEEDED FOR WHEEZING    gabapentin (NEURONTIN) 100 mg capsule      No current facility-administered medications for this visit. Past Medical History - reviewed:  Past Medical History:   Diagnosis Date    Anemia, unspecified 4/15/2010    Cardiac dysrhythmia, unspecified 4/15/2010    PAT- not on medication/ not seeing cardiologist    High cholesterol 4/30/2010    Obesity, unspecified 4/15/2010    Osteoarthrosis, unspecified whether generalized or localized, other specified sites 4/15/2010    Rheumatoid arthritis(714.0) 4/15/2010    Spinal stenosis, unspecified region other than cervical 4/15/2010    Unspecified asthma(493.90) 4/15/2010    rheumatoid lung disease       Family Medical History - reviewed:  Family History   Problem Relation Age of Onset    Cancer Mother         cervical, breast    Heart Disease Mother     Heart Attack Mother     Hypertension Mother     Arthritis-osteo Mother     Heart Disease Father     Heart Attack Father     Cancer Other         uterine and lung.  High Cholesterol Brother     Stroke Brother     Arthritis-rheumatoid Sister     Migraines Sister     Breast Cancer Maternal Aunt        Objective  Visit Vitals  /68 (BP 1 Location: Right arm, BP Patient Position: Sitting)   Pulse 80   Temp 97.7 °F (36.5 °C) (Oral)   Resp 18   Ht 5' 10\" (1.778 m)   Wt 280 lb (127 kg)   SpO2 94%   BMI 40.18 kg/m²     Body mass index is 40.18 kg/m². Nursing note and vitals reviewed. Physical Exam  Constitutional: Well-developed, well-nourished, and in no distress. Obese. Cardiovascular: Normal rate, regular rhythm, normal heart sounds and intact distal pulses. Exam reveals no gallop and no friction rub. No murmur heard. Pulmonary/Chest: Effort normal and breath sounds normal. No respiratory distress. No wheezes, no rales, no tenderness. Abdominal: Soft. Bowel sounds are normal. No distension and no mass. There is no tenderness. There is no rebound and no guarding. Musculoskeletal: Normal range of motion. Exhibits no edema, tenderness or deformity. Neurological:  Alert and oriented to person, place, and time. Normal reflexes. No cranial nerve deficit. Gait normal. Coordination normal.   Skin: Skin is warm and dry. 10 mm erythematous macule with \"target-like\" appearance in the left anterior proximal forearm. 20 mm area of multiple erythematous papillomacular rash on the lateral left distal leg. (see picture below)      Left proximal forearm rash        Left Distal leg rash    Assessment / Plan   Ms. Nena Stack is a 68 y.o. female with the following medical condition(s):    1. Lyme disease  Rash in forearm resembles that ones associated with Lyme Disease. Will treat at this time as follows:  - doxycycline (ADOXA) 100 mg tablet; Take 1 Tab by mouth two (2) times a day for 14 days. Dispense: 28 Tab; Refill: 0    2. Allergic contact dermatitis, unspecified trigger  - triamcinolone acetonide (KENALOG) 0.1 % ointment; Apply  to affected area two (2) times a day.  use thin layer in the affected area of the left leg  Dispense: 1 Tube; Refill: 0      Patient was advised to return to clinic in case of worsening of symptoms or fail to improve. Life threatening signs and symptoms were discussed and patient advised to go to the nearest ED for prompt evaluation and care in case of onset of any of these. Follow-up and Dispositions    · Return in about 2 weeks (around 6/6/2019) for Follow up Lyme Disease and Leg Rash. · I have discussed the diagnosis with the patient and the intended plan as seen in the above orders. The patient has received an after-visit summary and questions were answered concerning future plans. I have discussed medication side effects and warnings with the patient as well.       Patient/Plan discussed with Dr. Roberth De León (Attending Physician)      Sadiq Rasheed MD  PGY-3 Family Medicine Resident  Encounter Date: 5/23/2019

## 2019-05-23 NOTE — PATIENT INSTRUCTIONS
Dermatitis: Care Instructions Your Care Instructions Dermatitis is the general name used for any rash or inflammation of the skin. Different kinds of dermatitis cause different kinds of rashes. Common causes of a rash include new medicines, plants (such as poison oak or poison ivy), heat, and stress. Certain illnesses can also cause a rash. An allergic reaction to something that touches your skin, such as latex, nickel, or poison ivy, is called contact dermatitis. Contact dermatitis may also be caused by something that irritates the skin, such as bleach, a chemical, or soap. These types of rashes cannot be spread from person to person. How long your rash will last depends on what caused it. Rashes may last a few days or months. Follow-up care is a key part of your treatment and safety. Be sure to make and go to all appointments, and call your doctor if you are having problems. It's also a good idea to know your test results and keep a list of the medicines you take. How can you care for yourself at home? · Do not scratch the rash. Cut your nails short, and file them smooth. Or wear gloves if this helps keep you from scratching. · Wash the area with water only. Pat dry. · Put cold, wet cloths on the rash to reduce itching. · Keep cool, and stay out of the sun. · Leave the rash open to the air as much as possible. · If the rash itches, use hydrocortisone cream. Follow the directions on the label. Calamine lotion may help for plant rashes. · Take an over-the-counter antihistamine, such as diphenhydramine (Benadryl) or loratadine (Claritin), to help calm the itching. Read and follow all instructions on the label. · If your doctor prescribed a cream, use it as directed. If your doctor prescribed medicine, take it exactly as directed. When should you call for help? Call your doctor now or seek immediate medical care if: 
  · You have symptoms of infection, such as: ? Increased pain, swelling, warmth, or redness. ? Red streaks leading from the area. ? Pus draining from the area. ? A fever.  
  · You have joint pain along with the rash.  
 Watch closely for changes in your health, and be sure to contact your doctor if: 
  · Your rash is changing or getting worse.  
  · You are not getting better as expected. Where can you learn more? Go to http://antonio-adrien.info/. Enter [phone] in the search box to learn more about \"Dermatitis: Care Instructions. \" Current as of: April 17, 2018 Content Version: 11.9 © 7716-3422 [employer]. Care instructions adapted under license by [employer] (which disclaims liability or warranty for this information). If you have questions about a medical condition or this instruction, always ask your healthcare professional. [place] [address] any warranty or liability for your use of this information. Lyme Disease: Care Instructions Your Care Instructions Lyme disease is a bacterial infection spread by ticks. Antibiotics can treat Lyme disease. If you do not treat Lyme disease, it can lead to problems with your skin, joints, heart, and nervous system. These problems can develop weeks, months, or even years after you get the infection. Your doctor may prescribe antibiotics even if it is not yet certain that you have Lyme disease. Follow-up care is a key part of your treatment and safety. Be sure to make and go to all appointments, and call your doctor if you are having problems. It's also a good idea to know your test results and keep a list of the medicines you take. How can you care for yourself at home? · Take your antibiotics as directed. Do not stop taking them just because you feel better. You need to take the full course of antibiotics.  
· Take an over-the-counter pain medicine if needed, such as acetaminophen (Tylenol), ibuprofen (Advil, Motrin), or naproxen (Aleve). Read and follow all instructions on the label. To prevent Lyme disease in the future · Avoid ticks: 
? Learn where ticks are found in your community, and stay away from those areas if possible. ? Cover as much of your body as possible when you work or play in grassy or wooded areas. ? Use insect repellents, such as products containing DEET. You can spray them on your skin. ? Take steps to control ticks on your property if you live in an area where Lyme disease occurs. Clear leaves, brush, tall grasses, woodpiles, and stone fences from around your house and the edges of your yard or garden. This may help get rid of ticks. · When you come in from outdoors, check your body for ticks, including your groin, head, and underarms. The ticks may be about the size of a poppy seed. If no one else can help you check for ticks on your scalp, comb your hair with a fine-tooth comb. · If you find a tick, remove it quickly. If you can't remove it with your fingers, use tweezers to grasp the tick as close to its mouth (the part in your skin) as possible. Slowly pull the tick straight outdo not twist or yankuntil its mouth releases from your skin. If part of the tick stays in the skin, leave it alone. It will likely come out on its own in a few days. · Ticks can come into your house on clothing, outdoor gear, and pets. These ticks can fall off and attach to you. ? Check your clothing and outdoor gear. Remove any ticks you find. Then put your clothing in a clothes dryer on high heat for 1 hour to kill any ticks that might remain. ? Check your pets for ticks after they have been outdoors. · When hiking in the woods, carry a small dry jar or ziplock bag. If you find a tick on your body, remove the tick and put it in the jar or bag.  Store the container in the freezer so you can give it to your doctor if symptoms develop. The tick can be tested to learn whether it is carrying the bacteria that cause Lyme disease. When should you call for help? Call your doctor now or seek immediate medical care if: 
  · You are confused or cannot think clearly.  
  · You have a headache or stiff neck.  
  · You have a new or worse rash.  
  · You have symptoms of infection, such as: 
? Increased pain, swelling, warmth, or redness. ? Red streaks leading from the area. ? Pus draining from the area. ? A fever.  
 Watch closely for changes in your health, and be sure to contact your doctor if: 
  · You have new or worse weakness or muscle pain.  
  · You have new joint pain.  
  · You do not get better as expected. Where can you learn more? Go to http://antonio-adrien.info/. Enter Y599 in the search box to learn more about \"Lyme Disease: Care Instructions. \" Current as of: July 30, 2018 Content Version: 11.9 © 7899-4314 [employer]. Care instructions adapted under license by [employer] (which disclaims liability or warranty for this information). If you have questions about a medical condition or this instruction, always ask your healthcare professional. [place] [address] any warranty or liability for your use of this information.

## 2019-06-06 ENCOUNTER — OFFICE VISIT (OUTPATIENT)
Dept: FAMILY MEDICINE CLINIC | Age: 74
End: 2019-06-06

## 2019-06-06 VITALS
RESPIRATION RATE: 20 BRPM | TEMPERATURE: 98.3 F | BODY MASS INDEX: 39.94 KG/M2 | DIASTOLIC BLOOD PRESSURE: 78 MMHG | HEART RATE: 79 BPM | WEIGHT: 279 LBS | HEIGHT: 70 IN | SYSTOLIC BLOOD PRESSURE: 128 MMHG | OXYGEN SATURATION: 95 %

## 2019-06-06 DIAGNOSIS — Z86.711 HX OF PULMONARY EMBOLUS: ICD-10-CM

## 2019-06-06 DIAGNOSIS — E66.01 OBESITY, MORBID (HCC): ICD-10-CM

## 2019-06-06 DIAGNOSIS — Z86.718 HISTORY OF DVT OF LOWER EXTREMITY: ICD-10-CM

## 2019-06-06 DIAGNOSIS — A69.20 LYME DISEASE: Primary | ICD-10-CM

## 2019-06-06 DIAGNOSIS — M79.604 RIGHT LEG PAIN: ICD-10-CM

## 2019-06-06 PROBLEM — I26.99 ACUTE PULMONARY EMBOLISM (HCC): Chronic | Status: RESOLVED | Noted: 2019-03-18 | Resolved: 2019-06-06

## 2019-06-06 PROBLEM — D84.9 IMMUNOSUPPRESSED STATUS (HCC): Status: RESOLVED | Noted: 2018-08-20 | Resolved: 2019-06-06

## 2019-06-06 PROBLEM — I82.4Z1 ACUTE DEEP VEIN THROMBOSIS (DVT) OF DISTAL VEIN OF RIGHT LOWER EXTREMITY (HCC): Chronic | Status: RESOLVED | Noted: 2019-03-18 | Resolved: 2019-06-06

## 2019-06-06 RX ORDER — TRAMADOL HYDROCHLORIDE 50 MG/1
50 TABLET ORAL
COMMUNITY
End: 2020-01-26 | Stop reason: ALTCHOICE

## 2019-06-06 NOTE — PATIENT INSTRUCTIONS
Deep Vein Thrombosis: Care Instructions  Your Care Instructions    A deep vein thrombosis (DVT) is a blood clot in certain veins of the legs, pelvis, or arms. Blood clots in these veins need to be treated because they can get bigger, break loose, and travel through the bloodstream to the lungs. A blood clot in a lung can be life-threatening. The doctor may have given you a blood thinner (anticoagulant). A blood thinner can stop the blood clot from growing larger and prevent new clots from forming. You will need to take a blood thinner for 3 to 6 months or longer. The doctor has checked you carefully, but problems can develop later. If you notice any problems or new symptoms, get medical treatment right away. Follow-up care is a key part of your treatment and safety. Be sure to make and go to all appointments, and call your doctor if you are having problems. It's also a good idea to know your test results and keep a list of the medicines you take. How can you care for yourself at home? · Take your medicines exactly as prescribed. Call your doctor if you think you are having a problem with your medicine. · If you are taking a blood thinner, be sure you get instructions about how to take your medicine safely. Blood thinners can cause serious bleeding problems. · Wear compression stockings if your doctor recommends them. These stockings are tighter at the feet than on the legs. They may reduce pain and swelling in your legs. But there are different types of stockings, and they need to fit right. So your doctor will recommend what you need. · When you sit, use a pillow to raise the arm or leg that has the blood clot. Try to keep it above the level of your heart. When should you call for help? Call 911 anytime you think you may need emergency care. For example, call if:    · You passed out (lost consciousness).     · You have symptoms of a blood clot in your lung (called a pulmonary embolism).  These include:  ? Sudden chest pain. ? Trouble breathing. ? Coughing up blood.    Call your doctor now or seek immediate medical care if:    · You have new or worse trouble breathing.     · You are dizzy or lightheaded, or you feel like you may faint.     · You have symptoms of a blood clot in your arm or leg. These may include:  ? Pain in the arm, calf, back of the knee, thigh, or groin. ? Redness and swelling in the arm, leg, or groin.    Watch closely for changes in your health, and be sure to contact your doctor if:    · You do not get better as expected. Where can you learn more? Go to http://antonio-adrien.info/. Enter I585 in the search box to learn more about \"Deep Vein Thrombosis: Care Instructions. \"  Current as of: September 26, 2018  Content Version: 11.9  © 8687-8356 [employer]. Care instructions adapted under license by [employer] (which disclaims liability or warranty for this information). If you have questions about a medical condition or this instruction, always ask your healthcare professional. [address] any warranty or liability for your use of this information.

## 2019-06-06 NOTE — PROGRESS NOTES
I discussed the findings, assessment and plan in detail with the resident and agree with the resident's findings and plan as documented in the resident's note. Carroll Gordon M.D.

## 2019-06-06 NOTE — PROGRESS NOTES
Body mass index is 40.03 kg/m². Chief Complaint   Patient presents with    Rash    Leg Pain     Right -- Patient reports that she had a blood clot in her right leg        1. Have you been to the ER, urgent care clinic since your last visit? Hospitalized since your last visit? No    2. Have you seen or consulted any other health care providers outside of the [address] [place] [address] since your last visit? Include any pap smears or colon screening. No    Reviewed record in preparation for visit and have necessary documentation  Pt did not bring medication to office visit for review  Information was given to pt on Advanced Directives, Living Will  Information was given on Shingles Vaccine  Opportunity was given for questions  Goals that were addressed and/or need to be completed after this appointment include:     Health Maintenance Due   Topic Date Due    Shingrix Vaccine Age 50> (1 of 2) 09/20/1995     Patient brought her blood pressure cuff in from home.    Our machine read:128/78  Her machine read: 150/71

## 2019-06-06 NOTE — PROGRESS NOTES
[facility]     Outpatient Resident Progress Note    Encounter Date: 6/6/2019    Chief Complaint   Patient presents with    Rash    Leg Pain     Right -- Patient reports that she had a blood clot in her right leg        History of Present Illness    Patient is a 68 y.o. female, who presents to clinic for Rash and Leg Pain (Right -- Patient reports that she had a blood clot in her right leg )  Patient following up on target-like rash on the left forearm. Patient was treated with Doxycycline and rash resolved. She had a rash on the left leg treated with Kenalog ointment and resolved. She reports occasional right leg pain, specially with increased activity. 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 The pain is not persistent, and not as intense as on the DVT event. Denies CP, SOB, dizziness, lightheadedness, diaphoresis, loss of movement or sensation of the right leg, no discoloration of the right leg. Review of Systems    A complete ROS was reviewed and only pertinent items documented on HPI. Allergies - reviewed: Allergies   Allergen Reactions    Methotrexate Other (comments)     DVT and PE    Orudis [Ketoprofen] Palpitations    Singulair [Montelukast] Rash    Arthrotec 50 [Diclofenac-Misoprostol] Palpitations    Levaquin [Levofloxacin] Other (comments)     Hip joints swelling and painful per pt report    Lyrica [Pregabalin] Other (comments)     Saw spiders. Medications - reviewed:   Current Outpatient Medications   Medication Sig    traMADol (ULTRAM) 50 mg tablet Take 50 mg by mouth every six (6) hours as needed for Pain.  doxycycline (ADOXA) 100 mg tablet Take 1 Tab by mouth two (2) times a day for 14 days.  triamcinolone acetonide (KENALOG) 0.1 % ointment Apply  to affected area two (2) times a day.  use thin layer in the affected area of the left leg    folic acid (FOLVITE) 1 mg tablet Take 3 Tabs by mouth daily.    budesonide-formoterol (SYMBICORT) 160-4.5 mcg/actuation HFAA Take 2 Puffs by inhalation two (2) times a day.  rivaroxaban (XARELTO) 20 mg tab tablet Take 1 Tab by mouth daily (with breakfast).  Hot Water Bottle (WATER BOTTLE) misc Please Dispense a humidifier bottle to patient to use with her oxygen.  raNITIdine (ZANTAC) 300 mg tab Take 1 Tab by mouth daily.  PROAIR HFA 90 mcg/actuation inhaler INHALE 2 PUFFS BY MOUTH EVERY 4 HOURS AS NEEDED FOR WHEEZING    albuterol (PROVENTIL VENTOLIN) 2.5 mg /3 mL (0.083 %) nebulizer solution USE 1 VIAL IN NEBULIZER EVERY 4 HOURS AS NEEDED FOR WHEEZING    infliximab (REMICADE IV) by IntraVENous route. Every 8 weeks    atorvastatin (LIPITOR) 10 mg tablet TAKE ONE TABLET BY MOUTH EVERY DAY    amLODIPine (NORVASC) 5 mg tablet TAKE ONE TABLET BY MOUTH EVERY DAY    UBIDECARENONE (COQ-10 PO) Take  by mouth.  acetaminophen (TYLENOL) 500 mg tablet Take 1,000 mg by mouth every six (6) hours as needed for Pain.  omega-3s-dha-epa-fish oil 300-1,000 mg cpDR Take  by mouth daily.  cholecalciferol, vitamin D3, (VITAMIN D3) 4,000 unit cap Take  by mouth daily.  gabapentin (NEURONTIN) 100 mg capsule      No current facility-administered medications for this visit.         Past Medical History - reviewed:  Past Medical History:   Diagnosis Date    Anemia, unspecified 4/15/2010    Cardiac dysrhythmia, unspecified 4/15/2010    PAT- not on medication/ not seeing cardiologist    High cholesterol 4/30/2010    History of DVT of lower extremity 6/6/2019    Right leg (3/2019)    Obesity, unspecified 4/15/2010    Osteoarthrosis, unspecified whether generalized or localized, other specified sites 4/15/2010    Rheumatoid arthritis(714.0) 4/15/2010    Spinal stenosis, unspecified region other than cervical 4/15/2010    Unspecified asthma(493.90) 4/15/2010    rheumatoid lung disease       Family Medical History - reviewed:  Family History   Problem Relation Age of Onset    Cancer Mother         cervical, breast    Heart Disease Mother     Heart Attack Mother     Hypertension Mother     Arthritis-osteo Mother     Heart Disease Father     Heart Attack Father     Cancer Other         uterine and lung.  High Cholesterol Brother     Stroke Brother     Arthritis-rheumatoid Sister     Migraines Sister     Breast Cancer Maternal Aunt        Objective  Visit Vitals  /78 (BP 1 Location: Left arm, BP Patient Position: Sitting)   Pulse 79   Temp 98.3 °F (36.8 °C) (Oral)   Resp 20   Ht 5' 10\" (1.778 m)   Wt 279 lb (126.6 kg)   SpO2 95%   BMI 40.03 kg/m²     Body mass index is 40.03 kg/m². Nursing note and vitals reviewed. Physical Exam  Constitutional: Well-developed, well-nourished, and in no distress. Obese. Cardiovascular: Normal rate, regular rhythm, normal heart sounds and intact distal pulses. Exam reveals no gallop and no friction rub. No murmur heard. Pulmonary/Chest: Effort normal and breath sounds normal. No respiratory distress. No wheezes, no rales, no tenderness. Abdominal: Soft. Bowel sounds are normal. No distension and no mass. There is no tenderness. There is no rebound and no guarding. Musculoskeletal: Normal range of motion. Exhibits no edema, tenderness or deformity. Neurological: Alert and oriented. No focal abnormalities. Skin: Skin is warm and dry. No rash noted. Not diaphoretic. No erythema. No pallor. Psychiatric: Mood, memory, affect and judgment normal.     Assessment / Plan   Ms. Johanna Oakley is a 68 y.o. female with the following medical condition(s):    1. Lyme disease  Resolved. 2. Right leg pain  Might be explained for presence of blood clot in the leg as detected and treated on 3/2019. Discussed with patient that this might cause pain, specially with increased activity. Advised to RTC for reevaluation in case of persistent pain, new pain, edema or discoloration of the leg. Continue Xarelto as recommended.       3. Obesity, morbid ([facility] [address]  Body mass index is 40.03 kg/m². . I have reviewed/discussed the above normal BMI with the patient. I have recommended the following interventions: dietary management education, guidance, and counseling, encourage exercise and monitor weight . Patient was advised to return to clinic in case of worsening of symptoms or fail to improve. Life threatening signs and symptoms were discussed and patient advised to go to the nearest ED for prompt evaluation and care in case of onset of any of these. Follow-up and Dispositions    · Return in 6 weeks (on 7/19/2019), or if symptoms worsen or fail to improve, for Follow up Chronic Conditions. · I have discussed the diagnosis with the patient and the intended plan as seen in the above orders. The patient has received an after-visit summary and questions were answered concerning future plans. I have discussed medication side effects and warnings with the patient as well.       Patient/Plan discussed with Dr. Ambreen Bermudez (Attending Physician)      Jaycob Ruiz MD  PGY-3 Family Medicine Resident  Encounter Date: 6/6/2019

## 2019-06-19 ENCOUNTER — OFFICE VISIT (OUTPATIENT)
Dept: FAMILY MEDICINE CLINIC | Age: 74
End: 2019-06-19

## 2019-06-19 VITALS
WEIGHT: 279 LBS | HEIGHT: 70 IN | HEART RATE: 80 BPM | SYSTOLIC BLOOD PRESSURE: 116 MMHG | RESPIRATION RATE: 22 BRPM | TEMPERATURE: 98.4 F | DIASTOLIC BLOOD PRESSURE: 69 MMHG | BODY MASS INDEX: 39.94 KG/M2 | OXYGEN SATURATION: 98 %

## 2019-06-19 DIAGNOSIS — A69.20 LYME DISEASE: ICD-10-CM

## 2019-06-19 DIAGNOSIS — M79.661 PAIN OF RIGHT LOWER LEG: Primary | ICD-10-CM

## 2019-06-19 DIAGNOSIS — Z86.718 HISTORY OF DVT OF LOWER EXTREMITY: ICD-10-CM

## 2019-06-19 PROBLEM — M79.662 PAIN OF LEFT LOWER LEG: Status: ACTIVE | Noted: 2019-06-19

## 2019-06-19 NOTE — PROGRESS NOTES
[address]  [phone]           Progress Note    Patient: Donel Pod MRN: [id number]  SSN: [id number]    YOB: 1945  Age: 68 y.o. Sex: female        Chief Complaint   Patient presents with    Follow Up Chronic Condition         Subjective:     Encounter Diagnoses   Name Primary?  Pain of right lower leg:  She continues to have pain in her right calf. This is a site where she had her previous DVT. She has no redness no warmth no swelling and no tenderness. I will reorder a Doppler to see if she has residual clot there that would cause chronic leg discomfort. Yes    History of DVT of lower extremity: No evidence of recurrence. Her lung function continues to improve following her multiple PEs. She can now walk without hypoxia. Her baseline oxygen is also normal.  She monitors her oxygen with a pulse ox at home.  Lyme disease: She was treated with doxycycline and the longer has a rash around the tick bite and she has not developed any symptoms of joint pain or fatigue. Current and past medical information:    Current Medications after this visit[de-identified]     Current Outpatient Medications   Medication Sig    traMADol (ULTRAM) 50 mg tablet Take 50 mg by mouth every six (6) hours as needed for Pain.  triamcinolone acetonide (KENALOG) 0.1 % ointment Apply  to affected area two (2) times a day. use thin layer in the affected area of the left leg    folic acid (FOLVITE) 1 mg tablet Take 3 Tabs by mouth daily.  budesonide-formoterol (SYMBICORT) 160-4.5 mcg/actuation HFAA Take 2 Puffs by inhalation two (2) times a day.  rivaroxaban (XARELTO) 20 mg tab tablet Take 1 Tab by mouth daily (with breakfast).  Hot Water Bottle (WATER BOTTLE) misc Please Dispense a humidifier bottle to patient to use with her oxygen.  raNITIdine (ZANTAC) 300 mg tab Take 1 Tab by mouth daily.     PROAIR HFA 90 mcg/actuation inhaler INHALE 2 PUFFS BY MOUTH EVERY 4 HOURS AS NEEDED FOR WHEEZING    albuterol (PROVENTIL VENTOLIN) 2.5 mg /3 mL (0.083 %) nebulizer solution USE 1 VIAL IN NEBULIZER EVERY 4 HOURS AS NEEDED FOR WHEEZING    infliximab (REMICADE IV) by IntraVENous route. Every 8 weeks    atorvastatin (LIPITOR) 10 mg tablet TAKE ONE TABLET BY MOUTH EVERY DAY    amLODIPine (NORVASC) 5 mg tablet TAKE ONE TABLET BY MOUTH EVERY DAY    UBIDECARENONE (COQ-10 PO) Take  by mouth.  acetaminophen (TYLENOL) 500 mg tablet Take 1,000 mg by mouth every six (6) hours as needed for Pain.  omega-3s-dha-epa-fish oil 300-1,000 mg cpDR Take  by mouth daily.  cholecalciferol, vitamin D3, (VITAMIN D3) 4,000 unit cap Take  by mouth daily.  gabapentin (NEURONTIN) 100 mg capsule      No current facility-administered medications for this visit.         Patient Active Problem List    Diagnosis Date Noted    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[facility] 12/15/2010     Priority: 1 - One    High cholesterol 04/30/2010     Priority: 1 - One    Rheumatoid arthritis ([facility] [address] 04/15/2010     Priority: 1 - One    Severe persistent asthma without complication 55/38/4234     Priority: 1 - One    OA (osteoarthritis) 04/15/2010     Priority: 1 - One    Obesity, unspecified 04/15/2010     Priority: 1 - One    Spinal stenosis of lumbar region with neurogenic claudication 04/15/2010     Priority: 1 - One    Cardiac dysrhythmia, unspecified 04/15/2010     Priority: 2 - Two    Pain of left lower leg 06/19/2019    History of DVT of lower extremity 06/06/2019    Hx of pulmonary embolus 06/06/2019    Chronic rhinitis 05/23/2019    Allergic contact dermatitis 05/23/2019    Essential hypertension 04/15/2019    Obesity, morbid ([facility] [address] 12/04/2017    Primary localized osteoarthritis of right hip 07/11/2016    Asthma 10/06/2015    Primary generalized (osteo)arthritis 10/06/2015    PAC (premature atrial contraction) 10/06/2015    Lumbar spinal stenosis 10/06/2015    Colon polyp, hyperplastic 2015       Past Medical History:   Diagnosis Date    Anemia, unspecified 4/15/2010    Cardiac dysrhythmia, unspecified 4/15/2010    PAT- not on medication/ not seeing cardiologist    High cholesterol 2010    History of DVT of lower extremity 2019    Right leg (3/2019)    Obesity, unspecified 4/15/2010    Osteoarthrosis, unspecified whether generalized or localized, other specified sites 4/15/2010    Rheumatoid arthritis(714.0) 4/15/2010    Spinal stenosis, unspecified region other than cervical 4/15/2010    Unspecified asthma(493.90) 4/15/2010    rheumatoid lung disease       Allergies   Allergen Reactions    Methotrexate Other (comments)     DVT and PE    Orudis [Ketoprofen] Palpitations    Singulair [Montelukast] Rash    Arthrotec 50 [Diclofenac-Misoprostol] Palpitations    Levaquin [Levofloxacin] Other (comments)     Hip joints swelling and painful per pt report    Lyrica [Pregabalin] Other (comments)     Saw spiders. Past Surgical History:   Procedure Laterality Date    CHEST SURGERY PROCEDURE UNLISTED  lat 2727'U    lung biopsy- diagnosed with rheumatoid lung disease    ENDOSCOPY, COLON, DIAGNOSTIC      7-10-13    HX ORTHOPAEDIC Right     rotator cuff repair       Social History     Socioeconomic History    Marital status: SINGLE     Spouse name: Not on file    Number of children: Not on file    Years of education: Not on file    Highest education level: Not on file   Tobacco Use    Smoking status: Former Smoker     Packs/day: 1.00     Years: 20.00     Pack years: 20.00     Last attempt to quit: 1985     Years since quittin.1    Smokeless tobacco: Never Used   Substance and Sexual Activity    Alcohol use: No    Drug use: No    Sexual activity: Never       Review of Systems   Constitutional: Negative. Negative for chills, fever, malaise/fatigue and weight loss. HENT: Negative.   Negative for hearing loss.    Eyes: Negative. Negative for blurred vision and double vision. Respiratory: Positive for shortness of breath. Negative for cough and sputum production. Shortness of breath is greatly improved. She is not using oxygen with exercise now. Cardiovascular: Negative. Negative for chest pain and palpitations. Gastrointestinal: Negative. Negative for abdominal pain, blood in stool, heartburn, nausea and vomiting. Genitourinary: Negative. Negative for dysuria, frequency and urgency. Musculoskeletal: Negative. Negative for back pain, falls, myalgias and neck pain. Pain in her right calf. Skin: Negative. Negative for rash. Neurological: Negative. Negative for dizziness, tingling, tremors, weakness and headaches. Endo/Heme/Allergies: Negative. Psychiatric/Behavioral: Negative. Negative for depression. Objective:     Vitals:    06/19/19 1123   BP: 116/69   Pulse: 80   Resp: 22   Temp: 98.4 °F (36.9 °C)   TempSrc: Oral   SpO2: 98%   Weight: 279 lb (126.6 kg)   Height: 5' 10\" (1.778 m)      Body mass index is 40.03 kg/m². Physical Exam   Constitutional: She is oriented to person, place, and time and well-developed, well-nourished, and in no distress. No distress. HENT:   Head: Normocephalic and atraumatic. Mouth/Throat: Oropharynx is clear and moist.   Eyes: Conjunctivae are normal.   Neck: No thyromegaly present. No carotid bruit. Cardiovascular: Normal rate, regular rhythm and normal heart sounds. No murmur heard. Pulmonary/Chest: Effort normal. No respiratory distress. She has no wheezes. She has no rales. She has decreased breath sounds but no rales and no rhonchi. Her cough still sounds wet. Abdominal: Soft. She exhibits no distension. Neurological: She is alert and oriented to person, place, and time. Skin: Skin is warm. No rash noted. She is not diaphoretic. No erythema.    Psychiatric: Mood and affect normal.   Nursing note and vitals reviewed. Health Maintenance Due   Topic Date Due    Shingrix Vaccine Age 49> (1 of 2) 09/20/1995         Assessment and orders:     Encounter Diagnoses     ICD-10-CM ICD-9-CM   1. Pain of right lower leg M79.661 729.5   2. History of DVT of lower extremity Z86.718 V12.51   3. Lyme disease A69.20 088.81     Diagnoses and all orders for this visit:    1. Pain of right lower leg-retest for assessment of status. -     DUPLEX LOWER EXT VENOUS RIGHT; Future    2. History of DVT of lower extremity-retest-symptoms resolved  -     DUPLEX LOWER EXT VENOUS RIGHT; Future    3. Lyme disease            Plan of care:  Discussed diagnoses in detail with patient. Medication risks/benefits/side effects discussed with patient. All of the patient's questions were addressed. The patient understands and agrees with our plan of care. The patient knows to call back if they are unsure of or forget any changes we discussed today or if the symptoms change. The patient received an After-Visit Summary which contains VS, orders, medication list and allergy list. This can be used as a \"mini-medical record\" should they have to seek medical care while out of town. Patient Care Team:  Kole Garcia MD as PCP - Chandni Hansen MD (Orthopedic Surgery)  Ankush Steve MD (Orthopedic Surgery)  Courtney Du MD (Otolaryngology)  Liv Marie RN as Ambulatory Care Navigator  Aretha Odom MD (Rheumatology)    Follow-up and Dispositions    · Return in about 2 months (around 8/19/2019), or if symptoms worsen or fail to improve.          Future Appointments   Date Time Provider David Chester   6/20/2019  9:00 AM VAS ROOM 1 [facility]   6/20/2019  1:00 PM Talia Ojeda MD [facility]   8/19/2019 10:50 AM Kole Garcia MD [facility] SCH[facility]       Signed By: Audie Clement MD     June 19, 2019      ATTENTION:   This medical record was transcribed using an electronic medical records/speech recognition system. Although proofread, it may and can contain electronic, spelling and other errors. Corrections may be executed at a later time. Please feel free to contact me for any clarifications as needed.

## 2019-06-19 NOTE — PROGRESS NOTES
[address] [facility]  [phone]    Hematology / Oncology Established Visit    Reason for Visit:   Chao Baxter is a 68 y.o. female who comes in for f/u of PE. PCP is Dr. Cierra Rick. History of Present Illness:   Ms. Mehul Krishnan is a 69 y/o female with RA with lung involvement who comes in for evaluation and management of PE. She was recently put on home O2. She was hospitalized at [facility] 3/3/19 - 3/5/19 after p/w weakness, fatigue, SOB, chills. She denies R leg swelling but has had subacute pain in R leg from knee down which started in early Feb 2019. She was found to have bilateral pulmonary emboli as well as RLE DVT. She was started on Heparin infusion and transitioned to Xarelto. At time of discharge, she was told to stop MTX, Aspirin and Celebrex. Since then, she reported to the ED at [facility] for 2 episodes of bleeding from left naris. She also coughed up blood. She denies any 8 hr trips, surgeries, fractures, OCPs or hormone replacement. No prior h/o VTE. No family h/o VTE. For RA, she takes MTX, but this was put on hold. She was also started on Remicaide 1 yr ago. She is currently taking Xarelto as prescribed, states her SOB is much better than prior. The R leg pain is much better, but feels really stiff per patient.     -Labs 3/10/19: WBC 9.5, Hgb 10.8, , HIV/HCV Ab/HepB Surface Ag all negative. Interval History: Patient here for follow up of PE and RLE DVT. Reports ongoing nosebleeds, but she is able to control the bleeding on her own. Reports these nosebleeds occur every other day, sometimes 2 x per day. Denies blood in stool. Patient had a venous doppler completed on right lower extremity prior to arrival. Her PCP Dr. Cierra Rick ordered this test because patient reported her right leg has been hurting. Reports RLE has been hurting intermittently since 2/2019.      Past Medical History:   Diagnosis Date    Anemia, unspecified 4/15/2010    Cardiac dysrhythmia, unspecified 4/15/2010    PAT- not on medication/ not seeing cardiologist    High cholesterol 2010    History of DVT of lower extremity 2019    Right leg (3/2019)    Obesity, unspecified 4/15/2010    Osteoarthrosis, unspecified whether generalized or localized, other specified sites 4/15/2010    Rheumatoid arthritis(714.0) 4/15/2010    Spinal stenosis, unspecified region other than cervical 4/15/2010    Unspecified asthma(493.90) 4/15/2010    rheumatoid lung disease      Past Surgical History:   Procedure Laterality Date    CHEST SURGERY PROCEDURE UNLISTED  lat     lung biopsy- diagnosed with rheumatoid lung disease    ENDOSCOPY, COLON, DIAGNOSTIC      7-10-13    HX ORTHOPAEDIC Right     rotator cuff repair      Social History     Tobacco Use    Smoking status: Former Smoker     Packs/day: 1.00     Years: 20.00     Pack years: 20.00     Last attempt to quit: 1985     Years since quittin.1    Smokeless tobacco: Never Used   Substance Use Topics    Alcohol use: No      Family History   Problem Relation Age of Onset    Cancer Mother         cervical, breast    Heart Disease Mother     Heart Attack Mother     Hypertension Mother     Arthritis-osteo Mother     Heart Disease Father     Heart Attack Father     Cancer Other         uterine and lung.  High Cholesterol Brother     Stroke Brother     Arthritis-rheumatoid Sister     Migraines Sister     Breast Cancer Maternal Aunt      Current Outpatient Medications   Medication Sig    traMADol (ULTRAM) 50 mg tablet Take 50 mg by mouth every six (6) hours as needed for Pain.  triamcinolone acetonide (KENALOG) 0.1 % ointment Apply  to affected area two (2) times a day. use thin layer in the affected area of the left leg    folic acid (FOLVITE) 1 mg tablet Take 3 Tabs by mouth daily.  budesonide-formoterol (SYMBICORT) 160-4.5 mcg/actuation HFAA Take 2 Puffs by inhalation two (2) times a day.     rivaroxaban (XARELTO) 20 mg tab tablet Take 1 Tab by mouth daily (with breakfast).  Hot Water Bottle (WATER BOTTLE) misc Please Dispense a humidifier bottle to patient to use with her oxygen.  raNITIdine (ZANTAC) 300 mg tab Take 1 Tab by mouth daily.  PROAIR HFA 90 mcg/actuation inhaler INHALE 2 PUFFS BY MOUTH EVERY 4 HOURS AS NEEDED FOR WHEEZING    albuterol (PROVENTIL VENTOLIN) 2.5 mg /3 mL (0.083 %) nebulizer solution USE 1 VIAL IN NEBULIZER EVERY 4 HOURS AS NEEDED FOR WHEEZING    infliximab (REMICADE IV) by IntraVENous route. Every 8 weeks    atorvastatin (LIPITOR) 10 mg tablet TAKE ONE TABLET BY MOUTH EVERY DAY    amLODIPine (NORVASC) 5 mg tablet TAKE ONE TABLET BY MOUTH EVERY DAY    UBIDECARENONE (COQ-10 PO) Take  by mouth.  gabapentin (NEURONTIN) 100 mg capsule     acetaminophen (TYLENOL) 500 mg tablet Take 1,000 mg by mouth every six (6) hours as needed for Pain.  omega-3s-dha-epa-fish oil 300-1,000 mg cpDR Take  by mouth daily.  cholecalciferol, vitamin D3, (VITAMIN D3) 4,000 unit cap Take  by mouth daily. No current facility-administered medications for this visit. Allergies   Allergen Reactions    Methotrexate Other (comments)     DVT and PE    Orudis [Ketoprofen] Palpitations    Singulair [Montelukast] Rash    Arthrotec 50 [Diclofenac-Misoprostol] Palpitations    Levaquin [Levofloxacin] Other (comments)     Hip joints swelling and painful per pt report    Lyrica [Pregabalin] Other (comments)     Saw spiders. Review of Systems: A complete review of systems was obtained, negative except as described above.     Physical Exam:     Visit Vitals  /79   Pulse 82   Temp 97.8 °F (36.6 °C) (Oral)   Resp 16   Ht 5' 10\" (1.778 m)   Wt 280 lb (127 kg)   SpO2 95%   BMI 40.18 kg/m²     ECOG PS: 2  General: Well developed, no acute distress, using rolling walker  Eyes: PERRLA, EOMI, anicteric sclerae  HENT: Atraumatic, OP clear, TMs intact without erythema  Neck: Supple  Lymphatic: No cervical, supraclavicular, axillary or inguinal adenopathy  Respiratory: RLL velcro crackles, otherwise CTA, normal respiratory effort  CV: Normal rate, regular rhythm, no murmurs, no peripheral edema  GI: Soft, nontender, nondistended, no masses, no hepatomegaly, no splenomegaly  MS: Normal gait and station. Digits without clubbing or cyanosis. Skin: No rashes, ecchymoses, or petechiae. Normal temperature, turgor, and texture. Neuro/Psych: Alert, oriented. 5/5 strength in all 4 extremities. Appropriate affect, normal judgment/insight. Results:     Lab Results   Component Value Date/Time    WBC 7.9 04/16/2019 01:55 PM    HGB 10.8 (L) 04/16/2019 01:55 PM    HCT 35.1 04/16/2019 01:55 PM    PLATELET 289 98/96/9563 01:55 PM    MCV 91 04/16/2019 01:55 PM    ABS. NEUTROPHILS 4.8 04/16/2019 01:55 PM     Lab Results   Component Value Date/Time    Sodium 145 (H) 04/16/2019 01:55 PM    Potassium 4.1 04/16/2019 01:55 PM    Chloride 108 (H) 04/16/2019 01:55 PM    CO2 25 04/16/2019 01:55 PM    Glucose 79 04/16/2019 01:55 PM    BUN 11 04/16/2019 01:55 PM    Creatinine 0.56 (L) 04/16/2019 01:55 PM    GFR est  04/16/2019 01:55 PM    GFR est non-AA 93 04/16/2019 01:55 PM    Calcium 9.2 04/16/2019 01:55 PM     Lab Results   Component Value Date/Time    Bilirubin, total 0.3 04/16/2019 01:55 PM    ALT (SGPT) 17 04/16/2019 01:55 PM    AST (SGOT) 21 04/16/2019 01:55 PM    Alk.  phosphatase 70 04/16/2019 01:55 PM    Protein, total 7.0 04/16/2019 01:55 PM    Albumin 3.8 04/16/2019 01:55 PM    Globulin 4.2 (H) 04/19/2015 07:37 AM     Lab Results   Component Value Date/Time    Iron 68 04/25/2017 08:37 AM    TIBC 256 04/25/2017 08:37 AM    Iron % saturation 27 04/25/2017 08:37 AM       Lab Results   Component Value Date/Time    Vitamin B12 666 04/30/2010 10:37 AM     Lab Results   Component Value Date/Time    TSH 1.160 06/14/2016 10:21 AM     Lab Results   Component Value Date/Time    HEP C VIRUS AB <0.1 10/09/2015 08:07 AM         Imaging:     Imaging at [address] [facility]:     CTA Chest 3/2019: showing Multiple bilateral branching pulmonary Emboli, Bilateral patchy ground glass opacities likely chronic, and bronchiectasis consistent with pulmonary fibrosis (Rhematoid related lung disease). Similar Groundglass component and Bronchiectasis on previous CT in our system.       Duplex Legs 3/2019: Acute Deep vein thrombosis of the right peroneal vein    Echo: Mild concentric LVH with normal cavity size and systolic function. Trivial to mild regurgitation. Moderate LV diastolic dysfunction with elevated left atrial pressure. 6/20/19 Venous doppler bilateral LE:  Right Lower Venous     No evidence of deep vein thrombosis in the common femoral, profunda femoral, superficial femoral, popliteal, posterior tibial, and peroneal veins. The veins were imaged in the transverse and longitudinal planes. The vessels showed normal color filling and compressibility. Doppler interrogation showed phasic and spontaneous flow. Left Lower Venous     For comparison purposes, the left common femoral vein was briefly interrogated. These vein demonstrates normal color filing and compressibility. Doppler flow was phasic and spontaneous. Assessment & Plan:   Larisa Lua is a 68 y.o. female with RA comes in for evaluation of DVT/PE. 1. Venous thromboembolism: PE and RLE DVT occurred in March 2019. No obvious risk factors. My review of MTX shows no increased risk of VTE; Remicaide associated with < 1% risk of thrombophelebitis, MI, etc. Between the 2 medications, the Remicaide is more likely a culprit. The dose and frequency were recently increased in early February. I recommend patient discuss further with management with her Rheumatologist, Dr. Pradeep Bell. Patient is tolerating the Xarelto well aside from nosebleeds (improving).  Patient will likely need to remain on anticoagulation indefinitely as it is not clear whether this was provoked by medication or not. 6/2019 lower extremity venous dopplers ordered by PCP are negative  -- Continue Xalrelto 20 mg daily (just refilled)- switch to eliquis after 3 months due to epitaxis  -- Return in 3 months  -- Sending progress note to Dr. Alek Cooper (Rheumatologist) - Marli Molina [phone]    2. RA with lung involvement: MTX on hold. Started Remicaide 4/30/19, second infusion 6/11/19. I feel pt can likely restart MTX. I recommend she discuss an alternate agent to the Remicaide with her physician. Home O2 was started early Feb 2019 - likely due to PE and will likely be able to come off of supplemental O2. Her O2 sats are 96% on RA today in our office. -- Sees Dr. Prince Whitney in Pulmonary and Dr. Alek Cooper in Rheumatology. 3. Diastolic CHF: Seen on echo and likely due to HTN. Not on diuretics. On Norvasc. 4. Epistaxis: Occurred even before starting Xarelto, but more frequent since starting Xarelto. Currently managing with clamping physical pressure and Afrin prn. Occurring every other day and sometimes twice in one day. Also recommended saline nasal spray. -- If this becomes bothersome, we can try switching to Eliquis. 5. Normocytic anemia: Recent CBC checked by Dr. Jarett Lopes shows hgb 10.8, no recent iron profile. -- Iron profile, ferritin now- call patient with the results  -- Follow up in 3 months with repeat cbc, iron profile, ferritin       I appreciate the opportunity to participate in Ms. Dinorah Viveros's care.     Signed By: Hema Witt MD     June 20, 2019

## 2019-06-19 NOTE — PATIENT INSTRUCTIONS
Deep Vein Thrombosis: Care Instructions  Your Care Instructions    A deep vein thrombosis (DVT) is a blood clot in certain veins of the legs, pelvis, or arms. Blood clots in these veins need to be treated because they can get bigger, break loose, and travel through the bloodstream to the lungs. A blood clot in a lung can be life-threatening. The doctor may have given you a blood thinner (anticoagulant). A blood thinner can stop the blood clot from growing larger and prevent new clots from forming. You will need to take a blood thinner for 3 to 6 months or longer. The doctor has checked you carefully, but problems can develop later. If you notice any problems or new symptoms, get medical treatment right away. Follow-up care is a key part of your treatment and safety. Be sure to make and go to all appointments, and call your doctor if you are having problems. It's also a good idea to know your test results and keep a list of the medicines you take. How can you care for yourself at home? · Take your medicines exactly as prescribed. Call your doctor if you think you are having a problem with your medicine. · If you are taking a blood thinner, be sure you get instructions about how to take your medicine safely. Blood thinners can cause serious bleeding problems. · Wear compression stockings if your doctor recommends them. These stockings are tighter at the feet than on the legs. They may reduce pain and swelling in your legs. But there are different types of stockings, and they need to fit right. So your doctor will recommend what you need. · When you sit, use a pillow to raise the arm or leg that has the blood clot. Try to keep it above the level of your heart. When should you call for help? Call 911 anytime you think you may need emergency care. For example, call if:    · You passed out (lost consciousness).     · You have symptoms of a blood clot in your lung (called a pulmonary embolism).  These include:  ? Sudden chest pain. ? Trouble breathing. ? Coughing up blood.    Call your doctor now or seek immediate medical care if:    · You have new or worse trouble breathing.     · You are dizzy or lightheaded, or you feel like you may faint.     · You have symptoms of a blood clot in your arm or leg. These may include:  ? Pain in the arm, calf, back of the knee, thigh, or groin. ? Redness and swelling in the arm, leg, or groin.    Watch closely for changes in your health, and be sure to contact your doctor if:    · You do not get better as expected. Where can you learn more? Go to http://antonio-adrien.info/. Enter P228 in the search box to learn more about \"Deep Vein Thrombosis: Care Instructions. \"  Current as of: September 26, 2018  Content Version: 11.9  © 9170-3089 [employer]. Care instructions adapted under license by [employer] (which disclaims liability or warranty for this information). If you have questions about a medical condition or this instruction, always ask your healthcare professional. [address] any warranty or liability for your use of this information.

## 2019-06-19 NOTE — PROGRESS NOTES
1. Have you been to the ER, urgent care clinic since your last visit? Hospitalized since your last visit? No    2. Have you seen or consulted any other health care providers outside of the [address] [place] [address] since your last visit? Include any pap smears or colon screening.  No  Reviewed record in preparation for visit and have necessary documentation  Pt did not bring medication to office visit for review  Information was given to pt on Advanced Directives, Living Will  Information was given on Shingles Vaccine  opportunity was given for questions  Goals that were addressed and/or need to be completed during or after this appointment include     Health Maintenance Due   Topic Date Due    Shingrix Vaccine Age 50> (1 of 2) 09/20/1995

## 2019-06-20 ENCOUNTER — HOSPITAL ENCOUNTER (OUTPATIENT)
Dept: VASCULAR SURGERY | Age: 74
Discharge: HOME | End: 2019-06-20
Attending: FAMILY MEDICINE
Payer: MEDICARE

## 2019-06-20 ENCOUNTER — TELEPHONE (OUTPATIENT)
Dept: FAMILY MEDICINE CLINIC | Age: 74
End: 2019-06-20

## 2019-06-20 ENCOUNTER — OFFICE VISIT (OUTPATIENT)
Dept: ONCOLOGY | Age: 74
End: 2019-06-20

## 2019-06-20 VITALS
SYSTOLIC BLOOD PRESSURE: 129 MMHG | BODY MASS INDEX: 40.09 KG/M2 | WEIGHT: 280 LBS | DIASTOLIC BLOOD PRESSURE: 79 MMHG | RESPIRATION RATE: 16 BRPM | OXYGEN SATURATION: 95 % | TEMPERATURE: 97.8 F | HEIGHT: 70 IN | HEART RATE: 82 BPM

## 2019-06-20 DIAGNOSIS — I82.451 ACUTE DEEP VEIN THROMBOSIS (DVT) OF RIGHT PERONEAL VEIN (HCC): ICD-10-CM

## 2019-06-20 DIAGNOSIS — M79.661 PAIN OF RIGHT LOWER LEG: ICD-10-CM

## 2019-06-20 DIAGNOSIS — D64.9 NORMOCYTIC ANEMIA: ICD-10-CM

## 2019-06-20 DIAGNOSIS — R04.0 EPISTAXIS: ICD-10-CM

## 2019-06-20 DIAGNOSIS — M05.10 RHEUMATOID LUNG DISEASE (HCC): ICD-10-CM

## 2019-06-20 DIAGNOSIS — Z86.718 HISTORY OF DVT OF LOWER EXTREMITY: ICD-10-CM

## 2019-06-20 DIAGNOSIS — I26.99 OTHER ACUTE PULMONARY EMBOLISM WITHOUT ACUTE COR PULMONALE (HCC): Primary | ICD-10-CM

## 2019-06-20 PROCEDURE — 93971 EXTREMITY STUDY: CPT

## 2019-06-20 NOTE — TELEPHONE ENCOUNTER
Spoke with patient and informed her of the following per Dr. Desi Guallpa: \"The clot in her right lower leg has reabsorbed. No clot visible. We may need to x-ray her knee and lower leg if pain continues. Sometimes arthritic knee pain can be referred down the leg. \"    Patient verbalized understanding. She says that Dr. Jaz Turpin ordered xrays a couple months ago and she will contact him to have the results sent to our office.

## 2019-06-20 NOTE — PROGRESS NOTES
Charlie Landin is a 68 y.o. female here today for follow up of PE. Reports pain to back & both legs today, 6/10.

## 2019-06-20 NOTE — PROGRESS NOTES
Please call the patient. The clot in her right lower leg has reabsorbed. No clot visible. We may need to x-ray her knee and lower leg if pain continues. Sometimes arthritic knee pain can be referred down the leg.   Thank you,  Dr. Lepe Miss

## 2019-06-21 ENCOUNTER — TELEPHONE (OUTPATIENT)
Dept: ONCOLOGY | Age: 74
End: 2019-06-21

## 2019-06-21 LAB
FERRITIN SERPL-MCNC: 63 NG/ML (ref 15–150)
IRON SATN MFR SERPL: 13 % (ref 15–55)
IRON SERPL-MCNC: 36 UG/DL (ref 27–139)
TIBC SERPL-MCNC: 269 UG/DL (ref 250–450)
UIBC SERPL-MCNC: 233 UG/DL (ref 118–369)

## 2019-06-21 NOTE — TELEPHONE ENCOUNTER
06/21/19 1:17 PM Verified ID x [id number]. Notified patient her iron saturation is mildly low and I recommend she start on an OTC iron supplement 1-2 times daily, advised her to take a stool softener with this as it can cause constipation. Also, advised we would recheck her labs in 3 months at the follow up. Patient verbalized understanding, no further questions at this time.

## 2019-08-19 ENCOUNTER — OFFICE VISIT (OUTPATIENT)
Dept: FAMILY MEDICINE CLINIC | Age: 74
End: 2019-08-19

## 2019-08-19 VITALS
BODY MASS INDEX: 40.52 KG/M2 | HEART RATE: 68 BPM | HEIGHT: 70 IN | WEIGHT: 283 LBS | SYSTOLIC BLOOD PRESSURE: 130 MMHG | DIASTOLIC BLOOD PRESSURE: 69 MMHG | OXYGEN SATURATION: 97 % | TEMPERATURE: 97.3 F | RESPIRATION RATE: 20 BRPM

## 2019-08-19 DIAGNOSIS — M05.10 RHEUMATOID LUNG DISEASE (HCC): Chronic | ICD-10-CM

## 2019-08-19 DIAGNOSIS — I26.99 OTHER ACUTE PULMONARY EMBOLISM WITHOUT ACUTE COR PULMONALE (HCC): Chronic | ICD-10-CM

## 2019-08-19 DIAGNOSIS — I10 ESSENTIAL HYPERTENSION: Chronic | ICD-10-CM

## 2019-08-19 DIAGNOSIS — M48.062 SPINAL STENOSIS OF LUMBAR REGION WITH NEUROGENIC CLAUDICATION: Chronic | ICD-10-CM

## 2019-08-19 DIAGNOSIS — M06.9 RHEUMATOID ARTHRITIS INVOLVING MULTIPLE SITES, UNSPECIFIED RHEUMATOID FACTOR PRESENCE: ICD-10-CM

## 2019-08-19 DIAGNOSIS — I82.4Z1 ACUTE DEEP VEIN THROMBOSIS (DVT) OF DISTAL VEIN OF RIGHT LOWER EXTREMITY (HCC): Chronic | ICD-10-CM

## 2019-08-19 DIAGNOSIS — J45.50 SEVERE PERSISTENT ASTHMA WITHOUT COMPLICATION: Chronic | ICD-10-CM

## 2019-08-19 DIAGNOSIS — J47.1 BRONCHIECTASIS WITH ACUTE EXACERBATION (HCC): Primary | Chronic | ICD-10-CM

## 2019-08-19 RX ORDER — AZITHROMYCIN 250 MG/1
TABLET, FILM COATED ORAL
Qty: 6 TAB | Refills: 0 | Status: SHIPPED | OUTPATIENT
Start: 2019-08-19 | End: 2019-08-24

## 2019-08-19 NOTE — PATIENT INSTRUCTIONS
Bronchiectasis: Care Instructions  Your Care Instructions  Bronchiectasis (say \"tmcyb-yro-HMM-tuh-maikel\") is a lung problem in which the breathing tubes are stretched and become larger. The buildup of mucus causes the stretching and can lead to swelling and infections. You may find it harder to breathe and cough up mucus out of your lungs. Some people are born with it. Other people get it because of another health problem, usually cystic fibrosis or a lung infection such as pneumonia or tuberculosis. Symptoms are often different for everyone. But a cough that brings up mucus, or sputum, is common. The condition is usually treated with antibiotics, medicines to relax the airways (bronchodilators), and medicines to make it easier to cough up mucus (expectorants). Follow-up care is a key part of your treatment and safety. Be sure to make and go to all appointments, and call your doctor if you are having problems. It's also a good idea to know your test results and keep a list of the medicines you take. How can you care for yourself at home? · Take your antibiotics as directed. Do not stop taking them just because you feel better. You need to take the full course of antibiotics. · Take your medicines exactly as prescribed. Call your doctor if you think you are having a problem with your medicine. · If you have cystic fibrosis, follow your treatment plan. · If you or your child has bronchiectasis, follow directions from your doctor or respiratory therapist for moving your or your child's body into different positions to help drain fluid. This is called postural drainage, and it helps to ease breathing and prevent infections. · You also may do chest percussion on your child. This is strong clapping of the chest with a cupped hand to vibrate the airways in the lungs. The vibration helps your child cough up mucus. You may see a respiratory therapist to learn how to do chest percussion.   · Use an airway clearance device, such as a flutter valve, as directed to help remove mucus from the lungs. · Avoid lung infections. ? Get shots to protect against the flu and pneumococcal disease. ? Wash your hands frequently. ? Avoid close contact with people who have colds or the flu. ? Do not smoke or allow others to smoke around you. If you need help quitting, talk to your doctor about stop-smoking programs and medicines. These can increase your chances of quitting for good. ? Stay inside, if you can, on days when the pollution level is high. When should you call for help? Call 911 anytime you think you may need emergency care. For example, call if:    · You have severe trouble breathing.     · You cough up more than 1 cup of blood.    Call your doctor now or seek immediate medical care if:    · You have chest pain.     · You have shortness of breath.     · You have a fever.     · Your mucus (sputum) is bloody or changes color.    Watch closely for changes in your health, and be sure to contact your doctor if:    · You are coughing up more sputum than before.     · Your symptoms get worse or do not get better with treatment. Where can you learn more? Go to http://antonio-adrien.info/. Enter C667 in the search box to learn more about \"Bronchiectasis: Care Instructions. \"  Current as of: September 5, 2018  Content Version: 12.1  © 4998-1724 [employer]. Care instructions adapted under license by [employer] (which disclaims liability or warranty for this information). If you have questions about a medical condition or this instruction, always ask your healthcare professional. [place] [address] any warranty or liability for your use of this information.

## 2019-08-19 NOTE — PROGRESS NOTES
[address]  [phone]           Progress Note    Patient: Miguelito Rhodes MRN: [id number]  SSN: [id number]    YOB: 1945  Age: 68 y.o. Sex: female        Chief Complaint   Patient presents with    Labs    Cough         Subjective:     Encounter Diagnoses   Name Primary?  Bronchiectasis with acute exacerbation ([facility]): She has developed an increased amount of sputum which is gray in color. The sputum is also very thick. She has a long-standing history of recurrent lung infections from bronchiectasis. In addition of this she has had rheumatoid lung scarring and elevated pulmonary pressure due to history of pulmonary emboli. Yes    Rheumatoid arthritis involving multiple sites, unspecified rheumatoid factor presence [facility]): He still seeing Dr. Felipa Morris. Felipa Morris does not feel that her methotrexate caused the pulmonary emboli. That remains an option for her if the Remicade does not work or if she continues to have worsening itching while taking it.  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[facility]): See above.  Severe persistent asthma without complication: Related to the above problems. Acute infection.  Spinal stenosis of lumbar region with neurogenic claudication: She states that her sciatica has gotten better.  Acute deep vein thrombosis (DVT) of distal vein of right lower extremity ([facility] [address]): She has not had a final decision yet as to whether or not she needs to stay on Xarelto lifelong. Certainly if that is the case then we would not be a problem to restart the methotrexate without her worry about recurrent PEs.  Other acute pulmonary embolism without acute cor pulmonale ([address]): I do not have a copy of her most recent echocardiogram however she says her pulmonary artery pressure has not come down as of 4 as her clots got better.   Her pulmonary status is clinically improved and that she does not need oxygen 24 hours a day now. She says she has the oxygen if she needs it.  Essential hypertension:Controlled  BP Readings from Last 3 Encounters:   08/19/19 130/69   06/20/19 129/79   06/19/19 116/69     The patient reports:  taking medications as instructed, no medication side effects noted, no TIA's, no chest pain on exertion, no dyspnea on exertion, no swelling of ankles. Key CAD CHF Meds             rivaroxaban (XARELTO) 20 mg tab tablet (Taking) Take 1 Tab by mouth daily (with breakfast). atorvastatin (LIPITOR) 10 mg tablet (Taking) TAKE ONE TABLET BY MOUTH EVERY DAY    amLODIPine (NORVASC) 5 mg tablet (Taking) TAKE ONE TABLET BY MOUTH EVERY DAY    omega-3s-dha-epa-fish oil 300-1,000 mg cpDR (Taking) Take  by mouth daily. Lab Results   Component Value Date/Time    Sodium 145 (H) 04/16/2019 01:55 PM    Potassium 4.1 04/16/2019 01:55 PM    Chloride 108 (H) 04/16/2019 01:55 PM    CO2 25 04/16/2019 01:55 PM    Anion gap 2 (L) 07/13/2016 03:25 AM    Glucose 79 04/16/2019 01:55 PM    BUN 11 04/16/2019 01:55 PM    Creatinine 0.56 (L) 04/16/2019 01:55 PM    BUN/Creatinine ratio 20 04/16/2019 01:55 PM    GFR est  04/16/2019 01:55 PM    GFR est non-AA 93 04/16/2019 01:55 PM    Calcium 9.2 04/16/2019 01:55 PM    Bilirubin, total 0.3 04/16/2019 01:55 PM    AST (SGOT) 21 04/16/2019 01:55 PM    Alk. phosphatase 70 04/16/2019 01:55 PM    Protein, total 7.0 04/16/2019 01:55 PM    Albumin 3.8 04/16/2019 01:55 PM    Globulin 4.2 (H) 04/19/2015 07:37 AM    A-G Ratio 1.2 04/16/2019 01:55 PM    ALT (SGPT) 17 04/16/2019 01:55 PM     Low salt diet? yes  Aerobic exercise? no  Our goal is to normalize the blood pressure to decrease the risks of strokes and heart attacks. The patient is in agreement with the plan.        Spinal stenosis of lumbar region without neurogenic claudication              Current and past medical information:    Current Medications after this visit[de-identified]     Current Outpatient Medications   Medication Sig    azithromycin (ZITHROMAX) 250 mg tablet Take 2 tablets today, then take 1 tablet daily    traMADol (ULTRAM) 50 mg tablet Take 50 mg by mouth every six (6) hours as needed for Pain.  triamcinolone acetonide (KENALOG) 0.1 % ointment Apply  to affected area two (2) times a day. use thin layer in the affected area of the left leg    budesonide-formoterol (SYMBICORT) 160-4.5 mcg/actuation HFAA Take 2 Puffs by inhalation two (2) times a day.  rivaroxaban (XARELTO) 20 mg tab tablet Take 1 Tab by mouth daily (with breakfast).  Hot Water Bottle (WATER BOTTLE) misc Please Dispense a humidifier bottle to patient to use with her oxygen.  raNITIdine (ZANTAC) 300 mg tab Take 1 Tab by mouth daily.  PROAIR HFA 90 mcg/actuation inhaler INHALE 2 PUFFS BY MOUTH EVERY 4 HOURS AS NEEDED FOR WHEEZING    albuterol (PROVENTIL VENTOLIN) 2.5 mg /3 mL (0.083 %) nebulizer solution USE 1 VIAL IN NEBULIZER EVERY 4 HOURS AS NEEDED FOR WHEEZING    infliximab (REMICADE IV) by IntraVENous route. Every 8 weeks    atorvastatin (LIPITOR) 10 mg tablet TAKE ONE TABLET BY MOUTH EVERY DAY    amLODIPine (NORVASC) 5 mg tablet TAKE ONE TABLET BY MOUTH EVERY DAY    UBIDECARENONE (COQ-10 PO) Take  by mouth.  acetaminophen (TYLENOL) 500 mg tablet Take 1,000 mg by mouth every six (6) hours as needed for Pain.  omega-3s-dha-epa-fish oil 300-1,000 mg cpDR Take  by mouth daily.  cholecalciferol, vitamin D3, (VITAMIN D3) 4,000 unit cap Take  by mouth daily.  folic acid (FOLVITE) 1 mg tablet Take 3 Tabs by mouth daily.  gabapentin (NEURONTIN) 100 mg capsule      No current facility-administered medications for this visit.         Patient Active Problem List    Diagnosis Date Noted    [facility] 08/19/2019     Priority: 1 - One    Lumbar spinal stenosis 10/06/2015     Priority: 1 - One    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[facility] 12/15/2010     Priority: 1 - One    High cholesterol 04/30/2010     Priority: 1 - One    Rheumatoid arthritis ([facility] [address] 04/15/2010     Priority: 1 - One    Severe persistent asthma without complication 98/98/5795     Priority: 1 - One    OA (osteoarthritis) 04/15/2010     Priority: 1 - One    Obesity, unspecified 04/15/2010     Priority: 1 - One    Spinal stenosis of lumbar region with neurogenic claudication 04/15/2010     Priority: 1 - One    Cardiac dysrhythmia, unspecified 04/15/2010     Priority: 2 - Two    Pain of left lower leg 06/19/2019    History of DVT of lower extremity 06/06/2019    Hx of pulmonary embolus 06/06/2019    Chronic rhinitis 05/23/2019    Allergic contact dermatitis 05/23/2019    Essential hypertension 04/15/2019    [facility], [facility] 12/04/2017    Primary localized osteoarthritis of right hip 07/11/2016    Asthma 10/06/2015    Primary generalized (osteo)arthritis 10/06/2015    PAC (premature atrial contraction) 10/06/2015    Colon polyp, hyperplastic 06/02/2015       Past Medical History:   Diagnosis Date    Anemia, unspecified 4/15/2010    Cardiac dysrhythmia, unspecified 4/15/2010    PAT- not on medication/ not seeing cardiologist    High cholesterol 4/30/2010    History of DVT of lower extremity 6/6/2019    Right leg (3/2019)    Obesity, unspecified 4/15/2010    Osteoarthrosis, unspecified whether generalized or localized, other specified sites 4/15/2010    Rheumatoid arthritis(714.0) 4/15/2010    Spinal stenosis, unspecified region other than cervical 4/15/2010    Unspecified asthma(493.90) 4/15/2010    rheumatoid lung disease       Allergies   Allergen Reactions    Methotrexate Other (comments)     DVT and PE    Orudis [Ketoprofen] Palpitations    Singulair [Montelukast] Rash    Arthrotec 50 [Diclofenac-Misoprostol] Palpitations    Levaquin [Levofloxacin] Other (comments)     Hip joints swelling and painful per pt report    Lyrica [Pregabalin] Other (comments)     Saw spiders.        Past Surgical History:   Procedure Laterality Date    CHEST SURGERY PROCEDURE UNLISTED  lat     lung biopsy- diagnosed with rheumatoid lung disease    ENDOSCOPY, COLON, DIAGNOSTIC      7-10-13    HX ORTHOPAEDIC Right 2002    rotator cuff repair       Social History     Socioeconomic History    Marital status: SINGLE     Spouse name: Not on file    Number of children: Not on file    Years of education: Not on file    Highest education level: Not on file   Tobacco Use    Smoking status: Former Smoker     Packs/day: 1.00     Years: 20.00     Pack years: 20.00     Last attempt to quit: 1985     Years since quittin.3    Smokeless tobacco: Never Used   Substance and Sexual Activity    Alcohol use: No    Drug use: No    Sexual activity: Never       Review of Systems   Constitutional: Negative. Negative for chills, fever, malaise/fatigue and weight loss. HENT: Negative. Negative for hearing loss. Eyes: Negative. Negative for blurred vision and double vision. Respiratory: Positive for cough, sputum production and wheezing. Negative for shortness of breath. Cardiovascular: Negative. Negative for chest pain and palpitations. Gastrointestinal: Negative. Negative for abdominal pain, blood in stool, heartburn, nausea and vomiting. Genitourinary: Negative. Negative for dysuria, frequency and urgency. Musculoskeletal: Positive for joint pain. Negative for back pain, falls, myalgias and neck pain. Back pain is improved. Skin: Negative. Negative for rash. Neurological: Negative. Negative for dizziness, tingling, tremors, weakness and headaches. Endo/Heme/Allergies: Negative. Psychiatric/Behavioral: Negative. Negative for depression. Objective:     Vitals:    19 1059   BP: 130/69   Pulse: 68   Resp: 20   Temp: 97.3 °F (36.3 °C)   TempSrc: Oral   SpO2: 97%   Weight: 283 lb (128.4 kg)   Height: 5' 10\" (1.778 m)      Body mass index is 40.61 kg/m².     Physical Exam   Constitutional: She is oriented to person, place, and time and well-developed, well-nourished, and in no distress. No distress. HENT:   Head: Normocephalic and atraumatic. Mouth/Throat: Oropharynx is clear and moist.   Eyes: Conjunctivae are normal.   Neck: No thyromegaly present. No carotid bruit. Cardiovascular: Normal rate and regular rhythm. No murmur heard. Pulmonary/Chest: No respiratory distress. She has no wheezes. She has no rales. Respiratory rate is 20 and she has scattered wet rhonchi worse on the left side. Abdominal: Soft. She exhibits no distension. There is no tenderness. Musculoskeletal: She exhibits edema. Neurological: She is alert and oriented to person, place, and time. Skin: Skin is warm. No rash noted. She is not diaphoretic. No erythema. Psychiatric: Mood and affect normal.   Nursing note and vitals reviewed. Health Maintenance Due   Topic Date Due    Shingrix Vaccine Age 49> (1 of 2) 09/20/1995    Influenza Age 5 to Adult  08/01/2019         Assessment and orders:     Encounter Diagnoses     ICD-10-CM ICD-9-CM   1. Bronchiectasis with acute exacerbation ([facility]) J47.1 494.1   2. Rheumatoid arthritis involving multiple sites, unspecified rheumatoid factor presence ([facility]) M06.9 714.0   3. 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[facility] M05.10 714.81   4. Severe persistent asthma without complication T87.95 392.26   5. Spinal stenosis of lumbar region with neurogenic claudication M48.062 724.03   6. Acute deep vein thrombosis (DVT) of distal vein of right lower extremity ([facility]) I82.4Z1 453.42   7. Other acute pulmonary embolism without acute cor pulmonale ([facility]) I26.99 415.19   8. Essential hypertension I10 401.9   9. Spinal stenosis of lumbar region without neurogenic claudication M48.061 724.02      Diagnoses and all orders for this visit:    1. Bronchiectasis with acute exacerbation (HCC)        -     azithromycin (ZITHROMAX) 250 mg tablet;  Take 2 tablets today, then take 1 tablet daily  We will also start Mucinex to thin her mucus. Hopefully this will help prevent her from developing pneumonia. She says Dr. Lenny Brooks now has walk-in appointments available so she gets worse she can be seen there. 2. Rheumatoid arthritis involving multiple sites, unspecified rheumatoid factor presence (HCC)-chronic pain. It seems like the Remicade is not quite as good as a methotrexate for control her pain. 3. 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[address]- Stable lung function. 4. Severe persistent asthma without complication-has acute infection.-Improved    5. Spinal stenosis of lumbar region with neurogenic claudication    6. Acute deep vein thrombosis (DVT) of distal vein of right lower extremity (HCC)-still on Xarelto    7. Other acute pulmonary embolism without acute cor pulmonale (HCC)-still on Xarelto    8. Hypertension controlled    Other orders              Plan of care:  Discussed diagnoses in detail with patient. Medication risks/benefits/side effects discussed with patient. All of the patient's questions were addressed. The patient understands and agrees with our plan of care. The patient knows to call back if they are unsure of or forget any changes we discussed today or if the symptoms change. The patient received an After-Visit Summary which contains VS, orders, medication list and allergy list. This can be used as a \"mini-medical record\" should they have to seek medical care while out of town. Patient Care Team:  Lila Bryson MD as PCP - Kellen Montiel MD (Orthopedic Surgery)  Adalberto Sequeira MD (Orthopedic Surgery)  Thad Garcia MD (Otolaryngology)  See Huntley RN as Ambulatory Care Navigator  Stephanie Lynn MD (Rheumatology)    Follow-up and Dispositions    · Return in about 3 months (around 11/19/2019).          Future Appointments   Date Time Provider David Chester   9/3/2019  9:15 AM Missy Nunez, JEANNA [address], [address] 11/19/2019  1:05 PM Crow Vee MD [facility] KATI SCHED       Signed By: Gurpreet Thompson MD     August 19, 2019      ATTENTION:   This medical record was transcribed using an electronic medical records/speech recognition system. Although proofread, it may and can contain electronic, spelling and other errors. Corrections may be executed at a later time. Please feel free to contact me for any clarifications as needed.

## 2019-08-19 NOTE — PROGRESS NOTES
1. Have you been to the ER, urgent care clinic since your last visit? Hospitalized since your last visit? No    2. Have you seen or consulted any other health care providers outside of the [address] [place] [address] since your last visit? Include any pap smears or colon screening.  No  Reviewed record in preparation for visit and have necessary documentation  Pt did not bring medication to office visit for review  Information was given to pt on Advanced Directives, Living Will  Information was given on Shingles Vaccine  opportunity was given for questions  Goals that were addressed and/or need to be completed during or after this appointment include     Health Maintenance Due   Topic Date Due    Shingrix Vaccine Age 49> (1 of 2) 09/20/1995    Influenza Age 5 to Adult  08/01/2019

## 2019-08-28 NOTE — PROGRESS NOTES
[id number] [facility] Oncology at [address] [place] [address]  [phone]    Hematology / Oncology Established Visit    Reason for Visit:   Maxi Guerra is a 68 y.o. female who comes in for f/u of PE. PCP is Dr. Amber Moyer. History of Present Illness:   Ms. Kalin Norris is a 67 y/o female with RA with lung involvement who comes in for evaluation and management of PE. She was recently put on home O2. She was hospitalized at [facility] 3/3/19 - 3/5/19 after p/w weakness, fatigue, SOB, chills. She denies R leg swelling but has had subacute pain in R leg from knee down which started in early Feb 2019. She was found to have bilateral pulmonary emboli as well as RLE DVT. She was started on Heparin infusion and transitioned to Xarelto. At time of discharge, she was told to stop MTX, Aspirin and Celebrex. Since then, she reported to the ED at [facility] for 2 episodes of bleeding from left naris. She also coughed up blood. She denies any 8 hr trips, surgeries, fractures, OCPs or hormone replacement. No prior h/o VTE. No family h/o VTE. For RA, she takes MTX, but this was put on hold. She was also started on Remicaide 1 yr ago. She is currently taking Xarelto as prescribed, states her SOB is much better than prior. The R leg pain is much better, but feels really stiff per patient.     -Labs 3/10/19: WBC 9.5, Hgb 10.8, , HIV/HCV Ab/HepB Surface Ag all negative. Interval History: Patient here for follow up of PE and RLE DVT. Reports ongoing nosebleeds, and she does want to try switching to Eliquis. She also has R knee pain from osteoarthritis and states she might get a knee injection soon. Reports RLE has been hurting intermittently since 2/2019.      Past Medical History:   Diagnosis Date    Anemia, unspecified 4/15/2010    Cardiac dysrhythmia, unspecified 4/15/2010    PAT- not on medication/ not seeing cardiologist    High cholesterol 4/30/2010    History of DVT of lower extremity 6/6/2019    Right leg (3/2019)    Obesity, unspecified 4/15/2010    Osteoarthrosis, unspecified whether generalized or localized, other specified sites 4/15/2010    Rheumatoid arthritis(714.0) 4/15/2010    Spinal stenosis, unspecified region other than cervical 4/15/2010    Unspecified asthma(493.90) 4/15/2010    rheumatoid lung disease      Past Surgical History:   Procedure Laterality Date    CHEST SURGERY PROCEDURE UNLISTED  lat     lung biopsy- diagnosed with rheumatoid lung disease    ENDOSCOPY, COLON, DIAGNOSTIC      7-10-13    HX ORTHOPAEDIC Right     rotator cuff repair      Social History     Tobacco Use    Smoking status: Former Smoker     Packs/day: 1.00     Years: 20.00     Pack years: 20.00     Last attempt to quit: 1985     Years since quittin.3    Smokeless tobacco: Never Used   Substance Use Topics    Alcohol use: No      Family History   Problem Relation Age of Onset    Cancer Mother         cervical, breast    Heart Disease Mother     Heart Attack Mother     Hypertension Mother     Arthritis-osteo Mother     Heart Disease Father     Heart Attack Father     Cancer Other         uterine and lung.  High Cholesterol Brother     Stroke Brother     Arthritis-rheumatoid Sister     Migraines Sister     Breast Cancer Maternal Aunt      Current Outpatient Medications   Medication Sig    amLODIPine (NORVASC) 5 mg tablet TAKE ONE TABLET BY MOUTH EVERY DAY    traMADol (ULTRAM) 50 mg tablet Take 50 mg by mouth every six (6) hours as needed for Pain.  triamcinolone acetonide (KENALOG) 0.1 % ointment Apply  to affected area two (2) times a day. use thin layer in the affected area of the left leg    folic acid (FOLVITE) 1 mg tablet Take 3 Tabs by mouth daily.  budesonide-formoterol (SYMBICORT) 160-4.5 mcg/actuation HFAA Take 2 Puffs by inhalation two (2) times a day.  rivaroxaban (XARELTO) 20 mg tab tablet Take 1 Tab by mouth daily (with breakfast).     Hot Water Bottle (WATER BOTTLE) misc Please Dispense a humidifier bottle to patient to use with her oxygen.  raNITIdine (ZANTAC) 300 mg tab Take 1 Tab by mouth daily.  PROAIR HFA 90 mcg/actuation inhaler INHALE 2 PUFFS BY MOUTH EVERY 4 HOURS AS NEEDED FOR WHEEZING    albuterol (PROVENTIL VENTOLIN) 2.5 mg /3 mL (0.083 %) nebulizer solution USE 1 VIAL IN NEBULIZER EVERY 4 HOURS AS NEEDED FOR WHEEZING    infliximab (REMICADE IV) by IntraVENous route. Every 8 weeks    atorvastatin (LIPITOR) 10 mg tablet TAKE ONE TABLET BY MOUTH EVERY DAY    UBIDECARENONE (COQ-10 PO) Take  by mouth.  gabapentin (NEURONTIN) 100 mg capsule     acetaminophen (TYLENOL) 500 mg tablet Take 1,000 mg by mouth every six (6) hours as needed for Pain.  omega-3s-dha-epa-fish oil 300-1,000 mg cpDR Take  by mouth daily.  cholecalciferol, vitamin D3, (VITAMIN D3) 4,000 unit cap Take  by mouth daily. No current facility-administered medications for this visit. Allergies   Allergen Reactions    Methotrexate Other (comments)     DVT and PE    Orudis [Ketoprofen] Palpitations    Singulair [Montelukast] Rash    Arthrotec 50 [Diclofenac-Misoprostol] Palpitations    Levaquin [Levofloxacin] Other (comments)     Hip joints swelling and painful per pt report    Lyrica [Pregabalin] Other (comments)     Saw spiders. Review of Systems: A complete review of systems was obtained, negative except as described above.     Physical Exam:     Visit Vitals  /84   Pulse 67   Temp 97.6 °F (36.4 °C) (Oral)   Resp 20   Ht 5' 10\" (1.778 m)   Wt 280 lb (127 kg)   SpO2 97%   BMI 40.18 kg/m²     ECOG PS: 2  General: Well developed, no acute distress, using rolling walker  Eyes: PERRLA, EOMI, anicteric sclerae  HENT: Atraumatic, OP clear, TMs intact without erythema  Neck: Supple  Lymphatic: No cervical, supraclavicular, axillary or inguinal adenopathy  Respiratory: RLL velcro crackles, otherwise CTA, normal respiratory effort  CV: Normal rate, regular rhythm, no murmurs, no peripheral edema  GI: Soft, nontender, nondistended, no masses, no hepatomegaly, no splenomegaly  MS: Normal gait and station. Digits without clubbing or cyanosis. Skin: No rashes, ecchymoses, or petechiae. Normal temperature, turgor, and texture. Neuro/Psych: Alert, oriented. 5/5 strength in all 4 extremities. Appropriate affect, normal judgment/insight. Results:     Lab Results   Component Value Date/Time    WBC 7.9 04/16/2019 01:55 PM    HGB 10.8 (L) 04/16/2019 01:55 PM    HCT 35.1 04/16/2019 01:55 PM    PLATELET 590 09/61/7129 01:55 PM    MCV 91 04/16/2019 01:55 PM    ABS. NEUTROPHILS 4.8 04/16/2019 01:55 PM     Lab Results   Component Value Date/Time    Sodium 145 (H) 04/16/2019 01:55 PM    Potassium 4.1 04/16/2019 01:55 PM    Chloride 108 (H) 04/16/2019 01:55 PM    CO2 25 04/16/2019 01:55 PM    Glucose 79 04/16/2019 01:55 PM    BUN 11 04/16/2019 01:55 PM    Creatinine 0.56 (L) 04/16/2019 01:55 PM    GFR est  04/16/2019 01:55 PM    GFR est non-AA 93 04/16/2019 01:55 PM    Calcium 9.2 04/16/2019 01:55 PM     Lab Results   Component Value Date/Time    Bilirubin, total 0.3 04/16/2019 01:55 PM    ALT (SGPT) 17 04/16/2019 01:55 PM    AST (SGOT) 21 04/16/2019 01:55 PM    Alk.  phosphatase 70 04/16/2019 01:55 PM    Protein, total 7.0 04/16/2019 01:55 PM    Albumin 3.8 04/16/2019 01:55 PM    Globulin 4.2 (H) 04/19/2015 07:37 AM     Lab Results   Component Value Date/Time    Iron 36 06/20/2019 02:31 PM    TIBC 269 06/20/2019 02:31 PM    Iron % saturation 13 (L) 06/20/2019 02:31 PM    Ferritin 63 06/20/2019 02:31 PM       Lab Results   Component Value Date/Time    Vitamin B12 666 04/30/2010 10:37 AM     Lab Results   Component Value Date/Time    TSH 1.160 06/14/2016 10:21 AM     Lab Results   Component Value Date/Time    HEP C VIRUS AB <0.1 10/09/2015 08:07 AM         Imaging:     Imaging at [facility]:     CTA Chest 3/2019: showing Multiple bilateral branching pulmonary Emboli, Bilateral patchy ground glass opacities likely chronic, and bronchiectasis consistent with pulmonary fibrosis (Rhematoid related lung disease). Similar Groundglass component and Bronchiectasis on previous CT in our system.       Duplex Legs 3/2019: Acute Deep vein thrombosis of the right peroneal vein    Echo: Mild concentric LVH with normal cavity size and systolic function. Trivial to mild regurgitation. Moderate LV diastolic dysfunction with elevated left atrial pressure. 6/20/19 Venous doppler bilateral LE:  Right Lower Venous     No evidence of deep vein thrombosis in the common femoral, profunda femoral, superficial femoral, popliteal, posterior tibial, and peroneal veins. The veins were imaged in the transverse and longitudinal planes. The vessels showed normal color filling and compressibility. Doppler interrogation showed phasic and spontaneous flow. Left Lower Venous     For comparison purposes, the left common femoral vein was briefly interrogated. These vein demonstrates normal color filing and compressibility. Doppler flow was phasic and spontaneous. Assessment & Plan:   Alda Cruz is a 68 y.o. female with RA comes in for evaluation of DVT/PE. 1. Venous thromboembolism: PE and RLE DVT occurred in March 2019. No obvious risk factors. My review of MTX shows no increased risk of VTE; Remicaide associated with < 1% risk of thrombophelebitis, MI, etc. Between the 2 medications, the Remicaide is more likely a culprit. The dose and frequency were recently increased in early February. I recommend patient discuss further with management with her Rheumatologist, Dr. Rosa Pruitt. Patient is tolerating the Xarelto well aside from nosebleeds (improving). Patient will likely need to remain on anticoagulation indefinitely as it is not clear whether this was provoked by medication or not. 6/2019 lower extremity venous dopplers ordered by PCP are negative  -- Switch from Xarelto to Eliquis 5mg bid given epistaxis.   -- Return in 3 months  -- Sending progress note to Dr. Johanna Cortes (Rheumatologist) - Yolanda Menchaca [phone]    2. RA with lung involvement: MTX on hold. Started Remicaide 4/30/19, second infusion 6/11/19. I feel pt can likely restart MTX. I recommend she discuss an alternate agent to the Remicaide with her physician. Home O2 was started early Feb 2019 - likely due to PE and will likely be able to come off of supplemental O2. Her O2 sats are 96% on RA today in our office. -- Sees Dr. Kat Nicole in Pulmonary and Dr. Johanna Cortes in Rheumatology. 3. Diastolic CHF: Seen on echo and likely due to HTN. Not on diuretics. On Norvasc. 4. Epistaxis: Occurred even before starting Xarelto, but more frequent since starting Xarelto. Currently managing with clamping physical pressure and Afrin prn. Occurring every other day and sometimes twice in one day. Also recommended saline nasal spray. -- Switch to Eliquis. 5. Normocytic anemia: Hgb improved from 10. 8.to 11.6 with normalization in iron levels. -- Continue Iron supplements daily    6. R knee osteoarthritis: Pt might be getting an injection in the near future. -- Would hold anticoagulation for 2 days prior. I appreciate the opportunity to participate in Ms. Morteza Espinoza [place]'s care.     Signed By: Ning Navarrete MD     August 28, 2019

## 2019-08-30 DIAGNOSIS — D64.9 NORMOCYTIC ANEMIA: ICD-10-CM

## 2019-08-31 LAB
BASOPHILS # BLD AUTO: 0 X10E3/UL (ref 0–0.2)
BASOPHILS NFR BLD AUTO: 0 %
EOSINOPHIL # BLD AUTO: 0.4 X10E3/UL (ref 0–0.4)
EOSINOPHIL NFR BLD AUTO: 5 %
ERYTHROCYTE [DISTWIDTH] IN BLOOD BY AUTOMATED COUNT: 12.7 % (ref 12.3–15.4)
FERRITIN SERPL-MCNC: 76 NG/ML (ref 15–150)
HCT VFR BLD AUTO: 35 % (ref 34–46.6)
HGB BLD-MCNC: 11.6 G/DL (ref 11.1–15.9)
IMM GRANULOCYTES # BLD AUTO: 0 X10E3/UL (ref 0–0.1)
IMM GRANULOCYTES NFR BLD AUTO: 0 %
IRON SATN MFR SERPL: 23 % (ref 15–55)
IRON SERPL-MCNC: 69 UG/DL (ref 27–139)
LYMPHOCYTES # BLD AUTO: 2.8 X10E3/UL (ref 0.7–3.1)
LYMPHOCYTES NFR BLD AUTO: 33 %
MCH RBC QN AUTO: 29.5 PG (ref 26.6–33)
MCHC RBC AUTO-ENTMCNC: 33.1 G/DL (ref 31.5–35.7)
MCV RBC AUTO: 89 FL (ref 79–97)
MONOCYTES # BLD AUTO: 0.5 X10E3/UL (ref 0.1–0.9)
MONOCYTES NFR BLD AUTO: 5 %
MORPHOLOGY BLD-IMP: ABNORMAL
NEUTROPHILS # BLD AUTO: 4.5 X10E3/UL (ref 1.4–7)
NEUTROPHILS NFR BLD AUTO: 57 %
PLATELET # BLD AUTO: 136 X10E3/UL (ref 150–450)
RBC # BLD AUTO: 3.93 X10E6/UL (ref 3.77–5.28)
TIBC SERPL-MCNC: 296 UG/DL (ref 250–450)
UIBC SERPL-MCNC: 227 UG/DL (ref 118–369)
WBC # BLD AUTO: 8.2 X10E3/UL (ref 3.4–10.8)

## 2019-09-03 ENCOUNTER — OFFICE VISIT (OUTPATIENT)
Dept: ONCOLOGY | Age: 74
End: 2019-09-03

## 2019-09-03 VITALS
HEART RATE: 67 BPM | HEIGHT: 70 IN | RESPIRATION RATE: 20 BRPM | DIASTOLIC BLOOD PRESSURE: 84 MMHG | SYSTOLIC BLOOD PRESSURE: 148 MMHG | OXYGEN SATURATION: 97 % | TEMPERATURE: 97.6 F | BODY MASS INDEX: 40.09 KG/M2 | WEIGHT: 280 LBS

## 2019-09-03 DIAGNOSIS — R04.0 EPISTAXIS: ICD-10-CM

## 2019-09-03 DIAGNOSIS — I26.99 OTHER ACUTE PULMONARY EMBOLISM WITHOUT ACUTE COR PULMONALE (HCC): Primary | ICD-10-CM

## 2019-09-03 DIAGNOSIS — M17.11 PRIMARY OSTEOARTHRITIS OF RIGHT KNEE: ICD-10-CM

## 2019-09-03 DIAGNOSIS — M05.10 RHEUMATOID LUNG DISEASE (HCC): ICD-10-CM

## 2019-09-03 DIAGNOSIS — I82.451 ACUTE DEEP VEIN THROMBOSIS (DVT) OF RIGHT PERONEAL VEIN (HCC): ICD-10-CM

## 2019-09-09 ENCOUNTER — TELEPHONE (OUTPATIENT)
Dept: ONCOLOGY | Age: 74
End: 2019-09-09

## 2019-09-09 NOTE — TELEPHONE ENCOUNTER
Received call from patient. States this morning, she spontaneously coughed up a small amount of bright red blood. States cough present for a couple weeks. She was placed on Zpak by PCP for this but cough worsened after receiving Remicade infusion last thursday. States no shortness of breath, no chest pain, no fevers. Reports slight chest tightness yesterday with cough. Has not started Eliquis as she reports it has not been delivered by mail order yet. States for now, she continues to take Xarelto. Last dose was yesterday afternoon. States she will hold dose for today until she receives recommendations from this office. Advised will forward to Dr. Amarjit Leung and return call with response.

## 2019-09-09 NOTE — TELEPHONE ENCOUNTER
Spoke with patient. Verified ID x 2. Advised per Dr. Elvira Nesbitt, ok to hold Xarelto today & tomorrow and then resume. Should start Eliquis once received & call office if further bleeding noted. Patient verbalized understanding & appreciation.

## 2019-09-18 ENCOUNTER — CLINICAL SUPPORT (OUTPATIENT)
Dept: FAMILY MEDICINE CLINIC | Age: 74
End: 2019-09-18

## 2019-09-18 VITALS
OXYGEN SATURATION: 96 % | SYSTOLIC BLOOD PRESSURE: 139 MMHG | RESPIRATION RATE: 18 BRPM | HEART RATE: 74 BPM | TEMPERATURE: 97.5 F | DIASTOLIC BLOOD PRESSURE: 71 MMHG

## 2019-09-18 DIAGNOSIS — Z23 ENCOUNTER FOR IMMUNIZATION: Primary | ICD-10-CM

## 2019-09-18 NOTE — PROGRESS NOTES
Influenza vaccine administered 9/18/2019 by Adolm Duverney, LPN with consent. Patient tolerated well. No reactions noted.

## 2019-10-14 ENCOUNTER — TELEPHONE (OUTPATIENT)
Dept: ONCOLOGY | Age: 74
End: 2019-10-14

## 2019-10-14 NOTE — TELEPHONE ENCOUNTER
Agree with holding morning dose of Eliquis as there was active bleeding. Resume this evening if nose bleeds have not recurred. Recommend f/u with ENT, to see if area needs to be cauterized. She has been referred to ENT in the past, not sure if she has had recent follow up? If nose bleeds continue may need to consider low dose Eliquis, but this is not ideal, would value Dr. Max Patterson opinion regarding dosing.

## 2019-10-14 NOTE — TELEPHONE ENCOUNTER
[address] at [facility]  [phone]      10/14/19 10:56 AM Called patient. She states she had continuous nose bleed this morning x 1 hour, used multiple tissues to \"pack\" nose. Denies clots. Patient states she feels weak, but denies SOB and lightheadedness/dizziness. She shared that she feels \"head pressure\" but that this is getting better with rest. Patient states she has had a nosebleed daily for the past 4 days but that they have resolved quickly. Patient does have a saline nasal spray but did not use it last night and also wears a CPAP at night now. Patient states she held her dose of Eliquis this morning and inquiring when she should resume medication or if Dr. Pari Tony has any additional recommendations. Advised I would defer to Dr. Pari Tony and call patient back. She verbalized understanding. 12:57 PM Called patient. Informed her, per Ananda Quijano, that she agreed with holding morning dose of Eliquis since patient was having active bleeding but that it is safe for her to resume medication this evening if nose bleeds have not recurred. Also advised that she follow up with ENT. Patient last saw Dr. Ileana Chaudhary about 2 years ago but did not need routine follow up. Patient states she will call Dr. Malhotra Amel office to schedule an appointment. Encouraged patient to call office back if she needs any assistance in scheduling. She verbalized understanding. No further questions or concerns at this time.

## 2019-10-14 NOTE — TELEPHONE ENCOUNTER
Patient calling with concern that she had a nose bleed this AM for about 1 hour. She finally got it to stop. Patient is questioning weather she needs to been seen or do anything different with her medication?

## 2019-11-19 ENCOUNTER — OFFICE VISIT (OUTPATIENT)
Dept: FAMILY MEDICINE CLINIC | Age: 74
End: 2019-11-19

## 2019-11-19 VITALS
RESPIRATION RATE: 22 BRPM | TEMPERATURE: 98.5 F | BODY MASS INDEX: 41.19 KG/M2 | SYSTOLIC BLOOD PRESSURE: 131 MMHG | DIASTOLIC BLOOD PRESSURE: 73 MMHG | WEIGHT: 287.7 LBS | HEART RATE: 83 BPM | OXYGEN SATURATION: 94 % | HEIGHT: 70 IN

## 2019-11-19 DIAGNOSIS — L30.4 INTERTRIGO: ICD-10-CM

## 2019-11-19 DIAGNOSIS — J47.1 BRONCHIECTASIS WITH ACUTE EXACERBATION (HCC): Chronic | ICD-10-CM

## 2019-11-19 DIAGNOSIS — E78.00 HIGH CHOLESTEROL: Chronic | ICD-10-CM

## 2019-11-19 DIAGNOSIS — M48.062 SPINAL STENOSIS OF LUMBAR REGION WITH NEUROGENIC CLAUDICATION: Chronic | ICD-10-CM

## 2019-11-19 DIAGNOSIS — M06.9 RHEUMATOID ARTHRITIS INVOLVING MULTIPLE SITES, UNSPECIFIED RHEUMATOID FACTOR PRESENCE: ICD-10-CM

## 2019-11-19 DIAGNOSIS — R04.0 FREQUENT NOSEBLEEDS: ICD-10-CM

## 2019-11-19 DIAGNOSIS — I49.1 PAC (PREMATURE ATRIAL CONTRACTION): Chronic | ICD-10-CM

## 2019-11-19 DIAGNOSIS — M05.10 RHEUMATOID LUNG DISEASE (HCC): Primary | Chronic | ICD-10-CM

## 2019-11-19 DIAGNOSIS — J45.50 SEVERE PERSISTENT ASTHMA WITHOUT COMPLICATION: Chronic | ICD-10-CM

## 2019-11-19 NOTE — PROGRESS NOTES
[address], [address] [place] [address]  [phone]           Progress Note    Patient: Stanley Xiong MRN: [id number]  SSN: [id number]    YOB: 1945  Age: 76 y.o. Sex: female        Chief Complaint   Patient presents with    Follow Up Chronic Condition    Hypertension         Subjective:     Encounter Diagnoses   Name Primary?  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[facility] 75.): Symptoms are stable. Yes    Bronchiectasis with acute exacerbation [facility]): Chronic clear mucus blood but no signs of recent infection.  High cholesterol:  Cardiovascular risks for her are: LDL goal is under 100  hypertension  hyperlipidemia. Key Antihyperlipidemia Meds             atorvastatin (LIPITOR) 10 mg tablet (Taking) TAKE ONE TABLET BY MOUTH EVERY DAY    omega-3s-dha-epa-fish oil 300-1,000 mg cpDR (Taking) Take  by mouth daily. Lab Results   Component Value Date/Time    Cholesterol, total 160 04/16/2019 01:55 PM    HDL Cholesterol 46 04/16/2019 01:55 PM    LDL, calculated 98 04/16/2019 01:55 PM    Triglyceride 81 04/16/2019 01:55 PM    CHOL/HDL Ratio 3.2 04/30/2010 10:37 AM     Lab Results   Component Value Date/Time    ALT (SGPT) 17 04/16/2019 01:55 PM    AST (SGOT) 21 04/16/2019 01:55 PM    Alk. phosphatase 70 04/16/2019 01:55 PM    Bilirubin, total 0.3 04/16/2019 01:55 PM      Myalgias: No   Fatigue: No   Other side effects: no  Wt Readings from Last 3 Encounters:   11/19/19 287 lb 11.2 oz (130.5 kg)   09/03/19 280 lb (127 kg)   08/19/19 283 lb (128.4 kg)     The patient is aware of our goal to reduce or eliminate the long term problems (such as strokes and heart attacks) related to poorly controlled hyperlipidemia.  Rheumatoid arthritis involving multiple sites, unspecified rheumatoid factor presence ([facility] 75.): above.  Severe persistent asthma without complication:  asthma, not currently in exacerbation.    Asthma symptoms occur: infrequently. Wheezing when present is described as mild and easily relieved with rescue bronchodilator. Current limitations in activity from asthma: none. SpO2 Readings from Last 3 Encounters:   11/19/19 94%   09/18/19 96%   09/03/19 97%     Frequency of use of quick-relief meds: rare. Medication compliance: Good  Patient does not smoke cigarettes. Monitors Peak Flow at home: no           Spinal stenosis of lumbar region with neurogenic claudication: Stable symptoms.  PAC (premature atrial contraction): She is unaware of heart palpitations.  Intertrigo: She has itching in multiple intertriginous areas. I suspect she may have a low-grade yeast infection and recommended she try Lotrimin cream over-the-counter to see if it helps.  Frequent nosebleeds:  Her nosebleeds have been largely controlled since she had cauterization of the septal vessels. Current and past medical information:    Current Medications after this visit[de-identified]     Current Outpatient Medications   Medication Sig    raNITIdine (ZANTAC) 300 mg tab TAKE 1 TABLET BY MOUTH DAILY    apixaban (ELIQUIS) 5 mg tablet Take 1 Tab by mouth two (2) times a day.  amLODIPine (NORVASC) 5 mg tablet TAKE ONE TABLET BY MOUTH EVERY DAY    triamcinolone acetonide (KENALOG) 0.1 % ointment Apply  to affected area two (2) times a day. use thin layer in the affected area of the left leg    budesonide-formoterol (SYMBICORT) 160-4.5 mcg/actuation HFAA Take 2 Puffs by inhalation two (2) times a day.  Hot Water Bottle (WATER BOTTLE) misc Please Dispense a humidifier bottle to patient to use with her oxygen.  PROAIR HFA 90 mcg/actuation inhaler INHALE 2 PUFFS BY MOUTH EVERY 4 HOURS AS NEEDED FOR WHEEZING    albuterol (PROVENTIL VENTOLIN) 2.5 mg /3 mL (0.083 %) nebulizer solution USE 1 VIAL IN NEBULIZER EVERY 4 HOURS AS NEEDED FOR WHEEZING    infliximab (REMICADE IV) by IntraVENous route.  Every 6 weeks    atorvastatin (LIPITOR) 10 mg tablet TAKE ONE TABLET BY MOUTH EVERY DAY    UBIDECARENONE (COQ-10 PO) Take  by mouth.  acetaminophen (TYLENOL) 500 mg tablet Take 1,000 mg by mouth every six (6) hours as needed for Pain.  omega-3s-dha-epa-fish oil 300-1,000 mg cpDR Take  by mouth daily.  cholecalciferol, vitamin D3, (VITAMIN D3) 4,000 unit cap Take  by mouth daily.  traMADol (ULTRAM) 50 mg tablet Take 50 mg by mouth every six (6) hours as needed for Pain.  folic acid (FOLVITE) 1 mg tablet Take 3 Tabs by mouth daily.  gabapentin (NEURONTIN) 100 mg capsule      No current facility-administered medications for this visit.         Patient Active Problem List    Diagnosis Date Noted    Bronchiectasis with acute exacerbation ([facility] 08/19/2019     Priority: 1 - One    Lumbar spinal stenosis 10/06/2015     Priority: 1 - One    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[facility] 12/15/2010     Priority: 1 - One    High cholesterol 04/30/2010     Priority: 1 - One    [facility] 04/15/2010     Priority: 1 - One    Severe persistent asthma without complication 51/54/7789     Priority: 1 - One    OA (osteoarthritis) 04/15/2010     Priority: 1 - One    Obesity, unspecified 04/15/2010     Priority: 1 - One    Spinal stenosis of lumbar region with neurogenic claudication 04/15/2010     Priority: 1 - One    Cardiac dysrhythmia, unspecified 04/15/2010     Priority: 2 - Two    Pain of left lower leg 06/19/2019    History of DVT of lower extremity 06/06/2019    Hx of pulmonary embolus 06/06/2019    Chronic rhinitis 05/23/2019    Allergic contact dermatitis 05/23/2019    Essential hypertension 04/15/2019    Obesity, morbid ([facility] 12/04/2017    Primary localized osteoarthritis of right hip 07/11/2016    Asthma 10/06/2015    Primary generalized (osteo)arthritis 10/06/2015    PAC (premature atrial contraction) 10/06/2015    Colon polyp, hyperplastic 06/02/2015       Past Medical History:   Diagnosis Date    Anemia, unspecified 4/15/2010    Cardiac dysrhythmia, unspecified 4/15/2010    PAT- not on medication/ not seeing cardiologist    High cholesterol 2010    History of DVT of lower extremity 2019    Right leg (3/2019)    Obesity, unspecified 4/15/2010    Osteoarthrosis, unspecified whether generalized or localized, other specified sites 4/15/2010    Rheumatoid arthritis(714.0) 4/15/2010    Spinal stenosis, unspecified region other than cervical 4/15/2010    Unspecified asthma(493.90) 4/15/2010    rheumatoid lung disease       Allergies   Allergen Reactions    Methotrexate Other (comments)     DVT and PE    Orudis [Ketoprofen] Palpitations    Singulair [Montelukast] Rash    Arthrotec 50 [Diclofenac-Misoprostol] Palpitations    Levaquin [Levofloxacin] Other (comments)     Hip joints swelling and painful per pt report    Lyrica [Pregabalin] Other (comments)     Saw spiders. Past Surgical History:   Procedure Laterality Date    CHEST SURGERY PROCEDURE UNLISTED  lat 6666'O    lung biopsy- diagnosed with rheumatoid lung disease    ENDOSCOPY, COLON, DIAGNOSTIC      7-10-13    HX ORTHOPAEDIC Right     rotator cuff repair       Social History     Socioeconomic History    Marital status: SINGLE     Spouse name: Not on file    Number of children: Not on file    Years of education: Not on file    Highest education level: Not on file   Tobacco Use    Smoking status: Former Smoker     Packs/day: 1.00     Years: 20.00     Pack years: 20.00     Last attempt to quit: 1985     Years since quittin.5    Smokeless tobacco: Never Used   Substance and Sexual Activity    Alcohol use: No    Drug use: No    Sexual activity: Never       Review of Systems   Constitutional: Negative. Negative for chills, fever, malaise/fatigue and weight loss. HENT: Negative. Negative for hearing loss. Eyes: Negative. Negative for blurred vision and double vision. Respiratory: Positive for cough.  Negative for sputum production, shortness of breath and wheezing. Cardiovascular: Negative. Negative for chest pain and palpitations. Gastrointestinal: Negative. Negative for abdominal pain, blood in stool, heartburn, nausea and vomiting. Genitourinary: Negative. Negative for dysuria, frequency and urgency. Musculoskeletal: Positive for back pain and joint pain. Negative for falls, myalgias and neck pain. She has had a shot in her knee helped dramatically. Skin: Negative. Negative for rash. Neurological: Negative. Negative for dizziness, tingling, tremors, weakness and headaches. Endo/Heme/Allergies: Negative. Psychiatric/Behavioral: Negative. Negative for depression. Objective:     Vitals:    11/19/19 1328   BP: 131/73   Pulse: 83   Resp: 22   Temp: 98.5 °F (36.9 °C)   TempSrc: Oral   SpO2: 94%   Weight: 287 lb 11.2 oz (130.5 kg)   Height: 5' 10\" (1.778 m)      Body mass index is 41.28 kg/m². Physical Exam   Constitutional: She is oriented to person, place, and time and well-developed, well-nourished, and in no distress. No distress. HENT:   Head: Normocephalic and atraumatic. Mouth/Throat: Oropharynx is clear and moist.   Eyes: Conjunctivae are normal.   Neck: No thyromegaly present. No carotid bruit. Cardiovascular: Normal rate, regular rhythm and normal heart sounds. No murmur heard. Pulmonary/Chest: Effort normal and breath sounds normal. No respiratory distress. She has no wheezes. Slightly decreased breath sounds but no rales or rhonchi. Abdominal: Soft. She exhibits no distension. There is no tenderness. There is no rebound and no guarding. Neurological: She is alert and oriented to person, place, and time. Skin: Skin is warm. No rash noted. She is not diaphoretic. No erythema. Psychiatric: Mood and affect normal.   Nursing note and vitals reviewed.         Health Maintenance Due   Topic Date Due    Shingrix Vaccine Age 49> (1 of 2) 09/20/1995 Assessment and orders:     Encounter Diagnoses     ICD-10-CM ICD-9-CM   1. 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[facility] M05.10 714.81   2. Bronchiectasis with acute exacerbation (HCC) J47.1 494.1   3. High cholesterol E78.00 272.0   4. Rheumatoid arthritis involving multiple sites, unspecified rheumatoid factor presence (HCC) M06.9 714.0   5. Severe persistent asthma without complication P66.01 670.02   6. Spinal stenosis of lumbar region with neurogenic claudication M48.062 724.03   7. PAC (premature atrial contraction) I49.1 427.61   8. Intertrigo L30.4 695.89   9. Frequent nosebleeds R04.0 784.7     Diagnoses and all orders for this visit:    1. 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HCC)-if not better it is at least stable. Mother CT scan is scheduled for April 7648.  -     METABOLIC PANEL, COMPREHENSIVE; Future    2. Bronchiectasis with acute exacerbation (HCC)-stable symptoms    3. High cholesterol-retest at her convenience  -     METABOLIC PANEL, COMPREHENSIVE; Future  -     LIPID PANEL; Future    4. Rheumatoid arthritis involving multiple sites, unspecified rheumatoid factor presence (HCC)  -     METABOLIC PANEL, COMPREHENSIVE; Future    5. Severe persistent asthma without complication    6. Spinal stenosis of lumbar region with neurogenic claudication    7. PAC (premature atrial contraction)    8. Intertrigo try Lotrisone from over-the-counter. 9. Frequent [id number] symptoms-controlled-stable            Plan of care:  Discussed diagnoses in detail with patient. Medication risks/benefits/side effects discussed with patient. All of the patient's questions were addressed. The patient understands and agrees with our plan of care. The patient knows to call back if they are unsure of or forget any changes we discussed today or if the symptoms change.      The patient received an After-Visit Summary which contains VS, orders, medication list and allergy list. This can be used as a \"mini-medical record\" should they have to seek medical care while out of town. Patient Care Team:  Rosendo Thompson MD as PCP - Chiqui Fulton MD as PCP - [facility] Empaneled Provider  Gwendolyn Munoz MD (Orthopedic Surgery)  Marilou Ching MD (Orthopedic Surgery)  Domo Phillips MD (Otolaryngology)  Flora Lee RN as Ambulatory Care Manager  Cassandra Zamora MD (Rheumatology)    Follow-up and Dispositions    · Return in about 3 months (around 2/19/2020). Future Appointments   Date Time Provider David Chester   12/6/2019 11:00 AM Beth Ojeda MD [facility] SCHED   2/19/2020 11:10 AM [facility]   4/7/2020 10:00 AM [facility] CT 1 SF[facility]   4/7/2020 11:00 AM ECHO LAB [facility] SFMNIC [facility]       Signed By: Hernandez Morgan MD     November 19, 2019      ATTENTION:   This medical record was transcribed using an electronic medical records/speech recognition system. Although proofread, it may and can contain electronic, spelling and other errors. Corrections may be executed at a later time. Please feel free to contact me for any clarifications as needed.

## 2019-11-19 NOTE — PATIENT INSTRUCTIONS

## 2019-11-19 NOTE — PROGRESS NOTES
1. Have you been to the ER, urgent care clinic since your last visit? Hospitalized since your last visit? No    2. Have you seen or consulted any other health care providers outside of the [address] [place] [address] since your last visit? Include any pap smears or colon screening. No    Reviewed record in preparation for visit and have necessary documentation  Goals that were addressed and/or need to be completed during or after this appointment include     Health Maintenance Due   Topic Date Due    Shingrix Vaccine Age 49> (1 of 2) 09/20/1995       Patient is accompanied by self I have received verbal consent from Stan Mckee to discuss any/all medical information while they are present in the room.

## 2019-11-27 ENCOUNTER — HOSPITAL ENCOUNTER (OUTPATIENT)
Dept: LAB | Age: 74
Discharge: HOME | End: 2019-11-27

## 2019-11-27 DIAGNOSIS — E78.00 HIGH CHOLESTEROL: Chronic | ICD-10-CM

## 2019-11-27 DIAGNOSIS — M05.10 RHEUMATOID LUNG DISEASE (HCC): Chronic | ICD-10-CM

## 2019-11-27 DIAGNOSIS — M06.9 RHEUMATOID ARTHRITIS INVOLVING MULTIPLE SITES, UNSPECIFIED RHEUMATOID FACTOR PRESENCE: ICD-10-CM

## 2019-11-27 LAB
ALBUMIN SERPL-MCNC: 3.6 G/DL (ref 3.5–5)
ALBUMIN/GLOB SERPL: 0.9 {RATIO} (ref 1.1–2.2)
ALP SERPL-CCNC: 84 U/L (ref 45–117)
ALT SERPL-CCNC: 20 U/L (ref 12–78)
ANION GAP SERPL CALC-SCNC: 5 MMOL/L (ref 5–15)
AST SERPL-CCNC: 19 U/L (ref 15–37)
BILIRUB SERPL-MCNC: 0.7 MG/DL (ref 0.2–1)
BUN SERPL-MCNC: 13 MG/DL (ref 6–20)
BUN/CREAT SERPL: 19 (ref 12–20)
CALCIUM SERPL-MCNC: 9.2 MG/DL (ref 8.5–10.1)
CHLORIDE SERPL-SCNC: 107 MMOL/L (ref 97–108)
CHOLEST SERPL-MCNC: 166 MG/DL
CO2 SERPL-SCNC: 29 MMOL/L (ref 21–32)
CREAT SERPL-MCNC: 0.67 MG/DL (ref 0.55–1.02)
GLOBULIN SER CALC-MCNC: 4 G/DL (ref 2–4)
GLUCOSE SERPL-MCNC: 93 MG/DL (ref 65–100)
HDLC SERPL-MCNC: 66 MG/DL
HDLC SERPL: 2.5 {RATIO} (ref 0–5)
LDLC SERPL CALC-MCNC: 87.8 MG/DL (ref 0–100)
LIPID PROFILE,FLP: NORMAL
POTASSIUM SERPL-SCNC: 4.5 MMOL/L (ref 3.5–5.1)
PROT SERPL-MCNC: 7.6 G/DL (ref 6.4–8.2)
SODIUM SERPL-SCNC: 141 MMOL/L (ref 136–145)
TRIGL SERPL-MCNC: 61 MG/DL (ref ?–150)
VLDLC SERPL CALC-MCNC: 12.2 MG/DL

## 2019-11-29 DIAGNOSIS — E78.00 PURE HYPERCHOLESTEROLEMIA: ICD-10-CM

## 2019-11-29 RX ORDER — ATORVASTATIN CALCIUM 10 MG/1
TABLET, FILM COATED ORAL
Qty: 90 TAB | Refills: 2 | Status: SHIPPED | OUTPATIENT
Start: 2019-11-29 | End: 2019-12-30 | Stop reason: CLARIF

## 2019-12-06 ENCOUNTER — OFFICE VISIT (OUTPATIENT)
Dept: ONCOLOGY | Age: 74
End: 2019-12-06

## 2019-12-06 VITALS
WEIGHT: 287 LBS | SYSTOLIC BLOOD PRESSURE: 138 MMHG | HEIGHT: 70 IN | RESPIRATION RATE: 16 BRPM | DIASTOLIC BLOOD PRESSURE: 77 MMHG | TEMPERATURE: 97.5 F | BODY MASS INDEX: 41.09 KG/M2 | OXYGEN SATURATION: 97 % | HEART RATE: 75 BPM

## 2019-12-06 DIAGNOSIS — R04.0 EPISTAXIS: ICD-10-CM

## 2019-12-06 DIAGNOSIS — I26.99 OTHER ACUTE PULMONARY EMBOLISM WITHOUT ACUTE COR PULMONALE (HCC): Primary | ICD-10-CM

## 2019-12-06 DIAGNOSIS — I82.451 ACUTE DEEP VEIN THROMBOSIS (DVT) OF RIGHT PERONEAL VEIN (HCC): ICD-10-CM

## 2019-12-06 DIAGNOSIS — M17.11 PRIMARY OSTEOARTHRITIS OF RIGHT KNEE: ICD-10-CM

## 2019-12-06 RX ORDER — MUPIROCIN 20 MG/G
OINTMENT TOPICAL
Refills: 0 | COMMUNITY
Start: 2019-10-30

## 2019-12-06 RX ORDER — FAMOTIDINE 20 MG/1
20 TABLET, FILM COATED ORAL 2 TIMES DAILY
COMMUNITY

## 2019-12-06 NOTE — PROGRESS NOTES
[address] [facility] at [address] [place] [address]  [phone]    Hematology / Oncology Established Visit    Reason for Visit:   Bonita Delaney is a 76 y.o. female who comes in for f/u of PE. PCP is Dr. Kallie Calles. History of Present Illness:   Ms. Scooby Alexandre is a 76 y.o. female with RA with lung involvement who comes in for evaluation and management of PE. She was recently put on home O2. She was hospitalized at [address] [place] [address] 3/3/19 - 3/5/19 after p/w weakness, fatigue, SOB, chills. She denies R leg swelling but has had subacute pain in R leg from knee down which started in early 2019. She was found to have bilateral pulmonary emboli as well as RLE DVT. She was started on Heparin infusion and transitioned to Xarelto. At time of discharge, she was told to stop MTX, Aspirin and Celebrex. Since then, she reported to the ED at [facility] for 2 episodes of bleeding from left naris. She also coughed up blood. She denies any 8 hr trips, surgeries, fractures, OCPs or hormone replacement. No prior h/o VTE. No family h/o VTE. For RA, she takes MTX, but this was put on hold. She was also started on Remicaide 1 yr ago. She is currently taking Xarelto as prescribed, states her SOB is much better than prior. The R leg pain is much better, but feels really stiff per patient.     -Labs 3/10/19: WBC 9.5, Hgb 10.8, , HIV/HCV Ab/HepB Surface Ag all negative. Interval History: Patient here for follow up of PE and RLE DVT. Since the last visit, patient went to see ENT for nosebleeds and underwent cauterization in 2019. Nosebleeds under control now. Had also switched to Eliquis at our last visit. She underwent injection in R knee for osteoarthritis pain, which helped.      Past Medical History:   Diagnosis Date    Anemia, unspecified 4/15/2010    Cardiac dysrhythmia, unspecified 4/15/2010    PAT- not on medication/ not seeing cardiologist    High cholesterol 2010    History of DVT of lower extremity 2019 Right leg (3/2019)    Obesity, unspecified 4/15/2010    Osteoarthrosis, unspecified whether generalized or localized, other specified sites 4/15/2010    Rheumatoid arthritis(714.0) 4/15/2010    Spinal stenosis, unspecified region other than cervical 4/15/2010    Unspecified asthma(493.90) 4/15/2010    rheumatoid lung disease      Past Surgical History:   Procedure Laterality Date    CHEST SURGERY PROCEDURE UNLISTED  lat     lung biopsy- diagnosed with rheumatoid lung disease    ENDOSCOPY, COLON, DIAGNOSTIC      7-10-13    HX ORTHOPAEDIC Right     rotator cuff repair      Social History     Tobacco Use    Smoking status: Former Smoker     Packs/day: 1.00     Years: 20.00     Pack years: 20.00     Last attempt to quit: 1985     Years since quittin.6    Smokeless tobacco: Never Used   Substance Use Topics    Alcohol use: No      Family History   Problem Relation Age of Onset    Cancer Mother         cervical, breast    Heart Disease Mother     Heart Attack Mother     Hypertension Mother     Arthritis-osteo Mother     Heart Disease Father     Heart Attack Father     Cancer Other         uterine and lung.  High Cholesterol Brother     Stroke Brother     Arthritis-rheumatoid Sister     Migraines Sister     Breast Cancer Maternal Aunt      Current Outpatient Medications   Medication Sig    famotidine (PEPCID) 20 mg tablet Take 20 mg by mouth two (2) times a day.  mupirocin (BACTROBAN) 2 % ointment     atorvastatin (LIPITOR) 10 mg tablet TAKE ONE TABLET BY MOUTH EVERY DAY    apixaban (ELIQUIS) 5 mg tablet Take 1 Tab by mouth two (2) times a day.  amLODIPine (NORVASC) 5 mg tablet TAKE ONE TABLET BY MOUTH EVERY DAY    traMADol (ULTRAM) 50 mg tablet Take 50 mg by mouth every six (6) hours as needed for Pain.  triamcinolone acetonide (KENALOG) 0.1 % ointment Apply  to affected area two (2) times a day.  use thin layer in the affected area of the left leg    budesonide-formoterol (SYMBICORT) 160-4.5 mcg/actuation HFAA Take 2 Puffs by inhalation two (2) times a day.  Hot Water Bottle (WATER BOTTLE) misc Please Dispense a humidifier bottle to patient to use with her oxygen.  PROAIR HFA 90 mcg/actuation inhaler INHALE 2 PUFFS BY MOUTH EVERY 4 HOURS AS NEEDED FOR WHEEZING    infliximab (REMICADE IV) by IntraVENous route. Every 6 weeks    UBIDECARENONE (COQ-10 PO) Take  by mouth.  gabapentin (NEURONTIN) 100 mg capsule     acetaminophen (TYLENOL) 500 mg tablet Take 1,000 mg by mouth every six (6) hours as needed for Pain.  omega-3s-dha-epa-fish oil 300-1,000 mg cpDR Take  by mouth daily.  cholecalciferol, vitamin D3, (VITAMIN D3) 4,000 unit cap Take  by mouth daily.  raNITIdine (ZANTAC) 300 mg tab TAKE 1 TABLET BY MOUTH DAILY    folic acid (FOLVITE) 1 mg tablet Take 3 Tabs by mouth daily.  albuterol (PROVENTIL VENTOLIN) 2.5 mg /3 mL (0.083 %) nebulizer solution USE 1 VIAL IN NEBULIZER EVERY 4 HOURS AS NEEDED FOR WHEEZING     No current facility-administered medications for this visit. Allergies   Allergen Reactions    Methotrexate Other (comments)     DVT and PE    Orudis [Ketoprofen] Palpitations    Singulair [Montelukast] Rash    Arthrotec 50 [Diclofenac-Misoprostol] Palpitations    Levaquin [Levofloxacin] Other (comments)     Hip joints swelling and painful per pt report    Lyrica [Pregabalin] Other (comments)     Saw spiders. Review of Systems: A complete review of systems was obtained, negative except as described above.     Physical Exam:     Visit Vitals  /77   Pulse 75   Temp 97.5 °F (36.4 °C) (Oral)   Resp 16   Ht 5' 10\" (1.778 m)   Wt 287 lb (130.2 kg)   SpO2 97%   BMI 41.18 kg/m²     ECOG PS: 2  General: Well developed, no acute distress, using rolling walker  Eyes: PERRLA, EOMI, anicteric sclerae  HENT: Atraumatic, OP clear, TMs intact without erythema  Neck: Supple  Lymphatic: No cervical, supraclavicular, axillary or inguinal adenopathy  Respiratory: RLL velcro crackles, otherwise CTA, normal respiratory effort  CV: Normal rate, regular rhythm, no murmurs, no peripheral edema  GI: Soft, nontender, nondistended, no masses, no hepatomegaly, no splenomegaly  MS: Normal gait and station. Digits without clubbing or cyanosis. Skin: No rashes, ecchymoses, or petechiae. Normal temperature, turgor, and texture. Neuro/Psych: Alert, oriented. 5/5 strength in all 4 extremities. Appropriate affect, normal judgment/insight. Results:     Lab Results   Component Value Date/Time    WBC 8.2 08/30/2019 12:00 PM    HGB 11.6 08/30/2019 12:00 PM    HCT 35.0 08/30/2019 12:00 PM    PLATELET 141 (L) 16/54/2490 12:00 PM    MCV 89 08/30/2019 12:00 PM    ABS. NEUTROPHILS 4.5 08/30/2019 12:00 PM     Lab Results   Component Value Date/Time    Sodium 141 11/27/2019 10:45 AM    Potassium 4.5 11/27/2019 10:45 AM    Chloride 107 11/27/2019 10:45 AM    CO2 29 11/27/2019 10:45 AM    Glucose 93 11/27/2019 10:45 AM    BUN 13 11/27/2019 10:45 AM    Creatinine 0.67 11/27/2019 10:45 AM    GFR est AA >60 11/27/2019 10:45 AM    GFR est non-AA >60 11/27/2019 10:45 AM    Calcium 9.2 11/27/2019 10:45 AM     Lab Results   Component Value Date/Time    Bilirubin, total 0.7 11/27/2019 10:45 AM    ALT (SGPT) 20 11/27/2019 10:45 AM    AST (SGOT) 19 11/27/2019 10:45 AM    Alk.  phosphatase 84 11/27/2019 10:45 AM    Protein, total 7.6 11/27/2019 10:45 AM    Albumin 3.6 11/27/2019 10:45 AM    Globulin 4.0 11/27/2019 10:45 AM     Lab Results   Component Value Date/Time    Iron 69 08/30/2019 12:00 PM    TIBC 296 08/30/2019 12:00 PM    Iron % saturation 23 08/30/2019 12:00 PM    Ferritin 76 08/30/2019 12:00 PM       Lab Results   Component Value Date/Time    Vitamin B12 666 04/30/2010 10:37 AM     Lab Results   Component Value Date/Time    TSH 1.160 06/14/2016 10:21 AM     Lab Results   Component Value Date/Time    HEP C VIRUS AB <0.1 10/09/2015 08:07 AM         Imaging: Imaging at [address] [place] [address]:     CTA Chest 3/2019: showing Multiple bilateral branching pulmonary Emboli, Bilateral patchy ground glass opacities likely chronic, and bronchiectasis consistent with pulmonary fibrosis (Rhematoid related lung disease). Similar Groundglass component and Bronchiectasis on previous CT in our system.       Duplex Legs 3/2019: Acute Deep vein thrombosis of the right peroneal vein    Echo: Mild concentric LVH with normal cavity size and systolic function. Trivial to mild regurgitation. Moderate LV diastolic dysfunction with elevated left atrial pressure. 6/20/19 Venous doppler bilateral LE:  Right Lower Venous     No evidence of deep vein thrombosis in the common femoral, profunda femoral, superficial femoral, popliteal, posterior tibial, and peroneal veins. The veins were imaged in the transverse and longitudinal planes. The vessels showed normal color filling and compressibility. Doppler interrogation showed phasic and spontaneous flow. Left Lower Venous     For comparison purposes, the left common femoral vein was briefly interrogated. These vein demonstrates normal color filing and compressibility. Doppler flow was phasic and spontaneous. Assessment & Plan:   Maria Eugenia Elizondo is a 76 y.o. female with RA comes in for evaluation of DVT/PE. 1. Venous thromboembolism: PE and RLE DVT occurred in March 2019. No obvious risk factors. My review of MTX shows no increased risk of VTE; Remicaide associated with < 1% risk of thrombophelebitis, MI, etc. Between the 2 medications, the Remicaide is more likely a culprit, but unclear. The dose and frequency were increased in early February 2019. Pt discussed with her Rheumatologist, Dr. Cherylene Bunting who recommends thrombophilia testing. I discussed with patient that thrombophilia testing is only helpful when testing is positive, putting a name to her risk.  However if testing is negative, it is not helpful as patient may still have inherited thrombophilia associated with unknown genes. Results of testing would not change our anticoagulation management, and pt does not have any children. Nonetheless, I discussed we could do testing on her in the future when she is off of the blood thinner for 2 weeks. Patient is tolerating the anticoagulation well aside from nosebleeds which have improved. Patient will likely need to remain on anticoagulation indefinitely as it is not clear whether this was provoked by medication or not. 6/2019 lower extremity venous dopplers ordered by PCP are negative  -- Continue Eliquis 5mg bid. Pt to alert us at the time of invasive procedures. We may advise her to go ahead and hold anticoagulation for 2 weeks at that time to facilitate testing for inherited thrombophilias. -- Return in 6 months. -- Sending progress note to Dr. Sridhar Miguel (Rheumatologist) - Tim Casillas [phone]    2. RA with lung involvement: MTX on hold. Started Remicaide 4/30/19, second infusion 6/11/19. I feel pt can likely restart MTX. I recommend she discuss an alternate agent to the Remicaide with her physician. Home O2 was started early Feb 2019 - likely due to PE and will likely be able to come off of supplemental O2. Her O2 sats are 96% on RA today in our office. -- Sees Dr. Lars Espinoza in Pulmonary and Dr. Sridhar Miguel in Rheumatology. 3. Diastolic CHF: Seen on echo and likely due to HTN. Not on diuretics. On Norvasc. 4. H/o Epistaxis: Occurred even before starting anticoagulation, but more frequent after starting. Now improved after cauterization by ENT. Also can managed with clamping physical pressure and Afrin prn,  saline nasal spray. 5. Normocytic anemia: Hgb improved from 10. 8.to 11.6 with normalization in iron levels. -- Continue Iron supplements daily    6. R knee osteoarthritis: Improved after injection. I appreciate the opportunity to participate in Ms. Damaris Viveros's care.     Signed By: Baylee Bosch MD     December 6, 2019

## 2019-12-16 DIAGNOSIS — I26.99 OTHER ACUTE PULMONARY EMBOLISM WITHOUT ACUTE COR PULMONALE (HCC): ICD-10-CM

## 2019-12-16 DIAGNOSIS — I82.451 ACUTE DEEP VEIN THROMBOSIS (DVT) OF RIGHT PERONEAL VEIN (HCC): ICD-10-CM

## 2019-12-27 NOTE — TELEPHONE ENCOUNTER
----- Message from La Espinoza sent at 12/27/2019  3:17 PM EST -----  Regarding: López/telephone  Pt is requesting to know if you have the Shingles Shot. Pts number is  [phone] and cell [phone].

## 2019-12-30 NOTE — TELEPHONE ENCOUNTER
Advised patient that we do not have the Shingles vaccine in the office but we could send it to her pharmacy for her. Patient verbalized understanding and asked that we send it to [facility].

## 2019-12-31 RX ORDER — ATORVASTATIN CALCIUM 10 MG/1
TABLET, FILM COATED ORAL
Qty: 90 TAB | Refills: 2 | Status: SHIPPED | OUTPATIENT
Start: 2019-12-31

## 2019-12-31 NOTE — TELEPHONE ENCOUNTER
Please check and see if this is a legitimate request.  Note that it says you discontinued this on 12/30/2019

## 2019-12-31 NOTE — TELEPHONE ENCOUNTER
It is. It was discontinued by mistake when I was trying to put in her request for the Shingles vaccine.

## 2020-01-03 ENCOUNTER — HOSPITAL ENCOUNTER (OUTPATIENT)
Dept: MAMMOGRAPHY | Age: 75
Discharge: HOME | End: 2020-01-03
Attending: FAMILY MEDICINE
Payer: MEDICARE

## 2020-01-03 DIAGNOSIS — Z12.31 VISIT FOR SCREENING MAMMOGRAM: ICD-10-CM

## 2020-01-03 PROCEDURE — 77063 BREAST TOMOSYNTHESIS BI: CPT

## 2020-01-13 ENCOUNTER — HOSPITAL ENCOUNTER (EMERGENCY)
Age: 75
Discharge: HOME | End: 2020-01-13
Attending: EMERGENCY MEDICINE
Payer: MEDICARE

## 2020-01-13 ENCOUNTER — APPOINTMENT (OUTPATIENT)
Dept: GENERAL RADIOLOGY | Age: 75
End: 2020-01-13
Attending: EMERGENCY MEDICINE
Payer: MEDICARE

## 2020-01-13 VITALS
TEMPERATURE: 97.4 F | SYSTOLIC BLOOD PRESSURE: 179 MMHG | OXYGEN SATURATION: 97 % | HEART RATE: 76 BPM | WEIGHT: 280 LBS | DIASTOLIC BLOOD PRESSURE: 77 MMHG | BODY MASS INDEX: 40.09 KG/M2 | RESPIRATION RATE: 14 BRPM | HEIGHT: 70 IN

## 2020-01-13 DIAGNOSIS — M06.9 RHEUMATOID ARTHRITIS INVOLVING LEFT SHOULDER, UNSPECIFIED RHEUMATOID FACTOR PRESENCE: Primary | ICD-10-CM

## 2020-01-13 DIAGNOSIS — S46.812A STRAIN OF LEFT TRAPEZIUS MUSCLE, INITIAL ENCOUNTER: ICD-10-CM

## 2020-01-13 LAB
ATRIAL RATE: 68 BPM
CALCULATED P AXIS, ECG09: 51 DEGREES
CALCULATED R AXIS, ECG10: 27 DEGREES
CALCULATED T AXIS, ECG11: 29 DEGREES
DIAGNOSIS, 93000: NORMAL
P-R INTERVAL, ECG05: 142 MS
Q-T INTERVAL, ECG07: 384 MS
QRS DURATION, ECG06: 92 MS
QTC CALCULATION (BEZET), ECG08: 408 MS
VENTRICULAR RATE, ECG03: 68 BPM

## 2020-01-13 PROCEDURE — 74011636637 HC RX REV CODE- 636/637: Performed by: EMERGENCY MEDICINE

## 2020-01-13 PROCEDURE — 99283 EMERGENCY DEPT VISIT LOW MDM: CPT

## 2020-01-13 PROCEDURE — A9270 NON-COVERED ITEM OR SERVICE: HCPCS | Performed by: EMERGENCY MEDICINE

## 2020-01-13 PROCEDURE — 73030 X-RAY EXAM OF SHOULDER: CPT

## 2020-01-13 PROCEDURE — 93005 ELECTROCARDIOGRAM TRACING: CPT

## 2020-01-13 PROCEDURE — 74011000250 HC RX REV CODE- 250: Performed by: EMERGENCY MEDICINE

## 2020-01-13 PROCEDURE — 75810000123 HC INJ'S ANES/STEROID AGT PERIPH NERVE

## 2020-01-13 RX ORDER — PREDNISONE 20 MG/1
60 TABLET ORAL
Status: COMPLETED | OUTPATIENT
Start: 2020-01-13 | End: 2020-01-13

## 2020-01-13 RX ORDER — BUPIVACAINE HYDROCHLORIDE 5 MG/ML
20 INJECTION, SOLUTION EPIDURAL; INTRACAUDAL
Status: COMPLETED | OUTPATIENT
Start: 2020-01-13 | End: 2020-01-13

## 2020-01-13 RX ORDER — PREDNISONE 10 MG/1
50 TABLET ORAL DAILY
Qty: 20 TAB | Refills: 0 | Status: SHIPPED | OUTPATIENT
Start: 2020-01-14 | End: 2020-01-18

## 2020-01-13 RX ADMIN — BUPIVACAINE HYDROCHLORIDE 100 MG: 5 INJECTION, SOLUTION EPIDURAL; INTRACAUDAL at 15:04

## 2020-01-13 RX ADMIN — PREDNISONE 60 MG: 20 TABLET ORAL at 15:29

## 2020-01-13 NOTE — ED NOTES
Dr. Omayra Parikh has reviewed discharge instructions with the patient. The patient verbalized understanding. Rx given to pt.

## 2020-01-13 NOTE — ED TRIAGE NOTES
Pt here of \"excrutiating\" pain starting in shoulder that radiates down to fingers and up neck and into shoulder blade. Pain to shoulder x 1 months. States radiating pain started last week. Pt has RA.  Denies chest pain

## 2020-01-13 NOTE — ED PROVIDER NOTES
76 y.o. female with past medical history significant for RLE DVT, RA, spinal stenosis, anemia who presents non ambulatory to ED with chief complaint of arm pain. Pt reports intermittent severe left shoulder pain which radiates into her neck and down into her left index and ring fingers. During encounter in ED triage notes the pain is currently in her anterior/posterior left shoulder and radiating down into her fingers but not her neck. States she first started with shoulder pain around 1 month ago but on 1/8/2020 started with severe pains. Reports having 3-4 episodes of severe shoulder pain since onset on 1/8/2020. Initially thought it was a RA flare up and sees a Dr. Lulú Lew @ [facility] for her RA. No injuries have caused the current pain. Notes she can still move her left shoulder fine but tends not to as she doesn't want to cause a flare up of shoulder pain. Has been trying tylenol and Toradol for her pain with no relief. Pt notes she is not allergic to methotrexate and had a dosage of the medication this morning 1/13/2020. Also takes remicade daily. Pt denies any chest pain, fever. PCP: Patti Harrell MD    I have evaluated the patient as the Provider in Triage. I have reviewed Her vital signs and the triage nurse assessment. I have talked with the patient and any available family and advised that I am the provider in triage and have ordered the appropriate study to initiate their work up based on the clinical presentation during my assessment. I have advised that the patient will be accommodated in the Main ED as soon as possible. I have also requested to contact the triage nurse or myself immediately if the patient experiences any changes in their condition during this brief waiting period. Note written by Daren Quezada, as dictated by Lucie Gaucher, PA-C 1:47 PM.           The history is provided by the patient. No  was used.    Shoulder Pain    The incident occurred more than 1 week ago. The incident occurred at home. There was no injury mechanism. The left shoulder is affected. The pain is moderate. The pain has been fluctuating since onset. The pain radiates (down left arm and into back). There is no history of shoulder injury. There is no history of shoulder surgery. Past Medical History:   Diagnosis Date    Anemia, unspecified 4/15/2010    Cardiac dysrhythmia, unspecified 4/15/2010    PAT- not on medication/ not seeing cardiologist    High cholesterol 4/30/2010    History of DVT of lower extremity 6/6/2019    Right leg (3/2019)    Obesity, unspecified 4/15/2010    Osteoarthrosis, unspecified whether generalized or localized, other specified sites 4/15/2010    Rheumatoid arthritis(714.0) 4/15/2010    Spinal stenosis, unspecified region other than cervical 4/15/2010    Unspecified asthma(493.90) 4/15/2010    rheumatoid lung disease       Past Surgical History:   Procedure Laterality Date    CHEST SURGERY PROCEDURE UNLISTED  lat 1990's    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     High Cholesterol Brother     Stroke Brother     Arthritis-rheumatoid Sister     Migraines Sister     Breast Cancer Maternal Aunt        Social History     Socioeconomic History    Marital status: SINGLE     Spouse name: Not on file    Number of children: Not on file    Years of education: Not on file    Highest education level: Not on file   Occupational History    Not on file   Social Needs    Financial resource strain: Not on file    Food insecurity: Worry: Not on file     Inability: Not on file    Transportation needs:     Medical: Not on file     Non-medical: Not on file   Tobacco Use    Smoking status: Former Smoker     Packs/day: 1.00     Years: 20.00     Pack years: 20.00     Last attempt to quit: 1985     Years since quittin.7    Smokeless tobacco: Never Used   Substance and Sexual Activity    Alcohol use: No    Drug use: No    Sexual activity: Never   Lifestyle    Physical activity:     Days per week: Not on file     Minutes per session: Not on file    Stress: Not on file   Relationships    Social connections:     Talks on phone: Not on file     Gets together: Not on file     Attends Gnosticist service: Not on file     Active member of club or organization: Not on file     Attends meetings of clubs or organizations: Not on file     Relationship status: Not on file    Intimate partner violence:     Fear of current or ex partner: Not on file     Emotionally abused: Not on file     Physically abused: Not on file     Forced sexual activity: Not on file   Other Topics Concern    Not on file   Social History Narrative    Not on file         ALLERGIES: Methotrexate; Orudis [ketoprofen]; Singulair [montelukast]; Arthrotec 50 [diclofenac-misoprostol]; Levaquin [levofloxacin]; and Lyrica [pregabalin]    Review of Systems   Constitutional: Negative for fever. Cardiovascular: Negative for chest pain. Musculoskeletal: Positive for arthralgias, myalgias and neck pain. All other systems reviewed and are negative. Vitals:    20 1349   BP: 179/77   Pulse: 76   Resp: 14   Temp: 97.4 °F (36.3 °C)   SpO2: 97%   Weight: 127 kg (280 lb)   Height: 5' 10\" (1.778 m)            Physical Exam  Vitals signs and nursing note reviewed. Constitutional:       General: She is not in acute distress. Appearance: She is well-developed. HENT:      Head: Normocephalic and atraumatic.    Eyes:      Conjunctiva/sclera: Conjunctivae normal. Neck:      Musculoskeletal: Neck supple. Cardiovascular:      Rate and Rhythm: Normal rate and regular rhythm. Pulmonary:      Effort: Pulmonary effort is normal. No respiratory distress. Abdominal:      General: There is no distension. Musculoskeletal:         General: No deformity. Left shoulder: She exhibits decreased range of motion (2/2 pain), tenderness (over joint space and posteriorly over trapezius and subscapularis), effusion and pain. She exhibits no crepitus, no deformity and normal pulse. Arms:    Skin:     General: Skin is warm and dry. Neurological:      Mental Status: She is alert. Cranial Nerves: No cranial nerve deficit. Psychiatric:         Behavior: Behavior normal.          MDM     76 y.o. female presents with left shoulder pain that is acute on chronic. She has notable effusion in the joint which appears to have precipitated some musculoskeletal pain in her back and left trapezius muscle due to secondary muscle tightness. Local anesthesia was applied to her back and shoulder musculature, she might benefit from an intra-articular injection but I explained why we do not typically perform these from the emergency department due to high rate of complications and possibility of introducing infection. Suspect that her shoulder issues related to her RA, started on steroid burst therapy pending outpatient rheumatology evaluation tomorrow. Plan to follow up with PCP as needed and return precautions discussed for worsening or new concerning symptoms. Procedures    Procedure Note: Trigger Point Injection for Myofascial pain    Performed by Yuri Tucker MD  Indication: muscle/myofascial pain  Muscle body and tendon sheath of the left subscapularis and trapezius muscle(s) were injected with 0.5% bupivacaine under sterile technique for release of muscle spasm/pain.  Patient tolerated well with immediate improvement of symptoms and no immediate complications following procedure.     CPT Code:     1 or 2 muscle bodies: 28113

## 2020-01-26 ENCOUNTER — APPOINTMENT (OUTPATIENT)
Dept: CT IMAGING | Age: 75
End: 2020-01-26
Attending: NURSE PRACTITIONER
Payer: MEDICARE

## 2020-01-26 ENCOUNTER — HOSPITAL ENCOUNTER (EMERGENCY)
Age: 75
Discharge: HOME | End: 2020-01-26
Attending: EMERGENCY MEDICINE
Payer: MEDICARE

## 2020-01-26 VITALS
DIASTOLIC BLOOD PRESSURE: 70 MMHG | WEIGHT: 280 LBS | BODY MASS INDEX: 40.09 KG/M2 | TEMPERATURE: 98.3 F | HEART RATE: 86 BPM | HEIGHT: 70 IN | SYSTOLIC BLOOD PRESSURE: 147 MMHG | OXYGEN SATURATION: 96 % | RESPIRATION RATE: 16 BRPM

## 2020-01-26 DIAGNOSIS — M48.02 CERVICAL SPINAL STENOSIS: Primary | ICD-10-CM

## 2020-01-26 PROCEDURE — 72125 CT NECK SPINE W/O DYE: CPT

## 2020-01-26 PROCEDURE — 99283 EMERGENCY DEPT VISIT LOW MDM: CPT

## 2020-01-26 PROCEDURE — 93005 ELECTROCARDIOGRAM TRACING: CPT

## 2020-01-26 PROCEDURE — 74011250637 HC RX REV CODE- 250/637: Performed by: NURSE PRACTITIONER

## 2020-01-26 RX ORDER — CYCLOBENZAPRINE HCL 10 MG
10 TABLET ORAL
Status: COMPLETED | OUTPATIENT
Start: 2020-01-26 | End: 2020-01-26

## 2020-01-26 RX ORDER — CYCLOBENZAPRINE HCL 10 MG
10 TABLET ORAL
Qty: 15 TAB | Refills: 0 | Status: SHIPPED | OUTPATIENT
Start: 2020-01-26 | End: 2020-01-28 | Stop reason: SDUPTHER

## 2020-01-26 RX ORDER — METHYLPREDNISOLONE 4 MG/1
TABLET ORAL
Qty: 1 DOSE PACK | Refills: 0 | Status: SHIPPED | OUTPATIENT
Start: 2020-01-26 | End: 2020-02-19 | Stop reason: ALTCHOICE

## 2020-01-26 RX ADMIN — CYCLOBENZAPRINE 10 MG: 10 TABLET, FILM COATED ORAL at 15:52

## 2020-01-26 NOTE — ED TRIAGE NOTES
Pt arrived to ED via POV with CO L shoulder pain radiating down arm to fingers x several weeks but worse since 1/23. No known injury. Bilateral pulses present and sensation intact. Seen previously for same.

## 2020-01-26 NOTE — ED PROVIDER NOTES
76 y.o. female with past medical history significant for RLE DVT, RA, OA, anemia, spinal stenosis who presents ambulatory w/ a cane to ED with chief complaint of shoulder pain. Pt reports 6/10 left shoulder pain/edema which radiates into her left hand for weeks that have been worse over the past two weeks. Also notes the pain radiates into her left neck and back Under palpation and endorses decreased ROM of left shoulder secondary to pain. Has hx of RA and OA which effect her left shoulder area but she states that this current pain feels different. Underwent a cortisone injection in her left shoulder on Friday 1/24/2020 w/ Dr. Crys Washington without any relief. Took 2 tramadols, 2 tylenols, and 20 mg prednisone earlier today with no relief. Notes she has been seen in the ED for similar left shoulder pain in the past and had cortisone injections in her back during that encounter which did help. Is followed by Dr. Domo Arevalo for her RA. Pt denies any chest pain. PCP: Lady Ben MD  Past Medical History:  4/15/2010:  Anemia, unspecified  4/15/2010: Cardiac dysrhythmia, unspecified      Comment:  PAT- not on medication/ not seeing cardiologist  4/30/2010: High cholesterol  6/6/2019: History of DVT of lower extremity      Comment:  Right leg (3/2019)  4/15/2010: Obesity, unspecified  4/15/2010: Osteoarthrosis, unspecified whether generalized or   localized, other specified sites  4/15/2010: Rheumatoid arthritis(714.0)  4/15/2010: Spinal stenosis, unspecified region other than cervical  4/15/2010: Unspecified asthma(493.90)      Comment:  rheumatoid lung disease  Past Surgical History:  lat 1990's: CHEST SURGERY PROCEDURE UNLISTED      Comment:  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           The history is provided by the patient. No  was used. Past Medical History:   Diagnosis Date    Anemia, unspecified 4/15/2010    Cardiac dysrhythmia, unspecified 4/15/2010    PAT- not on medication/ not seeing cardiologist    High cholesterol 4/30/2010    History of DVT of lower extremity 6/6/2019    Right leg (3/2019)    Obesity, unspecified 4/15/2010    Osteoarthrosis, unspecified whether generalized or localized, other specified sites 4/15/2010    Rheumatoid arthritis(714.0) 4/15/2010    Spinal stenosis, unspecified region other than cervical 4/15/2010    Unspecified asthma(493.90) 4/15/2010    rheumatoid lung disease       Past Surgical History:   Procedure Laterality Date    CHEST SURGERY PROCEDURE UNLISTED  lat 1990's    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     High Cholesterol Brother     Stroke Brother     Arthritis-rheumatoid Sister     Migraines Sister     Breast Cancer Maternal Aunt        Social History     Socioeconomic History    Marital status: SINGLE     Spouse name: Not on file    Number of children: Not on file    Years of education: Not on file    Highest education level: Not on file   Occupational History    Not on file   Social Needs    Financial resource strain: Not on file    Food insecurity:     Worry: Not on file     Inability: Not on file    Transportation needs:     Medical: Not on file     Non-medical: Not on file   Tobacco Use    Smoking status: Former Smoker     Packs/day: 1.00     Years: 20.00     Pack years: 20.00 Last attempt to quit: 1985     Years since quittin.7    Smokeless tobacco: Never Used   Substance and Sexual Activity    Alcohol use: No    Drug use: No    Sexual activity: Never   Lifestyle    Physical activity:     Days per week: Not on file     Minutes per session: Not on file    Stress: Not on file   Relationships    Social connections:     Talks on phone: Not on file     Gets together: Not on file     Attends Voodoo service: Not on file     Active member of club or organization: Not on file     Attends meetings of clubs or organizations: Not on file     Relationship status: Not on file    Intimate partner violence:     Fear of current or ex partner: Not on file     Emotionally abused: Not on file     Physically abused: Not on file     Forced sexual activity: Not on file   Other Topics Concern    Not on file   Social History Narrative    Not on file         ALLERGIES: Methotrexate; Orudis [ketoprofen]; Singulair [montelukast]; Arthrotec 50 [diclofenac-misoprostol]; Levaquin [levofloxacin]; and Lyrica [pregabalin]    Review of Systems   Constitutional: Negative for appetite change, chills, diaphoresis, fatigue and fever. HENT: Negative for congestion, ear discharge, ear pain, sinus pressure, sinus pain, sore throat and trouble swallowing. Eyes: Negative for photophobia, pain, redness and visual disturbance. Respiratory: Negative for chest tightness, shortness of breath and wheezing. Cardiovascular: Negative for chest pain and palpitations. Gastrointestinal: Negative for abdominal distention, abdominal pain, nausea and vomiting. Endocrine: Negative. Genitourinary: Negative for difficulty urinating, flank pain, frequency and urgency. Musculoskeletal: Positive for arthralgias, back pain and neck pain. Negative for neck stiffness. Skin: Negative for color change, pallor, rash and wound. Allergic/Immunologic: Negative.     Neurological: Negative for dizziness, speech difficulty, weakness and headaches. Hematological: Does not bruise/bleed easily. Psychiatric/Behavioral: Negative for behavioral problems. The patient is not nervous/anxious. All other systems reviewed and are negative. There were no vitals filed for this visit. Physical Exam  Vitals signs and nursing note reviewed. Exam conducted with a chaperone present. Constitutional:       General: She is not in acute distress. Appearance: Normal appearance. She is well-developed. HENT:      Head: Normocephalic and atraumatic. Right Ear: External ear normal.      Left Ear: External ear normal.      Nose: Nose normal.      Mouth/Throat:      Mouth: Mucous membranes are moist.   Eyes:      General:         Right eye: No discharge. Left eye: No discharge. Extraocular Movements: Extraocular movements intact. Conjunctiva/sclera: Conjunctivae normal.      Pupils: Pupils are equal, round, and reactive to light. Neck:      Musculoskeletal: Normal range of motion and neck supple. Vascular: No JVD. Trachea: No tracheal deviation. Comments: No step offs, no deformities noted. Cardiovascular:      Rate and Rhythm: Normal rate and regular rhythm. Pulses: Normal pulses. Heart sounds: Normal heart sounds. No murmur. No gallop. Comments: Denies any chest pain, SOB and dizziness. Pulmonary:      Effort: Pulmonary effort is normal. No respiratory distress. Breath sounds: Normal breath sounds. No wheezing or rales. Chest:      Chest wall: No tenderness. Abdominal:      General: Bowel sounds are normal. There is no distension. Palpations: Abdomen is soft. Tenderness: There is no abdominal tenderness. There is no guarding or rebound. Genitourinary:     Comments: Negative    Musculoskeletal: Normal range of motion. General: Tenderness (left shoulder, muscular) present. Skin:     General: Skin is warm and dry.       Capillary Refill: Capillary refill takes less than 2 seconds. Coloration: Skin is not pale. Findings: No erythema or rash. Neurological:      General: No focal deficit present. Mental Status: She is alert and oriented to person, place, and time. Motor: No weakness. Coordination: Coordination normal.   Psychiatric:         Behavior: Behavior normal.         Thought Content: Thought content normal.         Judgment: Judgment normal.          MDM  Number of Diagnoses or Management Options  Cervical spinal stenosis: new and requires workup  Diagnosis management comments: Plan:    Pain located in the left shoulder, left neck region. Adamantly denies any chest pain, SOB or dizziness. Discharge to home and follow-up with PCP. Follow-up with neurosurgery as an outpatient. Return to the emergency room with worsening symptoms. Patient in agreement with plan of care. Muscle relaxants given. Amount and/or Complexity of Data Reviewed  Tests in the radiology section of CPT®: ordered and reviewed         6:21 PM  Pt has been reexamined. Pt has no new complaints, changes or physical findings. Care plan outlined and precautions discussed. All available results were reviewed with pt. All medications were reviewed with pt. All of pt's questions and concerns were addressed. Pt agrees to F/U as instructed and agrees to return to ED upon further deterioration. Pt is ready to go home.   Polina Morales NP      Procedures

## 2020-01-26 NOTE — DISCHARGE INSTRUCTIONS
Patient Education        Cervical Spinal Stenosis: Care Instructions  Your Care Instructions    Spinal stenosis is a narrowing of the canal that surrounds the spinal cord and nerve roots. Sometimes bone and other tissue grow into this canal and press on the nerves that branch out from the spinal cord. This can happen as a part of aging. When the narrowing happens in your neck, it's called cervical spinal stenosis. It often causes stiffness, pain, numbness, and weakness in the neck, shoulders, arms, hands, or legs. It can even cause problems with your balance, coordination, and bowel or bladder control. But some people have no symptoms. You may be able to get relief from the symptoms of spinal stenosis by taking pain medicine. Your doctor may suggest physical therapy and exercises to keep your spine strong and flexible. Some people try steroid shots to reduce swelling. If pain and numbness in your neck, arms, or legs are still so bad that you cannot do your normal activities, you may need surgery. Follow-up care is a key part of your treatment and safety. Be sure to make and go to all appointments, and call your doctor if you are having problems. It's also a good idea to know your test results and keep a list of the medicines you take. How can you care for yourself at home? · Ask your doctor if you can take an over-the-counter pain medicine, such as acetaminophen (Tylenol), ibuprofen (Advil, Motrin), or naproxen (Aleve). Be safe with medicines. Read and follow all instructions on the label. · Do not take two or more pain medicines at the same time unless the doctor told you to. Many pain medicines have acetaminophen, which is Tylenol. Too much acetaminophen (Tylenol) can be harmful. · Change positions often when you are standing or sitting. This may reduce pressure on the spinal cord and its nerves. · When you rest, use pillows or towel rolls to support your neck and head in a comfortable position.   · Follow your doctor's instructions about activity. He or she may tell you not to do sports or activities that could injure your neck. · Stretch your neck and shoulders as your doctor or physical therapist recommends. If your doctor says it is okay to do them, these exercises may help:  ? Neck stretches to the side. Keep your shoulders relaxed and slowly tilt your head straight over toward one shoulder. Hold for 15 seconds. Let the weight of your head stretch your muscles. Then do the same toward the other shoulder. ? Neck rotations. Keep your chin level and slowly turn your head to one side. Hold for 15 seconds. Then do the same to the other side. ? Shoulder rolls. Roll your shoulders up, then back, and then down in a smooth, circular motion. Repeat several times. When should you call for help? Call 911 anytime you think you may need emergency care. For example, call if:    · You are unable to move an arm or a leg at all.   [facility] your doctor now or seek immediate medical care if:    · You have new or worse symptoms in your arms, legs, belly, or buttocks. Symptoms may include:  ? Numbness or tingling. ? Weakness. ? Pain.     · You lose bladder or bowel control.    Watch closely for changes in your health, and be sure to contact your doctor if:    · You do not get better as expected. Where can you learn more? Go to http://antonio-adrien.info/. Enter  in the search box to learn more about \"Cervical Spinal Stenosis: Care Instructions. \"  Current as of: June 26, 2019  Content Version: 12.2  © 5390-4367 [employer]. Care instructions adapted under license by [employer] (which disclaims liability or warranty for this information). If you have questions about a medical condition or this instruction, always ask your healthcare professional. [address] any warranty or liability for your use of this information.

## 2020-01-27 LAB
ATRIAL RATE: 92 BPM
CALCULATED P AXIS, ECG09: 51 DEGREES
CALCULATED R AXIS, ECG10: 42 DEGREES
CALCULATED T AXIS, ECG11: 32 DEGREES
DIAGNOSIS, 93000: NORMAL
P-R INTERVAL, ECG05: 142 MS
Q-T INTERVAL, ECG07: 336 MS
QRS DURATION, ECG06: 88 MS
QTC CALCULATION (BEZET), ECG08: 415 MS
VENTRICULAR RATE, ECG03: 92 BPM

## 2020-01-28 ENCOUNTER — OFFICE VISIT (OUTPATIENT)
Dept: FAMILY MEDICINE CLINIC | Age: 75
End: 2020-01-28

## 2020-01-28 VITALS
BODY MASS INDEX: 40.8 KG/M2 | HEIGHT: 70 IN | RESPIRATION RATE: 20 BRPM | SYSTOLIC BLOOD PRESSURE: 136 MMHG | HEART RATE: 65 BPM | WEIGHT: 285 LBS | TEMPERATURE: 97.8 F | DIASTOLIC BLOOD PRESSURE: 68 MMHG | OXYGEN SATURATION: 95 %

## 2020-01-28 DIAGNOSIS — M15.0 PRIMARY GENERALIZED (OSTEO)ARTHRITIS: ICD-10-CM

## 2020-01-28 DIAGNOSIS — M06.9 RHEUMATOID ARTHRITIS INVOLVING MULTIPLE SITES, UNSPECIFIED RHEUMATOID FACTOR PRESENCE: Primary | ICD-10-CM

## 2020-01-28 DIAGNOSIS — M48.02 CERVICAL SPINAL STENOSIS: Chronic | ICD-10-CM

## 2020-01-28 RX ORDER — METHOTREXATE 2.5 MG/1
22.5 TABLET ORAL
COMMUNITY
Start: 2020-01-09

## 2020-01-28 RX ORDER — CYCLOBENZAPRINE HCL 10 MG
10 TABLET ORAL
Qty: 15 TAB | Refills: 0 | Status: SHIPPED | OUTPATIENT
Start: 2020-01-28 | End: 2020-02-03 | Stop reason: SDUPTHER

## 2020-01-28 NOTE — PATIENT INSTRUCTIONS
Cervical Spinal Stenosis: Care Instructions Your Care Instructions Spinal stenosis is a narrowing of the canal that surrounds the spinal cord and nerve roots. Sometimes bone and other tissue grow into this canal and press on the nerves that branch out from the spinal cord. This can happen as a part of aging. When the narrowing happens in your neck, it's called cervical spinal stenosis. It often causes stiffness, pain, numbness, and weakness in the neck, shoulders, arms, hands, or legs. It can even cause problems with your balance, coordination, and bowel or bladder control. But some people have no symptoms. You may be able to get relief from the symptoms of spinal stenosis by taking pain medicine. Your doctor may suggest physical therapy and exercises to keep your spine strong and flexible. Some people try steroid shots to reduce swelling. If pain and numbness in your neck, arms, or legs are still so bad that you cannot do your normal activities, you may need surgery. Follow-up care is a key part of your treatment and safety. Be sure to make and go to all appointments, and call your doctor if you are having problems. It's also a good idea to know your test results and keep a list of the medicines you take. How can you care for yourself at home? · Ask your doctor if you can take an over-the-counter pain medicine, such as acetaminophen (Tylenol), ibuprofen (Advil, Motrin), or naproxen (Aleve). Be safe with medicines. Read and follow all instructions on the label. · Do not take two or more pain medicines at the same time unless the doctor told you to. Many pain medicines have acetaminophen, which is Tylenol. Too much acetaminophen (Tylenol) can be harmful. · Change positions often when you are standing or sitting. This may reduce pressure on the spinal cord and its nerves. · When you rest, use pillows or towel rolls to support your neck and head in a comfortable position. · Follow your doctor's instructions about activity. He or she may tell you not to do sports or activities that could injure your neck. · Stretch your neck and shoulders as your doctor or physical therapist recommends. If your doctor says it is okay to do them, these exercises may help: ? Neck stretches to the side. Keep your shoulders relaxed and slowly tilt your head straight over toward one shoulder. Hold for 15 seconds. Let the weight of your head stretch your muscles. Then do the same toward the other shoulder. ? Neck rotations. Keep your chin level and slowly turn your head to one side. Hold for 15 seconds. Then do the same to the other side. ? Shoulder rolls. Roll your shoulders up, then back, and then down in a smooth, circular motion. Repeat several times. When should you call for help? Call 911 anytime you think you may need emergency care. For example, call if: 
  · You are unable to move an arm or a leg at all.  
[facility] your doctor now or seek immediate medical care if: 
  · You have new or worse symptoms in your arms, legs, belly, or buttocks. Symptoms may include: 
? Numbness or tingling. ? Weakness. ? Pain.  
  · You lose bladder or bowel control.  
 Watch closely for changes in your health, and be sure to contact your doctor if: 
  · You do not get better as expected. Where can you learn more? Go to http://antonio-adrien.info/. Enter  in the search box to learn more about \"Cervical Spinal Stenosis: Care Instructions. \" Current as of: June 26, 2019 Content Version: 12.2 © 9623-1431 [employer]wise, Incorporated. Care instructions adapted under license by [employer] (which disclaims liability or warranty for this information). If you have questions about a medical condition or this instruction, always ask your healthcare professional. [address] any warranty or liability for your use of this information.

## 2020-01-28 NOTE — PROGRESS NOTES
Chief Complaint   Patient presents with    Arm Pain    Neck Pain     Body mass index is 40.89 kg/m². 1. Have you been to the ER, urgent care clinic since your last visit? Hospitalized since your last visit? No    2. Have you seen or consulted any other health care providers outside of the [address] [place] [address] since your last visit? Include any pap smears or colon screening.  No    Reviewed record in preparation for visit and have necessary documentation  Pt did not bring medication to office visit for review  Information was given to pt on Advanced Directives, Living Will  Information was given on Shingles Vaccine  Opportunity was given for questions  Goals that were addressed and/or need to be completed after this appointment include:     Health Maintenance Due   Topic Date Due    Shingrix Vaccine Age 50> (1 of 2) 09/20/1995    MEDICARE YEARLY EXAM  02/09/2020

## 2020-01-28 NOTE — PROGRESS NOTES
[address], [address] [facility]  [phone]           Progress Note    Patient: Ellie Sagastume MRN: [id number]  SSN: [id number]    YOB: 1945  Age: 76 y.o. Sex: female        Chief Complaint   Patient presents with    Arm Pain    Neck Pain       Subjective:     Encounter Diagnoses   Name Primary?  Rheumatoid arthritis involving multiple sites, unspecified rheumatoid factor presence ([facility] [address]): She has severe multisite rheumatoid arthritis as well as degenerative arthritis in her major joints. Yes    Cervical spinal stenosis: She started with severe shoulder pain that radiated down to her fingertips on January 13. She went to the ER and had an injection in her left shoulder it did not help. He subsequently returned to the emergency room on 26 January at which time she had a CT scan of her cervical spine which showed a severe cervical spinal stenosis at C5-C7. That correlates with the neurologic exam of severe pain in her left middle finger. She does not recall any injury that may have triggered this. It is a classic radicular pain that is poorly responsive to medications. The combination of Flexeril and leftover tramadol has helped but I told her that gabapentin should work much better for this nerve root pain then tramadol so she will tramadol. She can continue the Flexeril to address associated muscle spasms. She already has an appointment to see Dr. Hector Dobbs on 6 February. She is on a blood thinner currently so she will not be able to get an injection at first visit. She is on tapering dose of steroids and 20 mg was the dose she took today.  Primary generalized (osteo)arthritis: With a mixture of rheumatoid and osteoarthritis she is a set up for nerve root impingements.         Current and past medical information:    Current Medications after this visit[de-identified]     Current Outpatient Medications   Medication Sig    cyclobenzaprine (FLEXERIL) 10 mg tablet Take 1 Tab by mouth three (3) times daily as needed for Muscle Spasm(s).  methotrexate (RHEUMATREX) 2.5 mg tablet     methylPREDNISolone (MEDROL, KAELA,) 4 mg tablet Take as directed    atorvastatin (LIPITOR) 10 mg tablet TAKE ONE TABLET BY MOUTH EVERY DAY    apixaban (ELIQUIS) 5 mg tablet Take 1 Tab by mouth two (2) times a day.  famotidine (PEPCID) 20 mg tablet Take 20 mg by mouth two (2) times a day.  mupirocin (BACTROBAN) 2 % ointment     amLODIPine (NORVASC) 5 mg tablet TAKE ONE TABLET BY MOUTH EVERY DAY    triamcinolone acetonide (KENALOG) 0.1 % ointment Apply  to affected area two (2) times a day. use thin layer in the affected area of the left leg    folic acid (FOLVITE) 1 mg tablet Take 3 Tabs by mouth daily.  budesonide-formoterol (SYMBICORT) 160-4.5 mcg/actuation HFAA Take 2 Puffs by inhalation two (2) times a day.  Hot Water Bottle (WATER BOTTLE) misc Please Dispense a humidifier bottle to patient to use with her oxygen.  PROAIR HFA 90 mcg/actuation inhaler INHALE 2 PUFFS BY MOUTH EVERY 4 HOURS AS NEEDED FOR WHEEZING    albuterol (PROVENTIL VENTOLIN) 2.5 mg /3 mL (0.083 %) nebulizer solution USE 1 VIAL IN NEBULIZER EVERY 4 HOURS AS NEEDED FOR WHEEZING    infliximab (REMICADE IV) by IntraVENous route. Every 6 weeks    UBIDECARENONE (COQ-10 PO) Take  by mouth.  gabapentin (NEURONTIN) 100 mg capsule     acetaminophen (TYLENOL) 500 mg tablet Take 1,000 mg by mouth every six (6) hours as needed for Pain.  omega-3s-dha-epa-fish oil 300-1,000 mg cpDR Take  by mouth daily.  cholecalciferol, vitamin D3, (VITAMIN D3) 4,000 unit cap Take  by mouth daily.  raNITIdine (ZANTAC) 300 mg tab TAKE 1 TABLET BY MOUTH DAILY     No current facility-administered medications for this visit.         Patient Active Problem List    Diagnosis Date Noted    Bronchiectasis with acute exacerbation ([facility] [address] 08/19/2019     Priority: 1 - One    Lumbar spinal stenosis 10/06/2015     Priority: 1 - One    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[facility] 12/15/2010     Priority: 1 - One    High cholesterol 04/30/2010     Priority: 1 - One    Rheumatoid arthritis ([facility] 04/15/2010     Priority: 1 - One    Severe persistent asthma without complication 1945     Priority: 1 - One    OA (osteoarthritis) 04/15/2010     Priority: 1 - One    Obesity, unspecified 04/15/2010     Priority: 1 - One    Spinal stenosis of lumbar region with neurogenic claudication 04/15/2010     Priority: 1 - One    Cardiac dysrhythmia, unspecified 04/15/2010     Priority: 2 - Two    Cervical spinal stenosis 01/28/2020    Pain of left lower leg 06/19/2019    History of DVT of lower extremity 06/06/2019    Hx of pulmonary embolus 06/06/2019    Chronic rhinitis 05/23/2019    Allergic contact dermatitis 05/23/2019    Essential hypertension 04/15/2019    Obesity, morbid ([facility] 12/04/2017    Primary localized osteoarthritis of right hip 07/11/2016    Asthma 10/06/2015    Primary generalized (osteo)arthritis 10/06/2015    PAC (premature atrial contraction) 10/06/2015    Colon polyp, hyperplastic 06/02/2015       Past Medical History:   Diagnosis Date    Anemia, unspecified 4/15/2010    Cardiac dysrhythmia, unspecified 4/15/2010    PAT- not on medication/ not seeing cardiologist    High cholesterol 4/30/2010    History of DVT of lower extremity 6/6/2019    Right leg (3/2019)    Obesity, unspecified 4/15/2010    Osteoarthrosis, unspecified whether generalized or localized, other specified sites 4/15/2010    Rheumatoid arthritis(714.0) 4/15/2010    Spinal stenosis, unspecified region other than cervical 4/15/2010    Unspecified asthma(493.90) 4/15/2010    rheumatoid lung disease       Allergies   Allergen Reactions    Methotrexate Other (comments)     DVT and PE    Pt states takes currently    Orudis [Ketoprofen] Palpitations    Singulair [Montelukast] Rash    Arthrotec 50 [Diclofenac-Misoprostol] Palpitations    Levaquin [Levofloxacin] Other (comments)     Hip joints swelling and painful per pt report    Lyrica [Pregabalin] Other (comments)     Saw spiders. Past Surgical History:   Procedure Laterality Date    CHEST SURGERY PROCEDURE UNLISTED  lat 5881'U    lung biopsy- diagnosed with rheumatoid lung disease    ENDOSCOPY, COLON, DIAGNOSTIC      7-10-13    HX ORTHOPAEDIC Right 2002    rotator cuff repair       Social History     Socioeconomic History    Marital status: SINGLE     Spouse name: Not on file    Number of children: Not on file    Years of education: Not on file    Highest education level: Not on file   Tobacco Use    Smoking status: Former Smoker     Packs/day: 1.00     Years: 20.00     Pack years: 20.00     Last attempt to quit: 1985     Years since quittin.7    Smokeless tobacco: Never Used   Substance and Sexual Activity    Alcohol use: No    Drug use: No    Sexual activity: Never       Review of Systems   Constitutional: Negative. Negative for chills, fever, malaise/fatigue and weight loss. HENT: Negative. Negative for hearing loss. Eyes: Negative. Negative for blurred vision and double vision. Respiratory: Positive for cough and sputum production. Negative for shortness of breath. She has severe atelectasis. She has a chronic cough that is productive of slightly green mucus. She does not think she has a flare of this. She has had no fever no chills and no sweats. She clear her throat several times during the office visit. Cardiovascular: Negative. Negative for chest pain and palpitations. Gastrointestinal: Negative. Negative for abdominal pain, blood in stool, heartburn, nausea and vomiting. Genitourinary: Negative. Negative for dysuria, frequency and urgency. Musculoskeletal: Positive for neck pain. Negative for back pain, falls and myalgias. Radicular neck pain going to the left middle finger. Skin: Negative. Negative for rash. Neurological: Negative. Negative for dizziness, tingling, tremors, weakness and headaches. Endo/Heme/Allergies: Negative. Psychiatric/Behavioral: Negative. Negative for depression. Objective:     Vitals:    01/28/20 1450   BP: 136/68   Pulse: 65   Resp: 20   Temp: 97.8 °F (36.6 °C)   TempSrc: Oral   SpO2: 95%   Weight: 285 lb (129.3 kg)   Height: 5' 10\" (1.778 m)      Body mass index is 40.89 kg/m². Physical Exam  Vitals signs and nursing note reviewed. Constitutional:       General: She is not in acute distress. Appearance: She is not diaphoretic. HENT:      Head: Normocephalic and atraumatic. Eyes:      Conjunctiva/sclera: Conjunctivae normal.   Neck:      Thyroid: No thyromegaly. Comments: No carotid bruit. Cardiovascular:      Rate and Rhythm: Normal rate and regular rhythm. Heart sounds: No murmur. No friction rub. No gallop. Pulmonary:      Effort: Pulmonary effort is normal. No respiratory distress. Breath sounds: Rhonchi present. No wheezing or rales. Comments: She has rhonchi with coughing otherwise clear. Abdominal:      General: There is no distension. Palpations: Abdomen is soft. Skin:     General: Skin is warm. Findings: No erythema or rash. Neurological:      Mental Status: She is alert and oriented to person, place, and time. Psychiatric:         Mood and Affect: Mood and affect normal.       Health Maintenance Due   Topic Date Due    Shingrix Vaccine Age 49> (1 of 2) 09/20/1995    MEDICARE YEARLY EXAM  02/09/2020         Assessment and orders:     Encounter Diagnoses     ICD-10-CM ICD-9-CM   1. Rheumatoid arthritis involving multiple sites, unspecified rheumatoid factor presence (HCC) M06.9 714.0   2. Cervical spinal stenosis M48.02 723.0   3. Primary generalized (osteo)arthritis M15.0 715.09     Diagnoses and all orders for this visit:    1.  Rheumatoid arthritis involving multiple sites, unspecified rheumatoid factor presence (HCC)    2. Cervical spinal stenosis-she has gabapentin at home. I told her that she get take up to 300 mg 3 times a day and she may want to start that dose and then back off after she gets some benefit. There will be much more effective than tramadol for nerve root pain. She will keep her appointment with Dr. Raquel Shin. She would have to go off her blood thinner before she did have spinal injections. -     cyclobenzaprine (FLEXERIL) 10 mg tablet; Take 1 Tab by mouth three (3) times daily as needed for Muscle Spasm(s). 3. Primary generalized (osteo)arthritis sure part of her cervical spine problem is degenerative in part is rheumatoid      Plan of care:  Discussed diagnoses in detail with patient. Medication risks/benefits/side effects discussed with patient. All of the patient's questions were addressed. The patient understands and agrees with our plan of care. The patient knows to call back if they are unsure of or forget any changes we discussed today or if the symptoms change. The patient received an After-Visit Summary which contains VS, orders, medication list and allergy list. This can be used as a \"mini-medical record\" should they have to seek medical care while out of town. Patient Care Team:  Abel Moon MD as PCP - Michele Parada MD as PCP - [facility] Empaneled Provider  Abelardo Yen MD (Orthopedic Surgery)  Yamilet Mancilla MD (Orthopedic Surgery)  Greta Gilliam MD (Otolaryngology)  Beatriz Bunn MD (Rheumatology)    Follow-up and Dispositions    · Return in about 3 weeks (around 2/18/2020) for has appt. Signed By: Delano Mack MD     January 28, 2020      ATTENTION:   This medical record was transcribed using an electronic medical records/speech recognition system. Although proofread, it may and can contain electronic, spelling and other errors.   Corrections may be executed at a later time.  Please feel free to contact me for any clarifications as needed.

## 2020-02-03 RX ORDER — CYCLOBENZAPRINE HCL 10 MG
10 TABLET ORAL
Qty: 15 TAB | Refills: 3 | Status: SHIPPED | OUTPATIENT
Start: 2020-02-03 | End: 2022-02-18

## 2020-02-03 NOTE — TELEPHONE ENCOUNTER
Pt called her [facility] and they stated she needs a Prior Auth for the CYCLOBENZAPRINE (FLEXERIL)N 10 MG

## 2020-02-19 ENCOUNTER — TELEPHONE (OUTPATIENT)
Dept: FAMILY MEDICINE CLINIC | Age: 75
End: 2020-02-19

## 2020-02-19 ENCOUNTER — OFFICE VISIT (OUTPATIENT)
Dept: FAMILY MEDICINE CLINIC | Age: 75
End: 2020-02-19

## 2020-02-19 VITALS
RESPIRATION RATE: 20 BRPM | SYSTOLIC BLOOD PRESSURE: 126 MMHG | HEART RATE: 81 BPM | WEIGHT: 286 LBS | OXYGEN SATURATION: 94 % | DIASTOLIC BLOOD PRESSURE: 71 MMHG | TEMPERATURE: 97.8 F | HEIGHT: 70 IN | BODY MASS INDEX: 40.94 KG/M2

## 2020-02-19 DIAGNOSIS — M48.062 SPINAL STENOSIS OF LUMBAR REGION WITH NEUROGENIC CLAUDICATION: Chronic | ICD-10-CM

## 2020-02-19 DIAGNOSIS — M48.02 CERVICAL SPINAL STENOSIS: Chronic | ICD-10-CM

## 2020-02-19 DIAGNOSIS — M15.0 PRIMARY GENERALIZED (OSTEO)ARTHRITIS: Chronic | ICD-10-CM

## 2020-02-19 DIAGNOSIS — J45.50 SEVERE PERSISTENT ASTHMA WITHOUT COMPLICATION: Chronic | ICD-10-CM

## 2020-02-19 DIAGNOSIS — J32.0 CHRONIC MAXILLARY SINUSITIS: ICD-10-CM

## 2020-02-19 DIAGNOSIS — M05.10 RHEUMATOID LUNG DISEASE (HCC): Chronic | ICD-10-CM

## 2020-02-19 DIAGNOSIS — M06.9 RHEUMATOID ARTHRITIS INVOLVING MULTIPLE SITES, UNSPECIFIED RHEUMATOID FACTOR PRESENCE: ICD-10-CM

## 2020-02-19 DIAGNOSIS — J47.1 BRONCHIECTASIS WITH ACUTE EXACERBATION (HCC): Primary | Chronic | ICD-10-CM

## 2020-02-19 DIAGNOSIS — Z86.711 HX OF PULMONARY EMBOLUS: ICD-10-CM

## 2020-02-19 DIAGNOSIS — I10 ESSENTIAL HYPERTENSION: Chronic | ICD-10-CM

## 2020-02-19 RX ORDER — CEFUROXIME AXETIL 500 MG/1
500 TABLET ORAL 2 TIMES DAILY
Qty: 14 TAB | Refills: 1 | Status: SHIPPED | OUTPATIENT
Start: 2020-02-19 | End: 2020-10-09 | Stop reason: ALTCHOICE

## 2020-02-19 RX ORDER — GABAPENTIN 300 MG/1
300 CAPSULE ORAL 2 TIMES DAILY
Qty: 42 CAP | Refills: 0 | Status: SHIPPED | OUTPATIENT
Start: 2020-02-19 | End: 2022-02-18

## 2020-02-19 NOTE — PROGRESS NOTES
1. Have you been to the ER, urgent care clinic since your last visit? Hospitalized since your last visit? No    2. Have you seen or consulted any other health care providers outside of the [address] [place] [address] since your last visit? Include any pap smears or colon screening. No    Reviewed record in preparation for visit and have necessary documentation  Goals that were addressed and/or need to be completed during or after this appointment include     Health Maintenance Due   Topic Date Due    Shingrix Vaccine Age 49> (1 of 2) 09/20/1995    Medicare Yearly Exam  02/09/2020       Patient is accompanied by self I have received verbal consent from [address] [place] [address] to discuss any/all medical information while they are present in the room.

## 2020-02-19 NOTE — PATIENT INSTRUCTIONS
Cervical Spinal Stenosis: Care Instructions Your Care Instructions Spinal stenosis is a narrowing of the canal that surrounds the spinal cord and nerve roots. Sometimes bone and other tissue grow into this canal and press on the nerves that branch out from the spinal cord. This can happen as a part of aging. When the narrowing happens in your neck, it's called cervical spinal stenosis. It often causes stiffness, pain, numbness, and weakness in the neck, shoulders, arms, hands, or legs. It can even cause problems with your balance, coordination, and bowel or bladder control. But some people have no symptoms. You may be able to get relief from the symptoms of spinal stenosis by taking pain medicine. Your doctor may suggest physical therapy and exercises to keep your spine strong and flexible. Some people try steroid shots to reduce swelling. If pain and numbness in your neck, arms, or legs are still so bad that you cannot do your normal activities, you may need surgery. Follow-up care is a key part of your treatment and safety. Be sure to make and go to all appointments, and call your doctor if you are having problems. It's also a good idea to know your test results and keep a list of the medicines you take. How can you care for yourself at home? · Ask your doctor if you can take an over-the-counter pain medicine, such as acetaminophen (Tylenol), ibuprofen (Advil, Motrin), or naproxen (Aleve). Be safe with medicines. Read and follow all instructions on the label. · Do not take two or more pain medicines at the same time unless the doctor told you to. Many pain medicines have acetaminophen, which is Tylenol. Too much acetaminophen (Tylenol) can be harmful. · Change positions often when you are standing or sitting. This may reduce pressure on the spinal cord and its nerves. · When you rest, use pillows or towel rolls to support your neck and head in a comfortable position. · Follow your doctor's instructions about activity. He or she may tell you not to do sports or activities that could injure your neck. · Stretch your neck and shoulders as your doctor or physical therapist recommends. If your doctor says it is okay to do them, these exercises may help: ? Neck stretches to the side. Keep your shoulders relaxed and slowly tilt your head straight over toward one shoulder. Hold for 15 seconds. Let the weight of your head stretch your muscles. Then do the same toward the other shoulder. ? Neck rotations. Keep your chin level and slowly turn your head to one side. Hold for 15 seconds. Then do the same to the other side. ? Shoulder rolls. Roll your shoulders up, then back, and then down in a smooth, circular motion. Repeat several times. When should you call for help? Call 911 anytime you think you may need emergency care. For example, call if: 
  · You are unable to move an arm or a leg at all.  
[facility] your doctor now or seek immediate medical care if: 
  · You have new or worse symptoms in your arms, legs, belly, or buttocks. Symptoms may include: 
? Numbness or tingling. ? Weakness. ? Pain.  
  · You lose bladder or bowel control.  
 Watch closely for changes in your health, and be sure to contact your doctor if: 
  · You do not get better as expected. Where can you learn more? Go to http://antonio-adrien.info/. Enter  in the search box to learn more about \"Cervical Spinal Stenosis: Care Instructions. \" Current as of: June 26, 2019 Content Version: 12.2 © 9422-8236 [employer]. Care instructions adapted under license by [employer] (which disclaims liability or warranty for this information). If you have questions about a medical condition or this instruction, always ask your healthcare professional. [address] any warranty or liability for your use of this information.

## 2020-02-19 NOTE — TELEPHONE ENCOUNTER
Spoke with patient and advised her of the following: \"Her sinus x-rays were okay so I think we can get by with 1 week worth of Ceftin to treat her low-grade sinus infection. I did put 1 refill on this in case she needed to extend the course for full 14 days. Call me if this is not clear her sinus symptoms. \"    Patient verbalized understanding.

## 2020-02-19 NOTE — PROGRESS NOTES
[address], [address] [place] [address]  [phone]           Progress Note    Patient: Ирина Brady MRN: [id number]  SSN: [id number]    YOB: 1945  Age: 76 y.o. Sex: female        Chief Complaint   Patient presents with    Labs         Subjective:     Encounter Diagnoses   Name Primary?  Bronchiectasis with acute exacerbation ([facility]): no active lung infection. She gets frequent infections because she is immunocompromised. Yes    Rheumatoid arthritis involving multiple sites, unspecified rheumatoid factor presence ([facility]):  Generalized severe disease. Sees Dr. Dorothy Lindsey.  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[facility]): High risk for frequent pulmonary infections. This disorder is on top of her bronchiectasis         Cervical spinal stenosis: She saw Dr. Jeremias Parrish. He referred her to pain management with Dr. Ezequiel engle. She still has pain but the gabapentin allows her to rest and sleep. She takes 1 at night and will rarely take another 1 during the day. She is low risk for diversion. The pain management assumes her gabapentin prescription she will not need a pain contract with me.  Essential hypertension:  BP Readings from Last 3 Encounters:   02/19/20 126/71   01/28/20 136/68   01/26/20 147/70     The patient reports:  taking medications as instructed, no medication side effects noted, no TIA's, no chest pain on exertion, notes stable dyspnea on exertion, no change, no swelling of ankles. Key CAD CHF Meds             atorvastatin (LIPITOR) 10 mg tablet (Taking) TAKE ONE TABLET BY MOUTH EVERY DAY    apixaban (ELIQUIS) 5 mg tablet (Taking) Take 1 Tab by mouth two (2) times a day. amLODIPine (NORVASC) 5 mg tablet (Taking) TAKE ONE TABLET BY MOUTH EVERY DAY    omega-3s-dha-epa-fish oil 300-1,000 mg cpDR (Taking) Take  by mouth daily.            Lab Results   Component Value Date/Time    Sodium 141 11/27/2019 10:45 AM    Potassium 4.5 11/27/2019 10:45 AM    Chloride 107 11/27/2019 10:45 AM    CO2 29 11/27/2019 10:45 AM    Anion gap 5 11/27/2019 10:45 AM    Glucose 93 11/27/2019 10:45 AM    BUN 13 11/27/2019 10:45 AM    Creatinine 0.67 11/27/2019 10:45 AM    BUN/Creatinine ratio 19 11/27/2019 10:45 AM    GFR est AA >60 11/27/2019 10:45 AM    GFR est non-AA >60 11/27/2019 10:45 AM    Calcium 9.2 11/27/2019 10:45 AM    Bilirubin, total 0.7 11/27/2019 10:45 AM    AST (SGOT) 19 11/27/2019 10:45 AM    Alk. phosphatase 84 11/27/2019 10:45 AM    Protein, total 7.6 11/27/2019 10:45 AM    Albumin 3.6 11/27/2019 10:45 AM    Globulin 4.0 11/27/2019 10:45 AM    A-G Ratio 0.9 (L) 11/27/2019 10:45 AM    ALT (SGPT) 20 11/27/2019 10:45 AM     Low salt diet? yes  Aerobic exercise? no  Our goal is to normalize the blood pressure to decrease the risks of strokes and heart attacks. The patient is in agreement with the plan.  Hx of pulmonary embolus: She was told by hematology to stay on Coumadin rest of her life.  Primary generalized (osteo)arthritis: On top of her rheumatoid arthritis she has a large joint osteoarthritis.  Severe persistent asthma without complication: No active wheezing.  Spinal stenosis of lumbar region with neurogenic claudication: She also has spinal stenosis in her lumbar spine. This is the first reason that she took gabapentin in the past.         Chronic maxillary sinusitis: She has chronic nasal congestion. All of her sinus drainage goes backwards instead of being able to blow it out of her nose. This certainly puts her at risk for new pulmonary infections. She uses nasal lavage as well as salt water nose spray. If her sinus x-rays show an acute sinusitis or air-fluid levels then I want her on an antibiotic. She said this morning she actually had some blood in her throat presumably from a recurrent nosebleed.               Current and past medical information:    Current Medications after this visit[de-identified] Current Outpatient Medications   Medication Sig    gabapentin (NEURONTIN) 300 mg capsule Take 1 Cap by mouth two (2) times a day. Max Daily Amount: 600 mg. Neck and left arm pain    cyclobenzaprine (FLEXERIL) 10 mg tablet Take 1 Tab by mouth three (3) times daily as needed for Muscle Spasm(s).  methotrexate (RHEUMATREX) 2.5 mg tablet     atorvastatin (LIPITOR) 10 mg tablet TAKE ONE TABLET BY MOUTH EVERY DAY    apixaban (ELIQUIS) 5 mg tablet Take 1 Tab by mouth two (2) times a day.  famotidine (PEPCID) 20 mg tablet Take 20 mg by mouth two (2) times a day.  mupirocin (BACTROBAN) 2 % ointment     amLODIPine (NORVASC) 5 mg tablet TAKE ONE TABLET BY MOUTH EVERY DAY    triamcinolone acetonide (KENALOG) 0.1 % ointment Apply  to affected area two (2) times a day. use thin layer in the affected area of the left leg    folic acid (FOLVITE) 1 mg tablet Take 3 Tabs by mouth daily.  budesonide-formoterol (SYMBICORT) 160-4.5 mcg/actuation HFAA Take 2 Puffs by inhalation two (2) times a day.  Hot Water Bottle (WATER BOTTLE) misc Please Dispense a humidifier bottle to patient to use with her oxygen.  PROAIR HFA 90 mcg/actuation inhaler INHALE 2 PUFFS BY MOUTH EVERY 4 HOURS AS NEEDED FOR WHEEZING    albuterol (PROVENTIL VENTOLIN) 2.5 mg /3 mL (0.083 %) nebulizer solution USE 1 VIAL IN NEBULIZER EVERY 4 HOURS AS NEEDED FOR WHEEZING    infliximab (REMICADE IV) by IntraVENous route. Every 6 weeks    UBIDECARENONE (COQ-10 PO) Take  by mouth.  acetaminophen (TYLENOL) 500 mg tablet Take 1,000 mg by mouth every six (6) hours as needed for Pain.  omega-3s-dha-epa-fish oil 300-1,000 mg cpDR Take  by mouth daily.  cholecalciferol, vitamin D3, (VITAMIN D3) 4,000 unit cap Take  by mouth daily.  raNITIdine (ZANTAC) 300 mg tab TAKE 1 TABLET BY MOUTH DAILY     No current facility-administered medications for this visit.         Patient Active Problem List    Diagnosis Date Noted    Bronchiectasis with acute exacerbation ([facility] 08/19/2019     Priority: 1 - One    Lumbar spinal stenosis 10/06/2015     Priority: 1 - One    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[facility] 12/15/2010     Priority: 1 - One    High cholesterol 04/30/2010     Priority: 1 - One    Rheumatoid arthritis ([facility] 04/15/2010     Priority: 1 - One    Severe persistent asthma without complication 20/36/5250     Priority: 1 - One    OA (osteoarthritis) 04/15/2010     Priority: 1 - One    Obesity, unspecified 04/15/2010     Priority: 1 - One    Spinal stenosis of lumbar region with neurogenic claudication 04/15/2010     Priority: 1 - One    Cardiac dysrhythmia, unspecified 04/15/2010     Priority: 2 - Two    Cervical spinal stenosis 01/28/2020    Pain of left lower leg 06/19/2019    History of DVT of lower extremity 06/06/2019    Hx of pulmonary embolus 06/06/2019    Chronic rhinitis 05/23/2019    Allergic contact dermatitis 05/23/2019    Essential hypertension 04/15/2019    Obesity, morbid ([facility] 12/04/2017    Primary localized osteoarthritis of right hip 07/11/2016    Asthma 10/06/2015    Primary generalized (osteo)arthritis 10/06/2015    PAC (premature atrial contraction) 10/06/2015    Colon polyp, hyperplastic 06/02/2015       Past Medical History:   Diagnosis Date    Anemia, unspecified 4/15/2010    Cardiac dysrhythmia, unspecified 4/15/2010    PAT- not on medication/ not seeing cardiologist    High cholesterol 4/30/2010    History of DVT of lower extremity 6/6/2019    Right leg (3/2019)    Obesity, unspecified 4/15/2010    Osteoarthrosis, unspecified whether generalized or localized, other specified sites 4/15/2010    Rheumatoid arthritis(714.0) 4/15/2010    Spinal stenosis, unspecified region other than cervical 4/15/2010    Unspecified asthma(493.90) 4/15/2010    rheumatoid lung disease       Allergies   Allergen Reactions    Methotrexate Other (comments)     DVT and PE    Pt states takes currently    Orudis [Ketoprofen] Palpitations    Singulair [Montelukast] Rash    Arthrotec 50 [Diclofenac-Misoprostol] Palpitations    Levaquin [Levofloxacin] Other (comments)     Hip joints swelling and painful per pt report    Lyrica [Pregabalin] Other (comments)     Saw spiders. Past Surgical History:   Procedure Laterality Date    CHEST SURGERY PROCEDURE UNLISTED  lat 0309'H    lung biopsy- diagnosed with rheumatoid lung disease    ENDOSCOPY, COLON, DIAGNOSTIC      7-10-13    HX ORTHOPAEDIC Right 2002    rotator cuff repair       Social History     Socioeconomic History    Marital status: SINGLE     Spouse name: Not on file    Number of children: Not on file    Years of education: Not on file    Highest education level: Not on file   Tobacco Use    Smoking status: Former Smoker     Packs/day: 1.00     Years: 20.00     Pack years: 20.00     Last attempt to quit: 1985     Years since quittin.8    Smokeless tobacco: Never Used   Substance and Sexual Activity    Alcohol use: No    Drug use: No    Sexual activity: Never       Review of Systems   Constitutional: Negative. Negative for chills, fever, malaise/fatigue and weight loss. HENT: Positive for congestion. Negative for hearing loss. Chronic nasal congestion. Eyes: Negative. Negative for blurred vision and double vision. Respiratory: Negative. Negative for cough, sputum production and shortness of breath. Cardiovascular: Negative. Negative for chest pain and palpitations. Gastrointestinal: Negative. Negative for abdominal pain, blood in stool, heartburn, nausea and vomiting. Genitourinary: Negative. Negative for dysuria, frequency and urgency. Musculoskeletal: Negative. Negative for back pain, falls, myalgias and neck pain. Skin: Negative. Negative for rash. Neurological: Negative. Negative for dizziness, tingling, tremors, weakness and headaches. Endo/Heme/Allergies: Negative. Psychiatric/Behavioral: Negative.   Negative for depression. Objective:     Vitals:    02/19/20 1106   BP: 126/71   Pulse: 81   Resp: 20   Temp: 97.8 °F (36.6 °C)   TempSrc: Oral   SpO2: 94%   Weight: 286 lb (129.7 kg)   Height: 5' 10\" (1.778 m)      Body mass index is 41.04 kg/m². Physical Exam  Vitals signs and nursing note reviewed. Constitutional:       General: She is not in acute distress. Appearance: She is not diaphoretic. HENT:      Head: Normocephalic and atraumatic. Eyes:      Conjunctiva/sclera: Conjunctivae normal.   Cardiovascular:      Rate and Rhythm: Normal rate and regular rhythm. Heart sounds: No murmur. No friction rub. No gallop. Pulmonary:      Effort: Pulmonary effort is normal. No respiratory distress. Breath sounds: Rhonchi present. No wheezing or rales. Comments: Respiratory rate is 20. Background of decreased breath sounds. No wheezes or rales. I can hear rhonchi when she coughs. Abdominal:      General: There is no distension. Palpations: Abdomen is soft. Musculoskeletal:      Comments: Her left arm symptoms are controlled with gabapentin well enough for her to sleep. Unfortunately she is losing strength in her left hand. We will have to see what Dr. Sinai Blandon me recommends but I do not see any contraindication to her being off the blood thinner long enough for her to get a cervical injection if she needs it. Skin:     General: Skin is warm. Findings: No erythema or rash. Neurological:      Mental Status: She is alert and oriented to person, place, and time. Psychiatric:         Mood and Affect: Mood normal.         Behavior: Behavior normal.           Health Maintenance Due   Topic Date Due    Shingrix Vaccine Age 49> (1 of 2) 09/20/1995    Medicare Yearly Exam  02/09/2020         Assessment and orders:     Encounter Diagnoses     ICD-10-CM ICD-9-CM   1. Bronchiectasis with acute exacerbation (HCC) J47.1 494.1   2.  Rheumatoid arthritis involving multiple sites, unspecified rheumatoid factor presence ([facility]) M06.9 714.0   3. 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[facility] M05.10 714.81   4. Cervical spinal stenosis M48.02 723.0   5. Essential hypertension I10 401.9   6. Hx of pulmonary embolus Z86.711 V12.55   7. Primary generalized (osteo)arthritis M15.0 715.09   8. Severe persistent asthma without complication G23.03 545.01   9. Spinal stenosis of lumbar region with neurogenic claudication M48.062 724.03   10. Chronic maxillary sinusitis J32.0 473.0     Diagnoses and all orders for this visit:    1. Bronchiectasis with acute exacerbation ([facility]    2. Rheumatoid arthritis involving multiple sites, unspecified rheumatoid factor presence ([facility]    3. 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[facility]    4. Cervical spinal stenosis-keep appointment with Dr. Senait Vasquez me  -     gabapentin (NEURONTIN) 300 mg capsule; Take 1 Cap by mouth two (2) times a day. Max Daily Amount: 600 mg. Neck and left arm pain    5. Essential hypertension-controlled    6. Hx of pulmonary embolus-no symptoms of recurrence. Okay to be off of blood thinner for a week if necessary. 7. Primary generalized (osteo)arthritis    8. Severe persistent asthma without complication-respiratory symptoms stable but severe    9. Spinal stenosis of lumbar region with neurogenic claudication  -     gabapentin (NEURONTIN) 300 mg capsule; Take 1 Cap by mouth two (2) times a day. Max Daily Amount: 600 mg. Neck and left arm pain    10. Chronic maxillary sinusitis-I have prescribed a seven-day course of Ceftin with 1 refill since her x-rays did not show any air-fluid level. Hopefully we can clear the low-grade infection with this approach  -     XR SINUSES PARANASAL MIN 3 V; Future            Plan of care:  Discussed diagnoses in detail with patient. Medication risks/benefits/side effects discussed with patient. All of the patient's questions were addressed. The patient understands and agrees with our plan of care.     The patient knows to call back if they are unsure of or forget any changes we discussed today or if the symptoms change. The patient received an After-Visit Summary which contains VS, orders, medication list and allergy list. This can be used as a \"mini-medical record\" should they have to seek medical care while out of town. Patient Care Team:  Wade Montelongo MD as PCP - Jeison Morocho MD as PCP - [facility]aneled Provider  Lyle Meadows MD (Orthopedic Surgery)  Elaine Eddy MD (Orthopedic Surgery)  Andrea Roca MD (Otolaryngology)  Demond Ramirez MD (Rheumatology)    Follow-up and Dispositions    · Return in about 2 months (around 4/19/2020). Signed By: Suni Leonard MD     February 19, 2020      ATTENTION:   This medical record was transcribed using an electronic medical records/speech recognition system. Although proofread, it may and can contain electronic, spelling and other errors. Corrections may be executed at a later time. Please feel free to contact me for any clarifications as needed.

## 2020-02-26 DIAGNOSIS — I26.99 OTHER ACUTE PULMONARY EMBOLISM WITHOUT ACUTE COR PULMONALE (HCC): ICD-10-CM

## 2020-02-26 DIAGNOSIS — I82.451 ACUTE DEEP VEIN THROMBOSIS (DVT) OF RIGHT PERONEAL VEIN (HCC): ICD-10-CM

## 2020-03-05 ENCOUNTER — TELEPHONE (OUTPATIENT)
Dept: FAMILY MEDICINE CLINIC | Age: 75
End: 2020-03-05

## 2020-03-05 NOTE — TELEPHONE ENCOUNTER
Pt states Dr. Nancy Martinez wanted her to call back to see how the antibiotic was working. It has been working well. The mucus coming up is much lighter in color.

## 2020-05-19 ENCOUNTER — HOSPITAL ENCOUNTER (OUTPATIENT)
Dept: LAB | Age: 75
Discharge: HOME | End: 2020-05-19

## 2020-05-19 DIAGNOSIS — D64.9 OLIGOCYTHEMIA OF RED BLOOD CELLS: ICD-10-CM

## 2020-05-19 LAB
BASOPHILS # BLD: 0 K/UL (ref 0–0.1)
BASOPHILS NFR BLD: 1 % (ref 0–1)
DIFFERENTIAL METHOD BLD: ABNORMAL
EOSINOPHIL # BLD: 0.3 K/UL (ref 0–0.4)
EOSINOPHIL NFR BLD: 4 % (ref 0–7)
ERYTHROCYTE [DISTWIDTH] IN BLOOD BY AUTOMATED COUNT: 15.1 % (ref 11.5–14.5)
FERRITIN SERPL-MCNC: 67 NG/ML (ref 8–252)
HCT VFR BLD AUTO: 37.6 % (ref 35–47)
HGB BLD-MCNC: 11.2 G/DL (ref 11.5–16)
IMM GRANULOCYTES # BLD AUTO: 0 K/UL (ref 0–0.04)
IMM GRANULOCYTES NFR BLD AUTO: 0 % (ref 0–0.5)
IRON SATN MFR SERPL: 15 % (ref 20–50)
IRON SERPL-MCNC: 46 UG/DL (ref 35–150)
LYMPHOCYTES # BLD: 1.8 K/UL (ref 0.8–3.5)
LYMPHOCYTES NFR BLD: 24 % (ref 12–49)
MCH RBC QN AUTO: 29.8 PG (ref 26–34)
MCHC RBC AUTO-ENTMCNC: 29.8 G/DL (ref 30–36.5)
MCV RBC AUTO: 100 FL (ref 80–99)
MONOCYTES # BLD: 0.6 K/UL (ref 0–1)
MONOCYTES NFR BLD: 8 % (ref 5–13)
NEUTS SEG # BLD: 4.7 K/UL (ref 1.8–8)
NEUTS SEG NFR BLD: 63 % (ref 32–75)
NRBC # BLD: 0 K/UL (ref 0–0.01)
NRBC BLD-RTO: 0 PER 100 WBC
PLATELET # BLD AUTO: 149 K/UL (ref 150–400)
PMV BLD AUTO: 11.5 FL (ref 8.9–12.9)
RBC # BLD AUTO: 3.76 M/UL (ref 3.8–5.2)
TIBC SERPL-MCNC: 298 UG/DL (ref 250–450)
WBC # BLD AUTO: 7.5 K/UL (ref 3.6–11)

## 2020-05-26 ENCOUNTER — HOSPITAL ENCOUNTER (OUTPATIENT)
Dept: NON INVASIVE DIAGNOSTICS | Age: 75
Discharge: HOME | End: 2020-05-26
Attending: INTERNAL MEDICINE
Payer: MEDICARE

## 2020-05-26 ENCOUNTER — HOSPITAL ENCOUNTER (OUTPATIENT)
Dept: CT IMAGING | Age: 75
Discharge: HOME | End: 2020-05-26
Attending: INTERNAL MEDICINE
Payer: MEDICARE

## 2020-05-26 VITALS
HEIGHT: 70 IN | BODY MASS INDEX: 40.94 KG/M2 | SYSTOLIC BLOOD PRESSURE: 174 MMHG | DIASTOLIC BLOOD PRESSURE: 78 MMHG | WEIGHT: 286 LBS

## 2020-05-26 DIAGNOSIS — I27.20 PULMONARY HTN (HCC): ICD-10-CM

## 2020-05-26 LAB
AV PEAK GRADIENT: 65.75 MMHG
AV VELOCITY RATIO: 0.63
ECHO AO ROOT DIAM: 3.47 CM
ECHO AV AREA PEAK VELOCITY: 2 CM2
ECHO AV PEAK GRADIENT: 10.2 MMHG
ECHO AV PEAK VELOCITY: 159.93 CM/S
ECHO AV REGURGITANT PHT: 660 CM
ECHO LA MAJOR AXIS: 3.01 CM
ECHO LA TO AORTIC ROOT RATIO: 0.87
ECHO LA VOL 2C: 89.69 ML (ref 22–52)
ECHO LA VOL 4C: 71.46 ML (ref 22–52)
ECHO LA VOL BP: 90.89 ML (ref 22–52)
ECHO LA VOL/BSA BIPLANE: 37.4 ML/M2 (ref 16–28)
ECHO LA VOLUME INDEX A2C: 36.91 ML/M2 (ref 16–28)
ECHO LA VOLUME INDEX A4C: 29.41 ML/M2 (ref 16–28)
ECHO LV INTERNAL DIMENSION DIASTOLIC: 5.24 CM (ref 3.9–5.3)
ECHO LV INTERNAL DIMENSION SYSTOLIC: 3.62 CM
ECHO LV IVSD: 0.85 CM (ref 0.6–0.9)
ECHO LV MASS 2D: 204.8 G (ref 67–162)
ECHO LV MASS INDEX 2D: 84.3 G/M2 (ref 43–95)
ECHO LV POSTERIOR WALL DIASTOLIC: 0.98 CM (ref 0.6–0.9)
ECHO LVOT DIAM: 2.03 CM
ECHO LVOT PEAK GRADIENT: 4.1 MMHG
ECHO LVOT PEAK VELOCITY: 101.47 CM/S
ECHO MV A VELOCITY: 106.7 CM/S
ECHO MV E DECELERATION TIME (DT): 207.7 MS
ECHO MV E VELOCITY: 95.1 CM/S
ECHO MV E/A RATIO: 0.89
ECHO MV REGURGITANT PEAK GRADIENT: 59.2 MMHG
ECHO MV REGURGITANT PEAK VELOCITY: 384.73 CM/S
ECHO PV MAX VELOCITY: 73.62 CM/S
ECHO PV PEAK GRADIENT: 2.2 MMHG
ECHO RIGHT VENTRICULAR SYSTOLIC PRESSURE (RVSP): 42.6 MMHG
ECHO RV INTERNAL DIMENSION: 3.83 CM
LVFS 2D: 30.97 %
MV DEC SLOPE: 4.58
PISA AR MAX VEL: 405.44 CM/S
PULMONARY ARTERY END DIASTOLIC PRESSURE: 12.6 MMHG
PULMONARY ARTERY MEAN PRESURE: 22.6 MMHG
PV END DIASTOLIC VELOCITY: 1.1 MMHG

## 2020-05-26 PROCEDURE — 71250 CT THORAX DX C-: CPT

## 2020-05-26 PROCEDURE — 93306 TTE W/DOPPLER COMPLETE: CPT

## 2020-06-01 ENCOUNTER — TELEPHONE (OUTPATIENT)
Dept: ONCOLOGY | Age: 75
End: 2020-06-01

## 2020-06-01 ENCOUNTER — VIRTUAL VISIT (OUTPATIENT)
Dept: ONCOLOGY | Age: 75
End: 2020-06-01

## 2020-06-01 DIAGNOSIS — I26.99 OTHER ACUTE PULMONARY EMBOLISM WITHOUT ACUTE COR PULMONALE (HCC): Primary | ICD-10-CM

## 2020-06-01 DIAGNOSIS — M17.11 PRIMARY OSTEOARTHRITIS OF RIGHT KNEE: ICD-10-CM

## 2020-06-01 DIAGNOSIS — M05.10 RHEUMATOID LUNG DISEASE (HCC): ICD-10-CM

## 2020-06-01 DIAGNOSIS — D50.9 IRON DEFICIENCY ANEMIA, UNSPECIFIED IRON DEFICIENCY ANEMIA TYPE: ICD-10-CM

## 2020-06-01 DIAGNOSIS — I82.451 ACUTE DEEP VEIN THROMBOSIS (DVT) OF RIGHT PERONEAL VEIN (HCC): ICD-10-CM

## 2020-06-01 NOTE — PROGRESS NOTES
[id number] [facility] Oncology at [address] [place] [address]  [phone]    Hematology / Oncology Established Visit    Reason for Visit:   Armida Mina is a 76 y.o. female who is seen by synchronous (real-time) audio-video technology on 6/1/2020 for follow up of PE.  PCP is Dr. Elvin Gamboa. History of Present Illness:   Ms. Sage Epstein is a 76 y.o. female with RA with lung involvement who comes in for evaluation and management of PE. She was recently put on home O2. She was hospitalized at [address] [place] [address] 3/3/19 - 3/5/19 after p/w weakness, fatigue, SOB, chills. She denies R leg swelling but has had subacute pain in R leg from knee down which started in early Feb 2019. She was found to have bilateral pulmonary emboli as well as RLE DVT. She was started on Heparin infusion and transitioned to Xarelto. At time of discharge, she was told to stop MTX, Aspirin and Celebrex. Since then, she reported to the ED at [facility] for 2 episodes of bleeding from left naris. She also coughed up blood. She denies any 8 hr trips, surgeries, fractures, OCPs or hormone replacement. No prior h/o VTE. No family h/o VTE. For RA, she takes MTX, but this was put on hold. She was also started on Remicaide 1 yr ago. She is currently taking Xarelto as prescribed, states her SOB is much better than prior. The R leg pain is much better, but feels really stiff per patient.     -Labs 3/10/19: WBC 9.5, Hgb 10.8, , HIV/HCV Ab/HepB Surface Ag all negative. Interval History: Patient here for follow up of PE and RLE DVT. Underwent cauterization for chronic nosebleeds in 11/2019, only one episode of epistaxis since last visit. Continues to take eliquis. Was advised to stop taking iron supplement by another physician. Restarted MTX.      Past Medical History:   Diagnosis Date    Anemia, unspecified 4/15/2010    Cardiac dysrhythmia, unspecified 4/15/2010    PAT- not on medication/ not seeing cardiologist    High cholesterol 4/30/2010    History of DVT of lower extremity 2019    Right leg (3/2019)    Obesity, unspecified 4/15/2010    Osteoarthrosis, unspecified whether generalized or localized, other specified sites 4/15/2010    Rheumatoid arthritis(714.0) 4/15/2010    Spinal stenosis, unspecified region other than cervical 4/15/2010    Unspecified asthma(493.90) 4/15/2010    rheumatoid lung disease      Past Surgical History:   Procedure Laterality Date    CHEST SURGERY PROCEDURE UNLISTED  lat     lung biopsy- diagnosed with rheumatoid lung disease    ENDOSCOPY, COLON, DIAGNOSTIC      7-10-13    HX ORTHOPAEDIC Right     rotator cuff repair      Social History     Tobacco Use    Smoking status: Former Smoker     Packs/day: 1.00     Years: 20.00     Pack years: 20.00     Last attempt to quit: 1985     Years since quittin.1    Smokeless tobacco: Never Used   Substance Use Topics    Alcohol use: No      Family History   Problem Relation Age of Onset    Cancer Mother         cervical, breast    Heart Disease Mother     Heart Attack Mother     Hypertension Mother     Arthritis-osteo Mother     Heart Disease Father     Heart Attack Father     Cancer Other         uterine and lung.  High Cholesterol Brother     Stroke Brother     Arthritis-rheumatoid Sister     Migraines Sister     Breast Cancer Maternal Aunt      Current Outpatient Medications   Medication Sig    apixaban (ELIQUIS) 5 mg tablet Take 1 Tab by mouth two (2) times a day.  gabapentin (NEURONTIN) 300 mg capsule Take 1 Cap by mouth two (2) times a day. Max Daily Amount: 600 mg. Neck and left arm pain    cefUROXime (CEFTIN) 500 mg tablet Take 1 Tab by mouth two (2) times a day. Indications: acute inflammation of the maxillary sinus    cyclobenzaprine (FLEXERIL) 10 mg tablet Take 1 Tab by mouth three (3) times daily as needed for Muscle Spasm(s).     methotrexate (RHEUMATREX) 2.5 mg tablet     atorvastatin (LIPITOR) 10 mg tablet TAKE ONE TABLET BY MOUTH EVERY DAY    famotidine (PEPCID) 20 mg tablet Take 20 mg by mouth two (2) times a day.  mupirocin (BACTROBAN) 2 % ointment     raNITIdine (ZANTAC) 300 mg tab TAKE 1 TABLET BY MOUTH DAILY    amLODIPine (NORVASC) 5 mg tablet TAKE ONE TABLET BY MOUTH EVERY DAY    triamcinolone acetonide (KENALOG) 0.1 % ointment Apply  to affected area two (2) times a day. use thin layer in the affected area of the left leg    folic acid (FOLVITE) 1 mg tablet Take 3 Tabs by mouth daily.  budesonide-formoterol (SYMBICORT) 160-4.5 mcg/actuation HFAA Take 2 Puffs by inhalation two (2) times a day.  Hot Water Bottle (WATER BOTTLE) mis Please Dispense a humidifier bottle to patient to use with her oxygen.  PROAIR HFA 90 mcg/actuation inhaler INHALE 2 PUFFS BY MOUTH EVERY 4 HOURS AS NEEDED FOR WHEEZING    albuterol (PROVENTIL VENTOLIN) 2.5 mg /3 mL (0.083 %) nebulizer solution USE 1 VIAL IN NEBULIZER EVERY 4 HOURS AS NEEDED FOR WHEEZING    infliximab (REMICADE IV) by IntraVENous route. Every 6 weeks    UBIDECARENONE (COQ-10 PO) Take  by mouth.  acetaminophen (TYLENOL) 500 mg tablet Take 1,000 mg by mouth every six (6) hours as needed for Pain.  omega-3s-dha-epa-fish oil 300-1,000 mg cpDR Take  by mouth daily.  cholecalciferol, vitamin D3, (VITAMIN D3) 4,000 unit cap Take  by mouth daily. No current facility-administered medications for this visit. Allergies   Allergen Reactions    Orudis [Ketoprofen] Palpitations    Singulair [Montelukast] Rash    Arthrotec 50 [Diclofenac-Misoprostol] Palpitations    Levaquin [Levofloxacin] Other (comments)     Hip joints swelling and painful per pt report    Lyrica [Pregabalin] Other (comments)     Saw spiders.  Methotrexate Other (comments)     DVT and PE-not felt to be secondary to methotrexate. There was some disagreement with her consultants.     Pt states takes currently        Review of Systems: A complete review of systems was obtained, negative except as described above. Physical Exam:       Due to this being a TeleHealth evaluation, many elements of the physical examination are unable to be assessed. Constitutional: Appears well-developed and well-nourished in no apparent distress   Mental status: Alert and awake, Oriented to person/place/time, Able to follow commands  Eyes: EOM normal, Sclera normal, No visible ocular discharge  HENT: Normocephalic, atraumatic; Mouth/Throat: Moist mucous membranes, External Ears normal  Neck: No visualized mass  Pulmonary/Chest: Respiratory effort normal, No visualized signs of difficulty breathing or respiratory distress   Musculoskeletal: Normal gait with no signs of ataxia, Normal range of motion of neck  Neurological: No facial asymmetry (Cranial nerve 7 motor function), No gaze palsy  Skin: No significant exanthematous lesions or discoloration noted on facial skin  Psychiatric: Normal affect, normal judgment/insight. No hallucinations       Results:     Lab Results   Component Value Date/Time    WBC 7.5 05/19/2020 10:06 AM    HGB 11.2 (L) 05/19/2020 10:06 AM    HCT 37.6 05/19/2020 10:06 AM    PLATELET 896 (L) 97/81/2302 10:06 AM    .0 (H) 05/19/2020 10:06 AM    ABS. NEUTROPHILS 4.7 05/19/2020 10:06 AM     Lab Results   Component Value Date/Time    Sodium 141 11/27/2019 10:45 AM    Potassium 4.5 11/27/2019 10:45 AM    Chloride 107 11/27/2019 10:45 AM    CO2 29 11/27/2019 10:45 AM    Glucose 93 11/27/2019 10:45 AM    BUN 13 11/27/2019 10:45 AM    Creatinine 0.67 11/27/2019 10:45 AM    GFR est AA >60 11/27/2019 10:45 AM    GFR est non-AA >60 11/27/2019 10:45 AM    Calcium 9.2 11/27/2019 10:45 AM     Lab Results   Component Value Date/Time    Bilirubin, total 0.7 11/27/2019 10:45 AM    ALT (SGPT) 20 11/27/2019 10:45 AM    Alk.  phosphatase 84 11/27/2019 10:45 AM    Protein, total 7.6 11/27/2019 10:45 AM    Albumin 3.6 11/27/2019 10:45 AM    Globulin 4.0 11/27/2019 10:45 AM     Lab Results   Component Value Date/Time    Iron 46 05/19/2020 10:06 AM    TIBC 298 05/19/2020 10:06 AM    Iron % saturation 15 (L) 05/19/2020 10:06 AM    Ferritin 67 05/19/2020 10:06 AM       Lab Results   Component Value Date/Time    Vitamin B12 666 04/30/2010 10:37 AM     Lab Results   Component Value Date/Time    TSH 1.160 06/14/2016 10:21 AM     Lab Results   Component Value Date/Time    HEP C VIRUS AB <0.1 10/09/2015 08:07 AM         Imaging:     Imaging at [facility]:     CTA Chest 3/2019: showing Multiple bilateral branching pulmonary Emboli, Bilateral patchy ground glass opacities likely chronic, and bronchiectasis consistent with pulmonary fibrosis (Rhematoid related lung disease). Similar Groundglass component and Bronchiectasis on previous CT in our system.       Duplex Legs 3/2019: Acute Deep vein thrombosis of the right peroneal vein    Echo: Mild concentric LVH with normal cavity size and systolic function. Trivial to mild regurgitation. Moderate LV diastolic dysfunction with elevated left atrial pressure. 6/20/19 Venous doppler bilateral LE:  Right Lower Venous     No evidence of deep vein thrombosis in the common femoral, profunda femoral, superficial femoral, popliteal, posterior tibial, and peroneal veins. The veins were imaged in the transverse and longitudinal planes. The vessels showed normal color filling and compressibility. Doppler interrogation showed phasic and spontaneous flow. Left Lower Venous     For comparison purposes, the left common femoral vein was briefly interrogated. These vein demonstrates normal color filing and compressibility. Doppler flow was phasic and spontaneous. Assessment & Plan:   Dacia Yu is a 76 y.o. female with RA comes in for evaluation of DVT/PE. 1. Venous thromboembolism: PE and RLE DVT occurred in March 2019. No obvious risk factors. My review of MTX shows no increased risk of VTE;  Remicaide associated with < 1% risk of thrombophelebitis, MI, etc. Between the 2 medications, the Remicaide is more likely a culprit, but unclear. The dose and frequency were increased in early February 2019. Pt discussed with her Rheumatologist, Dr. Mervat Liu who recommends thrombophilia testing. I discussed with patient that thrombophilia testing is only helpful when testing is positive, putting a name to her risk. However if testing is negative, it is not helpful as patient may still have inherited thrombophilia associated with unknown genes. Results of testing would not change our anticoagulation management, and pt does not have any children. Nonetheless, I discussed we could do testing on her in the future when she is off of the blood thinner for 2 weeks. Patient is tolerating the anticoagulation well aside from nosebleeds which have improved. Patient will likely need to remain on anticoagulation indefinitely as it is not clear whether this was provoked by medication or not. 6/2019 lower extremity venous dopplers ordered by PCP are negative  -- Continue Eliquis 5mg bid. Pt to alert us at the time of invasive procedures. We may advise her to go ahead and hold anticoagulation for 2 weeks at that time to facilitate testing for inherited thrombophilias. -- Return in 6 months. -- Sending progress note to Dr. Jacoby Robledo (Rheumatologist) - [facility] [phone]    2. RA with lung involvement: MTX restarted. Started Remicaide 4/30/19. I recommend she discuss an alternate agent to the Remicaide with her physician. Home O2 was started early Feb 2019 - likely due to PE and will likely be able to come off of supplemental O2. Her O2 sats are 96% on RA at last office visit. -- Sees Dr. Kareem Quinteros in Pulmonary and Dr. Jacoby Robledo in Rheumatology. 3. Diastolic CHF: Seen on echo and likely due to HTN. Not on diuretics. On Norvasc. 4. H/o Epistaxis: Occurred even before starting anticoagulation, but more frequent after starting. Now improved after cauterization by ENT.  Also can managed with clamping physical pressure and Afrin prn,  saline nasal spray. 5. Macrocytosis and Iron deficiency anemia: Iron levels borderline and downtrending per 5/2020 labs. Asked pt to restart Iron supplements once daily. 6. R knee osteoarthritis: Improved after injection. I was office while conducting this encounter. The patient was at her home. Consent:  She and/or her healthcare decision maker is aware that this patient-initiated Telehealth encounter is a billable service, with coverage as determined by her insurance carrier. She is aware that she may receive a bill and has provided verbal consent to proceed: YES    Pursuant to the emergency declaration under the [address] and the [facility] waiver authority and the [employer] Act, this Virtual  Visit was conducted, with patient's (and/or legal guardian's) consent, to reduce the patient's risk of exposure to COVID-19 and provide necessary medical care. Services were provided through a video synchronous discussion virtually to substitute for in-person visit. I appreciate the opportunity to participate in Ms. Polly Villalpando Jackeline's care.     Signed By: Forrest Perea MD     June 1, 2020

## 2020-06-01 NOTE — PROGRESS NOTES
Kalin Silva is a 76 y.o. female  Chief Complaint   Patient presents with    Follow-up     DVT     1. Have you been to the ER, urgent care clinic since your last visit? Hospitalized since your last visit? Yes ( pinched nerve)- [facility]    2. Have you seen or consulted any other health care providers outside of the [address] [place] [address] since your last visit? Include any pap smears or colon screening.  No

## 2020-06-09 ENCOUNTER — TELEPHONE (OUTPATIENT)
Dept: FAMILY MEDICINE CLINIC | Age: 75
End: 2020-06-09

## 2020-06-09 NOTE — TELEPHONE ENCOUNTER
----- Message from Hailey Chahal sent at 6/9/2020  9:16 AM EDT -----  Regarding: /Telephone  General Message/Vendor Calls    Caller's first and last name:      Reason for call:  Lab orders    Callback required yes/no and why:  Yes, to schedule a lab appointment.     Best contact number(s):  [phone]    Details to clarify the request:      Hailey Chahal

## 2020-06-10 ENCOUNTER — HOSPITAL ENCOUNTER (OUTPATIENT)
Dept: LAB | Age: 75
Discharge: HOME | End: 2020-06-10

## 2020-06-10 DIAGNOSIS — Z79.899 ENCOUNTER FOR LONG-TERM (CURRENT) USE OF OTHER MEDICATIONS: ICD-10-CM

## 2020-06-10 DIAGNOSIS — M05.79 SEROPOSITIVE RHEUMATOID ARTHRITIS OF MULTIPLE SITES (HCC): ICD-10-CM

## 2020-06-10 LAB
ALBUMIN SERPL-MCNC: 3.7 G/DL (ref 3.5–5)
ALBUMIN/GLOB SERPL: 0.9 {RATIO} (ref 1.1–2.2)
ALP SERPL-CCNC: 88 U/L (ref 45–117)
ALT SERPL-CCNC: 22 U/L (ref 12–78)
AST SERPL-CCNC: 19 U/L (ref 15–37)
BASOPHILS # BLD: 0 K/UL (ref 0–0.1)
BASOPHILS NFR BLD: 1 % (ref 0–1)
BILIRUB DIRECT SERPL-MCNC: 0.2 MG/DL (ref 0–0.2)
BILIRUB SERPL-MCNC: 0.6 MG/DL (ref 0.2–1)
CREAT SERPL-MCNC: 0.59 MG/DL (ref 0.55–1.02)
CRP SERPL-MCNC: 0.56 MG/DL (ref 0–0.6)
DIFFERENTIAL METHOD BLD: ABNORMAL
EOSINOPHIL # BLD: 0.3 K/UL (ref 0–0.4)
EOSINOPHIL NFR BLD: 4 % (ref 0–7)
ERYTHROCYTE [DISTWIDTH] IN BLOOD BY AUTOMATED COUNT: 15.6 % (ref 11.5–14.5)
ERYTHROCYTE [SEDIMENTATION RATE] IN BLOOD: 52 MM/HR (ref 0–30)
GLOBULIN SER CALC-MCNC: 4.1 G/DL (ref 2–4)
HCT VFR BLD AUTO: 38.4 % (ref 35–47)
HGB BLD-MCNC: 11.4 G/DL (ref 11.5–16)
IMM GRANULOCYTES # BLD AUTO: 0 K/UL (ref 0–0.04)
IMM GRANULOCYTES NFR BLD AUTO: 0 % (ref 0–0.5)
LYMPHOCYTES # BLD: 1.8 K/UL (ref 0.8–3.5)
LYMPHOCYTES NFR BLD: 28 % (ref 12–49)
MCH RBC QN AUTO: 29.4 PG (ref 26–34)
MCHC RBC AUTO-ENTMCNC: 29.7 G/DL (ref 30–36.5)
MCV RBC AUTO: 99 FL (ref 80–99)
MONOCYTES # BLD: 0.5 K/UL (ref 0–1)
MONOCYTES NFR BLD: 8 % (ref 5–13)
NEUTS SEG # BLD: 3.9 K/UL (ref 1.8–8)
NEUTS SEG NFR BLD: 59 % (ref 32–75)
NRBC # BLD: 0 K/UL (ref 0–0.01)
NRBC BLD-RTO: 0 PER 100 WBC
PLATELET # BLD AUTO: 154 K/UL (ref 150–400)
PMV BLD AUTO: 11.9 FL (ref 8.9–12.9)
PROT SERPL-MCNC: 7.8 G/DL (ref 6.4–8.2)
RBC # BLD AUTO: 3.88 M/UL (ref 3.8–5.2)
WBC # BLD AUTO: 6.6 K/UL (ref 3.6–11)

## 2020-07-21 ENCOUNTER — OFFICE VISIT (OUTPATIENT)
Dept: FAMILY MEDICINE CLINIC | Age: 75
End: 2020-07-21

## 2020-07-21 VITALS
HEART RATE: 68 BPM | DIASTOLIC BLOOD PRESSURE: 83 MMHG | SYSTOLIC BLOOD PRESSURE: 143 MMHG | WEIGHT: 286 LBS | TEMPERATURE: 96.9 F | RESPIRATION RATE: 18 BRPM | HEIGHT: 70 IN | BODY MASS INDEX: 40.94 KG/M2 | OXYGEN SATURATION: 94 %

## 2020-07-21 DIAGNOSIS — E66.01 OBESITY, MORBID (HCC): ICD-10-CM

## 2020-07-21 DIAGNOSIS — I10 ESSENTIAL HYPERTENSION: ICD-10-CM

## 2020-07-21 DIAGNOSIS — H57.11 PAIN OF RIGHT EYE: Primary | ICD-10-CM

## 2020-07-21 DIAGNOSIS — E78.00 PURE HYPERCHOLESTEROLEMIA: ICD-10-CM

## 2020-07-21 NOTE — PROGRESS NOTES
Health Maintenance Due   Topic Date Due    Shingrix Vaccine Age 49> (1 of 2) 09/20/1995    Medicare Yearly Exam  02/09/2020    GLAUCOMA SCREENING Q2Y  07/05/2020    1. Have you been to the ER, urgent care clinic since your last visit? Hospitalized since your last visit? No    2. Have you seen or consulted any other health care providers outside of the [address] [place] [address] since your last visit? Include any pap smears or colon screening.  No  Reviewed record in preparation for visit and have necessary documentation  opportunity was given for questions  Goals that were addressed and/or need to be completed during or after this appointment include

## 2020-07-22 ENCOUNTER — TELEPHONE (OUTPATIENT)
Dept: FAMILY MEDICINE CLINIC | Age: 75
End: 2020-07-22

## 2020-07-27 NOTE — PROGRESS NOTES
Patient: Maddison Reagan MRN: [id number]  SSN: [id number]    YOB: 1945  Age: 76 y.o. Sex: female      Chief Complaint   Patient presents with    Eye Problem     right eye     Maddison Reagan is a 76 y.o. female with pain and tearing in right eye for two weeks. Patient says it feels like something is in her eye. No significant prior ophthalmological history. Patient with hx of RA, DVT, PE, HTN, HLD, asthma and chronic rhinitis. Patient denies F/C, HA, dizziness, SOB, CP, abdominal pain, dysuria, acute myalgias or arthralgias. BP Readings from Last 3 Encounters:   07/21/20 143/83   05/26/20 174/78   02/19/20 126/71     Wt Readings from Last 3 Encounters:   07/21/20 286 lb (129.7 kg)   05/26/20 286 lb (129.7 kg)   02/19/20 286 lb (129.7 kg)     Body mass index is 41.04 kg/m². Medications:     Current Outpatient Medications   Medication Sig    amLODIPine (NORVASC) 5 mg tablet TAKE ONE TABLET BY MOUTH EVERY DAY    apixaban (ELIQUIS) 5 mg tablet Take 1 Tab by mouth two (2) times a day.  gabapentin (NEURONTIN) 300 mg capsule Take 1 Cap by mouth two (2) times a day. Max Daily Amount: 600 mg. Neck and left arm pain    cefUROXime (CEFTIN) 500 mg tablet Take 1 Tab by mouth two (2) times a day. Indications: acute inflammation of the maxillary sinus    cyclobenzaprine (FLEXERIL) 10 mg tablet Take 1 Tab by mouth three (3) times daily as needed for Muscle Spasm(s).  methotrexate (RHEUMATREX) 2.5 mg tablet     atorvastatin (LIPITOR) 10 mg tablet TAKE ONE TABLET BY MOUTH EVERY DAY    famotidine (PEPCID) 20 mg tablet Take 20 mg by mouth two (2) times a day.  mupirocin (BACTROBAN) 2 % ointment     raNITIdine (ZANTAC) 300 mg tab TAKE 1 TABLET BY MOUTH DAILY    triamcinolone acetonide (KENALOG) 0.1 % ointment Apply  to affected area two (2) times a day. use thin layer in the affected area of the left leg    folic acid (FOLVITE) 1 mg tablet Take 3 Tabs by mouth daily.     budesonide-formoterol (SYMBICORT) 160-4.5 mcg/actuation HFAA Take 2 Puffs by inhalation two (2) times a day.  Hot Water Bottle (WATER BOTTLE) misc Please Dispense a humidifier bottle to patient to use with her oxygen.  PROAIR HFA 90 mcg/actuation inhaler INHALE 2 PUFFS BY MOUTH EVERY 4 HOURS AS NEEDED FOR WHEEZING    albuterol (PROVENTIL VENTOLIN) 2.5 mg /3 mL (0.083 %) nebulizer solution USE 1 VIAL IN NEBULIZER EVERY 4 HOURS AS NEEDED FOR WHEEZING    infliximab (REMICADE IV) by IntraVENous route. Every 6 weeks    UBIDECARENONE (COQ-10 PO) Take  by mouth.  acetaminophen (TYLENOL) 500 mg tablet Take 1,000 mg by mouth every six (6) hours as needed for Pain.  omega-3s-dha-epa-fish oil 300-1,000 mg cpDR Take  by mouth daily.  cholecalciferol, vitamin D3, (VITAMIN D3) 4,000 unit cap Take  by mouth daily. No current facility-administered medications for this visit.         Problem List:     Patient Active Problem List    Diagnosis Date Noted    Cervical spinal stenosis 01/28/2020    Bronchiectasis with acute exacerbation ([address] 08/19/2019    Pain of left lower leg 06/19/2019    History of DVT of lower extremity 06/06/2019    Hx of pulmonary embolus 06/06/2019    Chronic rhinitis 05/23/2019    Allergic contact dermatitis 05/23/2019    Essential hypertension 04/15/2019    Obesity, morbid ([address] 12/04/2017    Primary localized osteoarthritis of right hip 07/11/2016    Asthma 10/06/2015    Primary generalized (osteo)arthritis 10/06/2015    PAC (premature atrial contraction) 10/06/2015    Lumbar spinal stenosis 10/06/2015    Colon polyp, hyperplastic 06/02/2015    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[address] 12/15/2010    High cholesterol 04/30/2010    Rheumatoid arthritis ([address] 04/15/2010    Severe persistent asthma without complication 01/31/3952    Cardiac dysrhythmia, unspecified 04/15/2010    OA (osteoarthritis) 04/15/2010    Obesity, unspecified 04/15/2010    Spinal stenosis of lumbar region with neurogenic claudication 04/15/2010       Medical History:     Past Medical History:   Diagnosis Date    Anemia, unspecified 4/15/2010    Cardiac dysrhythmia, unspecified 4/15/2010    PAT- not on medication/ not seeing cardiologist    High cholesterol 4/30/2010    History of DVT of lower extremity 6/6/2019    Right leg (3/2019)    Obesity, unspecified 4/15/2010    Osteoarthrosis, unspecified whether generalized or localized, other specified sites 4/15/2010    Rheumatoid arthritis(714.0) 4/15/2010    Spinal stenosis, unspecified region other than cervical 4/15/2010    Unspecified asthma(493.90) 4/15/2010    rheumatoid lung disease       Allergies: Allergies   Allergen Reactions    Orudis [Ketoprofen] Palpitations    Singulair [Montelukast] Rash    Arthrotec 50 [Diclofenac-Misoprostol] Palpitations    Levaquin [Levofloxacin] Other (comments)     Hip joints swelling and painful per pt report    Lyrica [Pregabalin] Other (comments)     Saw spiders.  Methotrexate Other (comments)     DVT and PE-not felt to be secondary to methotrexate. There was some disagreement with her consultants.     Pt states takes currently       Surgical History:     Past Surgical History:   Procedure Laterality Date    CHEST SURGERY PROCEDURE UNLISTED  lat 1990's    lung biopsy- diagnosed with rheumatoid lung disease    ENDOSCOPY, COLON, DIAGNOSTIC      7-10-13    HX ORTHOPAEDIC Right 2002    rotator cuff repair       Review of Symptoms:  Constitutional: No fever, chills, fatigue, malaise  HEENT: see HPI, denies sore throat, congestion, otalgia  CV: Negative for chest pain or palpitations  Resp: Negative for cough, wheezing or SOB  GI: Negative for nausea or abdominal pain  : Negative for dysuria or voiding dysfunction  MS: Negative for myalgias or arthralgias   Neuro: Negative for headache, weakness or paresthesia     Vitals:    07/21/20 1135   BP: 143/83   Pulse: 68   Resp: 18   Temp: 96.9 °F (36.1 °C)   TempSrc: Oral   SpO2: 94%   Weight: 286 lb (129.7 kg)   Height: 5' 10\" (1.778 m)       Physical Examination:  General: well developed, well nourished, in no acute distress  Skin: warm and dry sans rash or lesion  Head: normocephalic, atraumatic  Eyes: right eye with fluorecin retaining area on left superior cornea. PERRL, No periorbital cellulitis. Visual acuity normal  Neck: normal range of motion, no LAD  Cardiovascular: S1, S2, regular rate and rhythm  Respiratory: clear with symmetrical effort  Extremities: full range of motion  Neurology: active, alert, oriented   Psych: affect appropriate     Diagnoses and all orders for this visit:    1. Pain of right eye  -     REFERRAL TO OPHTHALMOLOGY    2. Essential hypertension    3. Pure hypercholesterolemia    4. Obesity, morbid ([address]      Plan of care:    Patient advised as a precaution to self isolate at home, practice regular hand washing with soap and warm water and to wear a mask and utilize social distancing when necessary to be out in public places. Diagnoses were discussed in detail with patient. Continue current prescribed medications as written. Medication risks/benefits/side effects discussed with patient. All of the patient's questions were addressed and answered to apparent satisfaction. The patient understands and agrees with our plan of care. The patient knows to call back if they have questions about the plan of care or if symptoms change. The patient received an After-Visit Summary which contains VS, diagnoses, orders, allergy and medication lists.       Future Appointments   Date Time Provider David Chester   12/1/2020  1:30 PM Kvng Ojeda MD [facility]

## 2020-07-30 ENCOUNTER — TELEPHONE (OUTPATIENT)
Dept: FAMILY MEDICINE CLINIC | Age: 75
End: 2020-07-30

## 2020-07-30 NOTE — TELEPHONE ENCOUNTER
Called patient. advsied per Dr. Lalito Vieira:  \"Shingles is an extremely painful conditions. Risks associated with Covid-19 are going to be present well into next year. However she should discuss the Shingrix vaccine with her rheumatologist. If he approves, then I would recommend going ahead and getting this. \"    She verbalized understanding.

## 2020-07-30 NOTE — TELEPHONE ENCOUNTER
Pt received a call from [employer] to come and get her Shingles injection. With all this going on with her eyes and all, do you think she should go and take it on Aug 4, 20 or wait a while?

## 2020-07-30 NOTE — TELEPHONE ENCOUNTER
Shingles is an extremely painful conditions. Risks associated with Covid-19 are going to be present well into next year. However she should discuss the Shingrix vaccine with her rheumatologist. If he approves, then I would recommend going ahead and getting this.

## 2020-08-25 NOTE — PROGRESS NOTES
1. Have you been to the ER, urgent care clinic since your last visit? Hospitalized since your last visit? no    2. Have you seen or consulted any other health care providers outside of the [address] [place] [address] since your last visit? Including any pap smears or colon screening. Dr. Merline Montenegro, rheumatologist in April    Reviewed record in preparation for visit and have necessary documentation    Pt did not bring medication to office visit for review  Opportunity was given for questions    Goals that were addressed and/or need to be completed during or after this appointment include   Health Maintenance Due   Topic Date Due    Shingrix Vaccine Age 50> (1 of 2) 09/20/1995     Body mass index is 40.18 kg/m². Xenograft Text: The defect edges were debeveled with a #15 scalpel blade.  Given the location of the defect, shape of the defect and the proximity to free margins a xenograft was deemed most appropriate.  The graft was then trimmed to fit the size of the defect.  The graft was then placed in the primary defect and oriented appropriately.

## 2020-08-31 ENCOUNTER — TELEPHONE (OUTPATIENT)
Dept: FAMILY MEDICINE CLINIC | Age: 75
End: 2020-08-31

## 2020-08-31 NOTE — TELEPHONE ENCOUNTER
Pt states that the provider that is doing her colonoscopy is going out of country and when he gets back he will have to quarantine. She wanted to know about the delay.

## 2020-08-31 NOTE — TELEPHONE ENCOUNTER
Please advise  Spoke with patient, patient states the colonoscopy was scheduled for Sept 15, with the provider going out of country, it has been rescheduled for Oct 20, she would like a new referral to another provider because she would like this done soon, and she is not to keen on this new doctor

## 2020-09-01 NOTE — TELEPHONE ENCOUNTER
Patient called per Dr. Lewis Tillman:  they have her history and that is important in terms of colonoscopy history.  And second, if she does try to switch, then she will likely end up waiting even longer for a Colonoscopy, particularly since everyone is behind with the [place] pandemic. Patient verbalized understanding and appreciative. Patient in agreement with keeping current appointment.

## 2020-09-17 RX ORDER — BUDESONIDE AND FORMOTEROL FUMARATE DIHYDRATE 160; 4.5 UG/1; UG/1
AEROSOL RESPIRATORY (INHALATION)
Qty: 1 INHALER | Refills: 3 | Status: SHIPPED | OUTPATIENT
Start: 2020-09-17 | End: 2022-01-27 | Stop reason: SDUPTHER

## 2020-09-25 ENCOUNTER — CLINICAL SUPPORT (OUTPATIENT)
Dept: FAMILY MEDICINE CLINIC | Age: 75
End: 2020-09-25
Payer: MEDICARE

## 2020-09-25 VITALS — TEMPERATURE: 97.7 F

## 2020-09-25 DIAGNOSIS — Z23 ENCOUNTER FOR IMMUNIZATION: Primary | ICD-10-CM

## 2020-09-25 PROCEDURE — 90694 VACC AIIV4 NO PRSRV 0.5ML IM: CPT | Performed by: FAMILY MEDICINE

## 2020-09-25 PROCEDURE — G0008 ADMIN INFLUENZA VIRUS VAC: HCPCS | Performed by: FAMILY MEDICINE

## 2020-10-09 ENCOUNTER — VIRTUAL VISIT (OUTPATIENT)
Dept: FAMILY MEDICINE CLINIC | Age: 75
End: 2020-10-09
Payer: MEDICARE

## 2020-10-09 DIAGNOSIS — H93.13 TINNITUS OF BOTH EARS: Primary | ICD-10-CM

## 2020-10-09 PROCEDURE — 99441 PR PHYS/QHP TELEPHONE EVALUATION 5-10 MIN: CPT | Performed by: FAMILY MEDICINE

## 2020-10-09 RX ORDER — CELECOXIB 100 MG/1
CAPSULE ORAL
COMMUNITY
Start: 2020-08-29 | End: 2021-07-01 | Stop reason: ALTCHOICE

## 2020-10-09 RX ORDER — FLUTICASONE PROPIONATE 50 MCG
2 SPRAY, SUSPENSION (ML) NASAL DAILY
Qty: 1 BOTTLE | Refills: 1 | Status: SHIPPED | OUTPATIENT
Start: 2020-10-09 | End: 2021-03-25

## 2020-10-09 NOTE — PROGRESS NOTES
1. Have you been to the ER, urgent care clinic since your last visit? Hospitalized since your last visit? No    2. Have you seen or consulted any other health care providers outside of the [address] [place] [address] since your last visit? Include any pap smears or colon screening.  No        Health Maintenance Due   Topic Date Due    Medicare Yearly Exam  02/09/2020    GLAUCOMA SCREENING Q2Y  07/05/2020    Shingrix Vaccine Age 50> (2 of 2) 09/29/2020

## 2020-10-09 NOTE — PROGRESS NOTES
Brandan Pinto is a 76 y.o. female evaluated via Telephone on 10/09/20. Patient Identity confirmed by . Consent:  He and/or health care decision maker is aware that that he may receive a bill for this telephone service, depending on his insurance coverage, and has provided verbal consent to proceed: Yes    Physician Location: Office  Patient Location: Home  Nurse Assisting with Encounter: Lara Coffman LPN    Chief Complaint   Patient presents with    Other     roaring sound in ears and head       Information gathered from patient and/or health care decision maker. HPI:   Ear roaring:  Patient presents with complaint of roaring sensation in the  bilateral ear(s) though worse on the right. Sound is intermittent but happens daily and can last up to hours at a time. Seems to be worst in the morning and at night. Symptoms ongoing over the last 3 weeks. she denies pain, drainage from the ears. Noting some mild decreased hearing. Recently BP have been trending higher, as 160's but mostly in 140-150's. Patient denies current URI/Sinusitis symptoms. Denies dizziness, lightheadedness, or other abnormalities. Denies headache. Current OTC medications include none. - Associated symptoms: Reports none of significance. Denies tugging at ear, ear drainage, congestion, runny nose, hearing loss, poor sleep pattern, poor appetite, sore throat, vomiting.  - Otitis media History negative. - History of Tympanostomy tubes? No     Review of Systems   Constitutional: Negative for chills, fever and malaise/fatigue. HENT: Negative for congestion. Respiratory: Negative for cough. Cardiovascular: Negative for chest pain. Gastrointestinal: Negative for nausea and vomiting. Neurological: Negative for dizziness and headaches. Limited Exam:  Due to this being a TeleHealth evaluation, many elements of the physical examination are unable to be assessed.       Constitutional: Appears in no apparent distress   Mental status: Alert and awake, Oriented to person/place/time, Able to follow commands  Psychiatric: Normal affect, normal judgment/insight. No hallucinations     Current Outpatient Medications on File Prior to Visit   Medication Sig Dispense Refill    Symbicort 160-4.5 mcg/actuation HFAA USE 2 INHALATIONS TWICE A DAY 1 Inhaler 3    amLODIPine (NORVASC) 5 mg tablet TAKE ONE TABLET BY MOUTH EVERY DAY 90 Tab 1    apixaban (ELIQUIS) 5 mg tablet Take 1 Tab by mouth two (2) times a day. 60 Tab 3    gabapentin (NEURONTIN) 300 mg capsule Take 1 Cap by mouth two (2) times a day. Max Daily Amount: 600 mg. Neck and left arm pain 42 Cap 0    cyclobenzaprine (FLEXERIL) 10 mg tablet Take 1 Tab by mouth three (3) times daily as needed for Muscle Spasm(s). 15 Tab 3    methotrexate (RHEUMATREX) 2.5 mg tablet       atorvastatin (LIPITOR) 10 mg tablet TAKE ONE TABLET BY MOUTH EVERY DAY (Patient taking differently: TAKE ONE TABLET BY MOUTH EVERY DAY - patient states she takes 2-3 times weekly) 90 Tab 2    famotidine (PEPCID) 20 mg tablet Take 20 mg by mouth two (2) times a day.  triamcinolone acetonide (KENALOG) 0.1 % ointment Apply  to affected area two (2) times a day. use thin layer in the affected area of the left leg 1 Tube 0    folic acid (FOLVITE) 1 mg tablet Take 3 Tabs by mouth daily. 90 Tab 2    Hot Water Bottle (WATER BOTTLE) misc Please Dispense a humidifier bottle to patient to use with her oxygen. 1 Each 0    PROAIR HFA 90 mcg/actuation inhaler INHALE 2 PUFFS BY MOUTH EVERY 4 HOURS AS NEEDED FOR WHEEZING 2 Inhaler 8    albuterol (PROVENTIL VENTOLIN) 2.5 mg /3 mL (0.083 %) nebulizer solution USE 1 VIAL IN NEBULIZER EVERY 4 HOURS AS NEEDED FOR WHEEZING 100 Each 4    UBIDECARENONE (COQ-10 PO) Take  by mouth.  acetaminophen (TYLENOL) 500 mg tablet Take 1,000 mg by mouth every six (6) hours as needed for Pain.  omega-3s-dha-epa-fish oil 300-1,000 mg cpDR Take  by mouth daily.       cholecalciferol, vitamin D3, (VITAMIN D3) 4,000 unit cap Take  by mouth daily.  celecoxib (CELEBREX) 100 mg capsule       [DISCONTINUED] cefUROXime (CEFTIN) 500 mg tablet Take 1 Tab by mouth two (2) times a day. Indications: acute inflammation of the maxillary sinus 14 Tab 1    mupirocin (BACTROBAN) 2 % ointment   0    raNITIdine (ZANTAC) 300 mg tab TAKE 1 TABLET BY MOUTH DAILY 30 Tab 11    infliximab (REMICADE IV) by IntraVENous route. Every 6 weeks       No current facility-administered medications on file prior to visit. Allergies   Allergen Reactions    Orudis [Ketoprofen] Palpitations    Singulair [Montelukast] Rash    Arthrotec 50 [Diclofenac-Misoprostol] Palpitations    Levaquin [Levofloxacin] Other (comments)     Hip joints swelling and painful per pt report    Lyrica [Pregabalin] Other (comments)     Saw spiders.  Methotrexate Other (comments)     DVT and PE-not felt to be secondary to methotrexate. There was some disagreement with her consultants.     Pt states takes currently        Patient Active Problem List    Diagnosis Date Noted    Cervical spinal stenosis 01/28/2020    Bronchiectasis with acute exacerbation ([address] 08/19/2019    Pain of left lower leg 06/19/2019    History of DVT of lower extremity 06/06/2019    Hx of pulmonary embolus 06/06/2019    Chronic rhinitis 05/23/2019    Allergic contact dermatitis 05/23/2019    Essential hypertension 04/15/2019    Obesity, morbid ([address] 12/04/2017    Primary localized osteoarthritis of right hip 07/11/2016    Asthma 10/06/2015    Primary generalized (osteo)arthritis 10/06/2015    PAC (premature atrial contraction) 10/06/2015    Lumbar spinal stenosis 10/06/2015    Colon polyp, hyperplastic 06/02/2015    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[address] 12/15/2010    High cholesterol 04/30/2010    Rheumatoid arthritis ([address] 04/15/2010    Severe persistent asthma without complication 19/01/6639    Cardiac dysrhythmia, unspecified 04/15/2010    OA (osteoarthritis) 04/15/2010    Obesity, unspecified 04/15/2010    Spinal stenosis of lumbar region with neurogenic claudication 04/15/2010        Health Maintenance Due   Topic Date Due    Medicare Yearly Exam  02/09/2020    GLAUCOMA SCREENING Q2Y  07/05/2020    Shingrix Vaccine Age 50> (2 of 2) 09/29/2020        Assessment/Plan:  Details of this discussion including any medical advice provided: Unclear etiology of abnormal rushing/roaring in ear. Trial of nasal steroid, if possible eustachian dysfunction. Follow up in office for BP review as possible cause though home BP not in extreme range though are elevated. ICD-10-CM ICD-9-CM    1. Tinnitus of both ears  H93.13 388.30 fluticasone propionate (FLONASE) 50 mcg/actuation nasal spray     Follow-up and Dispositions    · Return in about 1 week (around 10/16/2020) for In office follow up for ear tinnitus. Total Time: minutes: 5-10 minutes was spent on telemedicine encounter discussing above problems and plans. Patient Problem list, medications, and Allergies were reviewed during this encounter. Pursuant to the emergency declaration under the [facility], [facility] waiver authority and the [employer] Act, this Telephone Visit was conducted, with patient's consent, to reduce the patient's risk of exposure to COVID-19 and provide continuity of care for an established patient. I affirm this is a Patient Initiated Episode with an Established Patient who has not had a related appointment within my department in the past 7 days or scheduled within the next 24 hours. Discussed diagnoses in detail with patient. Medication risks/benefits/side effects discussed with patient. All of the patient's questions were addressed. The patient understands and agrees with our plan of care.   The patient knows to call back if they are unsure of or forget any changes we discussed today or if the symptoms change.     Note: not billable if this call serves to triage the patient into an appointment for the relevant concern    MD FAVIOLA Montano & NATALIA BECKETT [facility]  10/09/20

## 2020-10-11 DIAGNOSIS — I82.451 ACUTE DEEP VEIN THROMBOSIS (DVT) OF RIGHT PERONEAL VEIN (HCC): ICD-10-CM

## 2020-10-11 DIAGNOSIS — I26.99 OTHER ACUTE PULMONARY EMBOLISM WITHOUT ACUTE COR PULMONALE (HCC): ICD-10-CM

## 2020-10-12 RX ORDER — APIXABAN 5 MG/1
TABLET, FILM COATED ORAL
Qty: 60 TAB | Refills: 11 | Status: SHIPPED | OUTPATIENT
Start: 2020-10-12 | End: 2021-11-15 | Stop reason: SDUPTHER

## 2020-10-14 ENCOUNTER — OFFICE VISIT (OUTPATIENT)
Dept: FAMILY MEDICINE CLINIC | Age: 75
End: 2020-10-14
Payer: MEDICARE

## 2020-10-14 VITALS
OXYGEN SATURATION: 95 % | BODY MASS INDEX: 40.94 KG/M2 | TEMPERATURE: 97 F | WEIGHT: 286 LBS | DIASTOLIC BLOOD PRESSURE: 73 MMHG | SYSTOLIC BLOOD PRESSURE: 142 MMHG | HEIGHT: 70 IN | HEART RATE: 77 BPM | RESPIRATION RATE: 18 BRPM

## 2020-10-14 DIAGNOSIS — E78.00 HIGH CHOLESTEROL: Primary | Chronic | ICD-10-CM

## 2020-10-14 DIAGNOSIS — I10 ESSENTIAL HYPERTENSION: Chronic | ICD-10-CM

## 2020-10-14 LAB
ALBUMIN SERPL-MCNC: 3.6 G/DL (ref 3.5–5)
ALBUMIN/GLOB SERPL: 0.9 {RATIO} (ref 1.1–2.2)
ALP SERPL-CCNC: 94 U/L (ref 45–117)
ALT SERPL-CCNC: 20 U/L (ref 12–78)
ANION GAP SERPL CALC-SCNC: 5 MMOL/L (ref 5–15)
AST SERPL-CCNC: 16 U/L (ref 15–37)
BILIRUB SERPL-MCNC: 0.5 MG/DL (ref 0.2–1)
BUN SERPL-MCNC: 19 MG/DL (ref 6–20)
BUN/CREAT SERPL: 28 (ref 12–20)
CALCIUM SERPL-MCNC: 9.3 MG/DL (ref 8.5–10.1)
CHLORIDE SERPL-SCNC: 109 MMOL/L (ref 97–108)
CHOLEST SERPL-MCNC: 162 MG/DL
CO2 SERPL-SCNC: 27 MMOL/L (ref 21–32)
CREAT SERPL-MCNC: 0.69 MG/DL (ref 0.55–1.02)
GLOBULIN SER CALC-MCNC: 3.9 G/DL (ref 2–4)
GLUCOSE SERPL-MCNC: 86 MG/DL (ref 65–100)
HDLC SERPL-MCNC: 65 MG/DL
HDLC SERPL: 2.5 {RATIO} (ref 0–5)
LDLC SERPL CALC-MCNC: 81.2 MG/DL (ref 0–100)
LIPID PROFILE,FLP: NORMAL
POTASSIUM SERPL-SCNC: 4.4 MMOL/L (ref 3.5–5.1)
PROT SERPL-MCNC: 7.5 G/DL (ref 6.4–8.2)
SODIUM SERPL-SCNC: 141 MMOL/L (ref 136–145)
TRIGL SERPL-MCNC: 79 MG/DL (ref ?–150)
VLDLC SERPL CALC-MCNC: 15.8 MG/DL

## 2020-10-14 PROCEDURE — G8536 NO DOC ELDER MAL SCRN: HCPCS | Performed by: FAMILY MEDICINE

## 2020-10-14 PROCEDURE — 1101F PT FALLS ASSESS-DOCD LE1/YR: CPT | Performed by: FAMILY MEDICINE

## 2020-10-14 PROCEDURE — 99213 OFFICE O/P EST LOW 20 MIN: CPT | Performed by: FAMILY MEDICINE

## 2020-10-14 PROCEDURE — 3017F COLORECTAL CA SCREEN DOC REV: CPT | Performed by: FAMILY MEDICINE

## 2020-10-14 PROCEDURE — G8427 DOCREV CUR MEDS BY ELIG CLIN: HCPCS | Performed by: FAMILY MEDICINE

## 2020-10-14 PROCEDURE — G8754 DIAS BP LESS 90: HCPCS | Performed by: FAMILY MEDICINE

## 2020-10-14 PROCEDURE — 1090F PRES/ABSN URINE INCON ASSESS: CPT | Performed by: FAMILY MEDICINE

## 2020-10-14 PROCEDURE — G8417 CALC BMI ABV UP PARAM F/U: HCPCS | Performed by: FAMILY MEDICINE

## 2020-10-14 PROCEDURE — G8753 SYS BP > OR = 140: HCPCS | Performed by: FAMILY MEDICINE

## 2020-10-14 PROCEDURE — G8399 PT W/DXA RESULTS DOCUMENT: HCPCS | Performed by: FAMILY MEDICINE

## 2020-10-14 PROCEDURE — G8432 DEP SCR NOT DOC, RNG: HCPCS | Performed by: FAMILY MEDICINE

## 2020-10-14 NOTE — PROGRESS NOTES
[facility]    History of Present Illness:   Enrrique Wells is a 76 y.o. female with history of HTN, PAC, Asthma, OA, Bronchitis, DVT  CC: Follow up TInnitus  History provided by patient and Records    HPI:  Tinnitus resolved with use of Flonase. Hypertension Follow up:  Currently Taking Amlodipine 5 mg. The patient reports:  taking medications as instructed, no medication side effects noted, no TIA's, no chest pain on exertion, no dyspnea on exertion, no swelling of ankles. BP Readings from Last 3 Encounters:   10/14/20 (!) 142/73   07/21/20 143/83   05/26/20 174/78      Hypertriglyceridemia Follow up:   Cardiovascular risks for her are: hypertension  hyperlipidemia. Currently she takes Lipitor (atorvastatin) , 10 mg. Myalgias: NO   Fatigue: NO   Other side effects: NO     Wt Readings from Last 3 Encounters:   10/14/20 286 lb (129.7 kg)   07/21/20 286 lb (129.7 kg)   05/26/20 286 lb (129.7 kg)          Health Maintenance  Health Maintenance Due   Topic Date Due    Medicare Yearly Exam  02/09/2020    GLAUCOMA SCREENING Q2Y  07/05/2020    Shingrix Vaccine Age 50> (2 of 2) 09/29/2020       Past Medical, Family, and Social History:     Current Outpatient Medications on File Prior to Visit   Medication Sig Dispense Refill    Eliquis 5 mg tablet TAKE 1 TABLET TWICE A DAY 60 Tab 11    celecoxib (CELEBREX) 100 mg capsule       fluticasone propionate (FLONASE) 50 mcg/actuation nasal spray 2 Sprays by Both Nostrils route daily. 1 Bottle 1    Symbicort 160-4.5 mcg/actuation HFAA USE 2 INHALATIONS TWICE A DAY 1 Inhaler 3    amLODIPine (NORVASC) 5 mg tablet TAKE ONE TABLET BY MOUTH EVERY DAY 90 Tab 1    gabapentin (NEURONTIN) 300 mg capsule Take 1 Cap by mouth two (2) times a day. Max Daily Amount: 600 mg. Neck and left arm pain 42 Cap 0    cyclobenzaprine (FLEXERIL) 10 mg tablet Take 1 Tab by mouth three (3) times daily as needed for Muscle Spasm(s).  15 Tab 3    methotrexate (RHEUMATREX) 2.5 mg tablet       atorvastatin (LIPITOR) 10 mg tablet TAKE ONE TABLET BY MOUTH EVERY DAY (Patient taking differently: TAKE ONE TABLET BY MOUTH EVERY DAY - patient states she takes 2-3 times weekly) 90 Tab 2    famotidine (PEPCID) 20 mg tablet Take 20 mg by mouth two (2) times a day.  mupirocin (BACTROBAN) 2 % ointment   0    raNITIdine (ZANTAC) 300 mg tab TAKE 1 TABLET BY MOUTH DAILY 30 Tab 11    triamcinolone acetonide (KENALOG) 0.1 % ointment Apply  to affected area two (2) times a day. use thin layer in the affected area of the left leg 1 Tube 0    folic acid (FOLVITE) 1 mg tablet Take 3 Tabs by mouth daily. 90 Tab 2    Hot Water Bottle (WATER BOTTLE) misc Please Dispense a humidifier bottle to patient to use with her oxygen. 1 Each 0    PROAIR HFA 90 mcg/actuation inhaler INHALE 2 PUFFS BY MOUTH EVERY 4 HOURS AS NEEDED FOR WHEEZING 2 Inhaler 8    albuterol (PROVENTIL VENTOLIN) 2.5 mg /3 mL (0.083 %) nebulizer solution USE 1 VIAL IN NEBULIZER EVERY 4 HOURS AS NEEDED FOR WHEEZING 100 Each 4    infliximab (REMICADE IV) by IntraVENous route. Every 6 weeks      UBIDECARENONE (COQ-10 PO) Take  by mouth.  acetaminophen (TYLENOL) 500 mg tablet Take 1,000 mg by mouth every six (6) hours as needed for Pain.  omega-3s-dha-epa-fish oil 300-1,000 mg cpDR Take  by mouth daily.  cholecalciferol, vitamin D3, (VITAMIN D3) 4,000 unit cap Take  by mouth daily. No current facility-administered medications on file prior to visit.         Patient Active Problem List   Diagnosis Code    Rheumatoid arthritis ([facility] M06.9    Severe persistent asthma without complication P30.13    Cardiac dysrhythmia, unspecified I49.9    OA (osteoarthritis) M19.90    Obesity, unspecified E66.9    Spinal stenosis of lumbar region with neurogenic claudication M48.062    High cholesterol E78.00    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HCC) M05.10    Colon polyp, hyperplastic K63.5    Asthma J45.909    Primary generalized (osteo)arthritis M15.0    PAC (premature atrial contraction) I49.1    Lumbar spinal stenosis M48.061    Primary localized osteoarthritis of right hip M16.11    Obesity, morbid (HCC) E66.01    Essential hypertension I10    Chronic rhinitis J31.0    Allergic contact dermatitis L23.9    History of DVT of lower extremity Z86.718    Hx of pulmonary embolus Z86.711    Pain of left lower leg M79.662    Bronchiectasis with acute exacerbation (HCC) J47.1    Cervical spinal stenosis M48.02       Social History     Socioeconomic History    Marital status: SINGLE     Spouse name: Not on file    Number of children: Not on file    Years of education: Not on file    Highest education level: Not on file   Occupational History    Not on file   Social Needs    Financial resource strain: Not on file    Food insecurity     Worry: Not on file     Inability: Not on file    Transportation needs     Medical: Not on file     Non-medical: Not on file   Tobacco Use    Smoking status: Former Smoker     Packs/day: 1.00     Years: 20.00     Pack years: 20.00     Last attempt to quit: 1985     Years since quittin.4    Smokeless tobacco: Never Used   Substance and Sexual Activity    Alcohol use: No    Drug use: No    Sexual activity: Never   Lifestyle    Physical activity     Days per week: Not on file     Minutes per session: Not on file    Stress: Not on file   Relationships    Social connections     Talks on phone: Not on file     Gets together: Not on file     Attends Hinduism service: Not on file     Active member of club or organization: Not on file     Attends meetings of clubs or organizations: Not on file     Relationship status: Not on file    Intimate partner violence     Fear of current or ex partner: Not on file     Emotionally abused: Not on file     Physically abused: Not on file     Forced sexual activity: Not on file   Other Topics Concern    Not on file   Social History Narrative    Not on file       Review of Systems   Review of Systems   Constitutional: Negative for chills and fever. HENT: Negative for ear discharge, ear pain and tinnitus. Respiratory: Negative for cough. Cardiovascular: Negative for chest pain. Gastrointestinal: Negative for abdominal pain, nausea and vomiting. Neurological: Negative for headaches. Objective:     Visit Vitals  BP (!) 142/73 (BP 1 Location: Right arm, BP Patient Position: Sitting)   Pulse 77   Temp 97 °F (36.1 °C) (Temporal)   Resp 18   Ht 5' 10\" (1.778 m)   Wt 286 lb (129.7 kg)   SpO2 95%   BMI 41.04 kg/m²        Physical Exam  Vitals signs and nursing note reviewed. Constitutional:       Appearance: Normal appearance. HENT:      Head: Normocephalic and atraumatic. Neck:      Musculoskeletal: Normal range of motion and neck supple. Cardiovascular:      Rate and Rhythm: Normal rate and regular rhythm. Pulses: Normal pulses. Heart sounds: Normal heart sounds. No murmur. No friction rub. No gallop. Pulmonary:      Effort: Pulmonary effort is normal.      Breath sounds: Normal breath sounds. Abdominal:      General: Bowel sounds are normal.      Palpations: Abdomen is soft. Skin:     General: Skin is warm and dry. Neurological:      Mental Status: She is alert. Pertinent Labs/Studies:      Assessment and orders:       ICD-10-CM ICD-9-CM    1. High cholesterol  E78.00 272.0 LIPID PANEL   2. Essential hypertension  G08 047.3 METABOLIC PANEL, COMPREHENSIVE     Diagnoses and all orders for this visit:    1. High cholesterol: Labs today. No changes at this time  -     LIPID PANEL; Future    2. Essential hypertension  -     METABOLIC PANEL, COMPREHENSIVE; Future      Follow-up and Dispositions    · Return in about 3 months (around 1/14/2021) for Follow up for [address] [facility] or in person. I have discussed the diagnosis with the patient and the intended plan as seen in the above orders.   Social history, medical history, and labs were reviewed. The patient has received an after-visit summary and questions were answered concerning future plans. I have discussed medication side effects and warnings with the patient as well.     MD FAVIOLA Sarmiento & NATALIA BECKETT [facility]  10/14/20

## 2020-10-14 NOTE — PROGRESS NOTES
Chief Complaint   Patient presents with    Follow-up     Visit Vitals  BP (!) 142/73 (BP 1 Location: Right arm, BP Patient Position: Sitting)   Pulse 77   Temp 97 °F (36.1 °C) (Temporal)   Resp 18   Ht 5' 10\" (1.778 m)   Wt 286 lb (129.7 kg)   SpO2 95%   BMI 41.04 kg/m²     1. Have you been to the ER, urgent care clinic since your last visit? Hospitalized since your last visit? No    2. Have you seen or consulted any other health care providers outside of the [address] [place] [address] since your last visit? Include any pap smears or colon screening.  No    Reviewed record in preparation for visit and have necessary documentation  Pt did not bring medication to office visit for review  opportunity was given for questions  Goals that were addressed and/or need to be completed during or after this appointment include   Health Maintenance Due   Topic Date Due    Medicare Yearly Exam  02/09/2020    GLAUCOMA SCREENING Q2Y  07/05/2020    Shingrix Vaccine Age 50> (2 of 2) 09/29/2020

## 2020-10-16 ENCOUNTER — HOSPITAL ENCOUNTER (OUTPATIENT)
Dept: LAB | Age: 75
Discharge: HOME | End: 2020-10-16
Payer: MEDICARE

## 2020-10-16 ENCOUNTER — TRANSCRIBE ORDER (OUTPATIENT)
Dept: REGISTRATION | Age: 75
End: 2020-10-16

## 2020-10-16 DIAGNOSIS — Z01.812 PRE-PROCEDURAL LABORATORY EXAMINATIONS: ICD-10-CM

## 2020-10-16 DIAGNOSIS — Z01.812 PRE-PROCEDURAL LABORATORY EXAMINATIONS: Primary | ICD-10-CM

## 2020-10-16 PROCEDURE — 87635 SARS-COV-2 COVID-19 AMP PRB: CPT

## 2020-10-17 LAB — SARS-COV-2, COV2NT: NOT DETECTED

## 2020-10-19 NOTE — PERIOP NOTES
[address]  Endoscopy Preprocedure Instructions      1. On the day of your surgery, please report to registration located on the 2nd floor of the  [facility] ED. yes    2. You must have a responsible adult to drive you to the hospital, stay at the hospital during your procedure and drive you home. If they leave your procedure will not be started (no exceptions). yes    3. Do not have anything to eat or drink (including water, gum, mints, coffee, and juice) after midnight. This does not apply to the medications you were instructed to take by your physician. yesIf you are currently taking Plavix, Coumadin, Aspirin, or other blood-thinning agents, contact your physician for special instructions. Yes,stopped taking eliqius 10/17/20    4. If you are having a procedure that requires bowel prep: We recommend that you have only clear liquids the day before your procedure and begin your bowel prep by 5:00 pm.  You may continue to drink clear liquids until midnight. If for any reason you are not able to complete your prep please notify your physician immediately. yes    5. Have a list of all current medications, including vitamins, herbal supplements and any other over the counter medications. yes    6. If you wear glasses, contacts, dentures and/or hearing aids, they may be removed prior to procedure, please bring a case to store them in. yes    7. You should understand that if you do not follow these instructions your procedure may be cancelled. If your physical condition changes (I.e. fever, cold or flu) please contact your doctor as soon as possible. 8. It is important that you be on time.   If for any reason you are unable to keep your appointment please call )- the day of or your physicians office prior to your scheduled procedure

## 2020-10-20 ENCOUNTER — ANESTHESIA EVENT (OUTPATIENT)
Dept: ENDOSCOPY | Age: 75
End: 2020-10-20
Payer: MEDICARE

## 2020-10-20 ENCOUNTER — HOSPITAL ENCOUNTER (OUTPATIENT)
Age: 75
Setting detail: OUTPATIENT SURGERY
Discharge: HOME | End: 2020-10-20
Attending: INTERNAL MEDICINE | Admitting: INTERNAL MEDICINE
Payer: MEDICARE

## 2020-10-20 ENCOUNTER — ANESTHESIA (OUTPATIENT)
Dept: ENDOSCOPY | Age: 75
End: 2020-10-20
Payer: MEDICARE

## 2020-10-20 VITALS
WEIGHT: 284.83 LBS | SYSTOLIC BLOOD PRESSURE: 128 MMHG | OXYGEN SATURATION: 100 % | TEMPERATURE: 97.5 F | HEIGHT: 71 IN | BODY MASS INDEX: 39.88 KG/M2 | RESPIRATION RATE: 21 BRPM | HEART RATE: 74 BPM | DIASTOLIC BLOOD PRESSURE: 58 MMHG

## 2020-10-20 PROCEDURE — 88305 TISSUE EXAM BY PATHOLOGIST: CPT

## 2020-10-20 PROCEDURE — 2709999900 HC NON-CHARGEABLE SUPPLY: Performed by: INTERNAL MEDICINE

## 2020-10-20 PROCEDURE — 76060000031 HC ANESTHESIA FIRST 0.5 HR: Performed by: INTERNAL MEDICINE

## 2020-10-20 PROCEDURE — 74011250636 HC RX REV CODE- 250/636: Performed by: NURSE ANESTHETIST, CERTIFIED REGISTERED

## 2020-10-20 PROCEDURE — 74011250636 HC RX REV CODE- 250/636: Performed by: INTERNAL MEDICINE

## 2020-10-20 PROCEDURE — 76040000019: Performed by: INTERNAL MEDICINE

## 2020-10-20 PROCEDURE — 77030013992 HC SNR POLYP ENDOSC BSC -B: Performed by: INTERNAL MEDICINE

## 2020-10-20 PROCEDURE — 74011000250 HC RX REV CODE- 250: Performed by: NURSE ANESTHETIST, CERTIFIED REGISTERED

## 2020-10-20 PROCEDURE — 77030021593 HC FCPS BIOP ENDOSC BSC -A: Performed by: INTERNAL MEDICINE

## 2020-10-20 RX ORDER — EPINEPHRINE 0.1 MG/ML
1 INJECTION INTRACARDIAC; INTRAVENOUS
Status: DISCONTINUED | OUTPATIENT
Start: 2020-10-20 | End: 2020-10-20 | Stop reason: HOSPADM

## 2020-10-20 RX ORDER — DEXTROMETHORPHAN/PSEUDOEPHED 2.5-7.5/.8
1.2 DROPS ORAL
Status: DISCONTINUED | OUTPATIENT
Start: 2020-10-20 | End: 2020-10-20 | Stop reason: HOSPADM

## 2020-10-20 RX ORDER — LIDOCAINE HYDROCHLORIDE 20 MG/ML
INJECTION, SOLUTION EPIDURAL; INFILTRATION; INTRACAUDAL; PERINEURAL AS NEEDED
Status: DISCONTINUED | OUTPATIENT
Start: 2020-10-20 | End: 2020-10-20 | Stop reason: HOSPADM

## 2020-10-20 RX ORDER — PROPOFOL 10 MG/ML
INJECTION, EMULSION INTRAVENOUS AS NEEDED
Status: DISCONTINUED | OUTPATIENT
Start: 2020-10-20 | End: 2020-10-20 | Stop reason: HOSPADM

## 2020-10-20 RX ORDER — FLUMAZENIL 0.1 MG/ML
0.2 INJECTION INTRAVENOUS
Status: DISCONTINUED | OUTPATIENT
Start: 2020-10-20 | End: 2020-10-20 | Stop reason: HOSPADM

## 2020-10-20 RX ORDER — ATROPINE SULFATE 0.1 MG/ML
0.4 INJECTION INTRAVENOUS
Status: DISCONTINUED | OUTPATIENT
Start: 2020-10-20 | End: 2020-10-20 | Stop reason: HOSPADM

## 2020-10-20 RX ORDER — PROPOFOL 10 MG/ML
INJECTION, EMULSION INTRAVENOUS
Status: DISCONTINUED | OUTPATIENT
Start: 2020-10-20 | End: 2020-10-20 | Stop reason: HOSPADM

## 2020-10-20 RX ORDER — MIDAZOLAM HYDROCHLORIDE 1 MG/ML
.25-5 INJECTION, SOLUTION INTRAMUSCULAR; INTRAVENOUS
Status: DISCONTINUED | OUTPATIENT
Start: 2020-10-20 | End: 2020-10-20 | Stop reason: HOSPADM

## 2020-10-20 RX ORDER — SODIUM CHLORIDE 9 MG/ML
50 INJECTION, SOLUTION INTRAVENOUS CONTINUOUS
Status: DISCONTINUED | OUTPATIENT
Start: 2020-10-20 | End: 2020-10-20 | Stop reason: HOSPADM

## 2020-10-20 RX ORDER — NALOXONE HYDROCHLORIDE 0.4 MG/ML
0.4 INJECTION, SOLUTION INTRAMUSCULAR; INTRAVENOUS; SUBCUTANEOUS
Status: DISCONTINUED | OUTPATIENT
Start: 2020-10-20 | End: 2020-10-20 | Stop reason: HOSPADM

## 2020-10-20 RX ADMIN — PROPOFOL 100 MG: 10 INJECTION, EMULSION INTRAVENOUS at 09:52

## 2020-10-20 RX ADMIN — PROPOFOL 140 MCG/KG/MIN: 10 INJECTION, EMULSION INTRAVENOUS at 09:52

## 2020-10-20 RX ADMIN — LIDOCAINE HYDROCHLORIDE 100 MG: 20 INJECTION, SOLUTION INTRAVENOUS at 09:51

## 2020-10-20 RX ADMIN — SODIUM CHLORIDE 50 ML/HR: 9 INJECTION, SOLUTION INTRAVENOUS at 09:42

## 2020-10-20 RX ADMIN — PROPOFOL 30 MG: 10 INJECTION, EMULSION INTRAVENOUS at 10:01

## 2020-10-20 RX ADMIN — PROPOFOL 30 MG: 10 INJECTION, EMULSION INTRAVENOUS at 09:55

## 2020-10-20 NOTE — ROUTINE PROCESS
Endoscopy discharge instructions have been reviewed and given to patient. The patient verbalized understanding and acceptance of instructions. Dr. Afshan Dean discussed with patient procedure findings and next steps.

## 2020-10-20 NOTE — ANESTHESIA PREPROCEDURE EVALUATION
Anesthetic History   No history of anesthetic complications            Review of Systems / Medical History  Patient summary reviewed and pertinent labs reviewed    Pulmonary        Sleep apnea: CPAP    Asthma : well controlled       Neuro/Psych   Within defined limits           Cardiovascular    Hypertension          Hyperlipidemia    Exercise tolerance: <4 METS     GI/Hepatic/Renal     GERD: well controlled           Endo/Other        Morbid obesity, arthritis and anemia    Comments: Rheumatoid arthritis  Other Findings   Comments: H/o DVT/PE 3/2019    RA           Physical Exam    Airway  Mallampati: III  TM Distance: 4 - 6 cm  Neck ROM: normal range of motion   Mouth opening: Normal     Cardiovascular    Rhythm: regular  Rate: normal         Dental  No notable dental hx    Comments: Cracked tooth   Pulmonary  Breath sounds clear to auscultation               Abdominal         Other Findings            Anesthetic Plan    ASA: 3  Anesthesia type: MAC          Induction: Intravenous  Anesthetic plan and risks discussed with: Patient

## 2020-10-20 NOTE — PROCEDURES
Telly Alberto M.D.  [phone]            10/20/2020          Colonoscopy Operative Report  [facility]  :  1945  [facility] Medical Record Number:  [id number]      Indications:    Personal history of colon polyps (screening only)     :  Elpidio Meyer MD    Assistant -- None  Implants -- None    Referring Provider: Hue Gallagher MD    Sedation:  MAC anesthesia Propofol    Pre-Procedural Exam:      Airway: clear,  No airway problems anticipated  Heart: RRR, without gallops or rubs  Lungs: clear bilaterally without wheezes, crackles, or rhonchi  Abdomen: soft, nontender, nondistended, bowel sounds present  Mental Status: awake, alert and oriented to person, place and time     Procedure Details:  After informed consent was obtained with all risks and benefits of procedure explained and preoperative exam completed, the patient was taken to the endoscopy suite and placed in the left lateral decubitus position. Upon sequential sedation as per above, a digital rectal exam was performed. The Olympus videocolonoscope  was inserted in the rectum and carefully advanced to the cecum, which was identified by the ileocecal valve and appendiceal orifice. The quality of preparation was good. The colonoscope was slowly withdrawn with careful inspection and evaluation between folds. Retroflexion in the rectum was performed. Findings:     Cecum: 1  Sessile polyp(s), the largest 5 mm in size;  Ascending Colon: normal  Transverse Colon: normal  Descending Colon:     - Diverticulosis  Sigmoid:     - Diverticulosis  Rectum: normal    Interventions:  1 complete polypectomy were performed using cold biopsy forceps and the polyps were  retrieved    Specimen Removed:  specimen #1, 5 mm in size, located in the cecum removed by cold biopsy and sent for pathology    Complications: None. EBL:  None.     Impression:  One polyp and moderate left colonic diverticulosis, otherwise normal exam    Recommendations:  -Await pathology. -Repeat colonoscopy in 5 years.   -High fiber diet.    -Resume normal medication(s). -Proceed with a capsule endoscopy     Discharge Disposition:  Home in the company of a  when able to ambulate.     Ochoa Lee MD  10/20/2020  10:16 AM

## 2020-10-20 NOTE — PERIOP NOTES
Received report from Cele Reyes CRNA. See anesthesia record. Patient taken to post-recovery. Post-recovery report given to Carmella Sanabria RN. Patient's ABD remains soft and non-tender post procedure. Pt has no complaints at this time and tolerated the procedure well. Endoscope was pre-cleaned at bedside immediately following procedure by Naz Beckford.

## 2020-10-20 NOTE — ANESTHESIA POSTPROCEDURE EVALUATION
Procedure(s):  COLONOSCOPY/EGD  ESOPHAGOGASTRODUODENOSCOPY (EGD)  ESOPHAGOGASTRODUODENAL (EGD) BIOPSY  ENDOSCOPIC POLYPECTOMY. MAC    Anesthesia Post Evaluation      Multimodal analgesia: multimodal analgesia used between 6 hours prior to anesthesia start to PACU discharge  Patient location during evaluation: bedside  Patient participation: complete - patient participated  Level of consciousness: awake  Pain management: adequate  Airway patency: patent  Anesthetic complications: no  Cardiovascular status: acceptable  Respiratory status: acceptable  Hydration status: acceptable        INITIAL Post-op Vital signs:   Vitals Value Taken Time   /58 10/20/2020 10:35 AM   Temp 36.4 °C (97.5 °F) 10/20/2020 10:20 AM   Pulse 70 10/20/2020 10:37 AM   Resp 17 10/20/2020 10:37 AM   SpO2 100 % 10/20/2020 10:37 AM   Vitals shown include unvalidated device data.

## 2020-10-20 NOTE — PROCEDURES
Hillery Boast, M.D.  [phone]           10/20/2020                EGD Operative Report  Roberto Monroy  :  1945  [employer] Medical Record Number:  [id number]      Indication: Iron def. anemia     : Jennifer De La Vega MD    Assistant -- None  Implants -- None    Referring Provider:  Maria Elena Lawrence MD      Anesthesia/Sedation:  MAC anesthesia Propofol    Airway assessment: No airway problems anticipated    Pre-Procedural Exam:      Airway: clear, no airway problems anticipated  Heart: RRR, without gallops or rubs  Lungs: clear bilaterally without wheezes, crackles, or rhonchi  Abdomen: soft, nontender, nondistended, bowel sounds present  Mental Status: awake, alert and oriented to person, place and time       Procedure Details     After infomed consent was obtained for the procedure, with all risks and benefits of procedure explained the patient was taken to the endoscopy suite and placed in the left lateral decubitus position. Following sequential administration of sedation as per above, the endoscope was inserted into the mouth and advanced under direct vision to second portion of the duodenum. A careful inspection was made as the gastroscope was withdrawn, including a retroflexed view of the proximal stomach; findings and interventions are described below. Findings:   Esophagus:normal  Stomach: small hiatal hernia  Duodenum/jejunum: normal    Therapies:  biopsy of stomach - r/o H.pylori  biopsy of duodenal r/o celiac disease    Specimens: as above           Complications:   None; patient tolerated the procedure well. EBL:  None.            Impression:    1- Hiatal hernia, otherwise normal exam          Recommendations:    -Await pathology.  -Proceed with colonoscopy today as planned    Jennifer De La Vega MD

## 2020-10-20 NOTE — H&P
Mike Wong M.D.  [phone]            History and Physical       NAME:  Leslie Reynoso   :   1945   MRN:   [id number]       Referring Physician:  Sherie Cid MD      Consult Date: 10/20/2020 8:41 AM    Chief Complaint:  CAROLINE    History of Present Illness:  Ms. Zach Kat is a 76year old lady who is being seen today in consultation per request of Dr. Jarett Lopes for anemia. States she has been anemic for several years. she has been started on iron supplementation  she has been trying to eat food with a high iron content. last colonoscopy by Dr. Mehdi Lee in  -- had a 4mm polyp at the splenic flexure    Denies any menela or hematemesis. she offers no complaints to me    She is on Eliquis for h/o PE and DVT's    she does have a h/o acid reflux and is on PPI therapy  Denies any dysphagia or dyspepsia. PMH:  Past Medical History:   Diagnosis Date    Anemia, unspecified 4/15/2010    Asthma     Cardiac dysrhythmia, unspecified 4/15/2010    PAT- not on medication/ not seeing cardiologist    High cholesterol 2010    History of DVT of lower extremity 2019    Right leg (3/2019)    Hypertension     Ill-defined condition     PE and DVT to Right leg. 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  HX OTHER SURGICAL      left lung biopsy. Allergies: Allergies   Allergen Reactions    Orudis [Ketoprofen] Palpitations    Singulair [Montelukast] Rash    Arthrotec 50 [Diclofenac-Misoprostol] Palpitations    Levaquin [Levofloxacin] Other (comments)     Hip joints swelling and painful per pt report    Lyrica [Pregabalin] Other (comments)     Saw spiders. Home Medications:  Prior to Admission Medications   Prescriptions Last Dose Informant Patient Reported? Taking? Eliquis 5 mg tablet 10/17/2020  No No   Sig: TAKE 1 TABLET TWICE A DAY   Hot Water Bottle (WATER BOTTLE) misc Unknown at Unknown time  No No   Sig: Please Dispense a humidifier bottle to patient to use with her oxygen. PROAIR HFA 90 mcg/actuation inhaler 9/19/2020 at Unknown time  No Yes   Sig: INHALE 2 PUFFS BY MOUTH EVERY 4 HOURS AS NEEDED FOR WHEEZING   Symbicort 160-4.5 mcg/actuation HFAA 10/20/2020 at Unknown time  No Yes   Sig: USE 2 INHALATIONS TWICE A DAY   UBIDECARENONE (COQ-10 PO) 10/17/2020 at Unknown time  Yes Yes   Sig: Take  by mouth. acetaminophen (TYLENOL) 500 mg tablet 10/20/2020 at Unknown time  Yes Yes   Sig: Take 1,000 mg by mouth every six (6) hours as needed for Pain. albuterol (PROVENTIL VENTOLIN) 2.5 mg /3 mL (0.083 %) nebulizer solution Unknown at Unknown time  No No   Sig: USE 1 VIAL IN NEBULIZER EVERY 4 HOURS AS NEEDED FOR WHEEZING   amLODIPine (NORVASC) 5 mg tablet 10/20/2020 at Unknown time  No Yes   Sig: TAKE ONE TABLET BY MOUTH EVERY DAY   atorvastatin (LIPITOR) 10 mg tablet 10/17/2020 at Unknown time  No Yes   Sig: TAKE ONE TABLET BY MOUTH EVERY DAY   Patient taking differently: TAKE ONE TABLET BY MOUTH EVERY DAY - patient states she takes 2-3 times weekly   celecoxib (CELEBREX) 100 mg capsule Unknown at Unknown time  Yes No   cholecalciferol, vitamin D3, (VITAMIN D3) 4,000 unit cap 10/19/2020 at Unknown time  Yes Yes   Sig: Take  by mouth daily.    cyclobenzaprine (FLEXERIL) 10 mg tablet Not Taking at Unknown time  No No   Sig: Take 1 Tab by mouth three (3) times daily as needed for Muscle Spasm(s). famotidine (PEPCID) 20 mg tablet 10/13/2020 at Unknown time  Yes Yes   Sig: Take 20 mg by mouth two (2) times a day. fluticasone propionate (FLONASE) 50 mcg/actuation nasal spray Unknown at Unknown time  No No   Si Sprays by Both Nostrils route daily. folic acid (FOLVITE) 1 mg tablet 10/19/2020 at Unknown time  No Yes   Sig: Take 3 Tabs by mouth daily. gabapentin (NEURONTIN) 300 mg capsule Unknown at Unknown time  No No   Sig: Take 1 Cap by mouth two (2) times a day. Max Daily Amount: 600 mg. Neck and left arm pain   infliximab (REMICADE IV) Not Taking at Unknown time  Yes No   Sig: by IntraVENous route. Every 6 weeks   methotrexate (RHEUMATREX) 2.5 mg tablet 10/14/2020  Yes No   mupirocin (BACTROBAN) 2 % ointment Not Taking at Unknown time  Yes No   omega-3s-dha-epa-fish oil 300-1,000 mg cpDR 10/18/2020 at Unknown time  Yes Yes   Sig: Take  by mouth daily. raNITIdine (ZANTAC) 300 mg tab Not Taking at Unknown time  No No   Sig: TAKE 1 TABLET BY MOUTH DAILY   triamcinolone acetonide (KENALOG) 0.1 % ointment Not Taking at Unknown time  No No   Sig: Apply  to affected area two (2) times a day. use thin layer in the affected area of the left leg      Facility-Administered Medications: None       Hospital Medications:  No current facility-administered medications for this encounter.         Social History:  Social History     Tobacco Use    Smoking status: Former Smoker     Packs/day: 1.00     Years: 20.00     Pack years: 20.00     Last attempt to quit: 1985     Years since quittin.4    Smokeless tobacco: Never Used   Substance Use Topics    Alcohol use: No       Family History:  Family History   Problem Relation Age of Onset    Cancer Mother         cervical, breast    Heart Disease Mother     Heart Attack Mother     Hypertension Mother     Arthritis-osteo Mother     Heart Disease Father     Heart Attack Father     Cancer Other         uterine and lung.  High Cholesterol Brother     Stroke Brother    Lisa Bud Arthritis-rheumatoid Sister     Migraines Sister     Breast Cancer Maternal Aunt              Review of Systems:      Constitutional: negative fever, negative chills, negative weight loss  Eyes:   negative visual changes  ENT:   negative sore throat, tongue or lip swelling  Respiratory:  negative cough, negative dyspnea  Cards:  negative for chest pain, palpitations, lower extremity edema  GI:   See HPI  :  negative for frequency, dysuria  Integument:  negative for rash and pruritus  Heme:  negative for easy bruising and gum/nose bleeding  Musculoskel: negative for myalgias,  back pain and muscle weakness  Neuro: negative for headaches, dizziness, vertigo  Psych:  negative for feelings of anxiety, depression       Objective:   No data found. No intake/output data recorded. No intake/output data recorded. EXAM:     NEURO-a&o   HEENT-wnl   LUNGS-clear    COR-regular rate and rhythym     ABD-soft , no tenderness, no rebound, good bs     EXT-no edema     Data Review     No results for input(s): WBC, HGB, HCT, PLT, HGBEXT, HCTEXT, PLTEXT in the last 72 hours. No results for input(s): NA, K, CL, CO2, BUN, CREA, GLU, PHOS, CA in the last 72 hours. No results for input(s): AP, TBIL, TP, ALB, GLOB, GGT, AML, LPSE in the last 72 hours. No lab exists for component: SGOT, GPT, AMYP, HLPSE  No results for input(s): INR, PTP, APTT, INREXT in the last 72 hours.     Patient Active Problem List   Diagnosis Code    Rheumatoid arthritis ([facility] [address] M06.9    Severe persistent asthma without complication I99.57    Cardiac dysrhythmia, unspecified I49.9    OA (osteoarthritis) M19.90    Obesity, unspecified E66.9    Spinal stenosis of lumbar region with neurogenic claudication M48.062    High cholesterol E78.00    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HCC) M05.10    Colon polyp, hyperplastic K63.5    Asthma J45.909    Primary generalized (osteo)arthritis M15.0    PAC (premature atrial contraction) I49.1    Lumbar spinal stenosis M48.061    Primary localized osteoarthritis of right hip M16.11    Obesity, morbid (HCC) E66.01    Essential hypertension I10    Chronic rhinitis J31.0    Allergic contact dermatitis L23.9    History of DVT of lower extremity Z86.718    Hx of pulmonary embolus Z86.711    Pain of left lower leg M79.662    Bronchiectasis with acute exacerbation (HCC) J47.1    Cervical spinal stenosis M48.02      Assessment:   · CAROLINE  · H/o polyps   Plan:   · EGD  Colonoscopy today.      Signed By: Familia Valenzuela MD     10/20/2020  8:41 AM

## 2020-10-20 NOTE — ROUTINE PROCESS
Brandan Millerjennifer 1945 
[id number] Situation: 
Verbal report received from: Tay Gómez Procedure: Procedure(s): 
COLONOSCOPY/EGD 
ESOPHAGOGASTRODUODENOSCOPY (EGD) ESOPHAGOGASTRODUODENAL (EGD) BIOPSY ENDOSCOPIC POLYPECTOMY Background: 
 
Preoperative diagnosis: ANEMIA Postoperative diagnosis: 1- Hiatal hernia 2- Diverticulosis 3- Cecum Polyp :  Dr. Kristin Street Assistant(s): Endoscopy Technician-1: Vivian Swift Endoscopy RN-1: Therese Vicente Specimens:  
ID Type Source Tests Collected by Time Destination 1 : Duodenal biopsy Preservative   Arnold Yanez MD 10/20/2020 0594 Pathology 2 : Gastric Biopsy Preservative Gastric  Arnold Yanez MD 10/20/2020 0279 Pathology 3 : Cecum polyp Preservative Cecum  Arnold Yanez MD 10/20/2020 1006 Pathology H. Pylori  no Assessment: 
Intra-procedure medications Jj Villalta gave intra-procedure sedation and medications, see anesthesia flow sheet yes Intravenous fluids: NS@ Allegra See Vital signs stable Abdominal assessment: round and soft Recommendation: 
Discharge patient per MD order. Family Brother Permission to share finding with family or friend yes

## 2020-10-20 NOTE — DISCHARGE INSTRUCTIONS
[address] [facility]. Lauren Eden M.D.  [phone]                 COLON and EGD DISCHARGE INSTRUCTIONS    10/20/2020    Leslie Reynoso  :  1945  [facility] Medical Record Number:  [id number]      DISCOMFORT:  Sore throat- throat lozenges or warm salt water gargle  Redness at IV site- apply warm compress to area; if redness or soreness persist- contact your physician  There may be a slight amount of blood passed from the rectum  Gaseous discomfort- walking, belching will help relieve any discomfort  You may not operate a vehicle for 12 hours  You may not engage in an occupation involving machinery or appliances for rest of today  You may not drink alcoholic beverages for at least 12 hours  Avoid making any critical decisions for at least 24 hour  DIET:   High fiber diet. - however -  remember your colon is empty and a heavy meal will produce gas. Avoid these foods:  vegetables, fried / greasy foods, carbonated drinks for today     ACTIVITY:  You may  resume your normal daily activities it is recommended that you spend the remainder of the day resting -  avoid any strenuous activity and driving. CALL M.D. ANY SIGN OF:   Increasing pain, nausea, vomiting  Abdominal distension (swelling)  New increased bleeding (oral or rectal)  Fever (chills)  Pain in chest area  Bloody discharge from nose or mouth  Shortness of breath      Follow-up Instructions:   Call Dr. Aristeo Kapadia if any questions at [phone]. Results of procedure / biopsy in 7 to 10 days, we will call you with these results.   Your endoscopy showed hiatal hernia   Your colonoscopy showed 1 polyp and diverticulosis              Tom restarted 10/21/2020

## 2020-11-27 LAB
BASOPHILS # BLD: 0 K/UL (ref 0–0.1)
BASOPHILS NFR BLD: 0 % (ref 0–1)
DIFFERENTIAL METHOD BLD: ABNORMAL
EOSINOPHIL # BLD: 0.2 K/UL (ref 0–0.4)
EOSINOPHIL NFR BLD: 2 % (ref 0–7)
ERYTHROCYTE [DISTWIDTH] IN BLOOD BY AUTOMATED COUNT: 14.6 % (ref 11.5–14.5)
FERRITIN SERPL-MCNC: 89 NG/ML (ref 26–388)
HCT VFR BLD AUTO: 36.2 % (ref 35–47)
HGB BLD-MCNC: 11 G/DL (ref 11.5–16)
IMM GRANULOCYTES # BLD AUTO: 0 K/UL (ref 0–0.04)
IMM GRANULOCYTES NFR BLD AUTO: 0 % (ref 0–0.5)
IRON SATN MFR SERPL: 30 % (ref 20–50)
IRON SERPL-MCNC: 84 UG/DL (ref 35–150)
LYMPHOCYTES # BLD: 1.6 K/UL (ref 0.8–3.5)
LYMPHOCYTES NFR BLD: 20 % (ref 12–49)
MCH RBC QN AUTO: 30.2 PG (ref 26–34)
MCHC RBC AUTO-ENTMCNC: 30.4 G/DL (ref 30–36.5)
MCV RBC AUTO: 99.5 FL (ref 80–99)
MONOCYTES # BLD: 0.5 K/UL (ref 0–1)
MONOCYTES NFR BLD: 7 % (ref 5–13)
NEUTS SEG # BLD: 5.7 K/UL (ref 1.8–8)
NEUTS SEG NFR BLD: 71 % (ref 32–75)
NRBC # BLD: 0 K/UL (ref 0–0.01)
NRBC BLD-RTO: 0 PER 100 WBC
PLATELET # BLD AUTO: 151 K/UL (ref 150–400)
PMV BLD AUTO: 12.1 FL (ref 8.9–12.9)
RBC # BLD AUTO: 3.64 M/UL (ref 3.8–5.2)
TIBC SERPL-MCNC: 280 UG/DL (ref 250–450)
WBC # BLD AUTO: 8.1 K/UL (ref 3.6–11)

## 2020-12-01 ENCOUNTER — OFFICE VISIT (OUTPATIENT)
Dept: ONCOLOGY | Age: 75
End: 2020-12-01
Payer: MEDICARE

## 2020-12-01 VITALS
RESPIRATION RATE: 16 BRPM | HEART RATE: 79 BPM | WEIGHT: 286.4 LBS | DIASTOLIC BLOOD PRESSURE: 73 MMHG | SYSTOLIC BLOOD PRESSURE: 156 MMHG | TEMPERATURE: 97.4 F | HEIGHT: 71 IN | BODY MASS INDEX: 40.1 KG/M2 | OXYGEN SATURATION: 94 %

## 2020-12-01 DIAGNOSIS — I26.99 OTHER ACUTE PULMONARY EMBOLISM WITHOUT ACUTE COR PULMONALE (HCC): ICD-10-CM

## 2020-12-01 DIAGNOSIS — D53.9 MACROCYTIC ANEMIA: ICD-10-CM

## 2020-12-01 DIAGNOSIS — M05.10 RHEUMATOID LUNG DISEASE (HCC): ICD-10-CM

## 2020-12-01 DIAGNOSIS — D50.9 IRON DEFICIENCY ANEMIA, UNSPECIFIED IRON DEFICIENCY ANEMIA TYPE: ICD-10-CM

## 2020-12-01 DIAGNOSIS — Z86.2 HISTORY OF IRON DEFICIENCY ANEMIA: Primary | ICD-10-CM

## 2020-12-01 PROCEDURE — G8754 DIAS BP LESS 90: HCPCS | Performed by: INTERNAL MEDICINE

## 2020-12-01 PROCEDURE — 1101F PT FALLS ASSESS-DOCD LE1/YR: CPT | Performed by: INTERNAL MEDICINE

## 2020-12-01 PROCEDURE — G8510 SCR DEP NEG, NO PLAN REQD: HCPCS | Performed by: INTERNAL MEDICINE

## 2020-12-01 PROCEDURE — G0463 HOSPITAL OUTPT CLINIC VISIT: HCPCS | Performed by: INTERNAL MEDICINE

## 2020-12-01 PROCEDURE — 99214 OFFICE O/P EST MOD 30 MIN: CPT | Performed by: INTERNAL MEDICINE

## 2020-12-01 PROCEDURE — G8536 NO DOC ELDER MAL SCRN: HCPCS | Performed by: INTERNAL MEDICINE

## 2020-12-01 PROCEDURE — 1090F PRES/ABSN URINE INCON ASSESS: CPT | Performed by: INTERNAL MEDICINE

## 2020-12-01 PROCEDURE — G8399 PT W/DXA RESULTS DOCUMENT: HCPCS | Performed by: INTERNAL MEDICINE

## 2020-12-01 PROCEDURE — G8417 CALC BMI ABV UP PARAM F/U: HCPCS | Performed by: INTERNAL MEDICINE

## 2020-12-01 PROCEDURE — 3017F COLORECTAL CA SCREEN DOC REV: CPT | Performed by: INTERNAL MEDICINE

## 2020-12-01 PROCEDURE — G8427 DOCREV CUR MEDS BY ELIG CLIN: HCPCS | Performed by: INTERNAL MEDICINE

## 2020-12-01 PROCEDURE — G8753 SYS BP > OR = 140: HCPCS | Performed by: INTERNAL MEDICINE

## 2020-12-01 NOTE — PROGRESS NOTES
[address] [facility] at [address] [place] [address]  [phone]    Hematology / Oncology Established Visit    Reason for Visit:   Claude Kava is a 76 y.o. female who is here for follow up of PE.  PCP is Dr. Lashell Branch. History of Present Illness:   Ms. Paris Ibanez is a 76 y.o. female with RA with lung involvement who comes in for evaluation and management of PE. She was recently put on home O2. She was hospitalized at [address] [place] [address] 3/3/19 - 3/5/19 after p/w weakness, fatigue, SOB, chills. She denies R leg swelling but has had subacute pain in R leg from knee down which started in early Feb 2019. She was found to have bilateral pulmonary emboli as well as RLE DVT. She was started on Heparin infusion and transitioned to Xarelto. At time of discharge, she was told to stop MTX, Aspirin and Celebrex. Since then, she reported to the ED at [facility] for 2 episodes of bleeding from left naris. She also coughed up blood. She denies any 8 hr trips, surgeries, fractures, OCPs or hormone replacement. No prior h/o VTE. No family h/o VTE. For RA, she takes MTX, but this was put on hold. She was also started on Remicaide 1 yr ago. She is currently taking Xarelto as prescribed, states her SOB is much better than prior. The R leg pain is much better, but feels really stiff per patient.     -Labs 3/10/19: WBC 9.5, Hgb 10.8, , HIV/HCV Ab/HepB Surface Ag all negative. Interval History: Patient here for follow up of PE and RLE DVT. No recent cauterizations required for epistaxis. Denies melena/hematochezia. Continues to take eliquis. Takes iron supplements several times a week since they often cause constipation. She takes stool softeners as needed which helps manage the constipation. Reports mild chronic fatigue.      Past Medical History:   Diagnosis Date    Anemia, unspecified 4/15/2010    Asthma     Cardiac dysrhythmia, unspecified 4/15/2010    PAT- not on medication/ not seeing cardiologist    High cholesterol 2010    History of DVT of lower extremity 2019    Right leg (3/2019)    Hypertension     Ill-defined condition     PE and DVT to Right leg.  Obesity, unspecified 4/15/2010    Osteoarthrosis, unspecified whether generalized or localized, other specified sites 4/15/2010    Pulmonary embolism (HCC)     Rheumatoid arthritis(714.0) 4/15/2010    Spinal stenosis, unspecified region other than cervical 4/15/2010    Unspecified asthma(493.90) 4/15/2010    rheumatoid lung disease      Past Surgical History:   Procedure Laterality Date    CHEST SURGERY PROCEDURE UNLISTED  lat     lung biopsy- diagnosed with rheumatoid lung disease    COLONOSCOPY N/A 10/20/2020    COLONOSCOPY/EGD performed by Adriana Ortiz MD at [facility]. [address], COLON, DIAGNOSTIC      7-10-13    HX ORTHOPAEDIC Right 2002    rotator cuff repair    HX OTHER SURGICAL      Right hip replaced.  HX OTHER SURGICAL      left lung biopsy. Social History     Tobacco Use    Smoking status: Former Smoker     Packs/day: 1.00     Years: 20.00     Pack years: 20.00     Last attempt to quit: 1985     Years since quittin.6    Smokeless tobacco: Never Used   Substance Use Topics    Alcohol use: No      Family History   Problem Relation Age of Onset    Cancer Mother         cervical, breast    Heart Disease Mother     Heart Attack Mother     Hypertension Mother     Arthritis-osteo Mother     Heart Disease Father     Heart Attack Father     Cancer Other         uterine and lung.  High Cholesterol Brother     Stroke Brother     Arthritis-rheumatoid Sister     Migraines Sister     Breast Cancer Maternal Aunt      Current Outpatient Medications   Medication Sig    Eliquis 5 mg tablet TAKE 1 TABLET TWICE A DAY    celecoxib (CELEBREX) 100 mg capsule     fluticasone propionate (FLONASE) 50 mcg/actuation nasal spray 2 Sprays by Both Nostrils route daily.     Symbicort 160-4.5 mcg/actuation HFAA USE 2 INHALATIONS TWICE A DAY    amLODIPine (NORVASC) 5 mg tablet TAKE ONE TABLET BY MOUTH EVERY DAY    gabapentin (NEURONTIN) 300 mg capsule Take 1 Cap by mouth two (2) times a day. Max Daily Amount: 600 mg. Neck and left arm pain    cyclobenzaprine (FLEXERIL) 10 mg tablet Take 1 Tab by mouth three (3) times daily as needed for Muscle Spasm(s).  methotrexate (RHEUMATREX) 2.5 mg tablet     atorvastatin (LIPITOR) 10 mg tablet TAKE ONE TABLET BY MOUTH EVERY DAY (Patient taking differently: TAKE ONE TABLET BY MOUTH EVERY DAY - patient states she takes 2-3 times weekly)    famotidine (PEPCID) 20 mg tablet Take 20 mg by mouth two (2) times a day.  mupirocin (BACTROBAN) 2 % ointment     triamcinolone acetonide (KENALOG) 0.1 % ointment Apply  to affected area two (2) times a day. use thin layer in the affected area of the left leg    folic acid (FOLVITE) 1 mg tablet Take 3 Tabs by mouth daily.  Hot Water Bottle (WATER BOTTLE) misc Please Dispense a humidifier bottle to patient to use with her oxygen.  PROAIR HFA 90 mcg/actuation inhaler INHALE 2 PUFFS BY MOUTH EVERY 4 HOURS AS NEEDED FOR WHEEZING    albuterol (PROVENTIL VENTOLIN) 2.5 mg /3 mL (0.083 %) nebulizer solution USE 1 VIAL IN NEBULIZER EVERY 4 HOURS AS NEEDED FOR WHEEZING    infliximab (REMICADE IV) by IntraVENous route. Every 6 weeks    UBIDECARENONE (COQ-10 PO) Take  by mouth.  acetaminophen (TYLENOL) 500 mg tablet Take 1,000 mg by mouth every six (6) hours as needed for Pain.  omega-3s-dha-epa-fish oil 300-1,000 mg cpDR Take  by mouth daily.  cholecalciferol, vitamin D3, (VITAMIN D3) 4,000 unit cap Take  by mouth daily.  raNITIdine (ZANTAC) 300 mg tab TAKE 1 TABLET BY MOUTH DAILY     No current facility-administered medications for this visit.        Allergies   Allergen Reactions    Orudis [Ketoprofen] Palpitations    Singulair [Montelukast] Rash    Arthrotec 50 [Diclofenac-Misoprostol] Palpitations    Levaquin [Levofloxacin] Other (comments)     Hip joints swelling and painful per pt report    Lyrica [Pregabalin] Other (comments)     Saw spiders. Review of Systems: A complete review of systems was obtained, negative except as described above. Physical Exam:       Visit Vitals  BP (!) 156/73   Pulse 79   Temp 97.4 °F (36.3 °C)   Resp 16   Ht 5' 11\" (1.803 m)   Wt 286 lb 6.4 oz (129.9 kg)   SpO2 94%   BMI 39.94 kg/m²       ECOG PS: 2-3   General: Well developed, no acute distress, walking with a walker. Eyes: PERRLA, EOMI, anicteric sclerae  HENT: Atraumatic, OP clear, TMs intact without erythema  Neck: Supple  Lymphatic: No cervical, supraclavicular, axillary or inguinal adenopathy  Respiratory: CTAB, normal respiratory effort  CV: Normal rate, regular rhythm, no murmurs, no peripheral edema  GI: Soft, nontender, nondistended, no masses, no hepatomegaly, no splenomegaly  MS: Normal gait and station. Digits without clubbing or cyanosis. Skin: No rashes, ecchymoses, or petechiae. Normal temperature, turgor, and texture. Neuro/Psych: Alert, oriented. 5/5 strength in all 4 extremities. Appropriate affect, normal judgment/insight. Results:     Lab Results   Component Value Date/Time    WBC 8.1 11/27/2020 10:10 AM    HGB 11.0 (L) 11/27/2020 10:10 AM    HCT 36.2 11/27/2020 10:10 AM    PLATELET 343 36/98/7390 10:10 AM    MCV 99.5 (H) 11/27/2020 10:10 AM    ABS. NEUTROPHILS 5.7 11/27/2020 10:10 AM     Lab Results   Component Value Date/Time    Sodium 141 10/14/2020 11:18 AM    Potassium 4.4 10/14/2020 11:18 AM    Chloride 109 (H) 10/14/2020 11:18 AM    CO2 27 10/14/2020 11:18 AM    Glucose 86 10/14/2020 11:18 AM    BUN 19 10/14/2020 11:18 AM    Creatinine 0.69 10/14/2020 11:18 AM    GFR est AA >60 10/14/2020 11:18 AM    GFR est non-AA >60 10/14/2020 11:18 AM    Calcium 9.3 10/14/2020 11:18 AM     Lab Results   Component Value Date/Time    Bilirubin, total 0.5 10/14/2020 11:18 AM    ALT (SGPT) 20 10/14/2020 11:18 AM    Alk. phosphatase 94 10/14/2020 11:18 AM    Protein, total 7.5 10/14/2020 11:18 AM    Albumin 3.6 10/14/2020 11:18 AM    Globulin 3.9 10/14/2020 11:18 AM     Lab Results   Component Value Date/Time    Iron 84 11/27/2020 10:10 AM    TIBC 280 11/27/2020 10:10 AM    Iron % saturation 30 11/27/2020 10:10 AM    Ferritin 89 11/27/2020 10:10 AM       Lab Results   Component Value Date/Time    Vitamin B12 666 04/30/2010 10:37 AM     Lab Results   Component Value Date/Time    TSH 1.160 06/14/2016 10:21 AM     Lab Results   Component Value Date/Time    HEP C VIRUS AB <0.1 10/09/2015 08:07 AM         Imaging:     Imaging at [address] [facility] [address]:     CTA Chest 3/2019: showing Multiple bilateral branching pulmonary Emboli, Bilateral patchy ground glass opacities likely chronic, and bronchiectasis consistent with pulmonary fibrosis (Rhematoid related lung disease). Similar Groundglass component and Bronchiectasis on previous CT in our system.       Duplex Legs 3/2019: Acute Deep vein thrombosis of the right peroneal vein    Echo: Mild concentric LVH with normal cavity size and systolic function. Trivial to mild regurgitation. Moderate LV diastolic dysfunction with elevated left atrial pressure. 6/20/19 Venous doppler bilateral LE:  Right Lower Venous     No evidence of deep vein thrombosis in the common femoral, profunda femoral, superficial femoral, popliteal, posterior tibial, and peroneal veins. The veins were imaged in the transverse and longitudinal planes. The vessels showed normal color filling and compressibility. Doppler interrogation showed phasic and spontaneous flow. Left Lower Venous     For comparison purposes, the left common femoral vein was briefly interrogated. These vein demonstrates normal color filing and compressibility. Doppler flow was phasic and spontaneous. Assessment & Plan:   Jaron Oar is a 76 y.o. female with RA comes in for evaluation of DVT/PE.     1. Venous thromboembolism: PE and RLE DVT occurred in March 2019. No obvious risk factors. My review of MTX shows no increased risk of VTE; Remicaide associated with < 1% risk of thrombophelebitis, MI, etc. Between the 2 medications, the Remicaide is more likely a culprit, but unclear. The dose and frequency were increased in early February 2019. Pt discussed with her Rheumatologist, Dr. Ion De La Torre who recommended thrombophilia testing. I discussed with patient that thrombophilia testing is only helpful when testing is positive, putting a name to her risk. However if testing is negative, it is not helpful as patient may still have inherited thrombophilia associated with unknown genes. Results of testing would not change our anticoagulation management, and pt does not have any children. However, pt does have 2 brothers and 1 sister and would like testing. Patient will likely need to remain on anticoagulation indefinitely as it is not clear whether this was provoked by medication or not. 6/2019 lower extremity venous dopplers ordered by PCP are negative  -- Continue Eliquis 5mg bid. Pt to alert us at the time of invasive procedures. -- Test for inherited thrombophilias today - will call pt with results. -- Return in 6 months. -- Sending progress note to Dr. Melida Zapata (Rheumatologist) - Robert Daniels [phone]    2. RA with lung involvement: MTX restarted. No longer taking Remicaide  Home O2 was started early Feb 2019 - likely due to PE and no longer using supplemental O2. Her O2 sats are 94% today. -- Sees Dr. Deidre Yeh in Pulmonary and Dr. Melida Zapata in Rheumatology. 3. Diastolic CHF: Seen on echo and likely due to HTN. Not on diuretics. On Norvasc. 4. H/o Epistaxis: Occurred even before starting anticoagulation, but more frequent after starting. Now improved after cauterization by ENT. Also can managed with clamping physical pressure and Afrin prn,  saline nasal spray. No recent episodes of epistaxis. 5. Macrocytic anemia: Likely due to MTX.  Prior iron deficiency resolved with daily iron supplements. -- Continue once daily iron supplements as tolerated, stool softeners PRN. -- Check Vitamin B12 and folate in 6 months. I appreciate the opportunity to participate in Ms. Dot Viveros's care. I have personally seen and evaluated the patient in conjunction with Toi Sue NP. I find the patient's history and physical exam are consistent with the NP's documentation. I agree with the above assessment and plan, which I have edited if needed.     Signed By: Jens Buerger, MD     December 1, 2020

## 2020-12-02 LAB
B2 GLYCOPROT1 IGA SER-ACNC: <9 GPI IGA UNITS (ref 0–25)
B2 GLYCOPROT1 IGG SER-ACNC: <9 GPI IGG UNITS (ref 0–20)
B2 GLYCOPROT1 IGM SER-ACNC: <9 GPI IGM UNITS (ref 0–32)
CARDIOLIPIN IGA SER IA-ACNC: <9 APL U/ML (ref 0–11)
CARDIOLIPIN IGG SER IA-ACNC: <9 GPL U/ML (ref 0–14)
CARDIOLIPIN IGM SER IA-ACNC: <9 MPL U/ML (ref 0–12)

## 2020-12-03 LAB
AT III PPP CHRO-ACNC: 120 % (ref 75–135)
DRVVT MIX, 117894: 48.7 SEC (ref 0–47)
DRVVT RATIO 500585: 0.9 RATIO (ref 0.8–1.2)
INTERPRETATION, 117893: ABNORMAL
PROT C PPP-ACNC: 114 % (ref 73–180)
PROT S ACT/NOR PPP: 71 % (ref 63–140)
SCREEN APTT: 38.4 SEC (ref 0–51.9)
SCREEN DRVVT: 58.8 SEC (ref 0–47)

## 2020-12-07 LAB
F2 GENE MUT ANL BLD/T: NORMAL
F5 GENE MUT ANL BLD/T: NORMAL

## 2020-12-07 NOTE — PROGRESS NOTES
No evidence of inherited clotting disorder found, which is good. But I would continue the same management.

## 2020-12-08 NOTE — PROGRESS NOTES
Called patient and advised of lab results per Dr. Rosi Davis. Patient verbalized understanding. No further questions or concerns at this time.

## 2020-12-10 ENCOUNTER — TRANSCRIBE ORDER (OUTPATIENT)
Dept: SCHEDULING | Age: 75
End: 2020-12-10

## 2020-12-10 DIAGNOSIS — I27.20 PULMONARY HTN (HCC): Primary | ICD-10-CM

## 2020-12-17 ENCOUNTER — TRANSCRIBE ORDER (OUTPATIENT)
Dept: SCHEDULING | Age: 75
End: 2020-12-17

## 2020-12-17 ENCOUNTER — TRANSCRIBE ORDER (OUTPATIENT)
Dept: INTERNAL MEDICINE CLINIC | Age: 75
End: 2020-12-17

## 2020-12-17 DIAGNOSIS — Z12.31 VISIT FOR SCREENING MAMMOGRAM: Primary | ICD-10-CM

## 2021-01-11 ENCOUNTER — TELEPHONE (OUTPATIENT)
Dept: FAMILY MEDICINE CLINIC | Age: 76
End: 2021-01-11

## 2021-01-11 NOTE — TELEPHONE ENCOUNTER
Patient called per Dr. Ortiz Simpler:  from what I have read and seen she should be safe to get the COVID vaccine, and that when we move to phase 1B I highly recommend she get the vaccine.      Patient verbalized understanding and appreciative

## 2021-01-11 NOTE — TELEPHONE ENCOUNTER
Pt is calling to see if it is alright for her to take the COVID19 Vaccine with her health history and allergy history?

## 2021-02-03 ENCOUNTER — HOSPITAL ENCOUNTER (OUTPATIENT)
Dept: MAMMOGRAPHY | Age: 76
Discharge: HOME | End: 2021-02-03
Attending: FAMILY MEDICINE
Payer: MEDICARE

## 2021-02-03 DIAGNOSIS — Z12.31 VISIT FOR SCREENING MAMMOGRAM: ICD-10-CM

## 2021-02-03 PROCEDURE — 77063 BREAST TOMOSYNTHESIS BI: CPT

## 2021-05-24 DIAGNOSIS — Z86.2 HISTORY OF IRON DEFICIENCY ANEMIA: ICD-10-CM

## 2021-05-24 DIAGNOSIS — D53.9 MACROCYTIC ANEMIA: ICD-10-CM

## 2021-05-25 LAB
BASOPHILS # BLD AUTO: 0 X10E3/UL (ref 0–0.2)
BASOPHILS NFR BLD AUTO: 0 %
EOSINOPHIL # BLD AUTO: 0.2 X10E3/UL (ref 0–0.4)
EOSINOPHIL NFR BLD AUTO: 3 %
ERYTHROCYTE [DISTWIDTH] IN BLOOD BY AUTOMATED COUNT: 13.2 % (ref 11.7–15.4)
FERRITIN SERPL-MCNC: 124 NG/ML (ref 15–150)
FOLATE SERPL-MCNC: 16.7 NG/ML
HCT VFR BLD AUTO: 37.8 % (ref 34–46.6)
HGB BLD-MCNC: 12.2 G/DL (ref 11.1–15.9)
IMM GRANULOCYTES # BLD AUTO: 0 X10E3/UL (ref 0–0.1)
IMM GRANULOCYTES NFR BLD AUTO: 0 %
IRON SATN MFR SERPL: 30 % (ref 15–55)
IRON SERPL-MCNC: 83 UG/DL (ref 27–139)
LYMPHOCYTES # BLD AUTO: 1.7 X10E3/UL (ref 0.7–3.1)
LYMPHOCYTES NFR BLD AUTO: 21 %
MCH RBC QN AUTO: 30.1 PG (ref 26.6–33)
MCHC RBC AUTO-ENTMCNC: 32.3 G/DL (ref 31.5–35.7)
MCV RBC AUTO: 93 FL (ref 79–97)
MONOCYTES # BLD AUTO: 0.4 X10E3/UL (ref 0.1–0.9)
MONOCYTES NFR BLD AUTO: 5 %
NEUTROPHILS # BLD AUTO: 5.8 X10E3/UL (ref 1.4–7)
NEUTROPHILS NFR BLD AUTO: 71 %
PLATELET # BLD AUTO: 193 X10E3/UL (ref 150–450)
RBC # BLD AUTO: 4.05 X10E6/UL (ref 3.77–5.28)
TIBC SERPL-MCNC: 273 UG/DL (ref 250–450)
UIBC SERPL-MCNC: 190 UG/DL (ref 118–369)
VIT B12 SERPL-MCNC: 487 PG/ML (ref 232–1245)
WBC # BLD AUTO: 8.2 X10E3/UL (ref 3.4–10.8)

## 2021-05-28 NOTE — PROGRESS NOTES
[address] [facility] at [facility]  [phone]    Hematology / Oncology Established Visit    Reason for Visit:   Ashley Mars is a 76 y.o. female who is here for follow up of PE.  PCP is Dr. Jacinda Sanabria. History of Present Illness:   Ms. Malina Schwartz is a 76 y.o. female with RA with lung involvement who comes in for evaluation and management of PE. She was recently put on home O2. She was hospitalized at [address] [place] [address] 3/3/19 - 3/5/19 after p/w weakness, fatigue, SOB, chills. She denies R leg swelling but has had subacute pain in R leg from knee down which started in early Feb 2019. She was found to have bilateral pulmonary emboli as well as RLE DVT. She was started on Heparin infusion and transitioned to Xarelto. At time of discharge, she was told to stop MTX, Aspirin and Celebrex. Since then, she reported to the ED at [facility] for 2 episodes of bleeding from left naris. She also coughed up blood. She denies any 8 hr trips, surgeries, fractures, OCPs or hormone replacement. No prior h/o VTE. No family h/o VTE. For RA, she takes MTX, but this was put on hold. She was also started on Remicaide 1 yr ago. She is currently taking Xarelto as prescribed, states her SOB is much better than prior. The R leg pain is much better, but feels really stiff per patient.     -Labs 3/10/19: WBC 9.5, Hgb 10.8, , HIV/HCV Ab/HepB Surface Ag all negative. Interval History: Patient here for follow up of PE and RLE DVT. No recent cauterizations required for epistaxis. Denies melena/hematochezia. Continues to take eliquis. Takes iron supplements several times a week since they often cause constipation. She takes stool softeners as needed which helps manage the constipation. Reports chronic fatigue. She is taking prednisone for RA.      MedHx: Anemia, asthma, cardiac dysrhythmia, HLD, DVT, HTN, OA, PE, RA, spinal stenosis, asthma  SurgHx:  Rotator cuff repair, right hip replacement, left lung biopsy  SocialHx: past smoker 20PPD, no ETOH     Review of Systems: A complete review of systems was obtained, negative except as described above. Physical Exam:       Visit Vitals  BP (!) 162/71   Pulse 69   Temp 98.2 °F (36.8 °C)   Resp 16   Ht 5' 11\" (1.803 m)   Wt 287 lb 9.6 oz (130.5 kg)   SpO2 91%   BMI 40.11 kg/m²       ECOG PS: 2-3   General: Well developed, no acute distress, walking with a cane. Eyes: PERRLA, EOMI, anicteric sclerae  HENT: Atraumatic  Neck: Supple  Respiratory: course lung sounds RLL/LLL otherwise clear, normal respiratory effort  CV: Normal rate, regular rhythm, no murmurs, no peripheral edema  GI: Soft, nontender, nondistended, no masses, no hepatomegaly, no splenomegaly  MS: Normal gait and station. Digits without clubbing or cyanosis. Skin: No rashes, ecchymoses, or petechiae. Normal temperature, turgor, and texture. Neuro/Psych: Alert, oriented. 5/5 strength in all 4 extremities. Appropriate affect, normal judgment/insight. Results:     Lab Results   Component Value Date/Time    WBC 8.2 05/24/2021 12:00 AM    HGB 12.2 05/24/2021 12:00 AM    HCT 37.8 05/24/2021 12:00 AM    PLATELET 442 90/44/8265 12:00 AM    MCV 93 05/24/2021 12:00 AM    ABS. NEUTROPHILS 5.8 05/24/2021 12:00 AM     Lab Results   Component Value Date/Time    Sodium 141 10/14/2020 11:18 AM    Potassium 4.4 10/14/2020 11:18 AM    Chloride 109 (H) 10/14/2020 11:18 AM    CO2 27 10/14/2020 11:18 AM    Glucose 86 10/14/2020 11:18 AM    BUN 19 10/14/2020 11:18 AM    Creatinine 0.69 10/14/2020 11:18 AM    GFR est AA >60 10/14/2020 11:18 AM    GFR est non-AA >60 10/14/2020 11:18 AM    Calcium 9.3 10/14/2020 11:18 AM     Lab Results   Component Value Date/Time    Bilirubin, total 0.5 10/14/2020 11:18 AM    ALT (SGPT) 20 10/14/2020 11:18 AM    Alk.  phosphatase 94 10/14/2020 11:18 AM    Protein, total 7.5 10/14/2020 11:18 AM    Albumin 3.6 10/14/2020 11:18 AM    Globulin 3.9 10/14/2020 11:18 AM     Lab Results   Component Value Date/Time Iron 83 05/24/2021 12:00 AM    TIBC 273 05/24/2021 12:00 AM    Iron % saturation 30 05/24/2021 12:00 AM    Ferritin 124 05/24/2021 12:00 AM       Lab Results   Component Value Date/Time    Vitamin B12 487 05/24/2021 12:00 AM    Folate 16.7 05/24/2021 12:00 AM     Lab Results   Component Value Date/Time    TSH 1.160 06/14/2016 10:21 AM     Lab Results   Component Value Date/Time    HEP C VIRUS AB <0.1 10/09/2015 08:07 AM         Imaging:     Imaging at [facility]:     CTA Chest 3/2019: showing Multiple bilateral branching pulmonary Emboli, Bilateral patchy ground glass opacities likely chronic, and bronchiectasis consistent with pulmonary fibrosis (Rhematoid related lung disease). Similar Groundglass component and Bronchiectasis on previous CT in our system.       Duplex Legs 3/2019: Acute Deep vein thrombosis of the right peroneal vein    Echo: Mild concentric LVH with normal cavity size and systolic function. Trivial to mild regurgitation. Moderate LV diastolic dysfunction with elevated left atrial pressure. 6/20/19 Venous doppler bilateral LE:  Right Lower Venous     No evidence of deep vein thrombosis in the common femoral, profunda femoral, superficial femoral, popliteal, posterior tibial, and peroneal veins. The veins were imaged in the transverse and longitudinal planes. The vessels showed normal color filling and compressibility. Doppler interrogation showed phasic and spontaneous flow. Left Lower Venous     For comparison purposes, the left common femoral vein was briefly interrogated. These vein demonstrates normal color filing and compressibility. Doppler flow was phasic and spontaneous. Assessment & Plan:   Zaria Quiroz is a 76 y.o. female with RA comes in for evaluation of DVT/PE. 1. Venous thromboembolism: PE and RLE DVT occurred in March 2019. No obvious risk factors. My review of MTX shows no increased risk of VTE;  Remicaide associated with < 1% risk of thrombophelebitis, MI, etc. Between the 2 medications, the Remicaide is more likely a culprit, but unclear. The dose and frequency were increased in early February 2019. Pt discussed with her Rheumatologist, Dr. Farrah Martinez who recommended thrombophilia testing. I discussed with patient that thrombophilia testing is only helpful when testing is positive, putting a name to her risk. However if testing is negative, it is not helpful as patient may still have inherited thrombophilia associated with unknown genes. Results of testing would not change our anticoagulation management, and pt does not have any children. However, pt does have 2 brothers and 1 sister and would like testing. Patient will likely need to remain on anticoagulation indefinitely as it is not clear whether this was provoked by medication or not. 6/2019 lower extremity venous dopplers ordered by PCP are negative. 12/1/20 thrombophilia testing negative. -- Continue Eliquis 5mg bid. Pt to alert us at the time of invasive procedures. -- Return in 6 months. -- Sending progress note to Dr. Duane Collie (Rheumatologist) - [place] [phone]    2. RA with lung involvement: MTX restarted. No longer taking Remicaide  Home O2 was started early Feb 2019 - likely due to PE and no longer using supplemental O2. Her O2 sats are 94% today. -- Sees Dr. Elissa Main in Pulmonary and Dr. Duane Collie in Rheumatology. 3. Diastolic CHF: Seen on echo and likely due to HTN. Not on diuretics. On Norvasc. 4. H/o Epistaxis:   Occurred even before starting anticoagulation, but more frequent after starting. Now improved after cauterization by ENT. Also can managed with clamping physical pressure and Afrin prn,  saline nasal spray. No recent episodes of epistaxis. 5. H/o macrocytic anemia:   Likely due to MTX and resolved. Prior iron deficiency resolved with daily iron supplements. Vitamin b12 and folate normal on 5/24/21. -- Continue once daily iron supplements as tolerated, stool softeners PRN.        I appreciate the opportunity to participate in Ms. Kateryna Viveros's care. I have personally seen and evaluated the patient in conjunction with Mary Lou De La Vega NP. I find the patient's history and physical exam are consistent with the NP's documentation. I agree with the above assessment and plan, which I have edited if needed. Blood counts are stable. Continue on Eliquis and iron supplements.     Signed By: Rc Gutierrez MD     June 1, 2021

## 2021-06-01 ENCOUNTER — OFFICE VISIT (OUTPATIENT)
Dept: ONCOLOGY | Age: 76
End: 2021-06-01
Payer: MEDICARE

## 2021-06-01 VITALS
BODY MASS INDEX: 40.26 KG/M2 | OXYGEN SATURATION: 91 % | HEART RATE: 69 BPM | DIASTOLIC BLOOD PRESSURE: 71 MMHG | RESPIRATION RATE: 16 BRPM | TEMPERATURE: 98.2 F | HEIGHT: 71 IN | WEIGHT: 287.6 LBS | SYSTOLIC BLOOD PRESSURE: 162 MMHG

## 2021-06-01 DIAGNOSIS — I26.99 OTHER ACUTE PULMONARY EMBOLISM WITHOUT ACUTE COR PULMONALE (HCC): ICD-10-CM

## 2021-06-01 DIAGNOSIS — Z86.2 HISTORY OF IRON DEFICIENCY ANEMIA: Primary | ICD-10-CM

## 2021-06-01 DIAGNOSIS — I82.451 ACUTE DEEP VEIN THROMBOSIS (DVT) OF RIGHT PERONEAL VEIN (HCC): ICD-10-CM

## 2021-06-01 DIAGNOSIS — M05.10 RHEUMATOID LUNG DISEASE (HCC): ICD-10-CM

## 2021-06-01 PROCEDURE — G8753 SYS BP > OR = 140: HCPCS | Performed by: INTERNAL MEDICINE

## 2021-06-01 PROCEDURE — 3017F COLORECTAL CA SCREEN DOC REV: CPT | Performed by: INTERNAL MEDICINE

## 2021-06-01 PROCEDURE — G8427 DOCREV CUR MEDS BY ELIG CLIN: HCPCS | Performed by: INTERNAL MEDICINE

## 2021-06-01 PROCEDURE — 99214 OFFICE O/P EST MOD 30 MIN: CPT | Performed by: INTERNAL MEDICINE

## 2021-06-01 PROCEDURE — G8536 NO DOC ELDER MAL SCRN: HCPCS | Performed by: INTERNAL MEDICINE

## 2021-06-01 PROCEDURE — 1090F PRES/ABSN URINE INCON ASSESS: CPT | Performed by: INTERNAL MEDICINE

## 2021-06-01 PROCEDURE — 1101F PT FALLS ASSESS-DOCD LE1/YR: CPT | Performed by: INTERNAL MEDICINE

## 2021-06-01 PROCEDURE — G8754 DIAS BP LESS 90: HCPCS | Performed by: INTERNAL MEDICINE

## 2021-06-01 PROCEDURE — G8417 CALC BMI ABV UP PARAM F/U: HCPCS | Performed by: INTERNAL MEDICINE

## 2021-06-01 PROCEDURE — G8510 SCR DEP NEG, NO PLAN REQD: HCPCS | Performed by: INTERNAL MEDICINE

## 2021-06-01 PROCEDURE — G0463 HOSPITAL OUTPT CLINIC VISIT: HCPCS | Performed by: INTERNAL MEDICINE

## 2021-06-01 PROCEDURE — G8399 PT W/DXA RESULTS DOCUMENT: HCPCS | Performed by: INTERNAL MEDICINE

## 2021-06-01 RX ORDER — PREDNISONE 10 MG/1
TABLET ORAL
COMMUNITY
Start: 2021-05-26 | End: 2021-07-01 | Stop reason: ALTCHOICE

## 2021-06-01 RX ORDER — AZELASTINE 1 MG/ML
SPRAY, METERED NASAL
COMMUNITY
Start: 2021-04-05

## 2021-06-01 NOTE — PROGRESS NOTES
Chief Complaint   Patient presents with    Follow-up     Reji Tesfaye is a pleasant 76year old woman who presents as a follow up for DVT.  She denies pain

## 2021-06-01 NOTE — LETTER
6/1/2021 Patient: Estefany Núñez YOB: 1945 Date of Visit: 6/1/2021 Guzman Smith MD 
[address] [address] [address] [employer] Dear Guzman Smith MD, Thank you for referring Ms. Deandre Tinsley to [facility] for evaluation. My notes for this consultation are attached. If you have questions, please do not hesitate to call me. I look forward to following your patient along with you. Sincerely, Reagan Bowman MD

## 2021-06-28 ENCOUNTER — TELEPHONE (OUTPATIENT)
Dept: FAMILY MEDICINE CLINIC | Age: 76
End: 2021-06-28

## 2021-06-28 NOTE — TELEPHONE ENCOUNTER
----- Message from Kevin Quinteros sent at 6/28/2021  1:12 PM EDT -----  Regarding: Dr. Brijesh Dawson: [phone]  Level 1/Escalated Issue      Caller's first and last name and relationship (if not the patient): Pt. Best contact number(s): [phone]      What are the symptoms: High blood pressure 168/86, has been high for two days, pressure in head. Transfer successful - yes/no (include outcome): No, [place] said doctor will call pt. Transfer declined - yes/no (include reason): Yes, told pcp will call pt. Was caller advised to seek appropriate level of care - yes/no: Yes. Details to clarify the request: N/a.         Kevin Quinteros

## 2021-06-28 NOTE — TELEPHONE ENCOUNTER
----- Message from Roxy Díaz sent at 6/28/2021 11:11 AM EDT -----  Regarding: Dr. Ming Siegel / telephone  Appointment not available    Caller's first and last name and relationship to patient (if not the patient): pt      Best contact number:[phone]      Preferred date and time: today if possible       Scheduled appointment date and time: n/a      Reason for appointment:BP is high and going up.       Details to clarify the request:      Roxy Díaz

## 2021-07-01 ENCOUNTER — HOSPITAL ENCOUNTER (OUTPATIENT)
Dept: NON INVASIVE DIAGNOSTICS | Age: 76
Discharge: HOME | End: 2021-07-01
Attending: INTERNAL MEDICINE
Payer: MEDICARE

## 2021-07-01 ENCOUNTER — OFFICE VISIT (OUTPATIENT)
Dept: FAMILY MEDICINE CLINIC | Age: 76
End: 2021-07-01
Payer: MEDICARE

## 2021-07-01 VITALS
WEIGHT: 286 LBS | OXYGEN SATURATION: 97 % | RESPIRATION RATE: 20 BRPM | HEART RATE: 75 BPM | SYSTOLIC BLOOD PRESSURE: 110 MMHG | TEMPERATURE: 98.5 F | HEIGHT: 71 IN | DIASTOLIC BLOOD PRESSURE: 71 MMHG | BODY MASS INDEX: 40.04 KG/M2

## 2021-07-01 VITALS
BODY MASS INDEX: 40.03 KG/M2 | HEIGHT: 71 IN | DIASTOLIC BLOOD PRESSURE: 77 MMHG | WEIGHT: 285.94 LBS | SYSTOLIC BLOOD PRESSURE: 128 MMHG

## 2021-07-01 DIAGNOSIS — I27.20 PULMONARY HTN (HCC): ICD-10-CM

## 2021-07-01 DIAGNOSIS — I10 ESSENTIAL HYPERTENSION: ICD-10-CM

## 2021-07-01 DIAGNOSIS — E78.00 HIGH CHOLESTEROL: Primary | ICD-10-CM

## 2021-07-01 DIAGNOSIS — L23.9 ALLERGIC CONTACT DERMATITIS, UNSPECIFIED TRIGGER: ICD-10-CM

## 2021-07-01 PROBLEM — J47.1 BRONCHIECTASIS WITH ACUTE EXACERBATION (HCC): Chronic | Status: RESOLVED | Noted: 2019-08-19 | Resolved: 2021-07-01

## 2021-07-01 LAB
ECHO AO ROOT DIAM: 3.17 CM
ECHO AR MAX VEL PISA: 177.97 CENTIMETER/SECOND
ECHO AV AREA PEAK VELOCITY: 1.85 CM2
ECHO AV AREA/BSA PEAK VELOCITY: 0.8 CM2/M2
ECHO AV PEAK GRADIENT: 11.52 MMHG
ECHO AV PEAK VELOCITY: 169.68 CM/S
ECHO AV REGURGITANT PHT: 604.02 MS
ECHO EST RA PRESSURE: 5 MMHG
ECHO LA AREA 4C: 26.15 CM2
ECHO LA MAJOR AXIS: 3.8 CM
ECHO LA MINOR AXIS: 1.55 CM
ECHO LA VOL 2C: 77.23 ML (ref 22–52)
ECHO LA VOL 4C: 87.02 ML (ref 22–52)
ECHO LA VOL BP: 87.24 ML (ref 22–52)
ECHO LA VOL/BSA BIPLANE: 35.61 ML/M2 (ref 16–28)
ECHO LA VOLUME INDEX A2C: 31.52 ML/M2 (ref 16–28)
ECHO LA VOLUME INDEX A4C: 35.52 ML/M2 (ref 16–28)
ECHO LV E' LATERAL VELOCITY: 7.7 CENTIMETER/SECOND
ECHO LV E' SEPTAL VELOCITY: 6.9 CENTIMETER/SECOND
ECHO LV INTERNAL DIMENSION DIASTOLIC: 4.89 CM (ref 3.9–5.3)
ECHO LV INTERNAL DIMENSION SYSTOLIC: 3.28 CM
ECHO LV IVSD: 0.94 CM (ref 0.6–0.9)
ECHO LV MASS 2D: 160.3 G (ref 67–162)
ECHO LV MASS INDEX 2D: 65.4 G/M2 (ref 43–95)
ECHO LV POSTERIOR WALL DIASTOLIC: 0.93 CM (ref 0.6–0.9)
ECHO LVOT DIAM: 2.09 CM
ECHO LVOT PEAK GRADIENT: 3.35 MMHG
ECHO LVOT PEAK VELOCITY: 91.49 CM/S
ECHO MV A VELOCITY: 96.37 CENTIMETER/SECOND
ECHO MV AREA PHT: 2.45 CM2
ECHO MV E DECELERATION TIME (DT): 309.52 MS
ECHO MV E VELOCITY: 64.33 CENTIMETER/SECOND
ECHO MV PRESSURE HALF TIME (PHT): 89.76 MS
ECHO PV PEAK INSTANTANEOUS GRADIENT SYSTOLIC: 2.26 MMHG
ECHO RIGHT VENTRICULAR SYSTOLIC PRESSURE (RVSP): 26.84 MMHG
ECHO TV REGURGITANT MAX VELOCITY: 233.65 CM/S
ECHO TV REGURGITANT PEAK GRADIENT: 21.84 MMHG
LA VOL DISK BP: 81.41 ML (ref 22–52)

## 2021-07-01 PROCEDURE — G8417 CALC BMI ABV UP PARAM F/U: HCPCS | Performed by: FAMILY MEDICINE

## 2021-07-01 PROCEDURE — G8752 SYS BP LESS 140: HCPCS | Performed by: FAMILY MEDICINE

## 2021-07-01 PROCEDURE — G8754 DIAS BP LESS 90: HCPCS | Performed by: FAMILY MEDICINE

## 2021-07-01 PROCEDURE — 1090F PRES/ABSN URINE INCON ASSESS: CPT | Performed by: FAMILY MEDICINE

## 2021-07-01 PROCEDURE — G8432 DEP SCR NOT DOC, RNG: HCPCS | Performed by: FAMILY MEDICINE

## 2021-07-01 PROCEDURE — G8427 DOCREV CUR MEDS BY ELIG CLIN: HCPCS | Performed by: FAMILY MEDICINE

## 2021-07-01 PROCEDURE — G8536 NO DOC ELDER MAL SCRN: HCPCS | Performed by: FAMILY MEDICINE

## 2021-07-01 PROCEDURE — 93306 TTE W/DOPPLER COMPLETE: CPT | Performed by: SPECIALIST

## 2021-07-01 PROCEDURE — 99214 OFFICE O/P EST MOD 30 MIN: CPT | Performed by: FAMILY MEDICINE

## 2021-07-01 PROCEDURE — 93306 TTE W/DOPPLER COMPLETE: CPT

## 2021-07-01 PROCEDURE — 3017F COLORECTAL CA SCREEN DOC REV: CPT | Performed by: FAMILY MEDICINE

## 2021-07-01 PROCEDURE — 1101F PT FALLS ASSESS-DOCD LE1/YR: CPT | Performed by: FAMILY MEDICINE

## 2021-07-01 PROCEDURE — G8399 PT W/DXA RESULTS DOCUMENT: HCPCS | Performed by: FAMILY MEDICINE

## 2021-07-01 RX ORDER — TRIAMCINOLONE ACETONIDE 1 MG/G
OINTMENT TOPICAL 2 TIMES DAILY
Qty: 1 TUBE | Refills: 0 | Status: SHIPPED | OUTPATIENT
Start: 2021-07-01

## 2021-07-01 NOTE — PATIENT INSTRUCTIONS
DASH Diet: Care Instructions  Your Care Instructions     The DASH diet is an eating plan that can help lower your blood pressure. DASH stands for Dietary Approaches to Stop Hypertension. Hypertension is high blood pressure. The DASH diet focuses on eating foods that are high in calcium, potassium, and magnesium. These nutrients can lower blood pressure. The foods that are highest in these nutrients are fruits, vegetables, low-fat dairy products, nuts, seeds, and legumes. But taking calcium, potassium, and magnesium supplements instead of eating foods that are high in those nutrients does not have the same effect. The DASH diet also includes whole grains, fish, and poultry. The DASH diet is one of several lifestyle changes your doctor may recommend to lower your high blood pressure. Your doctor may also want you to decrease the amount of sodium in your diet. Lowering sodium while following the DASH diet can lower blood pressure even further than just the DASH diet alone. Follow-up care is a key part of your treatment and safety. Be sure to make and go to all appointments, and call your doctor if you are having problems. It's also a good idea to know your test results and keep a list of the medicines you take. How can you care for yourself at home? Following the DASH diet  · Eat 4 to 5 servings of fruit each day. A serving is 1 medium-sized piece of fruit, ½ cup chopped or canned fruit, 1/4 cup dried fruit, or 4 ounces (½ cup) of fruit juice. Choose fruit more often than fruit juice. · Eat 4 to 5 servings of vegetables each day. A serving is 1 cup of lettuce or raw leafy vegetables, ½ cup of chopped or cooked vegetables, or 4 ounces (½ cup) of vegetable juice. Choose vegetables more often than vegetable juice. · Get 2 to 3 servings of low-fat and fat-free dairy each day. A serving is 8 ounces of milk, 1 cup of yogurt, or 1 ½ ounces of cheese. · Eat 6 to 8 servings of grains each day.  A serving is 1 slice of bread, 1 ounce of dry cereal, or ½ cup of cooked rice, pasta, or cooked cereal. Try to choose whole-grain products as much as possible. · Limit lean meat, poultry, and fish to 2 servings each day. A serving is 3 ounces, about the size of a deck of cards. · Eat 4 to 5 servings of nuts, seeds, and legumes (cooked dried beans, lentils, and split peas) each week. A serving is 1/3 cup of nuts, 2 tablespoons of seeds, or ½ cup of cooked beans or peas. · Limit fats and oils to 2 to 3 servings each day. A serving is 1 teaspoon of vegetable oil or 2 tablespoons of salad dressing. · Limit sweets and added sugars to 5 servings or less a week. A serving is 1 tablespoon jelly or jam, ½ cup sorbet, or 1 cup of lemonade. · Eat less than 2,300 milligrams (mg) of sodium a day. If you limit your sodium to 1,500 mg a day, you can lower your blood pressure even more. · Be aware that all of these are the suggested number of servings for people who eat 1,800 to 2,000 calories a day. Your recommended number of servings may be different if you need more or fewer calories. Tips for success  · Start small. Do not try to make dramatic changes to your diet all at once. You might feel that you are missing out on your favorite foods and then be more likely to not follow the plan. Make small changes, and stick with them. Once those changes become habit, add a few more changes. · Try some of the following:  ? Make it a goal to eat a fruit or vegetable at every meal and at snacks. This will make it easy to get the recommended amount of fruits and vegetables each day. ? Try yogurt topped with fruit and nuts for a snack or healthy dessert. ? Add lettuce, tomato, cucumber, and onion to sandwiches. ? Combine a ready-made pizza crust with low-fat mozzarella cheese and lots of vegetable toppings. Try using tomatoes, squash, spinach, broccoli, carrots, cauliflower, and onions. ?  Have a variety of cut-up vegetables with a low-fat dip as an appetizer instead of chips and dip. ? Sprinkle sunflower seeds or chopped almonds over salads. Or try adding chopped walnuts or almonds to cooked vegetables. ? Try some vegetarian meals using beans and peas. Add garbanzo or kidney beans to salads. Make burritos and tacos with mashed pascual beans or black beans. Where can you learn more? Go to http://www.salgado.com/  Enter H967 in the search box to learn more about \"DASH Diet: Care Instructions. \"  Current as of: August 31, 2020               Content Version: 12.8  © 5643-7067 [employer]. Care instructions adapted under license by [employer] (which disclaims liability or warranty for this information). If you have questions about a medical condition or this instruction, always ask your healthcare professional. [address] any warranty or liability for your use of this information.

## 2021-07-01 NOTE — PROGRESS NOTES
CC: f/u HTN, HLD    HPI: Pt is a 76 y.o. female who presents for f/u HTN, HLD. She reports that last week she had some high BP readings associated with a pulsating/fullness feeling in her head. She was taking Celebrex at that time for joint pain and was also dealing with the death of someone close to her. She thinks the BP's was related to taking Celebrex because her BP's have come down since she stopped taking the Celebrex. She reports that her home BP's have been on the low side for the past few days so she has not taken her amlodipine. Yesterday was 114/67. She reports this same thing happening on and off in the past. She brought her home BP machine with her today and it is reading about 10 points above our machine systolic. She reports that she is working with her Rheumatologist to come up with a better plan for her RA. She is going to restart Remicaid. She would like a refill of her Kenalog for eczema. HTN:  Checking BPs at home?: YES  Logs today?: Some  Range of BPs?: 130-140's/70's  Headaches?: NO  Blurry vision?: NO  Lower extremity edema?: YES - legs were swollen over the weekend while BP was high  Smoking?: NO        Past Medical History:   Diagnosis Date    Anemia, unspecified 4/15/2010    Asthma     Cardiac dysrhythmia, unspecified 4/15/2010    PAT- not on medication/ not seeing cardiologist    High cholesterol 4/30/2010    History of DVT of lower extremity 6/6/2019    Right leg (3/2019)    Hypertension     Ill-defined condition     PE and DVT to Right leg.      Obesity, unspecified 4/15/2010    Osteoarthrosis, unspecified whether generalized or localized, other specified sites 4/15/2010    Pulmonary embolism (HCC)     Rheumatoid arthritis(714.0) 4/15/2010    Spinal stenosis, unspecified region other than cervical 4/15/2010    Unspecified asthma(493.90) 4/15/2010    rheumatoid lung disease       Family History   Problem Relation Age of Onset    Cancer Mother         cervical, breast    Heart Disease Mother     Heart Attack Mother     Hypertension Mother     Arthritis-osteo Mother     Heart Disease Father     Heart Attack Father     Cancer Other         uterine and lung.  High Cholesterol Brother     Stroke Brother    Adrianna Croft Arthritis-rheumatoid Sister     Migraines Sister     Breast Cancer Maternal Aunt        Social History     Tobacco Use    Smoking status: Former Smoker     Packs/day: 1.00     Years: 20.00     Pack years: 20.00     Quit date: 1985     Years since quittin.1    Smokeless tobacco: Never Used   Substance Use Topics    Alcohol use: No    Drug use: No       ROS:  Per HPI    PE:  Visit Vitals  /71   Pulse 75   Temp 98.5 °F (36.9 °C) (Oral)   Resp 20   Ht 5' 11\" (1.803 m)   Wt 286 lb (129.7 kg)   SpO2 97%   BMI 39.89 kg/m²     Gen: Pt sitting in chair, in NAD  Head: Normocephalic, atraumatic  Eyes: Sclera anicteric, EOM grossly intact, PERRL  Throat: MMM, normal lips, tongue and gums  Neck: Supple, no LAD, no thyromegaly or carotid bruits  CVS: Normal S1, S2, no m/r/g  Resp: CTAB, no wheezes or rales  Extrem: Atraumatic, no cyanosis or edema  Pulses: 2+   Skin: Warm, dry  Neuro: Alert, oriented, appropriate      A/P:   Encounter Diagnoses     ICD-10-CM ICD-9-CM   1. High cholesterol  E78.00 272.0   2. Allergic contact dermatitis, unspecified trigger  L23.9 692.9   3. Essential hypertension  I10 401.9     1. Allergic contact dermatitis, unspecified trigger: Stable, refill.   - triamcinolone acetonide (KENALOG) 0.1 % ointment; Apply  to affected area two (2) times a day. use thin layer in the affected area of the left leg  Dispense: 1 Tube; Refill: 0    2. High cholesterol: Stable, due for labs  - LIPID PANEL; Future    3. Essential hypertension: BP fine in clinic today. Pt to continue measuring at home.  Advised that combo of stress/pain/Celebrex likely caused the elevation.   - METABOLIC PANEL, BASIC; Future       RTC in 6 months for f/u chronic conditions, or sooner prn    Discussed diagnoses in detail with patient. Medication risks/benefits/side effects discussed with patient. All of the patient's questions were addressed. The patient understands and agrees with our plan of care. The patient knows to call back if they are unsure of or forget any changes we discussed today or if the symptoms change. The patient received an After-Visit Summary which contains VS, orders, medication list and allergy list. This can be used as a \"mini-medical record\" should they have to seek medical care while out of town. Current Outpatient Medications on File Prior to Visit   Medication Sig Dispense Refill    amLODIPine (NORVASC) 5 mg tablet TAKE ONE TABLET BY MOUTH EVERY DAY 30 Tablet 0    azelastine (ASTELIN) 137 mcg (0.1 %) nasal spray       fluticasone propionate (FLONASE) 50 mcg/actuation nasal spray USE 2 SPRAYS IN BOTH NOSTRILS DAILY 16 g 3    ProAir HFA 90 mcg/actuation inhaler INHALE 2 PUFFS BY MOUTH EVERY 4 HOURS AS NEEDED FOR WHEEZING 17 g 3    Eliquis 5 mg tablet TAKE 1 TABLET TWICE A DAY 60 Tab 11    Symbicort 160-4.5 mcg/actuation HFAA USE 2 INHALATIONS TWICE A DAY 1 Inhaler 3    gabapentin (NEURONTIN) 300 mg capsule Take 1 Cap by mouth two (2) times a day. Max Daily Amount: 600 mg. Neck and left arm pain 42 Cap 0    cyclobenzaprine (FLEXERIL) 10 mg tablet Take 1 Tab by mouth three (3) times daily as needed for Muscle Spasm(s). 15 Tab 3    methotrexate (RHEUMATREX) 2.5 mg tablet Take 22.5 mg by mouth every Monday.  atorvastatin (LIPITOR) 10 mg tablet TAKE ONE TABLET BY MOUTH EVERY DAY (Patient taking differently: TAKE ONE TABLET BY MOUTH EVERY DAY - patient states she takes 2-3 times weekly) 90 Tab 2    famotidine (PEPCID) 20 mg tablet Take 20 mg by mouth two (2) times a day.  mupirocin (BACTROBAN) 2 % ointment   0    folic acid (FOLVITE) 1 mg tablet Take 3 Tabs by mouth daily.  90 Tab 2    Hot Water Bottle (WATER BOTTLE) misc Please Dispense a humidifier bottle to patient to use with her oxygen. 1 Each 0    albuterol (PROVENTIL VENTOLIN) 2.5 mg /3 mL (0.083 %) nebulizer solution USE 1 VIAL IN NEBULIZER EVERY 4 HOURS AS NEEDED FOR WHEEZING 100 Each 4    infliximab (REMICADE IV) by IntraVENous route. Every 6 weeks      UBIDECARENONE (COQ-10 PO) Take  by mouth.  acetaminophen (TYLENOL) 500 mg tablet Take 1,000 mg by mouth every six (6) hours as needed for Pain.  omega-3s-dha-epa-fish oil 300-1,000 mg cpDR Take  by mouth daily.  cholecalciferol, vitamin D3, (VITAMIN D3) 4,000 unit cap Take  by mouth daily. No current facility-administered medications on file prior to visit.

## 2021-07-01 NOTE — PROGRESS NOTES
1. Have you been to the ER, urgent care clinic since your last visit? Hospitalized since your last visit? No    2. Have you seen or consulted any other health care providers outside of the [address] [place] [address] since your last visit? Include any pap smears or colon screening.  No  Reviewed record in preparation for visit and have necessary documentation  Pt did not bring medication to office visit for review    Goals that were addressed and/or need to be completed during or after this appointment include   Health Maintenance Due   Topic Date Due    COVID-19 Vaccine (1) Never done    Medicare Yearly Exam  02/09/2020    Shingrix Vaccine Age 50> (2 of 2) 09/29/2020     Home BP monitor 129/96,76  Recheck  126/77,76

## 2021-07-07 LAB
ANION GAP SERPL CALC-SCNC: 3 MMOL/L (ref 5–15)
BUN SERPL-MCNC: 17 MG/DL (ref 6–20)
BUN/CREAT SERPL: 28 (ref 12–20)
CALCIUM SERPL-MCNC: 9.5 MG/DL (ref 8.5–10.1)
CHLORIDE SERPL-SCNC: 108 MMOL/L (ref 97–108)
CHOLEST SERPL-MCNC: 158 MG/DL
CO2 SERPL-SCNC: 30 MMOL/L (ref 21–32)
CREAT SERPL-MCNC: 0.6 MG/DL (ref 0.55–1.02)
GLUCOSE SERPL-MCNC: 80 MG/DL (ref 65–100)
HDLC SERPL-MCNC: 54 MG/DL
HDLC SERPL: 2.9 {RATIO} (ref 0–5)
LDLC SERPL CALC-MCNC: 87.2 MG/DL (ref 0–100)
POTASSIUM SERPL-SCNC: 4.3 MMOL/L (ref 3.5–5.1)
SODIUM SERPL-SCNC: 141 MMOL/L (ref 136–145)
TRIGL SERPL-MCNC: 84 MG/DL (ref ?–150)
VLDLC SERPL CALC-MCNC: 16.8 MG/DL

## 2021-07-26 ENCOUNTER — OFFICE VISIT (OUTPATIENT)
Dept: FAMILY MEDICINE CLINIC | Age: 76
End: 2021-07-26
Payer: MEDICARE

## 2021-07-26 VITALS
WEIGHT: 285 LBS | DIASTOLIC BLOOD PRESSURE: 81 MMHG | HEART RATE: 70 BPM | SYSTOLIC BLOOD PRESSURE: 130 MMHG | RESPIRATION RATE: 16 BRPM | HEIGHT: 71 IN | TEMPERATURE: 98 F | BODY MASS INDEX: 39.9 KG/M2 | OXYGEN SATURATION: 98 %

## 2021-07-26 DIAGNOSIS — Z00.00 MEDICARE ANNUAL WELLNESS VISIT, SUBSEQUENT: Primary | ICD-10-CM

## 2021-07-26 PROCEDURE — G8754 DIAS BP LESS 90: HCPCS | Performed by: FAMILY MEDICINE

## 2021-07-26 PROCEDURE — G0439 PPPS, SUBSEQ VISIT: HCPCS | Performed by: FAMILY MEDICINE

## 2021-07-26 PROCEDURE — 1101F PT FALLS ASSESS-DOCD LE1/YR: CPT | Performed by: FAMILY MEDICINE

## 2021-07-26 PROCEDURE — G8536 NO DOC ELDER MAL SCRN: HCPCS | Performed by: FAMILY MEDICINE

## 2021-07-26 PROCEDURE — G8510 SCR DEP NEG, NO PLAN REQD: HCPCS | Performed by: FAMILY MEDICINE

## 2021-07-26 PROCEDURE — G8752 SYS BP LESS 140: HCPCS | Performed by: FAMILY MEDICINE

## 2021-07-26 PROCEDURE — 3017F COLORECTAL CA SCREEN DOC REV: CPT | Performed by: FAMILY MEDICINE

## 2021-07-26 PROCEDURE — G8427 DOCREV CUR MEDS BY ELIG CLIN: HCPCS | Performed by: FAMILY MEDICINE

## 2021-07-26 PROCEDURE — G8399 PT W/DXA RESULTS DOCUMENT: HCPCS | Performed by: FAMILY MEDICINE

## 2021-07-26 PROCEDURE — G8417 CALC BMI ABV UP PARAM F/U: HCPCS | Performed by: FAMILY MEDICINE

## 2021-07-26 NOTE — PROGRESS NOTES
This is the Subsequent Medicare Annual Wellness Exam, performed 12 months or more after the Initial AWV or the last Subsequent AWV    I have reviewed the patient's medical history in detail and updated the computerized patient record. History     Well Woman exam:  Ranlufo Hernandez is a 76 y.o. female presenting for well woman exam.     How would you rate your health in generally over the last year? Fair due to body stiffness and legs were sore. Secondary to Rheumatoid and restarted Remicaid   Has your physical and emotional health limited your social activities with family or friends? yes and limiting hobbies  Notes body pains daily. Current Complaints? Rash on the right shoulder that is itching, Improved with Kenalog  (See attached Problem visit note if applicable)    Menstrual/Sexual History:  Vaginal Bleeding: None    PAP History: None      Sexually active: No    Risk factors for breast cancer: Low risk    Diet/Exercise History:  Diet: Favorite food Bread. - Does patient eat at least 5 servings of Fruits/vegetables daily? Yes   - Is patient currently dieting? No    Exercise: Patient does not have structured exercise  - Does patient get 150 minutes of structured exercise weekly? No  - Type of work patient has? retired  - Limitations to exercise? RA/OA    Healthcare maintenance:   Health Maintenance Due   Topic Date Due    Shingrix Vaccine Age 49> (2 of 2) 2020     Mammogram indicated? No   Colonoscopy indicated? No   DEXA scan indicated? No   HIV/STI testing indicated? No   Hepatitis C testing indicated? No   Lung cancer screening indicated? No   AAA screening indicated?   No     Immunization History   Administered Date(s) Administered    (RETIRED) Pneumococcal Vaccine (Unspecified Type) 12/15/2005    Covid-19, MODERNA, Mrna, Lnp-s, Pf, 100mcg/0.5mL 2021, 2021    Hepatitis B Vaccine 2009    Influenza Vaccine 10/24/2013, 2014, 2015, 10/06/2016    Influenza Vaccine (Tri) Adjuvanted (>65 Yrs FLUAD TRI 99408) 10/05/2018, 09/18/2019    Influenza Vaccine Whole 10/15/2010, 10/05/2011    Influenza, Quadrivalent, Adjuvanted (>65 Yrs FLUAD QUAD 58075) 09/25/2020    PPD 09/20/2011    Pneumococcal Conjugate (PCV-13) 12/16/2015    Pneumococcal Polysaccharide (PPSV-23) 04/30/2019    Pneumococcal Vaccine (Pcv) 02/10/2010    Tdap 09/22/2014    Zoster Recombinant 08/04/2020     Flu indicated? No   Tdap indicated? No   Pneumovax indicated? No   Zostervax indicated? Yes  Meningococcal indicated? No      Past Medical History:   Diagnosis Date    Anemia, unspecified 4/15/2010    Asthma     Cardiac dysrhythmia, unspecified 4/15/2010    PAT- not on medication/ not seeing cardiologist    High cholesterol 4/30/2010    History of DVT of lower extremity 6/6/2019    Right leg (3/2019)    Hypertension     Ill-defined condition     PE and DVT to Right leg.  Obesity, unspecified 4/15/2010    Osteoarthrosis, unspecified whether generalized or localized, other specified sites 4/15/2010    Pulmonary embolism (HCC)     Rheumatoid arthritis(714.0) 4/15/2010    Spinal stenosis, unspecified region other than cervical 4/15/2010    Unspecified asthma(493.90) 4/15/2010    rheumatoid lung disease      Past Surgical History:   Procedure Laterality Date    COLONOSCOPY N/A 10/20/2020    COLONOSCOPY/EGD performed by Keith Gibbs MD at [facility]. [address], COLON, DIAGNOSTIC      7-10-13    HX ORTHOPAEDIC Right 2002    rotator cuff repair    HX OTHER SURGICAL      Right hip replaced.  HX OTHER SURGICAL      left lung biopsy.     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 Family History   Problem Relation Age of Onset    Cancer Mother         cervical, breast    Heart Disease Mother     Heart Attack Mother     Hypertension Mother     Arthritis-osteo Mother     Heart Disease Father     Heart Attack Father     Cancer Other         uterine and lung.  High Cholesterol Brother     Stroke Brother     Arthritis-rheumatoid Sister     Migraines Sister     Breast Cancer Maternal Aunt      Social History     Tobacco Use    Smoking status: Former Smoker     Packs/day: 1.00     Years: 20.00     Pack years: 20.00     Quit date: 1985     Years since quittin.2    Smokeless tobacco: Never Used   Substance Use Topics    Alcohol use: No     Depression Risk Factor Screening:     3 most recent PHQ Screens 2021   Little interest or pleasure in doing things Not at all   Feeling down, depressed, irritable, or hopeless Not at all   Total Score PHQ 2 0     Alcohol Risk Factor Screening:    Do you average more than 1 drink per night or more than 7 drinks a week: No    In the past three months have you have had more than 4 drinks containing alcohol on one occasion: No  Functional Ability and Level of Safety:   Hearing Loss  Hearing is good.     Activities of Daily Living  The home contains: handrails and grab bars  Patient does total self care  ADL Assessment 2021   Feeding yourself No Help Needed   Getting from bed to chair No Help Needed   Getting dressed No Help Needed   Bathing or showering No Help Needed   Walk across the room (includes cane/walker) No Help Needed   Using the telphone No Help Needed   Taking your medications No Help Needed   Preparing meals No Help Needed   Managing money (expenses/bills) No Help Needed   Moderately strenuous housework (laundry) No Help Needed   Shopping for personal items (toiletries/medicines) No Help Needed   Shopping for groceries No Help Needed   Driving No Help Needed   Climbing a flight of stairs No Help Needed   Getting to places beyond walking distances No Help Needed       Ambulation: with mild difficulty    Fall Risk  Fall Risk Assessment, last 12 mths 7/26/2021   Able to walk? Yes   Fall in past 12 months? 1   Do you feel unsteady? 0   Are you worried about falling 0   Number of falls in past 12 months 1   Fall with injury? -       Abuse Screen  Abuse Screening Questionnaire 7/26/2021   Do you ever feel afraid of your partner? N   Are you in a relationship with someone who physically or mentally threatens you? N   Is it safe for you to go home? Y     Cognitive Screening   Evaluation of Cognitive Function:  Has your family/caregiver stated any concerns about your memory: no  Normal  No flowsheet data found. Patient Care Team   Patient Care Team:  Syed Cervantes MD as PCP - General (Family Medicine)  Syed Cervantes MD as PCP - [facility] Empaneled Provider  Akosua Montez MD (Orthopedic Surgery)  Mayur Boyce MD (Orthopedic Surgery)  Ani Turpin MD (Otolaryngology)  Darlene Franz MD (Rheumatology)  Assessment/Plan   Education and counseling provided:  Are appropriate based on today's review and evaluation  End-of-Life planning (with patient's consent)    Diagnoses and all orders for this visit:    1.  Medicare annual wellness visit, subsequent        Health Maintenance Topics with due status: Overdue       Topic Date Due    Shingrix Vaccine Age 50> 09/29/2020     Health Maintenance Topics with due status: Not Due       Topic Last Completion Date    DTaP/Tdap/Td series 09/22/2014    Flu Vaccine 09/25/2020    Colorectal Cancer Screening Combo 10/20/2020    Lipid Screen 07/06/2021    Medicare Yearly Exam 07/26/2021     Health Maintenance Topics with due status: Completed       Topic Last Completion Date    Hepatitis C Screening 10/09/2015    Bone Densitometry (Dexa) Screening 05/02/2017    Pneumococcal 65+ years 04/30/2019    COVID-19 Vaccine 03/09/2021

## 2021-07-26 NOTE — PROGRESS NOTES
1. Have you been to the ER, urgent care clinic, or been hospitalized since your last visit? no    2. Have you seen or consulted any other health care providers outside of the [address] [place] [address] since your last visit? no    Reviewed record in preparation for visit and have necessary documentation  Goals that were addressed and/or need to be completed during or after this appointment include   Health Maintenance Due   Topic Date Due    Medicare Yearly Exam  02/09/2020    Shingrix Vaccine Age 50> (2 of 2) 09/29/2020       I have received verbal consent from Uyen Quesada to discuss any/all medical information while others present in the room.

## 2021-07-26 NOTE — PATIENT INSTRUCTIONS

## 2021-08-10 ENCOUNTER — TRANSCRIBE ORDER (OUTPATIENT)
Dept: REGISTRATION | Age: 76
End: 2021-08-10

## 2021-08-10 ENCOUNTER — HOSPITAL ENCOUNTER (OUTPATIENT)
Dept: GENERAL RADIOLOGY | Age: 76
Discharge: HOME | End: 2021-08-10
Payer: MEDICARE

## 2021-08-10 DIAGNOSIS — J40 BRONCHITIS, NOT SPECIFIED AS ACUTE OR CHRONIC: Primary | ICD-10-CM

## 2021-08-10 DIAGNOSIS — J40 BRONCHITIS, NOT SPECIFIED AS ACUTE OR CHRONIC: ICD-10-CM

## 2021-08-10 PROCEDURE — 71046 X-RAY EXAM CHEST 2 VIEWS: CPT

## 2021-10-15 ENCOUNTER — CLINICAL SUPPORT (OUTPATIENT)
Dept: FAMILY MEDICINE CLINIC | Age: 76
End: 2021-10-15
Payer: MEDICARE

## 2021-10-15 VITALS
SYSTOLIC BLOOD PRESSURE: 116 MMHG | RESPIRATION RATE: 20 BRPM | DIASTOLIC BLOOD PRESSURE: 69 MMHG | BODY MASS INDEX: 39.9 KG/M2 | HEIGHT: 71 IN | TEMPERATURE: 97.2 F | OXYGEN SATURATION: 95 % | WEIGHT: 285 LBS | HEART RATE: 81 BPM

## 2021-10-15 DIAGNOSIS — Z23 ENCOUNTER FOR IMMUNIZATION: Primary | ICD-10-CM

## 2021-10-15 PROCEDURE — 90694 VACC AIIV4 NO PRSRV 0.5ML IM: CPT | Performed by: FAMILY MEDICINE

## 2021-10-15 PROCEDURE — G0008 ADMIN INFLUENZA VIRUS VAC: HCPCS | Performed by: FAMILY MEDICINE

## 2021-10-15 NOTE — PROGRESS NOTES
1. Have you been to the ER, urgent care clinic since your last visit? Hospitalized since your last visit? Jessica Brambila for pnuemonia    2. Have you seen or consulted any other health care providers outside of the [address] [place] [address] since your last visit? Including any pap smears or colon screening. no    Reviewed record in preparation for visit and have necessary documentation    Opportunity was given for questions    Goals that were addressed and/or need to be completed during or after this appointment include   Health Maintenance Due   Topic Date Due    Shingrix Vaccine Age 49> (2 of 2) 09/29/2020    Flu Vaccine (1) 09/01/2021         Pt denies any recent illness.  Tolerated well

## 2021-11-10 ENCOUNTER — OFFICE VISIT (OUTPATIENT)
Dept: FAMILY MEDICINE CLINIC | Age: 76
End: 2021-11-10
Payer: MEDICARE

## 2021-11-10 VITALS
SYSTOLIC BLOOD PRESSURE: 110 MMHG | BODY MASS INDEX: 39.51 KG/M2 | DIASTOLIC BLOOD PRESSURE: 68 MMHG | RESPIRATION RATE: 16 BRPM | HEART RATE: 94 BPM | TEMPERATURE: 97.6 F | WEIGHT: 282.2 LBS | OXYGEN SATURATION: 96 % | HEIGHT: 71 IN

## 2021-11-10 DIAGNOSIS — E78.00 HIGH CHOLESTEROL: ICD-10-CM

## 2021-11-10 DIAGNOSIS — N64.4 BREAST PAIN: ICD-10-CM

## 2021-11-10 DIAGNOSIS — R53.81 DEBILITY: ICD-10-CM

## 2021-11-10 DIAGNOSIS — I10 ESSENTIAL HYPERTENSION: Primary | ICD-10-CM

## 2021-11-10 PROCEDURE — 1090F PRES/ABSN URINE INCON ASSESS: CPT | Performed by: FAMILY MEDICINE

## 2021-11-10 PROCEDURE — G8417 CALC BMI ABV UP PARAM F/U: HCPCS | Performed by: FAMILY MEDICINE

## 2021-11-10 PROCEDURE — 99214 OFFICE O/P EST MOD 30 MIN: CPT | Performed by: FAMILY MEDICINE

## 2021-11-10 PROCEDURE — G8752 SYS BP LESS 140: HCPCS | Performed by: FAMILY MEDICINE

## 2021-11-10 PROCEDURE — 1101F PT FALLS ASSESS-DOCD LE1/YR: CPT | Performed by: FAMILY MEDICINE

## 2021-11-10 PROCEDURE — G8427 DOCREV CUR MEDS BY ELIG CLIN: HCPCS | Performed by: FAMILY MEDICINE

## 2021-11-10 PROCEDURE — G8510 SCR DEP NEG, NO PLAN REQD: HCPCS | Performed by: FAMILY MEDICINE

## 2021-11-10 PROCEDURE — G8536 NO DOC ELDER MAL SCRN: HCPCS | Performed by: FAMILY MEDICINE

## 2021-11-10 PROCEDURE — G8754 DIAS BP LESS 90: HCPCS | Performed by: FAMILY MEDICINE

## 2021-11-10 PROCEDURE — G8399 PT W/DXA RESULTS DOCUMENT: HCPCS | Performed by: FAMILY MEDICINE

## 2021-11-10 NOTE — PROGRESS NOTES
1. Have you been to the ER, urgent care clinic since your last visit? Hospitalized since your last visit? No    2. Have you seen or consulted any other health care providers outside of the [address] [place] [address] since your last visit? Include any pap smears or colon screening. No    Reviewed record in preparation for visit and have necessary documentation  Goals that were addressed and/or need to be completed during or after this appointment include     Health Maintenance Due   Topic Date Due    Shingrix Vaccine Age 49> (2 of 2) 09/29/2020    COVID-19 Vaccine (3 - Booster for [place] Ign[place] series) 09/09/2021       Patient is accompanied by self I have received verbal consent from [address] [place] [address] to discuss any/all medical information while they are present in the room.

## 2021-11-10 NOTE — LETTER
11/10/2021 3:49 PM    Ms. Nikky Viveros  [address] [address]      To Whom It May Concern:    Claudia Donato is currently under the care of Ty Hoang. She suffers from debility from multiple conditions which makes walking difficult. This puts her at significant risk if trying to cross the road to get her mail. I am requesting that Her Mailbox be moved across the road to her driveway in front of her home. If there are questions or concerns please have the patient contact our office.       Sincerely,      Dinorah Campbell MD

## 2021-11-10 NOTE — PROGRESS NOTES
[facility]    History of Present Illness:   Alejandrina Ray is a 68 y.o. female with history of HTN, PAC, Asthma, OA, Bronchitis, DVT  CC: Breast Pain  History provided by patient and Records    HPI:  Left breast pain:  Noting breast pain in the upper, medial left breast with a nodule. Pain has improved but started several days ago. Denies any other red flag symp[toms. Recent admission for PNA and has completed Antibiotics and Prednisone. Is improving now. Debility: secondary to COPD and joint pain, has to use a cane. Is requesting moving Mailbox across the street from her house. Health Maintenance  Health Maintenance Due   Topic Date Due    Shingrix Vaccine Age 49> (2 of 2) 09/29/2020    COVID-19 Vaccine (3 - Booster for Verlena Charlene series) 09/09/2021       Past Medical, Family, and Social History:     Current Outpatient Medications on File Prior to Visit   Medication Sig Dispense Refill    amLODIPine (NORVASC) 5 mg tablet TAKE ONE TABLET BY MOUTH EVERY DAY 90 Tablet 1    triamcinolone acetonide (KENALOG) 0.1 % ointment Apply  to affected area two (2) times a day. use thin layer in the affected area of the left leg 1 Tube 0    azelastine (ASTELIN) 137 mcg (0.1 %) nasal spray       fluticasone propionate (FLONASE) 50 mcg/actuation nasal spray USE 2 SPRAYS IN BOTH NOSTRILS DAILY 16 g 3    ProAir HFA 90 mcg/actuation inhaler INHALE 2 PUFFS BY MOUTH EVERY 4 HOURS AS NEEDED FOR WHEEZING 17 g 3    Eliquis 5 mg tablet TAKE 1 TABLET TWICE A DAY 60 Tab 11    Symbicort 160-4.5 mcg/actuation HFAA USE 2 INHALATIONS TWICE A DAY 1 Inhaler 3    gabapentin (NEURONTIN) 300 mg capsule Take 1 Cap by mouth two (2) times a day. Max Daily Amount: 600 mg. Neck and left arm pain (Patient taking differently: Take 100 mg by mouth two (2) times a day. Neck and left arm pain) 42 Cap 0    methotrexate (RHEUMATREX) 2.5 mg tablet Take 22.5 mg by mouth every Monday.       atorvastatin (LIPITOR) 10 mg tablet TAKE ONE TABLET BY MOUTH EVERY DAY (Patient taking differently: TAKE ONE TABLET BY MOUTH EVERY DAY - patient states she takes 2-3 times weekly) 90 Tab 2    famotidine (PEPCID) 20 mg tablet Take 20 mg by mouth two (2) times a day. As needed      mupirocin (BACTROBAN) 2 % ointment   0    folic acid (FOLVITE) 1 mg tablet Take 3 Tabs by mouth daily. 90 Tab 2    Hot Water Bottle (WATER BOTTLE) misc Please Dispense a humidifier bottle to patient to use with her oxygen. 1 Each 0    albuterol (PROVENTIL VENTOLIN) 2.5 mg /3 mL (0.083 %) nebulizer solution USE 1 VIAL IN NEBULIZER EVERY 4 HOURS AS NEEDED FOR WHEEZING 100 Each 4    infliximab (REMICADE IV) by IntraVENous route. Every 6 weeks      UBIDECARENONE (COQ-10 PO) Take  by mouth.  acetaminophen (TYLENOL) 500 mg tablet Take 1,000 mg by mouth every six (6) hours as needed for Pain.  omega-3s-dha-epa-fish oil 300-1,000 mg cpDR Take  by mouth daily.  cholecalciferol, vitamin D3, (VITAMIN D3) 4,000 unit cap Take  by mouth daily.  cyclobenzaprine (FLEXERIL) 10 mg tablet Take 1 Tab by mouth three (3) times daily as needed for Muscle Spasm(s). (Patient not taking: Reported on 11/10/2021) 15 Tab 3     No current facility-administered medications on file prior to visit.        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    Colon polyp, hyperplastic K63.5    Asthma J45.909    Primary generalized (osteo)arthritis M15.0    PAC (premature atrial contraction) I49.1    Lumbar spinal stenosis M48.061    Primary localized osteoarthritis of right hip M16.11    Obesity, morbid (HCC) E66.01    Essential hypertension I10    Chronic rhinitis J31.0    Allergic contact dermatitis L23.9    History of DVT of lower extremity Z86.718    Hx of pulmonary embolus Z86.711    Pain of left lower leg M79.662    Cervical spinal stenosis M48.02       Social History     Socioeconomic History    Marital status: SINGLE     Spouse name: Not on file    Number of children: Not on file    Years of education: Not on file    Highest education level: Not on file   Occupational History    Not on file   Tobacco Use    Smoking status: Former Smoker     Packs/day: 1.00     Years: 20.00     Pack years: 20.00     Quit date: 1985     Years since quittin.5    Smokeless tobacco: Never Used   Substance and Sexual Activity    Alcohol use: No    Drug use: No    Sexual activity: Never   Other Topics Concern    Not on file   Social History Narrative    Not on file     Social Determinants of Health     Financial Resource Strain:     Difficulty of Paying Living Expenses: Not on file   Food Insecurity:     Worried About Running Out of Food in the Last Year: Not on file    Jose Luis of Food in the Last Year: Not on file   Transportation Needs:     Lack of Transportation (Medical): Not on file    Lack of Transportation (Non-Medical):  Not on file   Physical Activity:     Days of Exercise per Week: Not on file    Minutes of Exercise per Session: Not on file   Stress:     Feeling of Stress : Not on file   Social Connections:     Frequency of Communication with Friends and Family: Not on file    Frequency of Social Gatherings with Friends and Family: Not on file    Attends [employer] Services: Not on file    Active Member of Clubs or Organizations: Not on file    Attends Club or Organization Meetings: Not on file    Marital Status: Not on file   Intimate Partner Violence:     Fear of Current or Ex-Partner: Not on file    Emotionally Abused: Not on file    Physically Abused: Not on file    Sexually Abused: Not on file Housing Stability:     Unable to Pay for Housing in the Last Year: Not on file    Number of Places Lived in the Last Year: Not on file    Unstable Housing in the Last Year: Not on file       Review of Systems   Review of Systems   Constitutional: Negative for chills and fever. Gastrointestinal: Negative for nausea and vomiting. Objective:     Visit Vitals  /68 (BP 1 Location: Left upper arm, BP Patient Position: Sitting, BP Cuff Size: Adult)   Pulse 94   Temp 97.6 °F (36.4 °C) (Oral)   Resp 16   Ht 5' 11\" (1.803 m)   Wt 282 lb 3.2 oz (128 kg)   SpO2 96%   BMI 39.36 kg/m²        Physical Exam  Vitals and nursing note reviewed. Constitutional:       Appearance: Normal appearance. HENT:      Head: Normocephalic and atraumatic. Cardiovascular:      Rate and Rhythm: Normal rate and regular rhythm. Pulses: Normal pulses. Heart sounds: Normal heart sounds. Pulmonary:      Effort: Pulmonary effort is normal.      Breath sounds: Normal breath sounds. Abdominal:      General: Abdomen is flat. Bowel sounds are normal.      Palpations: Abdomen is soft. Musculoskeletal:      Cervical back: Normal range of motion and neck supple. Skin:     General: Skin is warm and dry. Neurological:      Mental Status: She is alert. Pertinent Labs/Studies:      Assessment and orders:       ICD-10-CM ICD-9-CM    1. Essential hypertension  I10 401.9 CBC W/O DIFF      METABOLIC PANEL, COMPREHENSIVE   2. High cholesterol  E78.00 272.0 LIPID PANEL   3. Breast pain  N64.4 611.71    4. Debility  R53.81 799.3      Diagnoses and all orders for this visit:    1. Essential hypertension: Our goal is to normalize the blood pressure to decrease the risks of strokes and heart attacks. The patient is in agreement with the plan. -     CBC W/O DIFF; Future  -     METABOLIC PANEL, COMPREHENSIVE; Future    2.  High cholesterol: The patient is aware of our goal to reduce or eliminate the long term problems (such as strokes and heart attacks) related to poorly controlled Triglycerides, LDL, Cholesterol.   -     LIPID PANEL; Future    3. Breast pain: Will monitor for now, call back in 1 week and if not still resolving will get imaging. 4. Debility: Needs Mailbox moved. Follow-up and Dispositions    · Return in about 1 week (around 11/17/2021) for Phone Follow up. I have discussed the diagnosis with the patient and the intended plan as seen in the above orders. Social history, medical history, and labs were reviewed. The patient has received an after-visit summary and questions were answered concerning future plans. I have discussed medication side effects and warnings with the patient as well.     MD FAVIOLA Mackay & NATALIA BECKETT [facility]  11/10/21

## 2021-12-01 LAB
BASOPHILS # BLD AUTO: 0 X10E3/UL (ref 0–0.2)
BASOPHILS NFR BLD AUTO: 1 %
EOSINOPHIL # BLD AUTO: 0.2 X10E3/UL (ref 0–0.4)
EOSINOPHIL NFR BLD AUTO: 3 %
ERYTHROCYTE [DISTWIDTH] IN BLOOD BY AUTOMATED COUNT: 13.9 % (ref 11.7–15.4)
FERRITIN SERPL-MCNC: 125 NG/ML (ref 15–150)
HCT VFR BLD AUTO: 36.4 % (ref 34–46.6)
HGB BLD-MCNC: 11.6 G/DL (ref 11.1–15.9)
IMM GRANULOCYTES # BLD AUTO: 0 X10E3/UL (ref 0–0.1)
IMM GRANULOCYTES NFR BLD AUTO: 0 %
IRON SATN MFR SERPL: 17 % (ref 15–55)
IRON SERPL-MCNC: 50 UG/DL (ref 27–139)
LYMPHOCYTES # BLD AUTO: 2.5 X10E3/UL (ref 0.7–3.1)
LYMPHOCYTES NFR BLD AUTO: 31 %
MCH RBC QN AUTO: 30.1 PG (ref 26.6–33)
MCHC RBC AUTO-ENTMCNC: 31.9 G/DL (ref 31.5–35.7)
MCV RBC AUTO: 95 FL (ref 79–97)
MONOCYTES # BLD AUTO: 0.4 X10E3/UL (ref 0.1–0.9)
MONOCYTES NFR BLD AUTO: 6 %
NEUTROPHILS # BLD AUTO: 4.7 X10E3/UL (ref 1.4–7)
NEUTROPHILS NFR BLD AUTO: 59 %
PLATELET # BLD AUTO: 211 X10E3/UL (ref 150–450)
RBC # BLD AUTO: 3.85 X10E6/UL (ref 3.77–5.28)
TIBC SERPL-MCNC: 286 UG/DL (ref 250–450)
UIBC SERPL-MCNC: 236 UG/DL (ref 118–369)
WBC # BLD AUTO: 7.8 X10E3/UL (ref 3.4–10.8)

## 2021-12-06 ENCOUNTER — TELEPHONE (OUTPATIENT)
Dept: FAMILY MEDICINE CLINIC | Age: 76
End: 2021-12-06

## 2021-12-06 DIAGNOSIS — N64.4 BREAST PAIN, LEFT: Primary | ICD-10-CM

## 2021-12-06 NOTE — TELEPHONE ENCOUNTER
Patient called. She states that on her visit with Dr. Amberly Porras on 11/10/21 she was seen for left breast pain. It was decided that day that patient would keep an eye it and if no impovement then would proceed with further testing. Patient states she would like the further testing done whether it be mammogram, ultrasound or Ct scan. Grace Zendejas.       Make sure referrals sent to [facility] at patients request.    Thank you,  Dr. Harl Holstein

## 2021-12-07 DIAGNOSIS — N64.4 BREAST PAIN, LEFT: Primary | ICD-10-CM

## 2021-12-30 ENCOUNTER — HOSPITAL ENCOUNTER (OUTPATIENT)
Dept: MAMMOGRAPHY | Age: 76
Discharge: HOME | End: 2021-12-30
Attending: FAMILY MEDICINE
Payer: MEDICARE

## 2021-12-30 DIAGNOSIS — N64.4 BREAST PAIN, LEFT: ICD-10-CM

## 2021-12-30 PROCEDURE — 76642 ULTRASOUND BREAST LIMITED: CPT

## 2021-12-30 PROCEDURE — 77062 BREAST TOMOSYNTHESIS BI: CPT

## 2022-01-19 ENCOUNTER — TELEPHONE (OUTPATIENT)
Dept: ONCOLOGY | Age: 77
End: 2022-01-19

## 2022-01-19 NOTE — PROGRESS NOTES
[id number] [facility] at [address] [place] [address]  [phone]    Hematology / Oncology Established Visit    Reason for Visit:   Guzman Monaco is a 68 y.o. female who is here for follow up of PE.  PCP is Dr. Alexi Suazo. History of Present Illness:   Ms. Disha Spain is a 68 y.o. female with RA with lung involvement who comes in for evaluation and management of PE. She was recently put on home O2. She was hospitalized at [address] [place] [address] 3/3/19 - 3/5/19 after p/w weakness, fatigue, SOB, chills. She denies R leg swelling but has had subacute pain in R leg from knee down which started in early Feb 2019. She was found to have bilateral pulmonary emboli as well as RLE DVT. She was started on Heparin infusion and transitioned to Xarelto. At time of discharge, she was told to stop MTX, Aspirin and Celebrex. Since then, she reported to the ED at [facility] for 2 episodes of bleeding from left naris. She also coughed up blood. She denies any 8 hr trips, surgeries, fractures, OCPs or hormone replacement. No prior h/o VTE. No family h/o VTE. For RA, she takes MTX, but this was put on hold. She was also started on Remicaide 1 yr ago. She is currently taking Xarelto as prescribed, states her SOB is much better than prior. The R leg pain is much better, but feels really stiff per patient.     -Labs 3/10/19: WBC 9.5, Hgb 10.8, , HIV/HCV Ab/HepB Surface Ag all negative. Interval History: Patient here for follow up of PE and RLE DVT. Did have a nosebleed today on her way to the appt and once 2 weeks. No recurring episodes. Denies melena/hematochezia. Continues to take eliquis. No longer on iron supplements. Reports chronic fatigue. She is taking prednisone for RA.      MedHx: Anemia, asthma, cardiac dysrhythmia, HLD, DVT, HTN, OA, PE, RA, spinal stenosis, asthma  SurgHx:  Rotator cuff repair, right hip replacement, left lung biopsy  SocialHx: past smoker 20PPD, no ETOH     Review of Systems: A complete review of systems was obtained, negative except as described above. Physical Exam:       Visit Vitals  BP (!) 156/80   Pulse 72   Resp 16   Ht 5' 11\" (1.803 m)   Wt 284 lb 9.6 oz (129.1 kg)   SpO2 93%   BMI 39.69 kg/m²       ECOG PS: 2-3   General: no distress  Eyes: anicteric sclerae  HENT: oropharynx clear  Neck: supple  Lymphatic: no cervical, supraclavicular adenopathy  Respiratory: normal respiratory effort  CV: no peripheral edema  GI: soft, nontender, nondistended, no masses  Skin: no rashes; no ecchymoses; no petechiae      Results:     Lab Results   Component Value Date/Time    WBC 7.8 11/30/2021 12:00 AM    HGB 11.6 11/30/2021 12:00 AM    HCT 36.4 11/30/2021 12:00 AM    PLATELET 583 31/09/8003 12:00 AM    MCV 95 11/30/2021 12:00 AM    ABS. NEUTROPHILS 4.7 11/30/2021 12:00 AM     Lab Results   Component Value Date/Time    Sodium 140 11/17/2021 09:20 AM    Potassium 4.3 11/17/2021 09:20 AM    Chloride 110 (H) 11/17/2021 09:20 AM    CO2 26 11/17/2021 09:20 AM    Glucose 87 11/17/2021 09:20 AM    BUN 18 11/17/2021 09:20 AM    Creatinine 0.63 11/17/2021 09:20 AM    GFR est AA >60 11/17/2021 09:20 AM    GFR est non-AA >60 11/17/2021 09:20 AM    Calcium 9.4 11/17/2021 09:20 AM     Lab Results   Component Value Date/Time    Bilirubin, total 0.5 11/17/2021 09:20 AM    ALT (SGPT) 24 11/17/2021 09:20 AM    Alk.  phosphatase 76 11/17/2021 09:20 AM    Protein, total 7.2 11/17/2021 09:20 AM    Albumin 3.6 11/17/2021 09:20 AM    Globulin 3.6 11/17/2021 09:20 AM     Lab Results   Component Value Date/Time    Iron 50 11/30/2021 12:00 AM    TIBC 286 11/30/2021 12:00 AM    Iron % saturation 17 11/30/2021 12:00 AM    Ferritin 125 11/30/2021 12:00 AM       Lab Results   Component Value Date/Time    Vitamin B12 487 05/24/2021 12:00 AM    Folate 16.7 05/24/2021 12:00 AM     Lab Results   Component Value Date/Time    TSH 1.160 06/14/2016 10:21 AM     Lab Results   Component Value Date/Time    HEP C VIRUS AB <0.1 10/09/2015 08:07 AM         Imaging: Imaging at [facility]:     CTA Chest 3/2019: showing Multiple bilateral branching pulmonary Emboli, Bilateral patchy ground glass opacities likely chronic, and bronchiectasis consistent with pulmonary fibrosis (Rhematoid related lung disease). Similar Groundglass component and Bronchiectasis on previous CT in our system.       Duplex Legs 3/2019: Acute Deep vein thrombosis of the right peroneal vein    Echo: Mild concentric LVH with normal cavity size and systolic function. Trivial to mild regurgitation. Moderate LV diastolic dysfunction with elevated left atrial pressure. 6/20/19 Venous doppler bilateral LE:  Right Lower Venous     No evidence of deep vein thrombosis in the common femoral, profunda femoral, superficial femoral, popliteal, posterior tibial, and peroneal veins. The veins were imaged in the transverse and longitudinal planes. The vessels showed normal color filling and compressibility. Doppler interrogation showed phasic and spontaneous flow. Left Lower Venous     For comparison purposes, the left common femoral vein was briefly interrogated. These vein demonstrates normal color filing and compressibility. Doppler flow was phasic and spontaneous. Assessment & Plan:   Jagjit Linn is a 68 y.o. female with RA comes in for evaluation of DVT/PE. 1. Venous thromboembolism: PE and RLE DVT occurred in March 2019. No obvious risk factors. My review of MTX shows no increased risk of VTE; Remicaide associated with < 1% risk of thrombophelebitis, MI, etc. Between the 2 medications, the Remicaide is more likely a culprit, but unclear. The dose and frequency were increased in early February 2019. Pt discussed with her Rheumatologist, Dr. Branden Zarate who recommended thrombophilia testing. I discussed with patient that thrombophilia testing is only helpful when testing is positive, putting a name to her risk.  However if testing is negative, it is not helpful as patient may still have inherited thrombophilia associated with unknown genes. Results of testing would not change our anticoagulation management, and pt does not have any children. However, pt does have 2 brothers and 1 sister and would like testing. Patient will likely need to remain on anticoagulation indefinitely as it is not clear whether this was provoked by medication or not. 6/2019 lower extremity venous dopplers ordered by PCP are negative. 12/1/20 thrombophilia testing negative. -- Continue Eliquis 5mg bid. Pt to alert us at the time of invasive procedures. -- Sending progress note to Dr. Kodi Goodwin (Rheumatologist) - [facility] Best [phone]  -- Return in 6 months with labs few days prior. 2. RA with lung involvement: On MTX and Remicaide. Sees Dr. Sean Lino in Pulmonary and Dr. Kodi Goodwin in Rheumatology. 3. Diastolic CHF: Seen on echo and likely due to HTN. Not on diuretics. On Norvasc. 4. H/o Epistaxis:   Occurred even before starting anticoagulation, but more frequent after starting. Now improved after cauterization by ENT. Also can managed with clamping physical pressure and Afrin prn,  saline nasal spray. No recent episodes of epistaxis. 5. H/o macrocytic anemia:   Likely due to MTX and resolved. Prior iron deficiency resolved with daily iron supplements. Vitamin b12 and folate normal on 5/24/21. -- Advised pt to restart oral iron supplements few times a week as tolerated, stool softeners PRN. 6. Obesity:  Limited from exercise given RA, especially in cold weather. Advised diet control with portion control in desserts. I appreciate the opportunity to participate in Ms. Teresa Viveros's care. I personally provided the entire service today.     Signed By: Zofia Ceballos MD     January 27, 2022

## 2022-01-26 DIAGNOSIS — I82.451 ACUTE DEEP VEIN THROMBOSIS (DVT) OF RIGHT PERONEAL VEIN (HCC): ICD-10-CM

## 2022-01-26 DIAGNOSIS — I26.99 OTHER ACUTE PULMONARY EMBOLISM WITHOUT ACUTE COR PULMONALE (HCC): ICD-10-CM

## 2022-01-26 NOTE — TELEPHONE ENCOUNTER
Pt is waiting on her Eloquis. Her [address] is running behind and asking for a 10 day supply ordered locally for her.

## 2022-01-27 ENCOUNTER — OFFICE VISIT (OUTPATIENT)
Dept: ONCOLOGY | Age: 77
End: 2022-01-27
Payer: MEDICARE

## 2022-01-27 VITALS
RESPIRATION RATE: 16 BRPM | SYSTOLIC BLOOD PRESSURE: 156 MMHG | OXYGEN SATURATION: 93 % | HEIGHT: 71 IN | DIASTOLIC BLOOD PRESSURE: 80 MMHG | WEIGHT: 284.6 LBS | BODY MASS INDEX: 39.84 KG/M2 | HEART RATE: 72 BPM

## 2022-01-27 DIAGNOSIS — Z86.2 HISTORY OF IRON DEFICIENCY ANEMIA: Primary | ICD-10-CM

## 2022-01-27 DIAGNOSIS — I26.99 OTHER ACUTE PULMONARY EMBOLISM WITHOUT ACUTE COR PULMONALE (HCC): ICD-10-CM

## 2022-01-27 DIAGNOSIS — E66.01 SEVERE OBESITY (BMI 35.0-35.9 WITH COMORBIDITY) (HCC): ICD-10-CM

## 2022-01-27 DIAGNOSIS — M05.10 RHEUMATOID LUNG DISEASE (HCC): ICD-10-CM

## 2022-01-27 PROCEDURE — G8510 SCR DEP NEG, NO PLAN REQD: HCPCS | Performed by: INTERNAL MEDICINE

## 2022-01-27 PROCEDURE — G8754 DIAS BP LESS 90: HCPCS | Performed by: INTERNAL MEDICINE

## 2022-01-27 PROCEDURE — G8399 PT W/DXA RESULTS DOCUMENT: HCPCS | Performed by: INTERNAL MEDICINE

## 2022-01-27 PROCEDURE — 1090F PRES/ABSN URINE INCON ASSESS: CPT | Performed by: INTERNAL MEDICINE

## 2022-01-27 PROCEDURE — G0463 HOSPITAL OUTPT CLINIC VISIT: HCPCS | Performed by: INTERNAL MEDICINE

## 2022-01-27 PROCEDURE — G8753 SYS BP > OR = 140: HCPCS | Performed by: INTERNAL MEDICINE

## 2022-01-27 PROCEDURE — 1101F PT FALLS ASSESS-DOCD LE1/YR: CPT | Performed by: INTERNAL MEDICINE

## 2022-01-27 PROCEDURE — G8427 DOCREV CUR MEDS BY ELIG CLIN: HCPCS | Performed by: INTERNAL MEDICINE

## 2022-01-27 PROCEDURE — G8417 CALC BMI ABV UP PARAM F/U: HCPCS | Performed by: INTERNAL MEDICINE

## 2022-01-27 PROCEDURE — 99214 OFFICE O/P EST MOD 30 MIN: CPT | Performed by: INTERNAL MEDICINE

## 2022-01-27 PROCEDURE — G8536 NO DOC ELDER MAL SCRN: HCPCS | Performed by: INTERNAL MEDICINE

## 2022-01-27 RX ORDER — BUDESONIDE AND FORMOTEROL FUMARATE DIHYDRATE 160; 4.5 UG/1; UG/1
AEROSOL RESPIRATORY (INHALATION)
Qty: 2 EACH | Refills: 3 | Status: SHIPPED | OUTPATIENT
Start: 2022-01-27

## 2022-01-27 NOTE — PROGRESS NOTES
Chief Complaint   Patient presents with    Follow-up     Lennyhomero Smith is a pleasant 68year old woman who presents as a follow up.  She reports arthritic pain

## 2022-02-18 ENCOUNTER — OFFICE VISIT (OUTPATIENT)
Dept: FAMILY MEDICINE CLINIC | Age: 77
End: 2022-02-18
Payer: MEDICARE

## 2022-02-18 VITALS
TEMPERATURE: 97.3 F | HEIGHT: 71 IN | BODY MASS INDEX: 38.78 KG/M2 | SYSTOLIC BLOOD PRESSURE: 122 MMHG | OXYGEN SATURATION: 94 % | DIASTOLIC BLOOD PRESSURE: 71 MMHG | HEART RATE: 73 BPM | WEIGHT: 277 LBS | RESPIRATION RATE: 20 BRPM

## 2022-02-18 DIAGNOSIS — I27.20 PULMONARY HTN (HCC): ICD-10-CM

## 2022-02-18 DIAGNOSIS — M05.10 RHEUMATOID LUNG DISEASE (HCC): Chronic | ICD-10-CM

## 2022-02-18 DIAGNOSIS — R07.89 CHEST WALL PAIN: ICD-10-CM

## 2022-02-18 DIAGNOSIS — M06.9 RHEUMATOID ARTHRITIS INVOLVING MULTIPLE SITES, UNSPECIFIED WHETHER RHEUMATOID FACTOR PRESENT (HCC): ICD-10-CM

## 2022-02-18 DIAGNOSIS — J31.0 CHRONIC RHINITIS: Primary | ICD-10-CM

## 2022-02-18 DIAGNOSIS — I10 ESSENTIAL HYPERTENSION: Chronic | ICD-10-CM

## 2022-02-18 PROCEDURE — 1101F PT FALLS ASSESS-DOCD LE1/YR: CPT | Performed by: FAMILY MEDICINE

## 2022-02-18 PROCEDURE — G8399 PT W/DXA RESULTS DOCUMENT: HCPCS | Performed by: FAMILY MEDICINE

## 2022-02-18 PROCEDURE — G8536 NO DOC ELDER MAL SCRN: HCPCS | Performed by: FAMILY MEDICINE

## 2022-02-18 PROCEDURE — 99214 OFFICE O/P EST MOD 30 MIN: CPT | Performed by: FAMILY MEDICINE

## 2022-02-18 PROCEDURE — 1090F PRES/ABSN URINE INCON ASSESS: CPT | Performed by: FAMILY MEDICINE

## 2022-02-18 PROCEDURE — G8427 DOCREV CUR MEDS BY ELIG CLIN: HCPCS | Performed by: FAMILY MEDICINE

## 2022-02-18 PROCEDURE — G8754 DIAS BP LESS 90: HCPCS | Performed by: FAMILY MEDICINE

## 2022-02-18 PROCEDURE — G8752 SYS BP LESS 140: HCPCS | Performed by: FAMILY MEDICINE

## 2022-02-18 PROCEDURE — G8510 SCR DEP NEG, NO PLAN REQD: HCPCS | Performed by: FAMILY MEDICINE

## 2022-02-18 PROCEDURE — G8417 CALC BMI ABV UP PARAM F/U: HCPCS | Performed by: FAMILY MEDICINE

## 2022-02-18 RX ORDER — LEVOCETIRIZINE DIHYDROCHLORIDE 5 MG/1
5 TABLET, FILM COATED ORAL
Qty: 90 TABLET | Refills: 1 | Status: SHIPPED | OUTPATIENT
Start: 2022-02-18

## 2022-02-18 NOTE — PROGRESS NOTES
[facility]    History of Present Illness:   Gage Hollingsworth is a 68 y.o. female with history of HTN, PAC, Asthma, OA, Bronchitis, DVT  CC: Upper respiratory issues  History provided by patient and Records    HPI:  Upper respiratory Congestion: Patient has year long allergies, but noting for the last several months has had increased phlegm and nasal congestin with thick mucous production. In the nasal passages has hard and dry production. Patient denies significant wheezing, nausea, vomiting. Has had trials of prednisone and antibiotics, and has been taking multiple nasal sprays (Flonase, Azelastine). Patient is taking Mucinex though may be once daily, has not started on Anti-histamines. Midline/epigastric Chest pains: Occurs at rest, feels like a soreness in the bone that sometimes radiates to the left side chest wall. Improves with position change. Rheumatoid Arthritis: Getting Remicade infusions    Health Maintenance  Health Maintenance Due   Topic Date Due    Shingrix Vaccine Age 49> (2 of 2) 09/29/2020    COVID-19 Vaccine (3 - Booster for Moderna series) 08/09/2021       Past Medical, Family, and Social History:     Current Outpatient Medications on File Prior to Visit   Medication Sig Dispense Refill    amLODIPine (NORVASC) 5 mg tablet TAKE ONE TABLET BY MOUTH EVERY DAY 90 Tablet 1    budesonide-formoteroL (Symbicort) 160-4.5 mcg/actuation HFAA USE 2 INHALATIONS TWICE A DAY 2 Each 3    apixaban (Eliquis) 5 mg tablet TAKE 1 TABLET TWICE A DAY 60 Tablet 0    triamcinolone acetonide (KENALOG) 0.1 % ointment Apply  to affected area two (2) times a day.  use thin layer in the affected area of the left leg 1 Tube 0    azelastine (ASTELIN) 137 mcg (0.1 %) nasal spray       fluticasone propionate (FLONASE) 50 mcg/actuation nasal spray USE 2 SPRAYS IN BOTH NOSTRILS DAILY 16 g 3    ProAir HFA 90 mcg/actuation inhaler INHALE 2 PUFFS BY MOUTH EVERY 4 HOURS AS NEEDED FOR WHEEZING 17 g 3    methotrexate (RHEUMATREX) 2.5 mg tablet Take 22.5 mg by mouth every Monday.  famotidine (PEPCID) 20 mg tablet Take 20 mg by mouth two (2) times a day. As needed      mupirocin (BACTROBAN) 2 % ointment   0    folic acid (FOLVITE) 1 mg tablet Take 3 Tabs by mouth daily. 90 Tab 2    Hot Water Bottle (WATER BOTTLE) misc Please Dispense a humidifier bottle to patient to use with her oxygen. 1 Each 0    albuterol (PROVENTIL VENTOLIN) 2.5 mg /3 mL (0.083 %) nebulizer solution USE 1 VIAL IN NEBULIZER EVERY 4 HOURS AS NEEDED FOR WHEEZING 100 Each 4    infliximab (REMICADE IV) by IntraVENous route. Every 6 weeks      acetaminophen (TYLENOL) 500 mg tablet Take 1,000 mg by mouth every six (6) hours as needed for Pain.  omega-3s-dha-epa-fish oil 300-1,000 mg cpDR Take  by mouth daily.  cholecalciferol, vitamin D3, (VITAMIN D3) 4,000 unit cap Take  by mouth daily.  [DISCONTINUED] gabapentin (NEURONTIN) 300 mg capsule Take 1 Cap by mouth two (2) times a day. Max Daily Amount: 600 mg. Neck and left arm pain (Patient not taking: Reported on 2/18/2022) 42 Cap 0    [DISCONTINUED] cyclobenzaprine (FLEXERIL) 10 mg tablet Take 1 Tab by mouth three (3) times daily as needed for Muscle Spasm(s). (Patient not taking: Reported on 11/10/2021) 15 Tab 3    atorvastatin (LIPITOR) 10 mg tablet TAKE ONE TABLET BY MOUTH EVERY DAY (Patient not taking: Reported on 2/18/2022) 90 Tab 2    UBIDECARENONE (COQ-10 PO) Take  by mouth. (Patient not taking: Reported on 2/18/2022)       No current facility-administered medications on file prior to visit.        Patient Active Problem List   Diagnosis Code    Rheumatoid arthritis ([facility] M06.9    Severe persistent asthma without complication Y88.15    Cardiac dysrhythmia, unspecified I49.9    OA (osteoarthritis) M19.90    Obesity, unspecified E66.9    Spinal stenosis of lumbar region with neurogenic claudication M48.062    High cholesterol E78.00    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 M05.10    Colon polyp, hyperplastic K63.5    Asthma J45.909    Primary generalized (osteo)arthritis M15.0    PAC (premature atrial contraction) I49.1    Lumbar spinal stenosis M48.061    Primary localized osteoarthritis of right hip M16.11    Obesity, morbid (HCC) E66.01    Essential hypertension I10    Chronic rhinitis J31.0    Allergic contact dermatitis L23.9    History of DVT of lower extremity Z86.718    Hx of pulmonary embolus Z86.711    Pain of left lower leg M79.662    Cervical spinal stenosis M48.02       Social History     Socioeconomic History    Marital status: SINGLE   Tobacco Use    Smoking status: Former Smoker     Packs/day: 1.00     Years: 20.00     Pack years: 20.00     Quit date: 1985     Years since quittin.8    Smokeless tobacco: Never Used   Substance and Sexual Activity    Alcohol use: No    Drug use: No    Sexual activity: Never        Review of Systems   Review of Systems   HENT: Positive for congestion. Respiratory: Positive for cough and sputum production. Objective:     Visit Vitals  /71   Pulse 73   Temp 97.3 °F (36.3 °C)   Resp 20   Ht 5' 11\" (1.803 m)   Wt 277 lb (125.6 kg)   SpO2 94%   BMI 38.63 kg/m²        Physical Exam  Vitals and nursing note reviewed. Constitutional:       Appearance: Normal appearance. HENT:      Head: Normocephalic and atraumatic. Cardiovascular:      Rate and Rhythm: Normal rate and regular rhythm. Pulses: Normal pulses. Heart sounds: Normal heart sounds. No murmur heard. No friction rub. No gallop. Pulmonary:      Effort: Pulmonary effort is normal.      Breath sounds: Normal breath sounds. Abdominal:      General: Abdomen is flat. Bowel sounds are normal.      Palpations: Abdomen is soft. Musculoskeletal:         General: Normal range of motion. Cervical back: Normal range of motion and neck supple. Skin:     General: Skin is warm and dry.    Neurological:      Mental Status: She is alert. Pertinent Labs/Studies:      Assessment and orders:       ICD-10-CM ICD-9-CM    1. Chronic rhinitis  J31.0 472.0 levocetirizine (XYZAL) 5 mg tablet   2. Pulmonary HTN (HCC)  I27.20 416.8    3. Essential hypertension  I10 401.9    4. 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[facility]  M05.10 714.81    5. Rheumatoid arthritis involving multiple sites, unspecified whether rheumatoid factor present ([facility]  M06.9 714.0    6. Chest wall pain  R07.89 786.52      Diagnoses and all orders for this visit:    1. Chronic rhinitis: Stopping Azelastine, continue the Flonase, and adding Xyzal at night, Mucinex in the day. -     levocetirizine (XYZAL) 5 mg tablet; Take 1 Tablet by mouth nightly. 2. Pulmonary HTN (HCC)/Essential hypertension: BP remains stable    4. 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[facility]): Getting Remicade now     5. Rheumatoid arthritis involving multiple sites, unspecified whether rheumatoid factor present (HCC)    Chest all Pain: TO use Diclofenac gel on the chest.    Follow-up and Dispositions    · Return in about 3 months (around 5/18/2022), or if symptoms worsen or fail to improve. I have discussed the diagnosis with the patient and the intended plan as seen in the above orders. Social history, medical history, and labs were reviewed. The patient has received an after-visit summary and questions were answered concerning future plans. I have discussed medication side effects and warnings with the patient as well.     MD FAVIOLA Gates & NATALIA BECKETT [facility]  02/18/22

## 2022-02-18 NOTE — PROGRESS NOTES
1. Have you been to the ER, urgent care clinic since your last visit? Hospitalized since your last visit? No    2. Have you seen or consulted any other health care providers outside of the [address] [place] [address] since your last visit? Include any pap smears or colon screening.  No  Reviewed record in preparation for visit and have necessary documentation  Pt did not bring medication to office visit for review    Goals that were addressed and/or need to be completed during or after this appointment include   Health Maintenance Due   Topic Date Due    Shingrix Vaccine Age 50> (2 of 2) 09/29/2020    COVID-19 Vaccine (3 - Booster for [place] series) 08/09/2021

## 2022-03-18 PROBLEM — Z86.718 HISTORY OF DVT OF LOWER EXTREMITY: Status: ACTIVE | Noted: 2019-06-06

## 2022-03-18 PROBLEM — L23.9 ALLERGIC CONTACT DERMATITIS: Status: ACTIVE | Noted: 2019-05-23

## 2022-03-19 PROBLEM — J31.0 CHRONIC RHINITIS: Status: ACTIVE | Noted: 2019-05-23

## 2022-03-19 PROBLEM — Z86.711 HX OF PULMONARY EMBOLUS: Status: ACTIVE | Noted: 2019-06-06

## 2022-03-19 PROBLEM — E66.01 OBESITY, MORBID (HCC): Status: ACTIVE | Noted: 2017-12-04

## 2022-03-19 PROBLEM — M79.662 PAIN OF LEFT LOWER LEG: Status: ACTIVE | Noted: 2019-06-19

## 2022-03-19 PROBLEM — M48.02 CERVICAL SPINAL STENOSIS: Status: ACTIVE | Noted: 2020-01-28

## 2022-03-20 PROBLEM — I10 ESSENTIAL HYPERTENSION: Status: ACTIVE | Noted: 2019-04-15

## 2022-04-12 ENCOUNTER — NURSE TRIAGE (OUTPATIENT)
Dept: OTHER | Facility: CLINIC | Age: 77
End: 2022-04-12

## 2022-04-12 NOTE — TELEPHONE ENCOUNTER
Received call from Gucci Carrillo at [facility] with [employer]. Subjective: Caller states \"I'm sleepy and I can't wake up. I slept good last night but I have been sitting in the chair and sleeping all day. \"     Current Symptoms: Fatigue, weakness     Onset: x2 weeks     Associated Symptoms: reduced activity    Pain Severity: Chronic pain in hands- rheumatoid arthritis     Temperature: Denies fever and feeling feverish/chills     What has been tried: Resting, stopped taking Gabapentin     LMP: NA Pregnant: NA    Recommended disposition: See in Office Today    Care advice provided, patient verbalizes understanding; denies any other questions or concerns; instructed to call back for any new or worsening symptoms. Patient/Caller agrees with recommended disposition; writer provided warm transfer to Gasper Laureano at [facility] for appointment scheduling. Attention Provider: Thank you for allowing me to participate in the care of your patient. The patient was connected to triage in response to information provided to the [facility]. Please do not respond through this encounter as the response is not directed to a shared pool.     Reason for Disposition   MODERATE weakness (i.e., interferes with work, school, normal activities) and persists > 3 days    Protocols used: WEAKNESS (GENERALIZED) AND FATIGUE-ADULT-OH

## 2022-04-13 ENCOUNTER — TELEPHONE (OUTPATIENT)
Dept: FAMILY MEDICINE CLINIC | Age: 77
End: 2022-04-13

## 2022-04-13 NOTE — TELEPHONE ENCOUNTER
----- Message from Deniz Minaya sent at 4/13/2022  9:10 AM EDT -----  Subject: Message to Provider    QUESTIONS  Information for Provider? Rosa Rojo took a home covid test this morning, and   it showed positive for covid. She has been feeling tired, cold, and having   low grade fevers. She has an appointment on Monday the 18th and would like   for the doctor to call her today and possibly call something fin or her   before her appointment on Monday. She can be reached at [phone] ok to   leave a message  ---------------------------------------------------------------------------  --------------  [address]  What is the best way for the office to contact you? OK to leave message on   voicemail  Preferred Call Back Phone Number? [phone]  ---------------------------------------------------------------------------  --------------  SCRIPT ANSWERS  Relationship to Patient?  Self

## 2022-04-18 ENCOUNTER — VIRTUAL VISIT (OUTPATIENT)
Dept: FAMILY MEDICINE CLINIC | Age: 77
End: 2022-04-18
Payer: MEDICARE

## 2022-04-18 DIAGNOSIS — M05.10 RHEUMATOID LUNG DISEASE (HCC): Chronic | ICD-10-CM

## 2022-04-18 DIAGNOSIS — M06.9 RHEUMATOID ARTHRITIS INVOLVING MULTIPLE SITES, UNSPECIFIED WHETHER RHEUMATOID FACTOR PRESENT (HCC): ICD-10-CM

## 2022-04-18 DIAGNOSIS — U07.1 COVID-19: Primary | ICD-10-CM

## 2022-04-18 DIAGNOSIS — J45.50 SEVERE PERSISTENT ASTHMA WITHOUT COMPLICATION: ICD-10-CM

## 2022-04-18 PROCEDURE — G8432 DEP SCR NOT DOC, RNG: HCPCS | Performed by: FAMILY MEDICINE

## 2022-04-18 PROCEDURE — G8756 NO BP MEASURE DOC: HCPCS | Performed by: FAMILY MEDICINE

## 2022-04-18 PROCEDURE — G8427 DOCREV CUR MEDS BY ELIG CLIN: HCPCS | Performed by: FAMILY MEDICINE

## 2022-04-18 PROCEDURE — 99214 OFFICE O/P EST MOD 30 MIN: CPT | Performed by: FAMILY MEDICINE

## 2022-04-18 PROCEDURE — 1090F PRES/ABSN URINE INCON ASSESS: CPT | Performed by: FAMILY MEDICINE

## 2022-04-18 PROCEDURE — G8399 PT W/DXA RESULTS DOCUMENT: HCPCS | Performed by: FAMILY MEDICINE

## 2022-04-18 PROCEDURE — 1101F PT FALLS ASSESS-DOCD LE1/YR: CPT | Performed by: FAMILY MEDICINE

## 2022-04-18 RX ORDER — PREDNISONE 20 MG/1
40 TABLET ORAL
Qty: 10 TABLET | Refills: 0 | Status: SHIPPED | OUTPATIENT
Start: 2022-04-18 | End: 2022-04-23

## 2022-04-18 RX ORDER — BENZONATATE 200 MG/1
200 CAPSULE ORAL
Qty: 30 CAPSULE | Refills: 0 | Status: SHIPPED | OUTPATIENT
Start: 2022-04-18

## 2022-04-18 NOTE — PROGRESS NOTES
1. Have you been to the ER, urgent care clinic since your last visit? Hospitalized since your last visit? Yes [facility] ER 04/15/22. Covid      2. Have you seen or consulted any other health care providers outside of the [address] [place] [address] since your last visit?   No      Health Maintenance Due   Topic Date Due    Shingrix Vaccine Age 49> (2 of 2) 09/29/2020    COVID-19 Vaccine (3 - Booster for Moderna series) 08/09/2021

## 2022-04-18 NOTE — PROGRESS NOTES
Yvrose Villalobos is a 68 y.o. female evaluated via Doximetry on 22. Patient Identity confirmed by . Consent:  He and/or health care decision maker is aware that that he may receive a bill for this telephone service, depending on his insurance coverage, and has provided verbal consent to proceed: Yes    Physician Location: Office  Patient Location: Home  Nurse Assisting with Encounter: Brian Rosario LPN    Chief Complaint   Patient presents with    Follow-up     covid      Information gathered from patient and/or health care decision maker. HPI:   COVID-19:   Duration of symptoms: 4-5 days   Fever:yes, Highest fever recorded: Low grade   Chills:yes   Sweats:yes   N,V,D: no   Shortness of breath:yes   Wheezing:yes   Sputum:yes   Other symptoms: sinus and nasal congestion, sore throat, nasal blockage, post nasal drip, myalgias and headache, fatigue     History of asthma/COPD:yes   Smoker:yes   Sick Contacts: no   [address] ER gave Prednisone which helped. Not taking Mucinex. COVID Booster 4 days. Asthma: Appears controlled at this time. Rheumatoid: Patient held Methotrexate and skipped remicaid infusion (every 6 weeks)    Review of Systems   Constitutional: Negative for chills and fever. Respiratory: Positive for cough and sputum production. Gastrointestinal: Negative for abdominal pain, nausea and vomiting. Neurological: Negative for dizziness, tingling and headaches. Limited Exam:  Due to this being a TeleHealth evaluation, many elements of the physical examination are unable to be assessed.       Constitutional: Appears well-developed and well-nourished in no apparent distress   Mental status: Alert and awake, Oriented to person/place/time, Able to follow commands  Eyes: EOM normal, Sclera normal, No visible ocular discharge  HENT: Normocephalic, atraumatic; Mouth/Throat: Moist mucous membranes, External Ears normal  Neck: No visualized mass  Pulmonary/Chest: Respiratory effort normal, No visualized signs of difficulty breathing or respiratory distress   Musculoskeletal: Normal gait with no signs of ataxia, Normal range of motion of neck  Neurological: No facial asymmetry (Cranial nerve 7 motor function), No gaze palsy  Skin: No significant exanthematous lesions or discoloration noted on facial skin  Psychiatric: Normal affect, normal judgment/insight. No hallucinations     Current Outpatient Medications on File Prior to Visit   Medication Sig Dispense Refill    levocetirizine (XYZAL) 5 mg tablet Take 1 Tablet by mouth nightly. 90 Tablet 1    amLODIPine (NORVASC) 5 mg tablet TAKE ONE TABLET BY MOUTH EVERY DAY 90 Tablet 1    budesonide-formoteroL (Symbicort) 160-4.5 mcg/actuation HFAA USE 2 INHALATIONS TWICE A DAY 2 Each 3    apixaban (Eliquis) 5 mg tablet TAKE 1 TABLET TWICE A DAY 60 Tablet 0    triamcinolone acetonide (KENALOG) 0.1 % ointment Apply  to affected area two (2) times a day. use thin layer in the affected area of the left leg 1 Tube 0    azelastine (ASTELIN) 137 mcg (0.1 %) nasal spray       fluticasone propionate (FLONASE) 50 mcg/actuation nasal spray USE 2 SPRAYS IN BOTH NOSTRILS DAILY 16 g 3    ProAir HFA 90 mcg/actuation inhaler INHALE 2 PUFFS BY MOUTH EVERY 4 HOURS AS NEEDED FOR WHEEZING 17 g 3    methotrexate (RHEUMATREX) 2.5 mg tablet Take 22.5 mg by mouth every Monday.  atorvastatin (LIPITOR) 10 mg tablet TAKE ONE TABLET BY MOUTH EVERY DAY (Patient not taking: Reported on 2/18/2022) 90 Tab 2    famotidine (PEPCID) 20 mg tablet Take 20 mg by mouth two (2) times a day. As needed      mupirocin (BACTROBAN) 2 % ointment   0    folic acid (FOLVITE) 1 mg tablet Take 3 Tabs by mouth daily. 90 Tab 2    Hot Water Bottle (WATER BOTTLE) misc Please Dispense a humidifier bottle to patient to use with her oxygen.  1 Each 0    albuterol (PROVENTIL VENTOLIN) 2.5 mg /3 mL (0.083 %) nebulizer solution USE 1 VIAL IN NEBULIZER EVERY 4 HOURS AS NEEDED FOR WHEEZING 100 Each 4    infliximab (REMICADE IV) by IntraVENous route. Every 6 weeks      UBIDECARENONE (COQ-10 PO) Take  by mouth. (Patient not taking: Reported on 2/18/2022)      acetaminophen (TYLENOL) 500 mg tablet Take 1,000 mg by mouth every six (6) hours as needed for Pain.  omega-3s-dha-epa-fish oil 300-1,000 mg cpDR Take  by mouth daily.  cholecalciferol, vitamin D3, (VITAMIN D3) 4,000 unit cap Take  by mouth daily. No current facility-administered medications on file prior to visit. Allergies   Allergen Reactions    Orudis [Ketoprofen] Palpitations    Singulair [Montelukast] Rash    Arthrotec 50 [Diclofenac-Misoprostol] Palpitations    Levaquin [Levofloxacin] Other (comments)     Hip joints swelling and painful per pt report    Lyrica [Pregabalin] Other (comments)     Saw spiders.         Patient Active Problem List    Diagnosis Date Noted    Cervical spinal stenosis 01/28/2020    Pain of left lower leg 06/19/2019    History of DVT of lower extremity 06/06/2019    Hx of pulmonary embolus 06/06/2019    Chronic rhinitis 05/23/2019    Allergic contact dermatitis 05/23/2019    Essential hypertension 04/15/2019    Obesity, morbid ([address] 12/04/2017    Primary localized osteoarthritis of right hip 07/11/2016    Asthma 10/06/2015    Primary generalized (osteo)arthritis 10/06/2015    PAC (premature atrial contraction) 10/06/2015    Lumbar spinal stenosis 10/06/2015    Colon polyp, hyperplastic 06/02/2015    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[address] 12/15/2010    High cholesterol 04/30/2010    Rheumatoid arthritis ([address] 04/15/2010    Severe persistent asthma without complication 10/33/3334    Cardiac dysrhythmia, unspecified 04/15/2010    OA (osteoarthritis) 04/15/2010    Obesity, unspecified 04/15/2010    Spinal stenosis of lumbar region with neurogenic claudication 04/15/2010        Health Maintenance Due   Topic Date Due    Shingrix Vaccine Age 50> (2 of 2) 09/29/2020  COVID-19 Vaccine (3 - Booster for Moderna series) 08/09/2021        Assessment/Plan:  Details of this discussion including any medical advice provided: Continuing Prednisone for 5 more days, refill tessalon, Methotrexate this Thursday if improving, Follow up Remicaid    ICD-10-CM ICD-9-CM    1. COVID-19  U07.1 079.89 benzonatate (TESSALON) 200 mg capsule   2. Severe persistent asthma without complication  C18.72 252.76 predniSONE (DELTASONE) 20 mg tablet      benzonatate (TESSALON) 200 mg capsule   3. 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[facility]  M05.10 714.81    4. Rheumatoid arthritis involving multiple sites, unspecified whether rheumatoid factor present ([facility]  M06.9 714.0      Follow-up and Dispositions    · Return in about 4 weeks (around 5/16/2022), or if symptoms worsen or fail to improve. Total Time: minutes: 11-20 minutes was spent on telemedicine encounter discussing above problems and plans. Patient Problem list, medications, and Allergies were reviewed during this encounter. Pursuant to the emergency declaration under the [facility] 2[facility] waiver authority and the [employer] Act, this Telephone Visit was conducted, with patient's consent, to reduce the patient's risk of exposure to COVID-19 and provide continuity of care for an established patient. I affirm this is a Patient Initiated Episode with an Established Patient who has not had a related appointment within my department in the past 7 days or scheduled within the next 24 hours. Discussed diagnoses in detail with patient. Medication risks/benefits/side effects discussed with patient. All of the patient's questions were addressed. The patient understands and agrees with our plan of care. The patient knows to call back if they are unsure of or forget any changes we discussed today or if the symptoms change.     Note: not billable if this call serves to triage the patient into an appointment for the relevant concern    MD FAVIOLA Pineda & NATALIA BECKETT [facility]  04/18/22

## 2022-04-21 DIAGNOSIS — I26.99 OTHER ACUTE PULMONARY EMBOLISM WITHOUT ACUTE COR PULMONALE (HCC): ICD-10-CM

## 2022-04-21 DIAGNOSIS — I82.451 ACUTE DEEP VEIN THROMBOSIS (DVT) OF RIGHT PERONEAL VEIN (HCC): ICD-10-CM

## 2022-07-09 LAB
BASOPHILS # BLD AUTO: 0 X10E3/UL (ref 0–0.2)
BASOPHILS NFR BLD AUTO: 0 %
EOSINOPHIL # BLD AUTO: 0.2 X10E3/UL (ref 0–0.4)
EOSINOPHIL NFR BLD AUTO: 3 %
ERYTHROCYTE [DISTWIDTH] IN BLOOD BY AUTOMATED COUNT: 14.4 % (ref 11.7–15.4)
FERRITIN SERPL-MCNC: 133 NG/ML (ref 15–150)
HCT VFR BLD AUTO: 33.4 % (ref 34–46.6)
HGB BLD-MCNC: 11.5 G/DL (ref 11.1–15.9)
IMM GRANULOCYTES # BLD AUTO: 0 X10E3/UL (ref 0–0.1)
IMM GRANULOCYTES NFR BLD AUTO: 0 %
IRON SATN MFR SERPL: 28 % (ref 15–55)
IRON SERPL-MCNC: 78 UG/DL (ref 27–139)
LYMPHOCYTES # BLD AUTO: 1.8 X10E3/UL (ref 0.7–3.1)
LYMPHOCYTES NFR BLD AUTO: 25 %
MCH RBC QN AUTO: 32.4 PG (ref 26.6–33)
MCHC RBC AUTO-ENTMCNC: 34.4 G/DL (ref 31.5–35.7)
MCV RBC AUTO: 94 FL (ref 79–97)
MONOCYTES # BLD AUTO: 0.7 X10E3/UL (ref 0.1–0.9)
MONOCYTES NFR BLD AUTO: 10 %
NEUTROPHILS # BLD AUTO: 4.4 X10E3/UL (ref 1.4–7)
NEUTROPHILS NFR BLD AUTO: 62 %
PLATELET # BLD AUTO: 165 X10E3/UL (ref 150–450)
RBC # BLD AUTO: 3.55 X10E6/UL (ref 3.77–5.28)
TIBC SERPL-MCNC: 276 UG/DL (ref 250–450)
UIBC SERPL-MCNC: 198 UG/DL (ref 118–369)
WBC # BLD AUTO: 7.2 X10E3/UL (ref 3.4–10.8)

## 2022-07-28 ENCOUNTER — OFFICE VISIT (OUTPATIENT)
Dept: ONCOLOGY | Age: 77
End: 2022-07-28
Payer: MEDICARE

## 2022-07-28 VITALS
OXYGEN SATURATION: 93 % | HEIGHT: 71 IN | SYSTOLIC BLOOD PRESSURE: 117 MMHG | BODY MASS INDEX: 39.2 KG/M2 | TEMPERATURE: 97.9 F | WEIGHT: 280 LBS | DIASTOLIC BLOOD PRESSURE: 61 MMHG | RESPIRATION RATE: 20 BRPM | HEART RATE: 73 BPM

## 2022-07-28 DIAGNOSIS — Z86.2 HISTORY OF MACROCYTIC ANEMIA: ICD-10-CM

## 2022-07-28 DIAGNOSIS — Z86.2 HISTORY OF IRON DEFICIENCY ANEMIA: Primary | ICD-10-CM

## 2022-07-28 DIAGNOSIS — I26.99 OTHER ACUTE PULMONARY EMBOLISM WITHOUT ACUTE COR PULMONALE (HCC): ICD-10-CM

## 2022-07-28 PROCEDURE — 1123F ACP DISCUSS/DSCN MKR DOCD: CPT | Performed by: INTERNAL MEDICINE

## 2022-07-28 PROCEDURE — G8752 SYS BP LESS 140: HCPCS | Performed by: INTERNAL MEDICINE

## 2022-07-28 PROCEDURE — G8536 NO DOC ELDER MAL SCRN: HCPCS | Performed by: INTERNAL MEDICINE

## 2022-07-28 PROCEDURE — 99213 OFFICE O/P EST LOW 20 MIN: CPT | Performed by: INTERNAL MEDICINE

## 2022-07-28 PROCEDURE — 1090F PRES/ABSN URINE INCON ASSESS: CPT | Performed by: INTERNAL MEDICINE

## 2022-07-28 PROCEDURE — 1101F PT FALLS ASSESS-DOCD LE1/YR: CPT | Performed by: INTERNAL MEDICINE

## 2022-07-28 PROCEDURE — G0463 HOSPITAL OUTPT CLINIC VISIT: HCPCS | Performed by: INTERNAL MEDICINE

## 2022-07-28 PROCEDURE — G8754 DIAS BP LESS 90: HCPCS | Performed by: INTERNAL MEDICINE

## 2022-07-28 PROCEDURE — G8417 CALC BMI ABV UP PARAM F/U: HCPCS | Performed by: INTERNAL MEDICINE

## 2022-07-28 PROCEDURE — G8510 SCR DEP NEG, NO PLAN REQD: HCPCS | Performed by: INTERNAL MEDICINE

## 2022-07-28 PROCEDURE — G8427 DOCREV CUR MEDS BY ELIG CLIN: HCPCS | Performed by: INTERNAL MEDICINE

## 2022-07-28 PROCEDURE — G8399 PT W/DXA RESULTS DOCUMENT: HCPCS | Performed by: INTERNAL MEDICINE

## 2022-07-28 RX ORDER — GABAPENTIN 100 MG/1
CAPSULE ORAL
COMMUNITY
Start: 2022-05-31

## 2022-07-28 NOTE — PROGRESS NOTES
Chief Complaint   Patient presents with    Follow-up     DVT/PE follow up.         Visit Vitals  Resp 20   Ht 5' 11\" (1.803 m)   Wt 280 lb (127 kg)   BMI 39.05 kg/m²

## 2022-07-28 NOTE — PROGRESS NOTES
[id number] [facility] at [address] [place] [address]  [phone]    Hematology / Oncology Established Visit    Reason for Visit:   Basia Batres is a 68 y.o. female who is here for follow up of PE.  PCP is Dr. Mercy Pina. History of Present Illness:   Ms. Brigida Bowens is a 68 y.o. female with RA with lung involvement who comes in for evaluation and management of PE. She was recently put on home O2. She was hospitalized at [address] [place] [address] 3/3/19 - 3/5/19 after p/w weakness, fatigue, SOB, chills. She denies R leg swelling but has had subacute pain in R leg from knee down which started in early Feb 2019. She was found to have bilateral pulmonary emboli as well as RLE DVT. She was started on Heparin infusion and transitioned to Xarelto. At time of discharge, she was told to stop MTX, Aspirin and Celebrex. Since then, she reported to the ED at [facility] for 2 episodes of bleeding from left naris. She also coughed up blood. She denies any 8 hr trips, surgeries, fractures, OCPs or hormone replacement. No prior h/o VTE. No family h/o VTE. For RA, she takes MTX, but this was put on hold. She was also started on Remicaide 1 yr ago. She is currently taking Xarelto as prescribed, states her SOB is much better than prior. The R leg pain is much better, but feels really stiff per patient.     -Labs 3/10/19: WBC 9.5, Hgb 10.8, , HIV/HCV Ab/HepB Surface Ag all negative. Interval History: Patient here for follow up of PE and RLE DVT. No more epistaxis after prior cauterization. No melena/hematochezia. Continues to take eliquis. No longer on iron supplements. Reports chronic fatigue. She is taking prednisone for RA.        MedHx: Anemia, asthma, cardiac dysrhythmia, HLD, DVT, HTN, OA, PE, RA, spinal stenosis, asthma  SurgHx:  Rotator cuff repair, right hip replacement, left lung biopsy  SocialHx: past smoker 20PPD, no ETOH     Review of Systems: A complete review of systems was obtained, negative except as described above.    Physical Exam:       Visit Vitals  /61   Pulse 73   Temp 97.9 °F (36.6 °C)   Resp 20   Ht 5' 11\" (1.803 m)   Wt 280 lb (127 kg)   SpO2 93%   BMI 39.05 kg/m²       ECOG PS: 2-3   General: no distress  Eyes: anicteric sclerae  HENT: oropharynx clear  Neck: supple  Lymphatic: no cervical, supraclavicular adenopathy  Respiratory: normal respiratory effort  CV: no peripheral edema  GI: soft, nontender, nondistended, no masses  Skin: no rashes; no ecchymoses; no petechiae      Results:     Lab Results   Component Value Date/Time    WBC 7.2 07/08/2022 12:00 AM    HGB 11.5 07/08/2022 12:00 AM    HCT 33.4 (L) 07/08/2022 12:00 AM    PLATELET 889 79/91/2836 12:00 AM    MCV 94 07/08/2022 12:00 AM    ABS. NEUTROPHILS 4.4 07/08/2022 12:00 AM     Lab Results   Component Value Date/Time    Sodium 140 11/17/2021 09:20 AM    Potassium 4.3 11/17/2021 09:20 AM    Chloride 110 (H) 11/17/2021 09:20 AM    CO2 26 11/17/2021 09:20 AM    Glucose 87 11/17/2021 09:20 AM    BUN 18 11/17/2021 09:20 AM    Creatinine 0.63 11/17/2021 09:20 AM    GFR est AA >60 11/17/2021 09:20 AM    GFR est non-AA >60 11/17/2021 09:20 AM    Calcium 9.4 11/17/2021 09:20 AM     Lab Results   Component Value Date/Time    Bilirubin, total 0.5 11/17/2021 09:20 AM    ALT (SGPT) 24 11/17/2021 09:20 AM    Alk.  phosphatase 76 11/17/2021 09:20 AM    Protein, total 7.2 11/17/2021 09:20 AM    Albumin 3.6 11/17/2021 09:20 AM    Globulin 3.6 11/17/2021 09:20 AM     Lab Results   Component Value Date/Time    Iron 78 07/08/2022 12:00 AM    TIBC 276 07/08/2022 12:00 AM    Iron % saturation 28 07/08/2022 12:00 AM    Ferritin 133 07/08/2022 12:00 AM       Lab Results   Component Value Date/Time    Vitamin B12 487 05/24/2021 12:00 AM    Folate 16.7 05/24/2021 12:00 AM     Lab Results   Component Value Date/Time    TSH 1.160 06/14/2016 10:21 AM     Lab Results   Component Value Date/Time    HEP C VIRUS AB <0.1 10/09/2015 08:07 AM         Imaging:     Imaging at [address]: CTA Chest 3/2019: showing Multiple bilateral branching pulmonary Emboli, Bilateral patchy ground glass opacities likely chronic, and bronchiectasis consistent with pulmonary fibrosis (Rhematoid related lung disease). Similar Groundglass component and Bronchiectasis on previous CT in our system. Duplex Legs 3/2019: Acute Deep vein thrombosis of the right peroneal vein    Echo: Mild concentric LVH with normal cavity size and systolic function. Trivial to mild regurgitation. Moderate LV diastolic dysfunction with elevated left atrial pressure. 6/20/19 Venous doppler bilateral LE:  Right Lower Venous     No evidence of deep vein thrombosis in the common femoral, profunda femoral, superficial femoral, popliteal, posterior tibial, and peroneal veins. The veins were imaged in the transverse and longitudinal planes. The vessels showed normal color filling and compressibility. Doppler interrogation showed phasic and spontaneous flow. Left Lower Venous     For comparison purposes, the left common femoral vein was briefly interrogated. These vein demonstrates normal color filing and compressibility. Doppler flow was phasic and spontaneous. Assessment & Plan:   Erika Montero is a 68 y.o. female with RA comes in for evaluation of DVT/PE. 1. Venous thromboembolism: PE and RLE DVT occurred in March 2019. No obvious risk factors. My review of MTX shows no increased risk of VTE; Remicaide associated with < 1% risk of thrombophelebitis, MI, etc. Between the 2 medications, the Remicaide is more likely a culprit, but unclear. The dose and frequency were increased in early February 2019. Pt discussed with her Rheumatologist, Dr. Kristina Ko who recommended thrombophilia testing. I discussed with patient that thrombophilia testing is only helpful when testing is positive, putting a name to her risk.  However if testing is negative, it is not helpful as patient may still have inherited thrombophilia associated with unknown genes. Results of testing would not change our anticoagulation management, and pt does not have any children. However, pt does have 2 brothers and 1 sister and would like testing. Patient will likely need to remain on anticoagulation indefinitely as it is not clear whether this was provoked by medication or not. 6/2019 lower extremity venous dopplers ordered by PCP are negative. 12/1/20 thrombophilia testing negative. -- Continue Eliquis 5mg bid. Pt to alert us at the time of invasive procedures. -- Sending progress note to Dr. Matthew Giles (Rheumatologist) - [facility] [phone]  -- Follow up as needed. 2. RA with lung involvement: On MTX and Remicaide. Sees Dr. Joellen Wisdom in Pulmonary and Dr. Matthew Giles in Rheumatology. 3. Diastolic CHF: Seen on echo and likely due to HTN. Not on diuretics. On Norvasc. 4. H/o Epistaxis:   Occurred even before starting anticoagulation, but more frequent after starting. Now improved after cauterization by ENT. Also can managed with clamping physical pressure and Afrin prn,  saline nasal spray. No recent episodes of epistaxis. 5. H/o macrocytic anemia:   Likely due to MTX and resolved. Prior iron deficiency resolved with daily iron supplements. Vitamin b12 and folate normal on 5/24/21. Advised oral iron supplements few times a week as tolerated, stool softeners PRN. 6. Obesity:  Limited from exercise given RA, especially in cold weather. Advised diet control with portion control in desserts. I appreciate the opportunity to participate in Ms. Vishnu Sullivan's care. I personally provided the entire service today.     Signed By: Russell Dawson MD     July 28, 2022

## 2022-08-30 ENCOUNTER — OFFICE VISIT (OUTPATIENT)
Dept: FAMILY MEDICINE CLINIC | Age: 77
End: 2022-08-30
Payer: MEDICARE

## 2022-08-30 VITALS
HEART RATE: 73 BPM | TEMPERATURE: 97.5 F | RESPIRATION RATE: 18 BRPM | DIASTOLIC BLOOD PRESSURE: 71 MMHG | HEIGHT: 71 IN | SYSTOLIC BLOOD PRESSURE: 119 MMHG | BODY MASS INDEX: 38.36 KG/M2 | OXYGEN SATURATION: 97 % | WEIGHT: 274 LBS

## 2022-08-30 DIAGNOSIS — E78.00 HIGH CHOLESTEROL: Chronic | ICD-10-CM

## 2022-08-30 DIAGNOSIS — I10 ESSENTIAL HYPERTENSION: Chronic | ICD-10-CM

## 2022-08-30 DIAGNOSIS — M05.10 RHEUMATOID LUNG DISEASE (HCC): Chronic | ICD-10-CM

## 2022-08-30 DIAGNOSIS — R21 RASH: ICD-10-CM

## 2022-08-30 DIAGNOSIS — E66.01 OBESITY, MORBID (HCC): ICD-10-CM

## 2022-08-30 DIAGNOSIS — Z00.00 MEDICARE ANNUAL WELLNESS VISIT, SUBSEQUENT: Primary | ICD-10-CM

## 2022-08-30 DIAGNOSIS — M15.0 PRIMARY GENERALIZED (OSTEO)ARTHRITIS: Chronic | ICD-10-CM

## 2022-08-30 DIAGNOSIS — Z86.711 HX OF PULMONARY EMBOLUS: ICD-10-CM

## 2022-08-30 PROCEDURE — 1090F PRES/ABSN URINE INCON ASSESS: CPT | Performed by: FAMILY MEDICINE

## 2022-08-30 PROCEDURE — G0439 PPPS, SUBSEQ VISIT: HCPCS | Performed by: FAMILY MEDICINE

## 2022-08-30 PROCEDURE — G8536 NO DOC ELDER MAL SCRN: HCPCS | Performed by: FAMILY MEDICINE

## 2022-08-30 PROCEDURE — 99214 OFFICE O/P EST MOD 30 MIN: CPT | Performed by: FAMILY MEDICINE

## 2022-08-30 PROCEDURE — G8427 DOCREV CUR MEDS BY ELIG CLIN: HCPCS | Performed by: FAMILY MEDICINE

## 2022-08-30 PROCEDURE — G8754 DIAS BP LESS 90: HCPCS | Performed by: FAMILY MEDICINE

## 2022-08-30 PROCEDURE — G8510 SCR DEP NEG, NO PLAN REQD: HCPCS | Performed by: FAMILY MEDICINE

## 2022-08-30 PROCEDURE — 1123F ACP DISCUSS/DSCN MKR DOCD: CPT | Performed by: FAMILY MEDICINE

## 2022-08-30 PROCEDURE — G8417 CALC BMI ABV UP PARAM F/U: HCPCS | Performed by: FAMILY MEDICINE

## 2022-08-30 PROCEDURE — G8399 PT W/DXA RESULTS DOCUMENT: HCPCS | Performed by: FAMILY MEDICINE

## 2022-08-30 PROCEDURE — 1101F PT FALLS ASSESS-DOCD LE1/YR: CPT | Performed by: FAMILY MEDICINE

## 2022-08-30 PROCEDURE — G8752 SYS BP LESS 140: HCPCS | Performed by: FAMILY MEDICINE

## 2022-08-30 NOTE — PATIENT INSTRUCTIONS
Medicare Wellness Visit, Female     The best way to live healthy is to have a lifestyle where you eat a well-balanced diet, exercise regularly, limit alcohol use, and quit all forms of tobacco/nicotine, if applicable. Regular preventive services are another way to keep healthy. Preventive services (vaccines, screening tests, monitoring & exams) can help personalize your care plan, which helps you manage your own care. Screening tests can find health problems at the earliest stages, when they are easiest to treat. [facility] follows the current, evidence-based guidelines published by the [facility]y Palomo ([facility]) when recommending preventive services for our patients. Because we follow these guidelines, sometimes recommendations change over time as research supports it. (For example, mammograms used to be recommended annually. Even though Medicare will still pay for an annual mammogram, the newer guidelines recommend a mammogram every two years for women of average risk). Of course, you and your doctor may decide to screen more often for some diseases, based on your risk and your co-morbidities (chronic disease you are already diagnosed with). Preventive services for you include:  - Medicare offers their members a free annual wellness visit, which is time for you and your primary care provider to discuss and plan for your preventive service needs. Take advantage of this benefit every year!  -All adults over the age of 72 should receive the recommended pneumonia vaccines. Current USPSTF guidelines recommend a series of two vaccines for the best pneumonia protection.   -All adults should have a flu vaccine yearly and a tetanus vaccine every 10 years.   -All adults age 48 and older should receive the shingles vaccines (series of two vaccines).       -All adults age 38-68 who are overweight should have a diabetes screening test once every three years.   -All adults born between [birth year] and 1965 should be screened once for Hepatitis C.  -Other screening tests and preventive services for persons with diabetes include: an eye exam to screen for diabetic retinopathy, a kidney function test, a foot exam, and stricter control over your cholesterol.   -Cardiovascular screening for adults with routine risk involves an electrocardiogram (ECG) at intervals determined by your doctor.   -Colorectal cancer screenings should be done for adults age 54-65 with no increased risk factors for colorectal cancer. There are a number of acceptable methods of screening for this type of cancer. Each test has its own benefits and drawbacks. Discuss with your doctor what is most appropriate for you during your annual wellness visit. The different tests include: colonoscopy (considered the best screening method), a fecal occult blood test, a fecal DNA test, and sigmoidoscopy.    -A bone mass density test is recommended when a woman turns 65 to screen for osteoporosis. This test is only recommended one time, as a screening. Some providers will use this same test as a disease monitoring tool if you already have osteoporosis. -Breast cancer screenings are recommended every other year for women of normal risk, age 54-69.  -Cervical cancer screenings for women over age 72 are only recommended with certain risk factors.      Here is a list of your current Health Maintenance items (your personalized list of preventive services) with a due date:  Health Maintenance Due   Topic Date Due    Shingles Vaccine (2 of 2) 09/29/2020    COVID-19 Vaccine (3 - Booster for Vera Lanier series) 08/09/2021

## 2022-08-30 NOTE — PROGRESS NOTES
This is the Subsequent Medicare Annual Wellness Exam, performed 12 months or more after the Initial AWV or the last Subsequent AWV    I have reviewed the patient's medical history in detail and updated the computerized patient record. Assessment/Plan   Education and counseling provided:  Are appropriate based on today's review and evaluation  End-of-Life planning (with patient's consent)    1. Rash  2. Essential hypertension  -     CBC W/O DIFF; Future  -     METABOLIC PANEL, COMPREHENSIVE; Future  3. 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[facility]  4. Primary generalized (osteo)arthritis  5. High cholesterol  -     LIPID PANEL; Future  6. Obesity, morbid ([facility]  7. Hx of pulmonary embolus  8. Medicare annual wellness visit, subsequent       Depression Risk Factor Screening     3 most recent PHQ Screens 8/30/2022   Little interest or pleasure in doing things Not at all   Feeling down, depressed, irritable, or hopeless Not at all   Total Score PHQ 2 0       Alcohol & Drug Abuse Risk Screen    Do you average more than 1 drink per night or more than 7 drinks a week:  No    On any one occasion in the past three months have you have had more than 3 drinks containing alcohol:  No          Functional Ability and Level of Safety    Hearing: Hearing is good. Activities of Daily Living: The home contains: grab bars  Patient does total self care      Ambulation: with difficulty, uses a walker     Fall Risk:  Fall Risk Assessment, last 12 mths 8/30/2022   Able to walk? Yes   Fall in past 12 months? 0   Do you feel unsteady? 0   Are you worried about falling 0   Number of falls in past 12 months -   Fall with injury?  -      Abuse Screen:  Patient is not abused       Cognitive Screening    Has your family/caregiver stated any concerns about your memory: no     Cognitive Screening: Normal - MMSE (Mini Mental Status Exam)    Health Maintenance Due     Health Maintenance Due   Topic Date Due    Shingrix Vaccine Age 49> (2 of 2) 09/29/2020 COVID-19 Vaccine (3 - Booster for Moderna series) 08/09/2021       Patient Care Team   Patient Care Team:  Malina Lombardi MD as PCP - General (Family Medicine)  Malina Lombardi MD as PCP - [facility] Provider  Cammy De Souza MD (Orthopedic Surgery)  Mercy Blue MD (Orthopedic Surgery)  Court Rasheed MD (Otolaryngology)  Mitzi Kiser DO (Rheumatology Internal Medicine)    History     Patient Active Problem List   Diagnosis Code    Rheumatoid arthritis ([address] M06.9    Severe persistent asthma without complication X32.00    Cardiac dysrhythmia, unspecified I49.9    OA (osteoarthritis) M19.90    Obesity, unspecified E66.9    Spinal stenosis of lumbar region with neurogenic claudication M48.062    High cholesterol E78.00    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[address] M05.10    Colon polyp, hyperplastic K63.5    Asthma J45.909    Primary generalized (osteo)arthritis M15.0    PAC (premature atrial contraction) I49.1    Lumbar spinal stenosis M48.061    Primary localized osteoarthritis of right hip M16.11    Obesity, morbid (HCC) E66.01    Essential hypertension I10    Chronic rhinitis J31.0    Allergic contact dermatitis L23.9    History of DVT of lower extremity Z86.718    Hx of pulmonary embolus Z86.711    Pain of left lower leg M79.662    Cervical spinal stenosis M48.02     Past Medical History:   Diagnosis Date    Anemia, unspecified 4/15/2010    Asthma     Cardiac dysrhythmia, unspecified 4/15/2010    PAT- not on medication/ not seeing cardiologist    High cholesterol 4/30/2010    History of DVT of lower extremity 6/6/2019    Right leg (3/2019)    Hypertension     Ill-defined condition     PE and DVT to Right leg.      Obesity, unspecified 4/15/2010    Osteoarthrosis, unspecified whether generalized or localized, other specified sites 4/15/2010    Pulmonary embolism ([address]     Rheumatoid arthritis(714.0) 4/15/2010    Spinal stenosis, unspecified region other than cervical 4/15/2010    Unspecified asthma(493.90) 4/15/2010    rheumatoid lung disease      Past Surgical History:   Procedure Laterality Date    COLONOSCOPY N/A 10/20/2020    COLONOSCOPY/EGD performed by Rand Souza MD at [facility], COLON, DIAGNOSTIC      7-10-13    HX ORTHOPAEDIC Right 2002    rotator cuff repair    HX OTHER SURGICAL      Right hip replaced. HX OTHER SURGICAL      left lung biopsy. RI CHEST SURGERY PROCEDURE UNLISTED  lat 1990's    lung biopsy- diagnosed with rheumatoid lung disease     Current Outpatient Medications   Medication Sig Dispense Refill    apixaban (Eliquis) 5 mg tablet TAKE 1 TABLET TWICE A  Tablet 3    amLODIPine (NORVASC) 5 mg tablet TAKE ONE TABLET BY MOUTH EVERY DAY 90 Tablet 1    budesonide-formoteroL (Symbicort) 160-4.5 mcg/actuation HFAA USE 2 INHALATIONS TWICE A DAY 2 Each 3    fluticasone propionate (FLONASE) 50 mcg/actuation nasal spray USE 2 SPRAYS IN BOTH NOSTRILS DAILY 16 g 3    ProAir HFA 90 mcg/actuation inhaler INHALE 2 PUFFS BY MOUTH EVERY 4 HOURS AS NEEDED FOR WHEEZING 17 g 3    methotrexate (RHEUMATREX) 2.5 mg tablet Take 22.5 mg by mouth every Monday. famotidine (PEPCID) 20 mg tablet Take 20 mg by mouth two (2) times a day. As needed      folic acid (FOLVITE) 1 mg tablet Take 3 Tabs by mouth daily. 90 Tab 2    acetaminophen (TYLENOL) 500 mg tablet Take 1,000 mg by mouth every six (6) hours as needed for Pain. cholecalciferol, vitamin D3, 100 mcg (4,000 unit) cap Take  by mouth daily. gabapentin (NEURONTIN) 100 mg capsule       benzonatate (TESSALON) 200 mg capsule Take 1 Capsule by mouth three (3) times daily as needed for Cough. (Patient not taking: No sig reported) 30 Capsule 0    levocetirizine (XYZAL) 5 mg tablet Take 1 Tablet by mouth nightly. (Patient not taking: Reported on 7/28/2022) 90 Tablet 1    triamcinolone acetonide (KENALOG) 0.1 % ointment Apply  to affected area two (2) times a day.  use thin layer in the affected area of the left leg (Patient not taking: No sig reported) 1 Tube 0    azelastine (ASTELIN) 137 mcg (0.1 %) nasal spray  (Patient not taking: No sig reported)      atorvastatin (LIPITOR) 10 mg tablet TAKE ONE TABLET BY MOUTH EVERY DAY (Patient not taking: Reported on 2022) 90 Tab 2    mupirocin (BACTROBAN) 2 % ointment  (Patient not taking: Reported on 2022)  0    Hot Water Bottle (WATER BOTTLE) misc Please Dispense a humidifier bottle to patient to use with her oxygen. (Patient not taking: No sig reported) 1 Each 0    albuterol (PROVENTIL VENTOLIN) 2.5 mg /3 mL (0.083 %) nebulizer solution USE 1 VIAL IN NEBULIZER EVERY 4 HOURS AS NEEDED FOR WHEEZING (Patient not taking: Reported on 2022) 100 Each 4    infliximab (REMICADE IV) by IntraVENous route. Every 6 weeks      UBIDECARENONE (COQ-10 PO) Take  by mouth. (Patient not taking: No sig reported)      omega-3s-dha-epa-fish oil 300-1,000 mg cpDR Take  by mouth daily. (Patient not taking: Reported on 2022)       Allergies   Allergen Reactions    Orudis [Ketoprofen] Palpitations    Singulair [Montelukast] Rash    Arthrotec 50 [Diclofenac-Misoprostol] Palpitations    Levaquin [Levofloxacin] Other (comments)     Hip joints swelling and painful per pt report    Lyrica [Pregabalin] Other (comments)     Saw spiders. Family History   Problem Relation Age of Onset    Cancer Mother         cervical, breast    Heart Disease Mother     Heart Attack Mother     Hypertension Mother     OSTEOARTHRITIS Mother     Heart Disease Father     Heart Attack Father     Cancer Other         uterine and lung.     High Cholesterol Brother     Stroke Brother     Arthritis-rheumatoid Sister     Migraines Sister     Breast Cancer Maternal Aunt      Social History     Tobacco Use    Smoking status: Former     Packs/day: 1.00     Years: 20.00     Pack years: 20.00     Types: Cigarettes     Quit date: 1985     Years since quittin.3    Smokeless tobacco: Never   Substance Use Topics Alcohol use: Milana Brown MD

## 2022-08-30 NOTE — PROGRESS NOTES
1. Have you been to the ER, urgent care clinic since your last visit? Hospitalized since your last visit? No    2. Have you seen or consulted any other health care providers outside of the [address] [place] [address] since your last visit? Including any pap smears or colon screening.  No      Health Maintenance Due   Topic Date Due    Shingrix Vaccine Age 49> (2 of 2) 09/29/2020    COVID-19 Vaccine (3 - Booster for Moderna series) 08/09/2021    Medicare Yearly Exam  07/27/2022

## 2022-08-30 NOTE — PROGRESS NOTES
[facility]    History of Present Illness:   Frederick Gomez is a 68 y.o. female with history of HTN, PAC, Asthma, OA, Bronchitis, DVT  CC: Rash  History provided by patient and Records    HPI:  Rash:Patient had a rash that started 2 weeks ago had multiple raised, itchy, red lesions on the multiple parts of the body starting behind the left ear first.  Had resolved but recently developed a similar lesion on the right forearm with itching, though improving now. No recent Viral illness, no known sick contacts, though niece and nephew were around immediately prior to rash. No other viral symptoms with the rash. Hypertriglyceridemia Follow up:   Cardiovascular risks for her are: hypertension  hyperlipidemia. Current Medications:  Key Antihyperlipidemia Meds               atorvastatin (LIPITOR) 10 mg tablet TAKE ONE TABLET BY MOUTH EVERY DAY    omega-3s-dha-epa-fish oil 300-1,000 mg cpDR Take  by mouth daily. Compliance: YES   Myalgias: NO   Fatigue: NO   Other side effects: NO     Wt Readings from Last 3 Encounters:   08/30/22 274 lb (124.3 kg)   07/28/22 280 lb (127 kg)   02/18/22 277 lb (125.6 kg)       Lab Results   Component Value Date/Time    Cholesterol, total 168 11/17/2021 09:20 AM    HDL Cholesterol 58 11/17/2021 09:20 AM    LDL, calculated 94 11/17/2021 09:20 AM    VLDL, calculated 16 11/17/2021 09:20 AM    Triglyceride 80 11/17/2021 09:20 AM    CHOL/HDL Ratio 2.9 11/17/2021 09:20 AM      Lab Results   Component Value Date/Time    ALT (SGPT) 24 11/17/2021 09:20 AM    AST (SGOT) 21 11/17/2021 09:20 AM    Alk.  phosphatase 76 11/17/2021 09:20 AM    Bilirubin, direct 0.2 06/10/2020 09:30 AM    Bilirubin, total 0.5 11/17/2021 09:20 AM      Hypertension Follow up:  Currently Taking:   Key CAD CHF Meds               apixaban (Eliquis) 5 mg tablet (Taking) TAKE 1 TABLET TWICE A DAY    amLODIPine (NORVASC) 5 mg tablet (Taking) TAKE ONE TABLET BY MOUTH EVERY DAY atorvastatin (LIPITOR) 10 mg tablet TAKE ONE TABLET BY MOUTH EVERY DAY    omega-3s-dha-epa-fish oil 300-1,000 mg cpDR Take  by mouth daily. The patient reports:  taking medications as instructed, no medication side effects noted, no TIA's, no chest pain on exertion, no dyspnea on exertion, no swelling of ankles, no orthostatic dizziness or lightheadedness, no orthopnea or paroxysmal nocturnal dyspnea. BP Readings from Last 3 Encounters:   08/30/22 119/71   07/28/22 117/61   02/18/22 122/71       DVT History: Takes Blood thinner. Health Maintenance  Health Maintenance Due   Topic Date Due    Shingrix Vaccine Age 49> (2 of 2) 09/29/2020    COVID-19 Vaccine (3 - Booster for Micheal Lords series) 08/09/2021    Medicare Yearly Exam  07/27/2022       Past Medical, Family, and Social History:     Current Outpatient Medications on File Prior to Visit   Medication Sig Dispense Refill    apixaban (Eliquis) 5 mg tablet TAKE 1 TABLET TWICE A  Tablet 3    amLODIPine (NORVASC) 5 mg tablet TAKE ONE TABLET BY MOUTH EVERY DAY 90 Tablet 1    budesonide-formoteroL (Symbicort) 160-4.5 mcg/actuation HFAA USE 2 INHALATIONS TWICE A DAY 2 Each 3    fluticasone propionate (FLONASE) 50 mcg/actuation nasal spray USE 2 SPRAYS IN BOTH NOSTRILS DAILY 16 g 3    ProAir HFA 90 mcg/actuation inhaler INHALE 2 PUFFS BY MOUTH EVERY 4 HOURS AS NEEDED FOR WHEEZING 17 g 3    methotrexate (RHEUMATREX) 2.5 mg tablet Take 22.5 mg by mouth every Monday. famotidine (PEPCID) 20 mg tablet Take 20 mg by mouth two (2) times a day. As needed      folic acid (FOLVITE) 1 mg tablet Take 3 Tabs by mouth daily. 90 Tab 2    acetaminophen (TYLENOL) 500 mg tablet Take 1,000 mg by mouth every six (6) hours as needed for Pain. cholecalciferol, vitamin D3, 100 mcg (4,000 unit) cap Take  by mouth daily.       gabapentin (NEURONTIN) 100 mg capsule       benzonatate (TESSALON) 200 mg capsule Take 1 Capsule by mouth three (3) times daily as needed for Cough. (Patient not taking: No sig reported) 30 Capsule 0    levocetirizine (XYZAL) 5 mg tablet Take 1 Tablet by mouth nightly. (Patient not taking: Reported on 7/28/2022) 90 Tablet 1    triamcinolone acetonide (KENALOG) 0.1 % ointment Apply  to affected area two (2) times a day. use thin layer in the affected area of the left leg (Patient not taking: No sig reported) 1 Tube 0    azelastine (ASTELIN) 137 mcg (0.1 %) nasal spray  (Patient not taking: No sig reported)      atorvastatin (LIPITOR) 10 mg tablet TAKE ONE TABLET BY MOUTH EVERY DAY (Patient not taking: Reported on 8/30/2022) 90 Tab 2    mupirocin (BACTROBAN) 2 % ointment  (Patient not taking: Reported on 7/28/2022)  0    Hot Water Bottle (WATER BOTTLE) misc Please Dispense a humidifier bottle to patient to use with her oxygen. (Patient not taking: No sig reported) 1 Each 0    albuterol (PROVENTIL VENTOLIN) 2.5 mg /3 mL (0.083 %) nebulizer solution USE 1 VIAL IN NEBULIZER EVERY 4 HOURS AS NEEDED FOR WHEEZING (Patient not taking: Reported on 8/30/2022) 100 Each 4    infliximab (REMICADE IV) by IntraVENous route. Every 6 weeks      UBIDECARENONE (COQ-10 PO) Take  by mouth. (Patient not taking: No sig reported)      omega-3s-dha-epa-fish oil 300-1,000 mg cpDR Take  by mouth daily. (Patient not taking: Reported on 7/28/2022)       No current facility-administered medications on file prior to visit.        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    Colon polyp, hyperplastic K63.5    Asthma J45.909    Primary generalized (osteo)arthritis M15.0    PAC (premature atrial contraction) I49.1    Lumbar spinal stenosis M48.061    Primary localized osteoarthritis of right hip M16.11    Obesity, morbid (HCC) E66.01    Essential hypertension I10    Chronic rhinitis J31.0    Allergic contact dermatitis L23.9    History of DVT of lower extremity Z86.718    Hx of pulmonary embolus Z86.711    Pain of left lower leg M79.662    Cervical spinal stenosis M48.02       Social History     Socioeconomic History    Marital status: SINGLE   Tobacco Use    Smoking status: Former     Packs/day: 1.00     Years: 20.00     Pack years: 20.00     Types: Cigarettes     Quit date: 1985     Years since quittin.3    Smokeless tobacco: Never   Substance and Sexual Activity    Alcohol use: No    Drug use: No    Sexual activity: Never        Review of Systems   Review of Systems   Constitutional:  Negative for chills and fever. HENT:  Negative for congestion and nosebleeds. Cardiovascular:  Negative for chest pain and palpitations. Gastrointestinal:  Negative for abdominal pain, nausea and vomiting. Musculoskeletal:  Positive for back pain and joint pain. Objective:   Visit Vitals  /71 (BP 1 Location: Left arm, BP Patient Position: Sitting, BP Cuff Size: Large adult)   Pulse 73   Temp 97.5 °F (36.4 °C) (Oral)   Resp 18   Ht 5' 11\" (1.803 m)   Wt 274 lb (124.3 kg)   SpO2 97%   BMI 38.22 kg/m²        Physical Exam  Vitals and nursing note reviewed. Constitutional:       Appearance: Normal appearance. HENT:      Head: Normocephalic and atraumatic. Cardiovascular:      Rate and Rhythm: Normal rate and regular rhythm. Pulses: Normal pulses. Heart sounds: Normal heart sounds. No murmur heard. No friction rub. No gallop. Pulmonary:      Effort: Pulmonary effort is normal.      Breath sounds: Normal breath sounds. Abdominal:      General: Abdomen is flat. Bowel sounds are normal.      Palpations: Abdomen is soft. Musculoskeletal:      Cervical back: Normal range of motion and neck supple.    Skin:     Comments: Area with raised red lesion on the right forearm that is nontnder. Neurological:      General: No focal deficit present. Mental Status: She is alert and oriented to person, place, and time. Pertinent Labs/Studies:      Assessment and orders:       ICD-10-CM ICD-9-CM    1. Rash  R21 782.1       2. Essential hypertension  I10 401.9 CBC W/O DIFF      METABOLIC PANEL, COMPREHENSIVE      3. 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[facility]  M05.10 714.81       4. Primary generalized (osteo)arthritis  M15.0 715.09       5. High cholesterol  E78.00 272.0 LIPID PANEL      6. Obesity, morbid ([facility]  E66.01 278.01       7. Hx of pulmonary embolus  Z86.711 V12.55         Diagnoses and all orders for this visit:    1. Rash:Like viral exanthem, is improving, will monitor and getting labs now. 2. Essential hypertension  -     CBC W/O DIFF; Future  -     METABOLIC PANEL, COMPREHENSIVE; Future    3. 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[facility]    4. Primary generalized (osteo)arthritis    5. High cholesterol  -     LIPID PANEL; Future    6. Obesity, morbid ([facility]    7. Hx of pulmonary embolus    Follow-up and Dispositions    Return in about 3 months (around 11/30/2022). I have discussed the diagnosis with the patient and the intended plan as seen in the above orders. Social history, medical history, and labs were reviewed. The patient has received an after-visit summary and questions were answered concerning future plans. I have discussed medication side effects and warnings with the patient as well.     MD FAVIOLA Shay & NATALIA BECKETT [facility]  08/30/22

## 2022-08-31 LAB
ALBUMIN SERPL-MCNC: 3.7 G/DL (ref 3.5–5)
ALBUMIN/GLOB SERPL: 1.1 {RATIO} (ref 1.1–2.2)
ALP SERPL-CCNC: 85 U/L (ref 45–117)
ALT SERPL-CCNC: 22 U/L (ref 12–78)
ANION GAP SERPL CALC-SCNC: 7 MMOL/L (ref 5–15)
AST SERPL-CCNC: 22 U/L (ref 15–37)
BILIRUB SERPL-MCNC: 0.6 MG/DL (ref 0.2–1)
BUN SERPL-MCNC: 22 MG/DL (ref 6–20)
BUN/CREAT SERPL: 37 (ref 12–20)
CALCIUM SERPL-MCNC: 9.3 MG/DL (ref 8.5–10.1)
CHLORIDE SERPL-SCNC: 107 MMOL/L (ref 97–108)
CHOLEST SERPL-MCNC: 176 MG/DL
CO2 SERPL-SCNC: 27 MMOL/L (ref 21–32)
CREAT SERPL-MCNC: 0.59 MG/DL (ref 0.55–1.02)
ERYTHROCYTE [DISTWIDTH] IN BLOOD BY AUTOMATED COUNT: 14.6 % (ref 11.5–14.5)
GLOBULIN SER CALC-MCNC: 3.5 G/DL (ref 2–4)
GLUCOSE SERPL-MCNC: 86 MG/DL (ref 65–100)
HCT VFR BLD AUTO: 37.9 % (ref 35–47)
HDLC SERPL-MCNC: 57 MG/DL
HDLC SERPL: 3.1 {RATIO} (ref 0–5)
HGB BLD-MCNC: 11.7 G/DL (ref 11.5–16)
LDLC SERPL CALC-MCNC: 101 MG/DL (ref 0–100)
MCH RBC QN AUTO: 31.3 PG (ref 26–34)
MCHC RBC AUTO-ENTMCNC: 30.9 G/DL (ref 30–36.5)
MCV RBC AUTO: 101.3 FL (ref 80–99)
NRBC # BLD: 0 K/UL (ref 0–0.01)
NRBC BLD-RTO: 0 PER 100 WBC
PLATELET # BLD AUTO: 143 K/UL (ref 150–400)
PMV BLD AUTO: 11.9 FL (ref 8.9–12.9)
POTASSIUM SERPL-SCNC: 4.2 MMOL/L (ref 3.5–5.1)
PROT SERPL-MCNC: 7.2 G/DL (ref 6.4–8.2)
RBC # BLD AUTO: 3.74 M/UL (ref 3.8–5.2)
SODIUM SERPL-SCNC: 141 MMOL/L (ref 136–145)
TRIGL SERPL-MCNC: 90 MG/DL (ref ?–150)
VLDLC SERPL CALC-MCNC: 18 MG/DL
WBC # BLD AUTO: 6.1 K/UL (ref 3.6–11)

## 2022-10-05 ENCOUNTER — CLINICAL SUPPORT (OUTPATIENT)
Dept: FAMILY MEDICINE CLINIC | Age: 77
End: 2022-10-05
Payer: MEDICARE

## 2022-10-05 VITALS
HEART RATE: 72 BPM | BODY MASS INDEX: 38.36 KG/M2 | TEMPERATURE: 98.3 F | DIASTOLIC BLOOD PRESSURE: 71 MMHG | HEIGHT: 71 IN | SYSTOLIC BLOOD PRESSURE: 153 MMHG | OXYGEN SATURATION: 94 % | WEIGHT: 274 LBS | RESPIRATION RATE: 20 BRPM

## 2022-10-05 DIAGNOSIS — Z23 ENCOUNTER FOR IMMUNIZATION: Primary | ICD-10-CM

## 2022-10-05 PROCEDURE — 90694 VACC AIIV4 NO PRSRV 0.5ML IM: CPT | Performed by: FAMILY MEDICINE

## 2022-10-05 PROCEDURE — G0008 ADMIN INFLUENZA VIRUS VAC: HCPCS | Performed by: FAMILY MEDICINE

## 2022-10-05 NOTE — PROGRESS NOTES
Tulio Crain is a 68 y.o. female who presents for routine immunizations. She denies any symptoms , reactions or allergies that would exclude them from being immunized today. Risks and adverse reactions were discussed and the VIS was given to them. All questions were addressed.       Oksana Berry LPN

## 2022-10-18 ENCOUNTER — TELEPHONE (OUTPATIENT)
Dept: FAMILY MEDICINE CLINIC | Age: 77
End: 2022-10-18

## 2022-10-18 NOTE — TELEPHONE ENCOUNTER
Letter written.     MD FAVIOLA Love & NATALIA BECKETT [facility]  10/18/22

## 2022-10-18 NOTE — TELEPHONE ENCOUNTER
----- Message from [facility] sent at 10/18/2022  9:00 AM EDT -----  Subject: Message to Provider    QUESTIONS  Information for Provider? Patient is in need of another form to be filled   out by Dr. Fifi Moran III in regards to having her mailbox being   moved from the opposite side of the road to the side of the patient house   due to patient inability to walk safely across the street. Patient said   she can pick the form up when it's ready and would like a call back as   well. If patient can't be reached by the home phone please call her mobile   number at [phone]  ---------------------------------------------------------------------------  --------------  [address] [facility]  [phone]; OK to leave message on voicemail, OK to respond with   electronic message via [employer] portal (only for patients who have   registered [employer] account)  ---------------------------------------------------------------------------  --------------  SCRIPT ANSWERS  Relationship to Patient?  Self

## 2022-10-18 NOTE — LETTER
10/18/2022 3:23 PM    Ms. Trever Viveros  [address] [address]      To Whom It May Concern:    Albania Morris is currently under the care of Ty Hoang. Due to mobility issues secondary to chronic/permanent conditions I am requesting that Mrs. Viveros Mailbox be moved from the opposite side of the road so she can access it safely. If there are questions or concerns please have the patient contact our office.         Sincerely,      Torrance Goldmann, MD

## 2022-12-30 ENCOUNTER — OFFICE VISIT (OUTPATIENT)
Dept: FAMILY MEDICINE CLINIC | Age: 77
End: 2022-12-30
Payer: MEDICARE

## 2022-12-30 VITALS
SYSTOLIC BLOOD PRESSURE: 128 MMHG | RESPIRATION RATE: 16 BRPM | TEMPERATURE: 97.9 F | OXYGEN SATURATION: 93 % | BODY MASS INDEX: 38.94 KG/M2 | WEIGHT: 279.2 LBS | HEART RATE: 69 BPM | DIASTOLIC BLOOD PRESSURE: 73 MMHG

## 2022-12-30 DIAGNOSIS — M05.10 RHEUMATOID LUNG DISEASE (HCC): Chronic | ICD-10-CM

## 2022-12-30 DIAGNOSIS — E78.00 HIGH CHOLESTEROL: Chronic | ICD-10-CM

## 2022-12-30 DIAGNOSIS — M15.0 PRIMARY GENERALIZED (OSTEO)ARTHRITIS: Chronic | ICD-10-CM

## 2022-12-30 DIAGNOSIS — M06.9 RHEUMATOID ARTHRITIS INVOLVING MULTIPLE SITES, UNSPECIFIED WHETHER RHEUMATOID FACTOR PRESENT (HCC): Primary | ICD-10-CM

## 2022-12-30 DIAGNOSIS — I10 ESSENTIAL HYPERTENSION: Chronic | ICD-10-CM

## 2022-12-30 PROCEDURE — 3078F DIAST BP <80 MM HG: CPT | Performed by: FAMILY MEDICINE

## 2022-12-30 PROCEDURE — 1123F ACP DISCUSS/DSCN MKR DOCD: CPT | Performed by: FAMILY MEDICINE

## 2022-12-30 PROCEDURE — G8536 NO DOC ELDER MAL SCRN: HCPCS | Performed by: FAMILY MEDICINE

## 2022-12-30 PROCEDURE — 3074F SYST BP LT 130 MM HG: CPT | Performed by: FAMILY MEDICINE

## 2022-12-30 PROCEDURE — G8427 DOCREV CUR MEDS BY ELIG CLIN: HCPCS | Performed by: FAMILY MEDICINE

## 2022-12-30 PROCEDURE — G8417 CALC BMI ABV UP PARAM F/U: HCPCS | Performed by: FAMILY MEDICINE

## 2022-12-30 PROCEDURE — G8510 SCR DEP NEG, NO PLAN REQD: HCPCS | Performed by: FAMILY MEDICINE

## 2022-12-30 PROCEDURE — 1090F PRES/ABSN URINE INCON ASSESS: CPT | Performed by: FAMILY MEDICINE

## 2022-12-30 PROCEDURE — 1101F PT FALLS ASSESS-DOCD LE1/YR: CPT | Performed by: FAMILY MEDICINE

## 2022-12-30 PROCEDURE — 99214 OFFICE O/P EST MOD 30 MIN: CPT | Performed by: FAMILY MEDICINE

## 2022-12-30 PROCEDURE — G8399 PT W/DXA RESULTS DOCUMENT: HCPCS | Performed by: FAMILY MEDICINE

## 2022-12-30 NOTE — PROGRESS NOTES
1. Have you been to the ER, urgent care clinic since your last visit? Hospitalized since your last visit? No    2. Have you seen or consulted any other health care providers outside of the [address] [place] [address] since your last visit? Include any pap smears or colon screening. Yes When: ortho  . [facility] office dr Marcella Morgan, [facility] [place] [place] NP     3. For patients aged 39-70: Has the patient had a colonoscopy / FIT/ Cologuard? NA - based on age    If the patient is female:    4. For patients aged 41-77: Has the patient had a mammogram within the past 2 years? NA - based on age or sex      11. For patients aged 21-65: Has the patient had a pap smear? NA - based on age or sex     Reviewed record in preparation for visit and have necessary documentation  Pt did not bring medication to office visit for review  Patient is accompanied by self I have received verbal consent from Zak Ingram to discuss any/all medical information while they are present in the room.     Goals that were addressed and/or need to be completed during or after this appointment include     Health Maintenance Due   Topic Date Due    Shingles Vaccine (2 of 2) 09/29/2020    COVID-19 Vaccine (3 - Booster for Moderna series) 05/04/2021

## 2022-12-30 NOTE — PROGRESS NOTES
[facility]    History of Present Illness:   Mariaelena Moore is a 68 y.o. female with history of HTN, PAC, Asthma, OA, Bronchitis, DVT  CC: Rash  History provided by patient and Records    HPI:  Arthritis: Patient sees specialists but noting significant issues with joint pains and muscle aches. Patient is interested in Pool therapy at the [facility]    Hypertriglyceridemia Follow up:   Cardiovascular risks for her are: hypertension  hyperlipidemia. Current Medications:  Key Antihyperlipidemia Meds               atorvastatin (LIPITOR) 10 mg tablet TAKE ONE TABLET BY MOUTH EVERY DAY    omega-3s-dha-epa-fish oil 300-1,000 mg cpDR Take  by mouth daily. Compliance: YES              Myalgias: NO              Fatigue: NO              Other side effects: NO     Wt Readings from Last 3 Encounters:   12/30/22 279 lb 3.2 oz (126.6 kg)   10/05/22 274 lb (124.3 kg)   08/30/22 274 lb (124.3 kg)       Lab Results   Component Value Date/Time    Cholesterol, total 176 08/30/2022 09:35 AM    HDL Cholesterol 57 08/30/2022 09:35 AM    LDL, calculated 101 (H) 08/30/2022 09:35 AM    VLDL, calculated 18 08/30/2022 09:35 AM    Triglyceride 90 08/30/2022 09:35 AM    CHOL/HDL Ratio 3.1 08/30/2022 09:35 AM      Lab Results   Component Value Date/Time    ALT (SGPT) 22 08/30/2022 09:35 AM    AST (SGOT) 22 08/30/2022 09:35 AM    Alk. phosphatase 85 08/30/2022 09:35 AM    Bilirubin, direct 0.2 06/10/2020 09:30 AM    Bilirubin, total 0.6 08/30/2022 09:35 AM      Hypertension Follow up:  Currently Taking:   Key CAD CHF Meds               amLODIPine (NORVASC) 5 mg tablet (Taking) TAKE ONE TABLET BY MOUTH EVERY DAY    apixaban (Eliquis) 5 mg tablet (Taking) TAKE 1 TABLET TWICE A DAY    atorvastatin (LIPITOR) 10 mg tablet TAKE ONE TABLET BY MOUTH EVERY DAY    omega-3s-dha-epa-fish oil 300-1,000 mg cpDR Take  by mouth daily.            The patient reports:  taking medications as instructed, no medication side effects noted, no TIA's, no chest pain on exertion, no dyspnea on exertion, no swelling of ankles, no orthostatic dizziness or lightheadedness, no orthopnea or paroxysmal nocturnal dyspnea. BP Readings from Last 3 Encounters:   12/30/22 128/73   10/05/22 (!) 153/71   08/30/22 119/71      DVT History: Takes Blood thinner    Health Maintenance  Health Maintenance Due   Topic Date Due    Shingles Vaccine (2 of 2) 09/29/2020    COVID-19 Vaccine (3 - Booster for Moderna series) 05/04/2021       Past Medical, Family, and Social History:     Current Outpatient Medications on File Prior to Visit   Medication Sig Dispense Refill    tiotropium bromide (Spiriva Respimat) 1.25 mcg/actuation inhaler       amLODIPine (NORVASC) 5 mg tablet TAKE ONE TABLET BY MOUTH EVERY DAY 90 Tablet 1    gabapentin (NEURONTIN) 100 mg capsule       apixaban (Eliquis) 5 mg tablet TAKE 1 TABLET TWICE A  Tablet 3    budesonide-formoteroL (Symbicort) 160-4.5 mcg/actuation HFAA USE 2 INHALATIONS TWICE A DAY 2 Each 3    fluticasone propionate (FLONASE) 50 mcg/actuation nasal spray USE 2 SPRAYS IN BOTH NOSTRILS DAILY 16 g 3    ProAir HFA 90 mcg/actuation inhaler INHALE 2 PUFFS BY MOUTH EVERY 4 HOURS AS NEEDED FOR WHEEZING 17 g 3    methotrexate (RHEUMATREX) 2.5 mg tablet Take 22.5 mg by mouth every Monday. Every Thursday      famotidine (PEPCID) 20 mg tablet Take 20 mg by mouth two (2) times a day. As needed      folic acid (FOLVITE) 1 mg tablet Take 3 Tabs by mouth daily. 90 Tab 2    infliximab (REMICADE IV) by IntraVENous route. Every 6 weeks      acetaminophen (TYLENOL) 500 mg tablet Take 1,000 mg by mouth every six (6) hours as needed for Pain. cholecalciferol, vitamin D3, 100 mcg (4,000 unit) cap Take  by mouth daily. [DISCONTINUED] benzonatate (TESSALON) 200 mg capsule Take 1 Capsule by mouth three (3) times daily as needed for Cough.  (Patient not taking: Reported on 12/30/2022) 30 Capsule 0    levocetirizine (XYZAL) 5 mg tablet Take 1 Tablet by mouth nightly. (Patient not taking: Reported on 12/30/2022) 90 Tablet 1    triamcinolone acetonide (KENALOG) 0.1 % ointment Apply  to affected area two (2) times a day. use thin layer in the affected area of the left leg (Patient not taking: No sig reported) 1 Tube 0    azelastine (ASTELIN) 137 mcg (0.1 %) nasal spray  (Patient not taking: Reported on 12/30/2022)      atorvastatin (LIPITOR) 10 mg tablet TAKE ONE TABLET BY MOUTH EVERY DAY (Patient not taking: No sig reported) 90 Tab 2    mupirocin (BACTROBAN) 2 % ointment  (Patient not taking: No sig reported)  0    Hot Water Bottle (WATER BOTTLE) misc Please Dispense a humidifier bottle to patient to use with her oxygen. (Patient not taking: No sig reported) 1 Each 0    albuterol (PROVENTIL VENTOLIN) 2.5 mg /3 mL (0.083 %) nebulizer solution USE 1 VIAL IN NEBULIZER EVERY 4 HOURS AS NEEDED FOR WHEEZING (Patient not taking: No sig reported) 100 Each 4    UBIDECARENONE (COQ-10 PO) Take  by mouth. (Patient not taking: No sig reported)      omega-3s-dha-epa-fish oil 300-1,000 mg cpDR Take  by mouth daily. (Patient not taking: No sig reported)       No current facility-administered medications on file prior to visit.        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    Colon polyp, hyperplastic K63.5    Asthma J45.909    Primary generalized (osteo)arthritis M15.0    PAC (premature atrial contraction) I49.1    Lumbar spinal stenosis M48.061    Primary localized osteoarthritis of right hip M16.11    Obesity, morbid (HCC) E66.01    Essential hypertension I10    Chronic rhinitis J31.0    Allergic contact dermatitis L23.9 History of DVT of lower extremity Z86.718    Hx of pulmonary embolus Z86.711    Pain of left lower leg M79.662    Cervical spinal stenosis M48.02       Social History     Socioeconomic History    Marital status: SINGLE   Tobacco Use    Smoking status: Former     Packs/day: 1.00     Years: 20.00     Pack years: 20.00     Types: Cigarettes     Quit date: 1985     Years since quittin.6    Smokeless tobacco: Never   Substance and Sexual Activity    Alcohol use: No    Drug use: No    Sexual activity: Never        Review of Systems   Review of Systems   Constitutional:  Negative for chills and fever. Cardiovascular:  Negative for chest pain and palpitations. Gastrointestinal:  Negative for nausea and vomiting. Musculoskeletal:  Positive for joint pain and myalgias. Neurological:  Negative for dizziness and headaches. Objective:   Visit Vitals  /73 (BP 1 Location: Left upper arm, BP Patient Position: Sitting, BP Cuff Size: Large adult)   Pulse 69   Temp 97.9 °F (36.6 °C) (Oral)   Resp 16   Wt 279 lb 3.2 oz (126.6 kg)   SpO2 93%   BMI 38.94 kg/m²        Physical Exam  Vitals and nursing note reviewed. Constitutional:       Appearance: Normal appearance. HENT:      Head: Normocephalic and atraumatic. Cardiovascular:      Rate and Rhythm: Normal rate and regular rhythm. Pulses: Normal pulses. Heart sounds: Normal heart sounds. No murmur heard. No friction rub. No gallop. Pulmonary:      Effort: Pulmonary effort is normal.      Breath sounds: Normal breath sounds. Abdominal:      General: Abdomen is flat. Bowel sounds are normal.      Palpations: Abdomen is soft. Musculoskeletal:      Cervical back: Normal range of motion and neck supple. Comments: Bilateral knee pains   Skin:     General: Skin is warm and dry. Neurological:      Mental Status: She is alert. Pertinent Labs/Studies:      Assessment and orders:       ICD-10-CM ICD-9-CM    1.  Rheumatoid arthritis involving multiple sites, unspecified whether rheumatoid factor present ([facility]  M06.9 714.0 REFERRAL TO PHYSICAL THERAPY      2. Primary generalized (osteo)arthritis  M15.0 715.09 REFERRAL TO PHYSICAL THERAPY      3. Essential hypertension  I10 401.9 CBC W/O DIFF      METABOLIC PANEL, COMPREHENSIVE      4. 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[facility]  M05.10 714.81       5. High cholesterol  E78.00 272.0 LIPID PANEL        Diagnoses and all orders for this visit:    1. Rheumatoid arthritis involving multiple sites, unspecified whether rheumatoid factor present [facility]): Referral for Pool Therapy  -     REFERRAL TO PHYSICAL THERAPY    2. Primary generalized (osteo)arthritis  -     REFERRAL TO PHYSICAL THERAPY    3. Essential hypertension: Our goal is to normalize the blood pressure to decrease the risks of strokes and heart attacks. The patient is in agreement with the plan. -     CBC W/O DIFF; Future  -     METABOLIC PANEL, COMPREHENSIVE; Future    4. 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[facility]    5. High cholesterol: The patient is aware of our goal to reduce or eliminate the long term problems (such as strokes and heart attacks) related to poorly controlled Triglycerides, LDL, Cholesterol.   -     LIPID PANEL; Future    Follow-up and Dispositions    Return in about 4 months (around 4/30/2023) for Follow u Chronic. I have discussed the diagnosis with the patient and the intended plan as seen in the above orders. Social history, medical history, and labs were reviewed. The patient has received an after-visit summary and questions were answered concerning future plans. I have discussed medication side effects and warnings with the patient as well.     MD FAVIOLA Dunbar & NATALIA BECKETT [facility]  12/30/22

## 2022-12-31 LAB
ALBUMIN SERPL-MCNC: 3.5 G/DL (ref 3.5–5)
ALBUMIN/GLOB SERPL: 1 {RATIO} (ref 1.1–2.2)
ALP SERPL-CCNC: 79 U/L (ref 45–117)
ALT SERPL-CCNC: 21 U/L (ref 12–78)
ANION GAP SERPL CALC-SCNC: 4 MMOL/L (ref 5–15)
AST SERPL-CCNC: 22 U/L (ref 15–37)
BILIRUB SERPL-MCNC: 0.6 MG/DL (ref 0.2–1)
BUN SERPL-MCNC: 15 MG/DL (ref 6–20)
BUN/CREAT SERPL: 26 (ref 12–20)
CALCIUM SERPL-MCNC: 9 MG/DL (ref 8.5–10.1)
CHLORIDE SERPL-SCNC: 108 MMOL/L (ref 97–108)
CHOLEST SERPL-MCNC: 180 MG/DL
CO2 SERPL-SCNC: 28 MMOL/L (ref 21–32)
CREAT SERPL-MCNC: 0.58 MG/DL (ref 0.55–1.02)
ERYTHROCYTE [DISTWIDTH] IN BLOOD BY AUTOMATED COUNT: 15.1 % (ref 11.5–14.5)
GLOBULIN SER CALC-MCNC: 3.6 G/DL (ref 2–4)
GLUCOSE SERPL-MCNC: 83 MG/DL (ref 65–100)
HCT VFR BLD AUTO: 36.7 % (ref 35–47)
HDLC SERPL-MCNC: 63 MG/DL
HDLC SERPL: 2.9 {RATIO} (ref 0–5)
HGB BLD-MCNC: 11.1 G/DL (ref 11.5–16)
LDLC SERPL CALC-MCNC: 98.8 MG/DL (ref 0–100)
MCH RBC QN AUTO: 30.6 PG (ref 26–34)
MCHC RBC AUTO-ENTMCNC: 30.2 G/DL (ref 30–36.5)
MCV RBC AUTO: 101.1 FL (ref 80–99)
NRBC # BLD: 0 K/UL (ref 0–0.01)
NRBC BLD-RTO: 0 PER 100 WBC
PLATELET # BLD AUTO: 189 K/UL (ref 150–400)
PMV BLD AUTO: 11.8 FL (ref 8.9–12.9)
POTASSIUM SERPL-SCNC: 4.4 MMOL/L (ref 3.5–5.1)
PROT SERPL-MCNC: 7.1 G/DL (ref 6.4–8.2)
RBC # BLD AUTO: 3.63 M/UL (ref 3.8–5.2)
SODIUM SERPL-SCNC: 140 MMOL/L (ref 136–145)
TRIGL SERPL-MCNC: 91 MG/DL (ref ?–150)
VLDLC SERPL CALC-MCNC: 18.2 MG/DL
WBC # BLD AUTO: 10 K/UL (ref 3.6–11)

## 2023-01-03 ENCOUNTER — TELEPHONE (OUTPATIENT)
Dept: FAMILY MEDICINE CLINIC | Age: 78
End: 2023-01-03

## 2023-01-03 NOTE — TELEPHONE ENCOUNTER
Routed referral again, noting in comments of cover page that electronic signature is at bottom of referral.

## 2023-01-03 NOTE — TELEPHONE ENCOUNTER
Ford Escalante from the [facility] called to advise that the referral she received for PT does not have the ordering physician signature. Fax number. [phone]

## 2023-01-09 ENCOUNTER — TRANSCRIBE ORDER (OUTPATIENT)
Dept: SCHEDULING | Age: 78
End: 2023-01-09

## 2023-01-09 DIAGNOSIS — Z12.31 SCREENING MAMMOGRAM FOR HIGH-RISK PATIENT: Primary | ICD-10-CM

## 2023-01-10 ENCOUNTER — TELEPHONE (OUTPATIENT)
Dept: FAMILY MEDICINE CLINIC | Age: 78
End: 2023-01-10

## 2023-01-10 NOTE — TELEPHONE ENCOUNTER
Call placed to pt and message left. Orders for water therapy faxed to the [facility].

## 2023-01-10 NOTE — TELEPHONE ENCOUNTER
----- Message from [facility] sent at 1/10/2023 11:53 AM EST -----  Subject: Message to Provider    QUESTIONS  Information for Provider? Patient called in to send a message to her   provider. She stated that her and her provider discussed water therapy. Provider took the information about the water therapy. Patient is wanting   to know if her provider is going to give her a prescription for water   therapy or has the provider contact the provider for the water therapy. Please contact patient to further assist.   ---------------------------------------------------------------------------  --------------  Mary Jo MA  [phone]; OK to leave message on voicemail  ---------------------------------------------------------------------------  --------------  SCRIPT ANSWERS  Relationship to Patient?  Self

## 2023-01-17 ENCOUNTER — HOSPITAL ENCOUNTER (OUTPATIENT)
Dept: MAMMOGRAPHY | Age: 78
Discharge: HOME | End: 2023-01-17
Attending: FAMILY MEDICINE
Payer: MEDICARE

## 2023-01-17 DIAGNOSIS — Z12.31 SCREENING MAMMOGRAM FOR HIGH-RISK PATIENT: ICD-10-CM

## 2023-01-17 PROCEDURE — 77063 BREAST TOMOSYNTHESIS BI: CPT

## 2023-03-21 ENCOUNTER — OFFICE VISIT (OUTPATIENT)
Dept: FAMILY MEDICINE CLINIC | Age: 78
End: 2023-03-21

## 2023-03-21 VITALS
RESPIRATION RATE: 20 BRPM | DIASTOLIC BLOOD PRESSURE: 61 MMHG | BODY MASS INDEX: 39.76 KG/M2 | HEIGHT: 71 IN | OXYGEN SATURATION: 95 % | HEART RATE: 75 BPM | WEIGHT: 284 LBS | SYSTOLIC BLOOD PRESSURE: 110 MMHG | TEMPERATURE: 98 F

## 2023-03-21 DIAGNOSIS — K92.1 BLOOD IN STOOL: Primary | ICD-10-CM

## 2023-03-21 PROBLEM — I27.20 PULMONARY HTN (HCC): Status: ACTIVE | Noted: 2023-03-21

## 2023-03-21 LAB — HGB BLD-MCNC: 11.3 G/DL

## 2023-03-21 NOTE — PROGRESS NOTES
1. \"Have you been to the ER, urgent care clinic since your last visit? Hospitalized since your last visit? \" No    2. \"Have you seen or consulted any other health care providers outside of the [address] [place] [address] since your last visit? \" No     3. For patients aged 39-70: Has the patient had a colonoscopy / FIT/ Cologuard? Yes - no Care Gap present      If the patient is female:    4. For patients aged 41-77: Has the patient had a mammogram within the past 2 years? NA - based on age or sex      11. For patients aged 21-65: Has the patient had a pap smear?  NA - based on age or sex    Health Maintenance Due   Topic Date Due    Shingles Vaccine (2 of 2) 09/29/2020    COVID-19 Vaccine (3 - Booster for Moderna series) 05/04/2021

## 2023-03-21 NOTE — PROGRESS NOTES
[address] [address] [facility], [facility] 24  P ([phone])  Date of visit:  3/24/2023    July Franks is a  68 y.o. female presents for blood in stool. Started 4-5 days ago. Red halo around the stool and red on the stool. A little bit of blood. Does not see blood when she wipes. No melena. Thought it was related to the sweet potatoes she ate. On Eliquis due to PE/DVT. Negative for nose bleed, vaginal bleeding. Has  intermittent LLQ abd pain. Ongoing since 2021. Negative for fever, no history of hemorrhoids. No diarrhea or constipation. Declined rectal exam.       Allergies   Allergies   Allergen Reactions    Orudis [Ketoprofen] Palpitations    Singulair [Montelukast] Rash    Arthrotec 50 [Diclofenac-Misoprostol] Palpitations    Levaquin [Levofloxacin] Other (comments)     Hip joints swelling and painful per pt report    Lyrica [Pregabalin] Other (comments)     Saw spiders. Medications  Current Outpatient Medications   Medication Sig    amLODIPine (NORVASC) 5 mg tablet TAKE ONE TABLET BY MOUTH EVERY DAY    gabapentin (NEURONTIN) 100 mg capsule     apixaban (Eliquis) 5 mg tablet TAKE 1 TABLET TWICE A DAY    budesonide-formoteroL (Symbicort) 160-4.5 mcg/actuation HFAA USE 2 INHALATIONS TWICE A DAY    fluticasone propionate (FLONASE) 50 mcg/actuation nasal spray USE 2 SPRAYS IN BOTH NOSTRILS DAILY    ProAir HFA 90 mcg/actuation inhaler INHALE 2 PUFFS BY MOUTH EVERY 4 HOURS AS NEEDED FOR WHEEZING    methotrexate (RHEUMATREX) 2.5 mg tablet Take 22.5 mg by mouth every Monday. Every Thursday    famotidine (PEPCID) 20 mg tablet Take 20 mg by mouth two (2) times a day. As needed    folic acid (FOLVITE) 1 mg tablet Take 3 Tabs by mouth daily. infliximab (REMICADE IV) by IntraVENous route. Every 6 weeks    acetaminophen (TYLENOL) 500 mg tablet Take 1,000 mg by mouth every six (6) hours as needed for Pain.     cholecalciferol, vitamin D3, 100 mcg (4,000 unit) cap Take  by mouth daily. tiotropium bromide (SPIRIVA RESPIMAT) 1.25 mcg/actuation inhaler  (Patient not taking: Reported on 3/21/2023)    levocetirizine (XYZAL) 5 mg tablet Take 1 Tablet by mouth nightly. (Patient not taking: No sig reported)    triamcinolone acetonide (KENALOG) 0.1 % ointment Apply  to affected area two (2) times a day. use thin layer in the affected area of the left leg (Patient not taking: No sig reported)    azelastine (ASTELIN) 137 mcg (0.1 %) nasal spray  (Patient not taking: No sig reported)    atorvastatin (LIPITOR) 10 mg tablet TAKE ONE TABLET BY MOUTH EVERY DAY (Patient not taking: No sig reported)    mupirocin (BACTROBAN) 2 % ointment  (Patient not taking: No sig reported)    Hot Water Bottle (WATER BOTTLE) misc Please Dispense a humidifier bottle to patient to use with her oxygen. (Patient not taking: No sig reported)    albuterol (PROVENTIL VENTOLIN) 2.5 mg /3 mL (0.083 %) nebulizer solution USE 1 VIAL IN NEBULIZER EVERY 4 HOURS AS NEEDED FOR WHEEZING (Patient not taking: No sig reported)    UBIDECARENONE (COQ-10 PO) Take  by mouth. (Patient not taking: No sig reported)    omega-3s-dha-epa-fish oil 300-1,000 mg cpDR Take  by mouth daily. (Patient not taking: No sig reported)     No current facility-administered medications for this visit. Medical History  Past Medical History:   Diagnosis Date    Anemia, unspecified 4/15/2010    Asthma     Cardiac dysrhythmia, unspecified 4/15/2010    PAT- not on medication/ not seeing cardiologist    High cholesterol 4/30/2010    History of DVT of lower extremity 6/6/2019    Right leg (3/2019)    Hypertension     Ill-defined condition     PE and DVT to Right leg.      Obesity, unspecified 4/15/2010    Osteoarthrosis, unspecified whether generalized or localized, other specified sites 4/15/2010    Pulmonary embolism ([facility] [address]     Rheumatoid arthritis(714.0) 4/15/2010    Spinal stenosis, unspecified region other than cervical 4/15/2010    Unspecified asthma(493.90) 4/15/2010    rheumatoid lung disease       Immunizations   Immunization History   Administered Date(s) Administered    (RETIRED) Pneumococcal Vaccine (Unspecified Type) 12/15/2005    COVID-19, MODERNA BLUE border, Primary or Immunocompromised, (age 18y+), IM, 100 mcg/0.5mL 2021, 2021    Hepatitis B Vaccine 2009    Influenza Vaccine 10/24/2013, 2014, 2015, 10/06/2016    Influenza Vaccine (Tri) Adjuvanted (>65 Yrs FLUAD TRI 20315) 10/05/2018, 2019    Influenza Vaccine Whole 10/15/2010, 10/05/2011    Influenza, FLUAD, (age 72 y+), Adjuvanted 2020, 10/15/2021, 10/05/2022    PPD 2011    Pneumococcal Conjugate (PCV-13) 2015    Pneumococcal Polysaccharide (PPSV-23) 2019    Pneumococcal Vaccine (Pcv) 02/10/2010    Tdap 2014    Zoster Recombinant 2020       Social History  Social History     Tobacco Use    Smoking status: Former     Packs/day: 1.00     Years: 20.00     Pack years: 20.00     Types: Cigarettes     Quit date: 1985     Years since quittin.9    Smokeless tobacco: Never   Substance Use Topics    Alcohol use: No    Drug use: No       Objective   Visit Vitals  /61 (BP 1 Location: Right arm, BP Patient Position: Sitting, BP Cuff Size: Large adult)   Pulse 75   Temp 98 °F (36.7 °C) (Oral)   Resp 20   Ht 5' 11\" (1.803 m)   Wt 284 lb (128.8 kg)   SpO2 95%   BMI 39.61 kg/m²       Physical Exam  Constitutional:       General: She is not in acute distress. Appearance: Normal appearance. She is not ill-appearing, toxic-appearing or diaphoretic. HENT:      Head: Normocephalic and atraumatic. Cardiovascular:      Rate and Rhythm: Normal rate and regular rhythm. Abdominal:      General: Bowel sounds are normal. There is no distension. Palpations: Abdomen is soft. Tenderness: There is no abdominal tenderness. There is no guarding or rebound. Neurological:      Mental Status: She is alert.       Assessment Yoon Raya is a 68 y.o. who presents for blood in stool. Plan     1. Blood in stool: No active bleeding. Vitals are stable. POC hemoglobin is 11.3. Differential diagnosis includes internal hemorrhoids, anal fissure. Patient declined rectal exam, would like to bring stool sample in to be tested for blood. - OCCULT BLOOD, STOOL; Future  - OCCULT BLOOD, STOOL      I have discussed the aforementioned diagnoses and plan with the patient in detail. I have provided information in person and/or in AVS. All questions answered prior to discharge.     Signed By:  Jeffery Powell MD    Family Medicine Resident

## 2023-05-01 ENCOUNTER — OFFICE VISIT (OUTPATIENT)
Dept: FAMILY MEDICINE CLINIC | Age: 78
End: 2023-05-01
Payer: MEDICARE

## 2023-05-01 VITALS
WEIGHT: 284.6 LBS | SYSTOLIC BLOOD PRESSURE: 125 MMHG | RESPIRATION RATE: 18 BRPM | BODY MASS INDEX: 39.84 KG/M2 | TEMPERATURE: 97.8 F | OXYGEN SATURATION: 96 % | HEART RATE: 65 BPM | DIASTOLIC BLOOD PRESSURE: 72 MMHG | HEIGHT: 71 IN

## 2023-05-01 DIAGNOSIS — M05.10 RHEUMATOID LUNG DISEASE (HCC): ICD-10-CM

## 2023-05-01 DIAGNOSIS — E66.01 SEVERE OBESITY (BMI 35.0-39.9) WITH COMORBIDITY (HCC): ICD-10-CM

## 2023-05-01 DIAGNOSIS — Z86.711 HX OF PULMONARY EMBOLUS: ICD-10-CM

## 2023-05-01 DIAGNOSIS — Z86.718 HISTORY OF DVT OF LOWER EXTREMITY: ICD-10-CM

## 2023-05-01 DIAGNOSIS — I27.20 PULMONARY HTN (HCC): ICD-10-CM

## 2023-05-01 DIAGNOSIS — J45.50 SEVERE PERSISTENT ASTHMA WITHOUT COMPLICATION: Chronic | ICD-10-CM

## 2023-05-01 DIAGNOSIS — I49.1 PAC (PREMATURE ATRIAL CONTRACTION): Primary | Chronic | ICD-10-CM

## 2023-05-01 DIAGNOSIS — M06.9 RHEUMATOID ARTHRITIS INVOLVING MULTIPLE SITES, UNSPECIFIED WHETHER RHEUMATOID FACTOR PRESENT (HCC): ICD-10-CM

## 2023-05-01 DIAGNOSIS — I26.99 OTHER ACUTE PULMONARY EMBOLISM WITHOUT ACUTE COR PULMONALE (HCC): ICD-10-CM

## 2023-05-01 PROCEDURE — G8417 CALC BMI ABV UP PARAM F/U: HCPCS | Performed by: FAMILY MEDICINE

## 2023-05-01 PROCEDURE — 1123F ACP DISCUSS/DSCN MKR DOCD: CPT | Performed by: FAMILY MEDICINE

## 2023-05-01 PROCEDURE — G8427 DOCREV CUR MEDS BY ELIG CLIN: HCPCS | Performed by: FAMILY MEDICINE

## 2023-05-01 PROCEDURE — G8510 SCR DEP NEG, NO PLAN REQD: HCPCS | Performed by: FAMILY MEDICINE

## 2023-05-01 PROCEDURE — 1101F PT FALLS ASSESS-DOCD LE1/YR: CPT | Performed by: FAMILY MEDICINE

## 2023-05-01 PROCEDURE — 99214 OFFICE O/P EST MOD 30 MIN: CPT | Performed by: FAMILY MEDICINE

## 2023-05-01 PROCEDURE — 3078F DIAST BP <80 MM HG: CPT | Performed by: FAMILY MEDICINE

## 2023-05-01 PROCEDURE — 1090F PRES/ABSN URINE INCON ASSESS: CPT | Performed by: FAMILY MEDICINE

## 2023-05-01 PROCEDURE — G8536 NO DOC ELDER MAL SCRN: HCPCS | Performed by: FAMILY MEDICINE

## 2023-05-01 PROCEDURE — G8399 PT W/DXA RESULTS DOCUMENT: HCPCS | Performed by: FAMILY MEDICINE

## 2023-05-01 PROCEDURE — 3074F SYST BP LT 130 MM HG: CPT | Performed by: FAMILY MEDICINE

## 2023-05-01 RX ORDER — AMLODIPINE BESYLATE 5 MG/1
5 TABLET ORAL DAILY
Qty: 90 TABLET | Refills: 1 | Status: SHIPPED | OUTPATIENT
Start: 2023-05-01

## 2023-05-01 NOTE — PROGRESS NOTES
[facility]    History of Present Illness:   Tre Hong is a 68 y.o. female with history of  HTN, PAC, Asthma, OA, Bronchitis, DVT  CC: Follow up  History provided by patient and Records    HPI:  Hypertriglyceridemia Follow up:   Cardiovascular risks for her are: hypertension  hyperlipidemia. Current Medications:  Key Antihyperlipidemia Meds       The patient is on no antihyperlipidemia meds. Compliance: YES   Myalgias: NO   Fatigue: NO   Other side effects: NO     Wt Readings from Last 3 Encounters:   05/01/23 284 lb 9.6 oz (129.1 kg)   03/21/23 284 lb (128.8 kg)   12/30/22 279 lb 3.2 oz (126.6 kg)       Lab Results   Component Value Date/Time    Cholesterol, total 180 12/30/2022 11:40 AM    HDL Cholesterol 63 12/30/2022 11:40 AM    LDL, calculated 98.8 12/30/2022 11:40 AM    VLDL, calculated 18.2 12/30/2022 11:40 AM    Triglyceride 91 12/30/2022 11:40 AM    CHOL/HDL Ratio 2.9 12/30/2022 11:40 AM      Lab Results   Component Value Date/Time    ALT (SGPT) 21 12/30/2022 11:40 AM    AST (SGOT) 22 12/30/2022 11:40 AM    Alk. phosphatase 79 12/30/2022 11:40 AM    Bilirubin, direct 0.2 06/10/2020 09:30 AM    Bilirubin, total 0.6 12/30/2022 11:40 AM      Hypertension Follow up:  Currently Taking:   Key CAD CHF Meds               amLODIPine (NORVASC) 5 mg tablet (Taking) Take 1 Tablet by mouth daily. apixaban (Eliquis) 5 mg tablet (Taking) TAKE 1 TABLET TWICE A DAY           The patient reports:  taking medications as instructed, no medication side effects noted, no TIA's, no chest pain on exertion, no dyspnea on exertion, no swelling of ankles, no orthostatic dizziness or lightheadedness, no orthopnea or paroxysmal nocturnal dyspnea. BP Readings from Last 3 Encounters:   05/01/23 125/72   03/21/23 110/61   12/30/22 128/73       DVT History: Takes Blood thinner    Rheumatoid Arthritis: Getting Remicaid, overall doing well.     Asthma/Rheumatoid Lungs: Overall stable, noting some increased coughing with pollen    Health Maintenance  Health Maintenance Due   Topic Date Due    Shingles Vaccine (2 of 2) 09/29/2020    COVID-19 Vaccine (3 - Booster for Moderna series) 05/04/2021       Past Medical, Family, and Social History:     Current Outpatient Medications on File Prior to Visit   Medication Sig Dispense Refill    tiotropium bromide (SPIRIVA RESPIMAT) 1.25 mcg/actuation inhaler       gabapentin (NEURONTIN) 100 mg capsule       apixaban (Eliquis) 5 mg tablet TAKE 1 TABLET TWICE A  Tablet 3    budesonide-formoteroL (Symbicort) 160-4.5 mcg/actuation HFAA USE 2 INHALATIONS TWICE A DAY 2 Each 3    fluticasone propionate (FLONASE) 50 mcg/actuation nasal spray USE 2 SPRAYS IN BOTH NOSTRILS DAILY 16 g 3    ProAir HFA 90 mcg/actuation inhaler INHALE 2 PUFFS BY MOUTH EVERY 4 HOURS AS NEEDED FOR WHEEZING 17 g 3    methotrexate (RHEUMATREX) 2.5 mg tablet Take 9 Tablets by mouth every Monday. Every Thursday      famotidine (PEPCID) 20 mg tablet Take 1 Tablet by mouth two (2) times a day. As needed      folic acid (FOLVITE) 1 mg tablet Take 3 Tabs by mouth daily. 90 Tab 2    infliximab (REMICADE IV) by IntraVENous route. Every 6 weeks      acetaminophen (TYLENOL) 500 mg tablet Take 2 Tablets by mouth every six (6) hours as needed for Pain. cholecalciferol, vitamin D3, 100 mcg (4,000 unit) cap Take  by mouth daily. [DISCONTINUED] amLODIPine (NORVASC) 5 mg tablet TAKE ONE TABLET BY MOUTH EVERY DAY 90 Tablet 1    [DISCONTINUED] levocetirizine (XYZAL) 5 mg tablet Take 1 Tablet by mouth nightly. (Patient not taking: No sig reported) 90 Tablet 1    [DISCONTINUED] triamcinolone acetonide (KENALOG) 0.1 % ointment Apply  to affected area two (2) times a day.  use thin layer in the affected area of the left leg (Patient not taking: No sig reported) 1 Tube 0    [DISCONTINUED] azelastine (ASTELIN) 137 mcg (0.1 %) nasal spray  (Patient not taking: No sig reported)      [DISCONTINUED] atorvastatin (LIPITOR) 10 mg tablet TAKE ONE TABLET BY MOUTH EVERY DAY (Patient not taking: No sig reported) 90 Tab 2    [DISCONTINUED] mupirocin (BACTROBAN) 2 % ointment  (Patient not taking: No sig reported)  0    [DISCONTINUED] Hot Water Bottle (WATER BOTTLE) misc Please Dispense a humidifier bottle to patient to use with her oxygen. (Patient not taking: No sig reported) 1 Each 0    [DISCONTINUED] albuterol (PROVENTIL VENTOLIN) 2.5 mg /3 mL (0.083 %) nebulizer solution USE 1 VIAL IN NEBULIZER EVERY 4 HOURS AS NEEDED FOR WHEEZING (Patient not taking: No sig reported) 100 Each 4    [DISCONTINUED] UBIDECARENONE (COQ-10 PO) Take  by mouth. (Patient not taking: No sig reported)      [DISCONTINUED] omega-3s-dha-epa-fish oil 300-1,000 mg cpDR Take  by mouth daily. (Patient not taking: No sig reported)       No current facility-administered medications on file prior to visit.        Patient Active Problem List   Diagnosis Code    Rheumatoid arthritis ([facility] [address] M06.9    Severe persistent asthma without complication I25.60    Cardiac dysrhythmia, unspecified I49.9    OA (osteoarthritis) M19.90    Obesity, unspecified E66.9    Spinal stenosis of lumbar region with neurogenic claudication M48.062    High cholesterol E78.00    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HCC) M05.10    Colon polyp, hyperplastic K63.5    Asthma J45.909    Primary generalized (osteo)arthritis M15.0    PAC (premature atrial contraction) I49.1    Lumbar spinal stenosis M48.061    Primary localized osteoarthritis of right hip M16.11    Obesity, morbid (HCC) E66.01    Essential hypertension I10    Chronic rhinitis J31.0    Allergic contact dermatitis L23.9    History of DVT of lower extremity Z86.718    Hx of pulmonary embolus Z86.711    Pain of left lower leg M79.662    Cervical spinal stenosis M48.02    Pulmonary HTN (HCC) I27.20       Social History     Socioeconomic History    Marital status: SINGLE   Tobacco Use    Smoking status: Former     Packs/day: 1.00     Years: 20.00 Pack years: 20.00     Types: Cigarettes     Quit date: 1985     Years since quittin.0    Smokeless tobacco: Never   Substance and Sexual Activity    Alcohol use: No    Drug use: No    Sexual activity: Never     Social Determinants of Health     Financial Resource Strain: Low Risk     Difficulty of Paying Living Expenses: Not hard at all   Food Insecurity: No Food Insecurity    Worried About Running Out of Food in the Last Year: Never true    Ran Out of Food in the Last Year: Never true        Review of Systems   Review of Systems   Constitutional:  Negative for chills and fever. Cardiovascular:  Negative for chest pain, palpitations and orthopnea. Gastrointestinal:  Negative for nausea and vomiting. Neurological:  Negative for dizziness, tingling, tremors, sensory change and headaches. Objective:   Visit Vitals  /72 (BP 1 Location: Left arm, BP Patient Position: Sitting, BP Cuff Size: Large adult)   Pulse 65   Temp 97.8 °F (36.6 °C) (Oral)   Resp 18   Ht 5' 11\" (1.803 m)   Wt 284 lb 9.6 oz (129.1 kg)   SpO2 96%   BMI 39.69 kg/m²        Physical Exam  Vitals and nursing note reviewed. Constitutional:       Appearance: Normal appearance. HENT:      Head: Normocephalic and atraumatic. Cardiovascular:      Rate and Rhythm: Normal rate and regular rhythm. Pulses: Normal pulses. Heart sounds: Normal heart sounds. No murmur heard. No friction rub. No gallop. Pulmonary:      Effort: Pulmonary effort is normal.      Breath sounds: Normal breath sounds. Abdominal:      General: Abdomen is flat. Bowel sounds are normal.      Palpations: Abdomen is soft. Musculoskeletal:         General: Normal range of motion. Cervical back: Normal range of motion and neck supple. Skin:     General: Skin is warm and dry. Neurological:      Mental Status: She is alert. Pertinent Labs/Studies:      Assessment and orders:       ICD-10-CM ICD-9-CM    1.  PAC (premature atrial contraction)  I49.1 427.61       2. Severe obesity (BMI 35.0-39. 9) with comorbidity ([facility] [address]  E66.01 278.01       3. 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[facility]  M05.10 714.81       4. Pulmonary HTN (HCC)  I27.20 416.8 amLODIPine (NORVASC) 5 mg tablet      5. Other acute pulmonary embolism without acute cor pulmonale (HCC)  I26.99 415.19       6. Severe persistent asthma without complication  K47.84 159.45       7. Rheumatoid arthritis involving multiple sites, unspecified whether rheumatoid factor present ([facility] [address]  M06.9 714.0       8. Hx of pulmonary embolus  Z86.711 V12.55       9. History of DVT of lower extremity  Z86.718 V12.51         Diagnoses and all orders for this visit:    1. PAC (premature atrial contraction)    2. Severe obesity (BMI 35.0-39. 9) with comorbidity ([facility]    3. 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[facility]    4. Pulmonary HTN (HCC)  -     amLODIPine (NORVASC) 5 mg tablet; Take 1 Tablet by mouth daily. 5. Other acute pulmonary embolism without acute cor pulmonale ([facility] [address]    6. Severe persistent asthma without complication    7. Rheumatoid arthritis involving multiple sites, unspecified whether rheumatoid factor present ([facility]    8. Hx of pulmonary embolus    9. History of DVT of lower extremity      Follow-up and Dispositions    Return in about 6 months (around 11/1/2023). I have discussed the diagnosis with the patient and the intended plan as seen in the above orders. Social history, medical history, and labs were reviewed. The patient has received an after-visit summary and questions were answered concerning future plans. I have discussed medication side effects and warnings with the patient as well.     MD FAVIOLA Chavez & NATALIA BECKETT [facility]  05/01/23

## 2023-05-01 NOTE — PROGRESS NOTES
1. Have you been to the ER, urgent care clinic since your last visit? Hospitalized since your last visit? No    2. Have you seen or consulted any other health care providers outside of the [address] [place] [address] since your last visit? Including any pap smears or colon screening.  No      Health Maintenance Due   Topic Date Due    Shingles Vaccine (2 of 2) 09/29/2020    COVID-19 Vaccine (3 - Booster for Moderna series) 05/04/2021

## 2023-07-11 RX ORDER — APIXABAN 5 MG/1
TABLET, FILM COATED ORAL
Qty: 180 TABLET | Refills: 3 | Status: SHIPPED | OUTPATIENT
Start: 2023-07-11

## 2023-08-21 RX ORDER — ALBUTEROL SULFATE 90 UG/1
AEROSOL, METERED RESPIRATORY (INHALATION)
Qty: 18 G | Refills: 3 | Status: SHIPPED | OUTPATIENT
Start: 2023-08-21

## 2023-10-30 ENCOUNTER — TELEPHONE (OUTPATIENT)
Facility: CLINIC | Age: 78
End: 2023-10-30

## 2023-10-30 DIAGNOSIS — Z86.718 HISTORY OF DVT OF LOWER EXTREMITY: Primary | ICD-10-CM

## 2023-10-30 DIAGNOSIS — Z86.711 HX OF PULMONARY EMBOLUS: ICD-10-CM

## 2023-10-30 NOTE — TELEPHONE ENCOUNTER
Pt called to request a refill on the following medication:    - ELIQUIS 5 MG TABS tablet    Pt would like a 2 week supply to be sent to [facility], until she receives it through the mail. Pt also stated she was completely out. Please advise. Asa Navas

## 2023-10-30 NOTE — TELEPHONE ENCOUNTER
Medication reordered.     MD JANIA Aguila & CHIARA GOODRICH [facility]  10/30/23

## 2023-11-01 ENCOUNTER — OFFICE VISIT (OUTPATIENT)
Facility: CLINIC | Age: 78
End: 2023-11-01
Payer: MEDICARE

## 2023-11-01 VITALS
SYSTOLIC BLOOD PRESSURE: 106 MMHG | WEIGHT: 275 LBS | HEIGHT: 71 IN | OXYGEN SATURATION: 95 % | TEMPERATURE: 98.3 F | DIASTOLIC BLOOD PRESSURE: 63 MMHG | BODY MASS INDEX: 38.5 KG/M2 | HEART RATE: 73 BPM | RESPIRATION RATE: 18 BRPM

## 2023-11-01 DIAGNOSIS — M17.10 PRIMARY OSTEOARTHRITIS OF KNEE, UNSPECIFIED LATERALITY: ICD-10-CM

## 2023-11-01 DIAGNOSIS — M06.9 RHEUMATOID ARTHRITIS, INVOLVING UNSPECIFIED SITE, UNSPECIFIED WHETHER RHEUMATOID FACTOR PRESENT (HCC): ICD-10-CM

## 2023-11-01 DIAGNOSIS — Z00.00 MEDICARE ANNUAL WELLNESS VISIT, SUBSEQUENT: Primary | ICD-10-CM

## 2023-11-01 DIAGNOSIS — I10 ESSENTIAL HYPERTENSION: ICD-10-CM

## 2023-11-01 DIAGNOSIS — Z86.718 HISTORY OF DVT OF LOWER EXTREMITY: ICD-10-CM

## 2023-11-01 DIAGNOSIS — E78.00 HIGH CHOLESTEROL: ICD-10-CM

## 2023-11-01 DIAGNOSIS — M48.02 CERVICAL SPINAL STENOSIS: ICD-10-CM

## 2023-11-01 DIAGNOSIS — M05.10 RHEUMATOID LUNG DISEASE (HCC): ICD-10-CM

## 2023-11-01 DIAGNOSIS — Z86.711 HX OF PULMONARY EMBOLUS: ICD-10-CM

## 2023-11-01 DIAGNOSIS — J45.50 SEVERE PERSISTENT ASTHMA WITHOUT COMPLICATION: ICD-10-CM

## 2023-11-01 DIAGNOSIS — I27.20 PULMONARY HTN (HCC): ICD-10-CM

## 2023-11-01 PROCEDURE — 99214 OFFICE O/P EST MOD 30 MIN: CPT | Performed by: FAMILY MEDICINE

## 2023-11-01 PROCEDURE — G8417 CALC BMI ABV UP PARAM F/U: HCPCS | Performed by: FAMILY MEDICINE

## 2023-11-01 PROCEDURE — G8399 PT W/DXA RESULTS DOCUMENT: HCPCS | Performed by: FAMILY MEDICINE

## 2023-11-01 PROCEDURE — G8484 FLU IMMUNIZE NO ADMIN: HCPCS | Performed by: FAMILY MEDICINE

## 2023-11-01 PROCEDURE — G8427 DOCREV CUR MEDS BY ELIG CLIN: HCPCS | Performed by: FAMILY MEDICINE

## 2023-11-01 PROCEDURE — 3074F SYST BP LT 130 MM HG: CPT | Performed by: FAMILY MEDICINE

## 2023-11-01 PROCEDURE — 3078F DIAST BP <80 MM HG: CPT | Performed by: FAMILY MEDICINE

## 2023-11-01 PROCEDURE — G0439 PPPS, SUBSEQ VISIT: HCPCS | Performed by: FAMILY MEDICINE

## 2023-11-01 PROCEDURE — 1036F TOBACCO NON-USER: CPT | Performed by: FAMILY MEDICINE

## 2023-11-01 PROCEDURE — 1090F PRES/ABSN URINE INCON ASSESS: CPT | Performed by: FAMILY MEDICINE

## 2023-11-01 PROCEDURE — 1123F ACP DISCUSS/DSCN MKR DOCD: CPT | Performed by: FAMILY MEDICINE

## 2023-11-01 SDOH — ECONOMIC STABILITY: INCOME INSECURITY: HOW HARD IS IT FOR YOU TO PAY FOR THE VERY BASICS LIKE FOOD, HOUSING, MEDICAL CARE, AND HEATING?: NOT HARD AT ALL

## 2023-11-01 SDOH — ECONOMIC STABILITY: HOUSING INSECURITY
IN THE LAST 12 MONTHS, WAS THERE A TIME WHEN YOU DID NOT HAVE A STEADY PLACE TO SLEEP OR SLEPT IN A SHELTER (INCLUDING NOW)?: NO

## 2023-11-01 SDOH — ECONOMIC STABILITY: FOOD INSECURITY: WITHIN THE PAST 12 MONTHS, THE FOOD YOU BOUGHT JUST DIDN'T LAST AND YOU DIDN'T HAVE MONEY TO GET MORE.: NEVER TRUE

## 2023-11-01 SDOH — ECONOMIC STABILITY: INCOME INSECURITY: IN THE LAST 12 MONTHS, WAS THERE A TIME WHEN YOU WERE NOT ABLE TO PAY THE MORTGAGE OR RENT ON TIME?: NO

## 2023-11-01 SDOH — ECONOMIC STABILITY: FOOD INSECURITY: WITHIN THE PAST 12 MONTHS, YOU WORRIED THAT YOUR FOOD WOULD RUN OUT BEFORE YOU GOT MONEY TO BUY MORE.: NEVER TRUE

## 2023-11-01 ASSESSMENT — ENCOUNTER SYMPTOMS
CHEST TIGHTNESS: 0
BACK PAIN: 0

## 2023-11-01 ASSESSMENT — PATIENT HEALTH QUESTIONNAIRE - PHQ9
SUM OF ALL RESPONSES TO PHQ QUESTIONS 1-9: 0
2. FEELING DOWN, DEPRESSED OR HOPELESS: 0
SUM OF ALL RESPONSES TO PHQ QUESTIONS 1-9: 0
SUM OF ALL RESPONSES TO PHQ9 QUESTIONS 1 & 2: 0
1. LITTLE INTEREST OR PLEASURE IN DOING THINGS: 0
SUM OF ALL RESPONSES TO PHQ QUESTIONS 1-9: 0
SUM OF ALL RESPONSES TO PHQ QUESTIONS 1-9: 0

## 2023-11-01 ASSESSMENT — SOCIAL DETERMINANTS OF HEALTH (SDOH)
WITHIN THE LAST YEAR, HAVE YOU BEEN KICKED, HIT, SLAPPED, OR OTHERWISE PHYSICALLY HURT BY YOUR PARTNER OR EX-PARTNER?: NO
WITHIN THE LAST YEAR, HAVE YOU BEEN AFRAID OF YOUR PARTNER OR EX-PARTNER?: NO
WITHIN THE LAST YEAR, HAVE TO BEEN RAPED OR FORCED TO HAVE ANY KIND OF SEXUAL ACTIVITY BY YOUR PARTNER OR EX-PARTNER?: NO
WITHIN THE LAST YEAR, HAVE YOU BEEN HUMILIATED OR EMOTIONALLY ABUSED IN OTHER WAYS BY YOUR PARTNER OR EX-PARTNER?: NO

## 2023-11-01 ASSESSMENT — LIFESTYLE VARIABLES
HOW MANY STANDARD DRINKS CONTAINING ALCOHOL DO YOU HAVE ON A TYPICAL DAY: PATIENT DOES NOT DRINK
HOW OFTEN DO YOU HAVE A DRINK CONTAINING ALCOHOL: NEVER

## 2023-11-01 NOTE — PROGRESS NOTES
[facility]    History of Present Illness:   Nikki Hernandez is a 66 y.o. female with history of HTN, Arthritis, Sciatica, PAC, Asthma, OA, Bronchitis, DVT  CC: Medicare wellness/Follow up  History provided by patient and Records    HPI:  Hypertriglyceridemia Follow up:   Cardiovascular risks for her are: hypertension  hyperlipidemia. Current Medications:  atorvastatin - 10 MG    Compliance: Yes   Myalgias: No   Fatigue: No   Other side effects: No     Wt Readings from Last 3 Encounters:   11/01/23 124.7 kg (275 lb)   05/01/23 129.1 kg (284 lb 9.6 oz)   03/21/23 128.8 kg (284 lb)     Lab Results   Component Value Date/Time    CHOL 180 12/30/2022 11:40 AM    HDL 63 12/30/2022 11:40 AM      Lab Results   Component Value Date/Time    ALT 21 12/30/2022 11:40 AM    AST 22 12/30/2022 11:40 AM       Hypertension Follow up: The patient reports:  taking medications as instructed, no medication side effects noted, no TIA's, no chest pain on exertion, no dyspnea on exertion, no swelling of ankles, no orthostatic dizziness or lightheadedness, no orthopnea or paroxysmal nocturnal dyspnea. BP Readings from Last 3 Encounters:   11/01/23 106/63   05/01/23 125/72   03/21/23 110/61      DVT History: Takes Blood thinner, Eliquis     Rheumatoid Arthritis: Getting Remicaid, overall doing well.      Asthma/Rheumatoid Lungs: Overall stable, noting some increased coughing with pollen       Health Maintenance  Health Maintenance Due   Topic Date Due    Hepatitis B vaccine (2 of 3 - 19+ 3-dose series) 05/18/2009    Shingles vaccine (2 of 2) 09/29/2020    COVID-19 Vaccine (3 - Josefina Nunez series) 05/04/2021    Annual Wellness Visit (AWV)  08/31/2023       Past Medical, Family, and Social History:     Current Outpatient Medications on File Prior to Visit   Medication Sig Dispense Refill    albuterol sulfate HFA (PROVENTIL;VENTOLIN;PROAIR) 108 (90 Base) MCG/ACT inhaler INHALE 2 PUFFS BY MOUTH EVERY 4 HOURS AS NEEDED

## 2023-11-01 NOTE — PROGRESS NOTES
Medicare Annual Wellness Visit    Edith Tena is here for Follow-up Chronic Condition and Medicare AWV    Assessment & Plan   Medicare annual wellness visit, subsequent  Essential hypertension  -     Basic Metabolic Panel; Future  Pulmonary HTN (HCC)  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HCC)  Severe persistent asthma without complication  Rheumatoid arthritis, involving unspecified site, unspecified whether rheumatoid factor present (HCC)  Primary osteoarthritis of knee, unspecified laterality  High cholesterol  -     Lipid Panel; Future  Cervical spinal stenosis  History of DVT of lower extremity  -     apixaban (ELIQUIS) 5 MG TABS tablet; Take 1 tablet by mouth 2 times daily, Disp-180 tablet, R-1Normal  Hx of pulmonary embolus  -     apixaban (ELIQUIS) 5 MG TABS tablet; Take 1 tablet by mouth 2 times daily, Disp-180 tablet, R-1Normal    Recommendations for Preventive Services Due: see orders and patient instructions/AVS.  Recommended screening schedule for the next 5-10 years is provided to the patient in written form: see Patient Instructions/AVS.     Return in about 6 months (around 5/1/2024). Subjective   The following acute and/or chronic problems were also addressed today:    Patient's complete Health Risk Assessment and screening values have been reviewed and are found in Flowsheets. The following problems were reviewed today and where indicated follow up appointments were made and/or referrals ordered.     Positive Risk Factor Screenings with Interventions:    Fall Risk:  Do you feel unsteady or are you worried about falling? : (!) yes  2 or more falls in past year?: no  Fall with injury in past year?: no     Interventions:    Upcoming knee replacement             Social and Emotional Support:  Do you get the social and emotional support that you need?: (!) No    Interventions:  DISCUSSED OPTIONS    Weight and Activity:  Physical Activity: Insufficiently Active (11/1/2023)    Exercise Vital Sign     Days of

## 2023-11-02 LAB
ANION GAP SERPL CALC-SCNC: 2 MMOL/L (ref 5–15)
BUN SERPL-MCNC: 17 MG/DL (ref 6–20)
BUN/CREAT SERPL: 29 (ref 12–20)
CALCIUM SERPL-MCNC: 9.2 MG/DL (ref 8.5–10.1)
CHLORIDE SERPL-SCNC: 109 MMOL/L (ref 97–108)
CHOLEST SERPL-MCNC: 181 MG/DL
CO2 SERPL-SCNC: 27 MMOL/L (ref 21–32)
CREAT SERPL-MCNC: 0.59 MG/DL (ref 0.55–1.02)
GLUCOSE SERPL-MCNC: 85 MG/DL (ref 65–100)
HDLC SERPL-MCNC: 56 MG/DL
HDLC SERPL: 3.2 (ref 0–5)
LDLC SERPL CALC-MCNC: 110.2 MG/DL (ref 0–100)
POTASSIUM SERPL-SCNC: 4.2 MMOL/L (ref 3.5–5.1)
SODIUM SERPL-SCNC: 138 MMOL/L (ref 136–145)
TRIGL SERPL-MCNC: 74 MG/DL
VLDLC SERPL CALC-MCNC: 14.8 MG/DL

## 2023-11-05 ENCOUNTER — TELEPHONE (OUTPATIENT)
Facility: CLINIC | Age: 78
End: 2023-11-05

## 2023-11-06 ENCOUNTER — TELEPHONE (OUTPATIENT)
Facility: CLINIC | Age: 78
End: 2023-11-06

## 2023-11-06 NOTE — TELEPHONE ENCOUNTER
Pt returned call, pt would first like to thank Dr. Mohinder Ruby for following up with her over the weekend. Pt reported that she went to [facility] on 11/4, blood work was done and her WBC was elevated. Pt was prescribed the following medications:    -Methylprednisolone 4 mg,tapered tablets for 6 days    - Dicloxacillin    Pt stated that her arm was starting to feel better.

## 2023-11-07 NOTE — TELEPHONE ENCOUNTER
Called patient, was given 10 days of abx and will call back near completed course.     MD JANIA De Leon & CHIARA GOODRICH [facility]  11/07/23

## 2023-12-25 DIAGNOSIS — I27.20 PULMONARY HYPERTENSION, UNSPECIFIED (HCC): ICD-10-CM

## 2023-12-26 RX ORDER — AMLODIPINE BESYLATE 5 MG/1
TABLET ORAL
Qty: 90 TABLET | Refills: 1 | Status: SHIPPED | OUTPATIENT
Start: 2023-12-26

## 2024-01-29 ENCOUNTER — HOSPITAL ENCOUNTER (OUTPATIENT)
Facility: HOSPITAL | Age: 79
Discharge: HOME | End: 2024-02-01
Attending: FAMILY MEDICINE
Payer: MEDICARE

## 2024-01-29 VITALS — BODY MASS INDEX: 41.68 KG/M2 | HEIGHT: 68 IN | WEIGHT: 275 LBS

## 2024-01-29 DIAGNOSIS — Z12.31 SCREENING MAMMOGRAM FOR HIGH-RISK PATIENT: ICD-10-CM

## 2024-01-29 PROCEDURE — 77063 BREAST TOMOSYNTHESIS BI: CPT

## 2024-03-08 ENCOUNTER — OFFICE VISIT (OUTPATIENT)
Facility: CLINIC | Age: 79
End: 2024-03-08

## 2024-03-08 VITALS
HEART RATE: 75 BPM | WEIGHT: 280.6 LBS | DIASTOLIC BLOOD PRESSURE: 71 MMHG | SYSTOLIC BLOOD PRESSURE: 113 MMHG | BODY MASS INDEX: 42.53 KG/M2 | OXYGEN SATURATION: 95 % | RESPIRATION RATE: 20 BRPM | HEIGHT: 68 IN | TEMPERATURE: 97.6 F

## 2024-03-08 DIAGNOSIS — E66.01 OBESITY, CLASS III, BMI 40-49.9 (MORBID OBESITY) (HCC): ICD-10-CM

## 2024-03-08 DIAGNOSIS — I10 ESSENTIAL HYPERTENSION: ICD-10-CM

## 2024-03-08 DIAGNOSIS — J45.51 SEVERE PERSISTENT ASTHMA WITH ACUTE EXACERBATION: Primary | ICD-10-CM

## 2024-03-08 RX ORDER — PREDNISONE 2.5 MG/1
2.5 TABLET ORAL DAILY
COMMUNITY

## 2024-03-08 RX ORDER — OXYCODONE HYDROCHLORIDE AND ACETAMINOPHEN 5; 325 MG/1; MG/1
1 TABLET ORAL EVERY 4 HOURS PRN
COMMUNITY

## 2024-03-08 RX ORDER — AZITHROMYCIN 250 MG/1
TABLET, FILM COATED ORAL
Qty: 6 TABLET | Refills: 0 | Status: SHIPPED | OUTPATIENT
Start: 2024-03-08 | End: 2024-03-18

## 2024-03-08 RX ORDER — PREDNISONE 20 MG/1
20 TABLET ORAL 2 TIMES DAILY
Qty: 10 TABLET | Refills: 0 | Status: SHIPPED | OUTPATIENT
Start: 2024-03-08 | End: 2024-03-13

## 2024-03-08 RX ORDER — LIDOCAINE 50 MG/G
1 PATCH TOPICAL DAILY
COMMUNITY

## 2024-03-08 RX ORDER — METHOCARBAMOL 500 MG/1
500 TABLET, FILM COATED ORAL 3 TIMES DAILY PRN
COMMUNITY
Start: 2023-11-30

## 2024-03-08 ASSESSMENT — PATIENT HEALTH QUESTIONNAIRE - PHQ9
SUM OF ALL RESPONSES TO PHQ QUESTIONS 1-9: 0
1. LITTLE INTEREST OR PLEASURE IN DOING THINGS: 0
2. FEELING DOWN, DEPRESSED OR HOPELESS: 0
SUM OF ALL RESPONSES TO PHQ QUESTIONS 1-9: 0
SUM OF ALL RESPONSES TO PHQ9 QUESTIONS 1 & 2: 0

## 2024-03-08 NOTE — PROGRESS NOTES
No chief complaint on file.      \"Have you been to the ER, urgent care clinic since your last visit?  Hospitalized since your last visit?\"    Urgent care in [place] and [facility] for Sciatic nerve pain & cellulitis  “Have you seen or consulted any other health care providers outside of [facility] since your last visit?”    NO              Health Maintenance Due   Topic Date Due    Shingles vaccine (2 of 2) 09/29/2020    COVID-19 Vaccine (6 - 2023-24 season) 09/01/2023

## 2024-03-08 NOTE — PATIENT INSTRUCTIONS
Your opinion matters...   & your survey makes a difference!     We would love to hear feedback from YOU! Help us better serve our community by completing your survey after your visit at [facility]. You will be receiving a survey via text or e-mail to complete. Thank You!

## 2024-03-20 ENCOUNTER — TELEPHONE (OUTPATIENT)
Facility: CLINIC | Age: 79
End: 2024-03-20

## 2024-03-20 DIAGNOSIS — M79.89 LEG SWELLING: Primary | ICD-10-CM

## 2024-03-20 RX ORDER — FUROSEMIDE 20 MG/1
10 TABLET ORAL DAILY
Qty: 30 TABLET | Refills: 0 | Status: SHIPPED | OUTPATIENT
Start: 2024-03-20

## 2024-03-20 NOTE — TELEPHONE ENCOUNTER
Pt returned called advised:  per    I called in Lasix to [facility], start with 1/2 tablet first for now.     Pt verbalized understanding.

## 2024-03-20 NOTE — TELEPHONE ENCOUNTER
Pt called stating she has swelling in her ankles, feet and legs. Pt feel this is due to the prednisone she was taking. Pt would like to know if Dr. Richard could send a Rx for Lasix to her pharmacy.    Pt is also requesting a call back, please advise..

## 2024-03-20 NOTE — TELEPHONE ENCOUNTER
Attempted to return call to pt.NO answer and message left.If she calls back please inform per    I called in Lasix to [facility], start with 1/2 tablet first for now.

## 2024-05-15 ENCOUNTER — OFFICE VISIT (OUTPATIENT)
Facility: CLINIC | Age: 79
End: 2024-05-15
Payer: MEDICARE

## 2024-05-15 VITALS
RESPIRATION RATE: 18 BRPM | BODY MASS INDEX: 41.13 KG/M2 | HEIGHT: 68 IN | OXYGEN SATURATION: 96 % | HEART RATE: 75 BPM | TEMPERATURE: 97 F | SYSTOLIC BLOOD PRESSURE: 122 MMHG | WEIGHT: 271.4 LBS | DIASTOLIC BLOOD PRESSURE: 73 MMHG

## 2024-05-15 DIAGNOSIS — I27.20 PULMONARY HTN (HCC): ICD-10-CM

## 2024-05-15 DIAGNOSIS — M15.0 PRIMARY GENERALIZED (OSTEO)ARTHRITIS: ICD-10-CM

## 2024-05-15 DIAGNOSIS — M06.9 RHEUMATOID ARTHRITIS, INVOLVING UNSPECIFIED SITE, UNSPECIFIED WHETHER RHEUMATOID FACTOR PRESENT (HCC): ICD-10-CM

## 2024-05-15 DIAGNOSIS — I27.82 OTHER CHRONIC PULMONARY EMBOLISM WITHOUT ACUTE COR PULMONALE (HCC): ICD-10-CM

## 2024-05-15 DIAGNOSIS — J45.50 SEVERE PERSISTENT ASTHMA WITHOUT COMPLICATION: ICD-10-CM

## 2024-05-15 DIAGNOSIS — E78.00 HIGH CHOLESTEROL: ICD-10-CM

## 2024-05-15 DIAGNOSIS — M05.10 RHEUMATOID LUNG DISEASE (HCC): ICD-10-CM

## 2024-05-15 DIAGNOSIS — I10 ESSENTIAL HYPERTENSION: Primary | ICD-10-CM

## 2024-05-15 PROCEDURE — 99214 OFFICE O/P EST MOD 30 MIN: CPT | Performed by: FAMILY MEDICINE

## 2024-05-15 PROCEDURE — G8427 DOCREV CUR MEDS BY ELIG CLIN: HCPCS | Performed by: FAMILY MEDICINE

## 2024-05-15 PROCEDURE — 1036F TOBACCO NON-USER: CPT | Performed by: FAMILY MEDICINE

## 2024-05-15 PROCEDURE — G8399 PT W/DXA RESULTS DOCUMENT: HCPCS | Performed by: FAMILY MEDICINE

## 2024-05-15 PROCEDURE — 3078F DIAST BP <80 MM HG: CPT | Performed by: FAMILY MEDICINE

## 2024-05-15 PROCEDURE — 1123F ACP DISCUSS/DSCN MKR DOCD: CPT | Performed by: FAMILY MEDICINE

## 2024-05-15 PROCEDURE — G8417 CALC BMI ABV UP PARAM F/U: HCPCS | Performed by: FAMILY MEDICINE

## 2024-05-15 PROCEDURE — 1090F PRES/ABSN URINE INCON ASSESS: CPT | Performed by: FAMILY MEDICINE

## 2024-05-15 PROCEDURE — 3074F SYST BP LT 130 MM HG: CPT | Performed by: FAMILY MEDICINE

## 2024-05-15 RX ORDER — ATORVASTATIN CALCIUM 10 MG/1
10 TABLET, FILM COATED ORAL DAILY
Qty: 90 TABLET | Refills: 1 | Status: SHIPPED | OUTPATIENT
Start: 2024-05-15

## 2024-05-15 ASSESSMENT — ENCOUNTER SYMPTOMS
CHEST TIGHTNESS: 0
ABDOMINAL DISTENTION: 0

## 2024-05-15 NOTE — PROGRESS NOTES
1. \"Have you been to the ER, urgent care clinic since your last visit?  Hospitalized since your last visit?\" no    2. \"Have you seen or consulted any other health care providers outside of the [facility] since your last visit?\" Yes Knee replacement      Health Maintenance Due   Topic Date Due    Shingles vaccine (2 of 2) 09/29/2020    COVID-19 Vaccine (6 - 2023-24 season) 09/01/2023

## 2024-05-15 NOTE — PROGRESS NOTES
[facility]    History of Present Illness:   Selam Jones is a 78 y.o. female with history of HTN, Arthritis, Sciatica, PAC, Asthma, OA, Bronchitis, DVT   CC: Follow up  History provided by patient and Records    HPI:  Left knee replacement: Had a Left knee replacement and is doing well.  Noting overall pain is well controlled.  Has logan filter, needs to determine when get removed.    Hypertension Follow up:  The patient reports:  taking medications as instructed, no medication side effects noted, no TIA's, no chest pain on exertion, no dyspnea on exertion, no swelling of ankles, no orthostatic dizziness or lightheadedness, no orthopnea or paroxysmal nocturnal dyspnea.  Is taking Amlodipine, skips if BP is low.  Checks at least once daily     BP Readings from Last 3 Encounters:   05/15/24 122/73   03/08/24 113/71   11/01/23 106/63      DVT History: Takes Blood thinner, Eliquis     Rheumatoid Arthritis: Getting Remicaid, overall doing well.  Noting recent labs the inflamatory markers were up.  2.5 mg daily prednisone.     Asthma/Rheumatoid Lungs: Overall stable, noting some increased coughing recently.      Hypertriglyceridemia Follow up:   Cardiovascular risks for her are: hypertension  hyperlipidemia.   Current Medications:  atorvastatin - 10 MG    Compliance: No   Myalgias: No   Fatigue: No   Other side effects: No     Wt Readings from Last 3 Encounters:   05/15/24 123.1 kg (271 lb 6.4 oz)   03/08/24 127.3 kg (280 lb 9.6 oz)   01/29/24 124.7 kg (275 lb)       Lab Results   Component Value Date/Time    CHOL 181 11/01/2023 11:15 AM    HDL 56 11/01/2023 11:15 AM    .2 11/01/2023 11:15 AM    VLDL 14.8 11/01/2023 11:15 AM      Lab Results   Component Value Date/Time    ALT 21 12/30/2022 11:40 AM    AST 22 12/30/2022 11:40 AM         Health Maintenance  Health Maintenance Due   Topic Date Due    Shingles vaccine (2 of 2) 09/29/2020    COVID-19 Vaccine (6 - 2023-24 season)

## 2024-05-15 NOTE — PATIENT INSTRUCTIONS
- If BP is on the lower end take 1/2 of the Norvasc  - Symbicort inhaler 2 times daily for the next 7-10 days.  - Atorvastatin every other day.

## 2024-05-21 ENCOUNTER — TELEPHONE (OUTPATIENT)
Facility: CLINIC | Age: 79
End: 2024-05-21

## 2024-05-21 RX ORDER — PREDNISONE 20 MG/1
20 TABLET ORAL DAILY
Qty: 5 TABLET | Refills: 0 | Status: SHIPPED | OUTPATIENT
Start: 2024-05-21 | End: 2024-05-26

## 2024-05-21 NOTE — TELEPHONE ENCOUNTER
Pt states she is having a bad flare up with her sciatica. Pt states her right hip, and leg are very painful. Pt would like to know if Dr Richard will send in an Rx for prednisone to [facility]. Please advise.

## 2024-06-24 ENCOUNTER — TELEPHONE (OUTPATIENT)
Facility: CLINIC | Age: 79
End: 2024-06-24

## 2024-06-24 DIAGNOSIS — J45.20 MILD INTERMITTENT ASTHMA, UNSPECIFIED WHETHER COMPLICATED: Primary | ICD-10-CM

## 2024-06-24 DIAGNOSIS — M05.10 RHEUMATOID LUNG DISEASE (HCC): ICD-10-CM

## 2024-06-24 RX ORDER — BUDESONIDE AND FORMOTEROL FUMARATE DIHYDRATE 160; 4.5 UG/1; UG/1
2 AEROSOL RESPIRATORY (INHALATION) 2 TIMES DAILY
Qty: 10.2 G | Refills: 0 | Status: SHIPPED | OUTPATIENT
Start: 2024-06-24

## 2024-06-24 RX ORDER — BUDESONIDE AND FORMOTEROL FUMARATE DIHYDRATE 160; 4.5 UG/1; UG/1
2 AEROSOL RESPIRATORY (INHALATION) 2 TIMES DAILY
Qty: 30.6 G | Refills: 1 | Status: SHIPPED | OUTPATIENT
Start: 2024-06-24

## 2024-06-24 NOTE — TELEPHONE ENCOUNTER
budesonide-formoterol (SYMBICORT) 160-4.5 MCG/ACT AERO     Pt is out of this medication and needs a refill to be sent to [facility], and extra one to [facility] for future use. Please advise.

## 2024-08-24 DIAGNOSIS — I27.20 PULMONARY HYPERTENSION, UNSPECIFIED (HCC): ICD-10-CM

## 2024-08-26 RX ORDER — AMLODIPINE BESYLATE 5 MG/1
TABLET ORAL
Qty: 90 TABLET | Refills: 1 | Status: SHIPPED | OUTPATIENT
Start: 2024-08-26

## 2024-10-11 DIAGNOSIS — J45.20 MILD INTERMITTENT ASTHMA, UNSPECIFIED WHETHER COMPLICATED: ICD-10-CM

## 2024-10-11 DIAGNOSIS — M05.10 RHEUMATOID LUNG DISEASE (HCC): ICD-10-CM

## 2024-10-11 RX ORDER — BUDESONIDE AND FORMOTEROL FUMARATE 160; 4.5 UG/1; UG/1
AEROSOL, METERED RESPIRATORY (INHALATION)
Qty: 10.3 G | Refills: 1 | Status: SHIPPED | OUTPATIENT
Start: 2024-10-11

## 2024-11-05 DIAGNOSIS — Z86.718 HISTORY OF DVT OF LOWER EXTREMITY: ICD-10-CM

## 2024-11-05 DIAGNOSIS — Z86.711 HX OF PULMONARY EMBOLUS: ICD-10-CM

## 2024-11-05 RX ORDER — APIXABAN 5 MG/1
5 TABLET, FILM COATED ORAL 2 TIMES DAILY
Qty: 180 TABLET | Refills: 0 | Status: SHIPPED | OUTPATIENT
Start: 2024-11-05

## 2024-12-19 ENCOUNTER — OFFICE VISIT (OUTPATIENT)
Facility: CLINIC | Age: 79
End: 2024-12-19

## 2024-12-19 VITALS
HEIGHT: 68 IN | HEART RATE: 81 BPM | DIASTOLIC BLOOD PRESSURE: 64 MMHG | BODY MASS INDEX: 40.47 KG/M2 | TEMPERATURE: 97.3 F | SYSTOLIC BLOOD PRESSURE: 129 MMHG | RESPIRATION RATE: 16 BRPM | OXYGEN SATURATION: 96 % | WEIGHT: 267 LBS

## 2024-12-19 DIAGNOSIS — J18.9 PNEUMONIA OF BOTH LUNGS DUE TO INFECTIOUS ORGANISM, UNSPECIFIED PART OF LUNG: Primary | ICD-10-CM

## 2024-12-19 DIAGNOSIS — I27.20 PULMONARY HTN (HCC): ICD-10-CM

## 2024-12-19 DIAGNOSIS — I10 ESSENTIAL HYPERTENSION: ICD-10-CM

## 2024-12-19 DIAGNOSIS — R53.83 OTHER FATIGUE: ICD-10-CM

## 2024-12-19 DIAGNOSIS — R00.1 BRADYCARDIA: ICD-10-CM

## 2024-12-19 DIAGNOSIS — R53.81 DEBILITY: ICD-10-CM

## 2024-12-19 DIAGNOSIS — J45.50 SEVERE PERSISTENT ASTHMA WITHOUT COMPLICATION: ICD-10-CM

## 2024-12-19 DIAGNOSIS — M05.10 RHEUMATOID LUNG DISEASE (HCC): ICD-10-CM

## 2024-12-19 DIAGNOSIS — Z09 HOSPITAL DISCHARGE FOLLOW-UP: ICD-10-CM

## 2024-12-19 DIAGNOSIS — M06.9 RHEUMATOID ARTHRITIS, INVOLVING UNSPECIFIED SITE, UNSPECIFIED WHETHER RHEUMATOID FACTOR PRESENT (HCC): ICD-10-CM

## 2024-12-19 DIAGNOSIS — E78.00 HIGH CHOLESTEROL: ICD-10-CM

## 2024-12-19 DIAGNOSIS — J96.01 ACUTE HYPOXIC RESPIRATORY FAILURE: ICD-10-CM

## 2024-12-19 DIAGNOSIS — Z86.711 HX OF PULMONARY EMBOLUS: ICD-10-CM

## 2024-12-19 RX ORDER — ATORVASTATIN CALCIUM 10 MG/1
10 TABLET, FILM COATED ORAL DAILY
Qty: 90 TABLET | Refills: 1 | Status: SHIPPED | OUTPATIENT
Start: 2024-12-19

## 2024-12-19 SDOH — ECONOMIC STABILITY: INCOME INSECURITY: HOW HARD IS IT FOR YOU TO PAY FOR THE VERY BASICS LIKE FOOD, HOUSING, MEDICAL CARE, AND HEATING?: NOT HARD AT ALL

## 2024-12-19 SDOH — ECONOMIC STABILITY: FOOD INSECURITY: WITHIN THE PAST 12 MONTHS, YOU WORRIED THAT YOUR FOOD WOULD RUN OUT BEFORE YOU GOT MONEY TO BUY MORE.: NEVER TRUE

## 2024-12-19 SDOH — ECONOMIC STABILITY: FOOD INSECURITY: WITHIN THE PAST 12 MONTHS, THE FOOD YOU BOUGHT JUST DIDN'T LAST AND YOU DIDN'T HAVE MONEY TO GET MORE.: NEVER TRUE

## 2024-12-19 ASSESSMENT — ENCOUNTER SYMPTOMS
COUGH: 1
APNEA: 0
ABDOMINAL DISTENTION: 0
CHEST TIGHTNESS: 0
ABDOMINAL PAIN: 0

## 2024-12-19 NOTE — PROGRESS NOTES
\"Have you been to the ER, urgent care clinic since your last visit?  Hospitalized since your last visit?\"    Yes  Mirna Chung  “Have you seen or consulted any other health care providers outside of [facility] since your last visit?”    Yes  Yes Mirna Chung          Click Here for Release of Records Request   
date: 1965     Quit date: 1985     Years since quittin.6    Smokeless tobacco: Never   Substance and Sexual Activity    Alcohol use: No    Drug use: No    Sexual activity: Not Currently     Social Determinants of Health     Financial Resource Strain: Low Risk  (2024)    Overall Financial Resource Strain (CARDIA)     Difficulty of Paying Living Expenses: Not hard at all   Food Insecurity: No Food Insecurity (2024)    Hunger Vital Sign     Worried About Running Out of Food in the Last Year: Never true     Ran Out of Food in the Last Year: Never true   Transportation Needs: Unknown (2024)    PRAPARE - Transportation     Lack of Transportation (Non-Medical): No   Physical Activity: Insufficiently Active (2023)    Exercise Vital Sign     Days of Exercise per Week: 2 days     Minutes of Exercise per Session: 30 min   Intimate Partner Violence: Not At Risk (2023)    Humiliation, Afraid, Rape, and Kick questionnaire     Fear of Current or Ex-Partner: No     Emotionally Abused: No     Physically Abused: No     Sexually Abused: No   Housing Stability: Unknown (2024)    Housing Stability Vital Sign     Homeless in the Last Year: No         Review of Systems   Review of Systems   Constitutional:  Positive for fatigue. Negative for activity change and appetite change.   Respiratory:  Positive for cough. Negative for apnea and chest tightness.    Cardiovascular:  Negative for chest pain and leg swelling.   Gastrointestinal:  Negative for abdominal distention and abdominal pain.         Objective:     Vitals:    24 0822   BP: 129/64   Site: Right Upper Arm   Position: Sitting   Cuff Size: Large Adult   Pulse: 81   Resp: 16   Temp: 97.3 °F (36.3 °C)   TempSrc: Temporal   SpO2: 96%   Weight: 121.1 kg (267 lb)   Height: 1.727 m (5' 8\")      Body mass index is 40.6 kg/m².      Physical Exam  Constitutional:       Appearance: Normal appearance.   HENT:      Head: Normocephalic and

## 2024-12-23 ENCOUNTER — TRANSCRIBE ORDERS (OUTPATIENT)
Facility: HOSPITAL | Age: 79
End: 2024-12-23

## 2024-12-23 DIAGNOSIS — J84.10 PULMONARY FIBROSIS (HCC): Primary | ICD-10-CM

## 2025-01-28 DIAGNOSIS — M05.10 RHEUMATOID LUNG DISEASE (HCC): ICD-10-CM

## 2025-01-28 DIAGNOSIS — J45.20 MILD INTERMITTENT ASTHMA, UNSPECIFIED WHETHER COMPLICATED: ICD-10-CM

## 2025-01-28 RX ORDER — BUDESONIDE AND FORMOTEROL FUMARATE DIHYDRATE 160; 4.5 UG/1; UG/1
AEROSOL RESPIRATORY (INHALATION)
Qty: 10.3 G | Refills: 1 | Status: SHIPPED | OUTPATIENT
Start: 2025-01-28

## 2025-02-27 DIAGNOSIS — Z86.711 HX OF PULMONARY EMBOLUS: ICD-10-CM

## 2025-02-27 DIAGNOSIS — Z86.718 HISTORY OF DVT OF LOWER EXTREMITY: ICD-10-CM

## 2025-02-27 RX ORDER — APIXABAN 5 MG/1
5 TABLET, FILM COATED ORAL 2 TIMES DAILY
Qty: 180 TABLET | Refills: 3 | Status: SHIPPED | OUTPATIENT
Start: 2025-02-27

## 2025-04-04 ENCOUNTER — TELEPHONE (OUTPATIENT)
Facility: CLINIC | Age: 80
End: 2025-04-04

## 2025-04-04 NOTE — TELEPHONE ENCOUNTER
Pt called stating she went to [facility] ER on last Friday due to near syncope and viral syndrome. Pt states she has been trying to make a appt to see Dr. Stein as she is constantly coughing and feels very weak. Pt is inquiring if she can be fit in or if Dr. Stein could at least send her something in to help wit the coughing until she can get in?    Please advise...

## 2025-04-07 ENCOUNTER — RESULTS FOLLOW-UP (OUTPATIENT)
Facility: CLINIC | Age: 80
End: 2025-04-07

## 2025-04-07 ENCOUNTER — OFFICE VISIT (OUTPATIENT)
Facility: CLINIC | Age: 80
End: 2025-04-07
Payer: MEDICARE

## 2025-04-07 VITALS
WEIGHT: 261.2 LBS | BODY MASS INDEX: 39.59 KG/M2 | HEIGHT: 68 IN | DIASTOLIC BLOOD PRESSURE: 66 MMHG | OXYGEN SATURATION: 93 % | HEART RATE: 74 BPM | RESPIRATION RATE: 16 BRPM | SYSTOLIC BLOOD PRESSURE: 130 MMHG | TEMPERATURE: 97.4 F

## 2025-04-07 DIAGNOSIS — J18.9 PNEUMONIA DUE TO INFECTIOUS ORGANISM, UNSPECIFIED LATERALITY, UNSPECIFIED PART OF LUNG: Primary | ICD-10-CM

## 2025-04-07 DIAGNOSIS — M05.10 RHEUMATOID LUNG DISEASE (HCC): ICD-10-CM

## 2025-04-07 DIAGNOSIS — J45.50 SEVERE PERSISTENT ASTHMA WITHOUT COMPLICATION (HCC): ICD-10-CM

## 2025-04-07 PROCEDURE — 99214 OFFICE O/P EST MOD 30 MIN: CPT | Performed by: FAMILY MEDICINE

## 2025-04-07 PROCEDURE — G8427 DOCREV CUR MEDS BY ELIG CLIN: HCPCS | Performed by: FAMILY MEDICINE

## 2025-04-07 PROCEDURE — G8417 CALC BMI ABV UP PARAM F/U: HCPCS | Performed by: FAMILY MEDICINE

## 2025-04-07 PROCEDURE — G8399 PT W/DXA RESULTS DOCUMENT: HCPCS | Performed by: FAMILY MEDICINE

## 2025-04-07 PROCEDURE — 1160F RVW MEDS BY RX/DR IN RCRD: CPT | Performed by: FAMILY MEDICINE

## 2025-04-07 PROCEDURE — 3075F SYST BP GE 130 - 139MM HG: CPT | Performed by: FAMILY MEDICINE

## 2025-04-07 PROCEDURE — 1159F MED LIST DOCD IN RCRD: CPT | Performed by: FAMILY MEDICINE

## 2025-04-07 PROCEDURE — 1123F ACP DISCUSS/DSCN MKR DOCD: CPT | Performed by: FAMILY MEDICINE

## 2025-04-07 PROCEDURE — 1090F PRES/ABSN URINE INCON ASSESS: CPT | Performed by: FAMILY MEDICINE

## 2025-04-07 PROCEDURE — 3078F DIAST BP <80 MM HG: CPT | Performed by: FAMILY MEDICINE

## 2025-04-07 PROCEDURE — 1036F TOBACCO NON-USER: CPT | Performed by: FAMILY MEDICINE

## 2025-04-07 RX ORDER — ALBUTEROL SULFATE 0.83 MG/ML
SOLUTION RESPIRATORY (INHALATION)
COMMUNITY
End: 2025-04-07 | Stop reason: SDUPTHER

## 2025-04-07 RX ORDER — BUDESONIDE AND FORMOTEROL FUMARATE DIHYDRATE 160; 4.5 UG/1; UG/1
2 AEROSOL RESPIRATORY (INHALATION) 2 TIMES DAILY
Qty: 10.3 G | Refills: 1 | Status: SHIPPED | OUTPATIENT
Start: 2025-04-07

## 2025-04-07 RX ORDER — TIOTROPIUM BROMIDE INHALATION SPRAY 1.56 UG/1
2 SPRAY, METERED RESPIRATORY (INHALATION) DAILY
Qty: 4 G | Refills: 1 | Status: SHIPPED | OUTPATIENT
Start: 2025-04-07

## 2025-04-07 RX ORDER — ALBUTEROL SULFATE 0.83 MG/ML
2.5 SOLUTION RESPIRATORY (INHALATION) EVERY 4 HOURS PRN
Qty: 120 EACH | Refills: 1 | Status: SHIPPED | OUTPATIENT
Start: 2025-04-07

## 2025-04-07 RX ORDER — PREDNISONE 20 MG/1
TABLET ORAL
COMMUNITY
Start: 2025-04-04

## 2025-04-07 SDOH — ECONOMIC STABILITY: FOOD INSECURITY: WITHIN THE PAST 12 MONTHS, THE FOOD YOU BOUGHT JUST DIDN'T LAST AND YOU DIDN'T HAVE MONEY TO GET MORE.: NEVER TRUE

## 2025-04-07 SDOH — ECONOMIC STABILITY: FOOD INSECURITY: WITHIN THE PAST 12 MONTHS, YOU WORRIED THAT YOUR FOOD WOULD RUN OUT BEFORE YOU GOT MONEY TO BUY MORE.: NEVER TRUE

## 2025-04-07 ASSESSMENT — ENCOUNTER SYMPTOMS
WHEEZING: 1
APNEA: 0
ABDOMINAL PAIN: 0
ABDOMINAL DISTENTION: 0
COUGH: 1
CHEST TIGHTNESS: 0

## 2025-04-07 ASSESSMENT — PATIENT HEALTH QUESTIONNAIRE - PHQ9
1. LITTLE INTEREST OR PLEASURE IN DOING THINGS: NOT AT ALL
SUM OF ALL RESPONSES TO PHQ QUESTIONS 1-9: 0
2. FEELING DOWN, DEPRESSED OR HOPELESS: NOT AT ALL
SUM OF ALL RESPONSES TO PHQ QUESTIONS 1-9: 0

## 2025-04-07 NOTE — PROGRESS NOTES
\"Have you been to the ER, urgent care clinic since your last visit?  Hospitalized since your last visit?\"    Clau Palmer and [facility]    “Have you seen or consulted any other health care providers outside of [facility] since your last visit?”    Yes            Click Here for Release of Records Request   
160-4.5 MCG/ACT AERO; Inhale 2 puffs into the lungs 2 times daily  Dispense: 10.3 g; Refill: 1  - tiotropium (SPIRIVA RESPIMAT) 1.25 MCG/ACT AERS inhaler; Inhale 2 puffs into the lungs daily  Dispense: 4 g; Refill: 1       Follow-up and Dispositions    Return in about 3 weeks (around 4/28/2025) for Pneumonia.           I have discussed the diagnosis with the patient and the intended plan as seen in the above orders.  Social history, medical history, and labs were reviewed.  The patient has received an after-visit summary and questions were answered concerning future plans.  I have discussed medication side effects and warnings with the patient as well.    Feng Richard MD  [facility]  04/07/25

## 2025-04-08 ENCOUNTER — PATIENT MESSAGE (OUTPATIENT)
Facility: CLINIC | Age: 80
End: 2025-04-08

## 2025-04-08 DIAGNOSIS — M05.10 RHEUMATOID LUNG DISEASE (HCC): Primary | ICD-10-CM

## 2025-04-15 ENCOUNTER — OFFICE VISIT (OUTPATIENT)
Facility: CLINIC | Age: 80
End: 2025-04-15
Payer: MEDICARE

## 2025-04-15 VITALS
BODY MASS INDEX: 39.31 KG/M2 | HEIGHT: 68 IN | DIASTOLIC BLOOD PRESSURE: 69 MMHG | RESPIRATION RATE: 20 BRPM | WEIGHT: 259.4 LBS | TEMPERATURE: 98.9 F | HEART RATE: 79 BPM | OXYGEN SATURATION: 93 % | SYSTOLIC BLOOD PRESSURE: 118 MMHG

## 2025-04-15 DIAGNOSIS — J84.10 PULMONARY FIBROSIS (HCC): Primary | ICD-10-CM

## 2025-04-15 PROCEDURE — 1123F ACP DISCUSS/DSCN MKR DOCD: CPT

## 2025-04-15 PROCEDURE — 1159F MED LIST DOCD IN RCRD: CPT

## 2025-04-15 PROCEDURE — 3074F SYST BP LT 130 MM HG: CPT

## 2025-04-15 PROCEDURE — 1036F TOBACCO NON-USER: CPT

## 2025-04-15 PROCEDURE — 3078F DIAST BP <80 MM HG: CPT

## 2025-04-15 PROCEDURE — 1126F AMNT PAIN NOTED NONE PRSNT: CPT

## 2025-04-15 PROCEDURE — G8399 PT W/DXA RESULTS DOCUMENT: HCPCS

## 2025-04-15 PROCEDURE — 1090F PRES/ABSN URINE INCON ASSESS: CPT

## 2025-04-15 PROCEDURE — G8417 CALC BMI ABV UP PARAM F/U: HCPCS

## 2025-04-15 PROCEDURE — 99214 OFFICE O/P EST MOD 30 MIN: CPT

## 2025-04-15 PROCEDURE — G8427 DOCREV CUR MEDS BY ELIG CLIN: HCPCS

## 2025-04-15 RX ORDER — BENZONATATE 200 MG/1
200 CAPSULE ORAL 3 TIMES DAILY PRN
Qty: 30 CAPSULE | Refills: 0 | Status: SHIPPED | OUTPATIENT
Start: 2025-04-15 | End: 2025-04-25

## 2025-04-15 RX ORDER — PREDNISONE 20 MG/1
TABLET ORAL
Qty: 11 TABLET | Refills: 0 | Status: SHIPPED | OUTPATIENT
Start: 2025-04-15 | End: 2025-04-24

## 2025-04-15 ASSESSMENT — PATIENT HEALTH QUESTIONNAIRE - PHQ9
10. IF YOU CHECKED OFF ANY PROBLEMS, HOW DIFFICULT HAVE THESE PROBLEMS MADE IT FOR YOU TO DO YOUR WORK, TAKE CARE OF THINGS AT HOME, OR GET ALONG WITH OTHER PEOPLE: NOT DIFFICULT AT ALL
SUM OF ALL RESPONSES TO PHQ QUESTIONS 1-9: 0
SUM OF ALL RESPONSES TO PHQ QUESTIONS 1-9: 0
5. POOR APPETITE OR OVEREATING: NOT AT ALL
9. THOUGHTS THAT YOU WOULD BE BETTER OFF DEAD, OR OF HURTING YOURSELF: NOT AT ALL
2. FEELING DOWN, DEPRESSED OR HOPELESS: NOT AT ALL
7. TROUBLE CONCENTRATING ON THINGS, SUCH AS READING THE NEWSPAPER OR WATCHING TELEVISION: NOT AT ALL
8. MOVING OR SPEAKING SO SLOWLY THAT OTHER PEOPLE COULD HAVE NOTICED. OR THE OPPOSITE, BEING SO FIGETY OR RESTLESS THAT YOU HAVE BEEN MOVING AROUND A LOT MORE THAN USUAL: NOT AT ALL
SUM OF ALL RESPONSES TO PHQ QUESTIONS 1-9: 0
1. LITTLE INTEREST OR PLEASURE IN DOING THINGS: NOT AT ALL
6. FEELING BAD ABOUT YOURSELF - OR THAT YOU ARE A FAILURE OR HAVE LET YOURSELF OR YOUR FAMILY DOWN: NOT AT ALL
SUM OF ALL RESPONSES TO PHQ QUESTIONS 1-9: 0
4. FEELING TIRED OR HAVING LITTLE ENERGY: NOT AT ALL
3. TROUBLE FALLING OR STAYING ASLEEP: NOT AT ALL

## 2025-04-15 NOTE — PROGRESS NOTES
[facility]   [facility]    Subjective:  CC:   Chief Complaint   Patient presents with    Cough     Worsened Cough and SOB        HPI:  Selam Jones is 79 y.o. female who presents today for cough and shortness of breath. Was recently treated for a pneumonia. Saw PCP afterwards and given steroid taper and refills of inhalers. Has been using nebs q4h in the last day or so. Hx ILD.       Objective:    Vitals:    04/15/25 0951   BP: 118/69   BP Site: Right Upper Arm   Patient Position: Sitting   BP Cuff Size: Large Adult   Pulse: 79   Resp: 20   Temp: 98.9 °F (37.2 °C)   TempSrc: Oral   SpO2: 93%   Weight: 117.7 kg (259 lb 6.4 oz)   Height: 1.727 m (5' 8\")       Physical Exam  Vitals and nursing note reviewed.   Constitutional:       General: She is not in acute distress.     Appearance: She is not ill-appearing.   Cardiovascular:      Rate and Rhythm: Normal rate.   Pulmonary:      Effort: Pulmonary effort is normal.      Comments: Scattered rhonchi throughout, moving air well  Musculoskeletal:      Right lower leg: No edema.      Left lower leg: No edema.   Skin:     General: Skin is warm and dry.   Neurological:      Mental Status: She is alert.   Psychiatric:         Mood and Affect: Mood normal.         Behavior: Behavior normal.         No results found for this or any previous visit (from the past 12 hours).  All imaging and labs ordered in clinic personally reviewed by me.       Assessment & Plan  Pulmonary fibrosis (HCC)   Chronic, not at goal (unstable), no evidence of pneumonia on chest XR a couple weeks ago. Lungs without focal breath sounds abnormalities. Will put her back on steroids. She sees pulmonology tomorrow. May need home oxygen again - not hypoxic today.     Orders:    predniSONE (DELTASONE) 20 MG tablet; Take 2 tablets by mouth daily for 3 days, THEN 1 tablet daily for 3 days, THEN 0.5 tablets daily for 3 days.         Follow up: Return if symptoms

## 2025-04-21 ENCOUNTER — TELEPHONE (OUTPATIENT)
Facility: CLINIC | Age: 80
End: 2025-04-21

## 2025-04-21 NOTE — TELEPHONE ENCOUNTER
LOV w/ Dr Valencia  Pt states she needs a walking pulse ox reading. Pt states she also needs orders for oxygen and supplies for home use due to breathing issues. Pt states her oxygen levels at home this morning were between 86-83 while moving around. Pt forgot to have this done at her pulmonologist appt the other day. Pt has made another appt for 4-22 with Dr Valencia for this test and orders to be done.

## 2025-04-22 ENCOUNTER — TELEPHONE (OUTPATIENT)
Facility: CLINIC | Age: 80
End: 2025-04-22

## 2025-04-22 NOTE — TELEPHONE ENCOUNTER
Will await her results from pulmonology and see what is happening from the hospital as well.    Feng Richard MD  [facility]  04/22/25

## 2025-04-22 NOTE — TELEPHONE ENCOUNTER
Pt states her pulmonologist will be sending her CNF results to pcp. Pt is also currently hospitalized, states she doesn't have her phone  so he can reach her at her room #.  Please advise.

## 2025-04-23 ENCOUNTER — TELEPHONE (OUTPATIENT)
Facility: CLINIC | Age: 80
End: 2025-04-23

## 2025-04-23 NOTE — TELEPHONE ENCOUNTER
Care Transitions Initial Follow Up Call    Outreach made within 2 business days of discharge: Yes    Patient: Selam Jones Patient : 1945   MRN: [id number]  Reason for Admission: SOB, pneumonia  Discharge Date: 25      Spoke with: patient    Discharge department/facility: [facility] Interactive Patient Contact:  Was patient able to fill all prescriptions: Yes  Was patient instructed to bring all medications to the follow-up visit: Yes  Is patient taking all medications as directed in the discharge summary? Yes  Does patient understand their discharge instructions: Yes  Does patient have questions or concerns that need addressed prior to 7-14 day follow up office visit: no    Additional needs identified to be addressed with provider  No needs identified             Scheduled appointment with PCP within 7-14 days: yes    Follow Up  Future Appointments   Date Time Provider Department Center   2025  9:40 AM Feng Richard MD [facility]       SONA FUENTES RN

## 2025-04-28 ENCOUNTER — OFFICE VISIT (OUTPATIENT)
Facility: CLINIC | Age: 80
End: 2025-04-28

## 2025-04-28 VITALS
SYSTOLIC BLOOD PRESSURE: 101 MMHG | OXYGEN SATURATION: 96 % | WEIGHT: 259 LBS | TEMPERATURE: 97.5 F | HEIGHT: 68 IN | BODY MASS INDEX: 39.25 KG/M2 | DIASTOLIC BLOOD PRESSURE: 61 MMHG | HEART RATE: 71 BPM | RESPIRATION RATE: 16 BRPM

## 2025-04-28 DIAGNOSIS — M05.10 RHEUMATOID LUNG DISEASE (HCC): ICD-10-CM

## 2025-04-28 DIAGNOSIS — Z09 HOSPITAL DISCHARGE FOLLOW-UP: ICD-10-CM

## 2025-04-28 DIAGNOSIS — J96.01 ACUTE HYPOXIC RESPIRATORY FAILURE (HCC): Primary | ICD-10-CM

## 2025-04-28 DIAGNOSIS — J18.9 PNEUMONIA DUE TO INFECTIOUS ORGANISM, UNSPECIFIED LATERALITY, UNSPECIFIED PART OF LUNG: ICD-10-CM

## 2025-04-28 DIAGNOSIS — I27.20 PULMONARY HTN (HCC): ICD-10-CM

## 2025-04-28 RX ORDER — PREDNISONE 10 MG/1
TABLET ORAL
COMMUNITY
Start: 2025-04-25

## 2025-04-28 RX ORDER — AZITHROMYCIN 500 MG/1
TABLET, FILM COATED ORAL
COMMUNITY
Start: 2025-04-22

## 2025-04-28 RX ORDER — DOXYCYCLINE 100 MG/1
CAPSULE ORAL
COMMUNITY
Start: 2025-04-22

## 2025-04-28 RX ORDER — FUROSEMIDE 20 MG/1
20 TABLET ORAL DAILY
Qty: 30 TABLET | Refills: 2 | Status: SHIPPED | OUTPATIENT
Start: 2025-04-28

## 2025-04-28 RX ORDER — ALBUTEROL SULFATE 90 UG/1
2 INHALANT RESPIRATORY (INHALATION) 4 TIMES DAILY PRN
Qty: 18 G | Refills: 5 | Status: SHIPPED | OUTPATIENT
Start: 2025-04-28

## 2025-04-28 ASSESSMENT — ENCOUNTER SYMPTOMS
ABDOMINAL DISTENTION: 0
SHORTNESS OF BREATH: 1
ABDOMINAL PAIN: 0
CHEST TIGHTNESS: 0
WHEEZING: 1

## 2025-04-28 NOTE — PROGRESS NOTES
\"Have you been to the ER, urgent care clinic since your last visit?  Hospitalized since your last visit?\"    Yes Clau Palmer    “Have you seen or consulted any other health care providers outside of [facility] since your last visit?”    No            Click Here for Release of Records Request

## 2025-04-28 NOTE — PROGRESS NOTES
[facility] [address] [address]  [phone]        Transition of Care Visit    Patient: Selam Jones MRN: [id number]  SSN: [id number]    YOB: 1945  Age: 79 y.o.  Sex: female      Hospital: [facility]  Dates of admission: 4/21/25 through 4/22/25  Discharge diagnoses: Pneumonia, Hypoxic respiratory failure  State of health at discharge: Stable  Surgical or invasive procedures done: None  Transition Care Management contact Date: 04/23/2025     Amount and/or Complexity of Data Reviewed:   Clinical lab tests: Reviewed or ordered   Tests in the radiology section: reviewed or ordered  Discussion of test results with the patient: yes  Obtain previous medical records or obtain history from someone other than the patient: Yes  Obtain history from someone other than the patient: Yes  Review and address past medical records: Yes  Discuss the patient with another provider: no  Independant visualization of image, tracing, or specimen: no    Risk of Significant Complications, Morbidity, and/or Mortality:   Presenting problems: High   Diagnostic procedures: Moderate   Management options: Moderate     Transition of Care time spent:   Total time providing care and documentation: 30 minutes    Progress at discharge:   Stable on Discharge      Progress Note    Patient: Selam Jones MRN: [id number]  SSN: [id number]    YOB: 1945  Age: 79 y.o.  Sex: female        Chief Complaint   Patient presents with    Follow-Up from Hospital         Subjective:     Encounter Diagnoses   Name Primary?    Acute hypoxic respiratory failure (HCC) Yes    Pulmonary HTN (HCC)     Pneumonia due to infectious organism, unspecified laterality, unspecified part of lung     Hospital discharge follow-up     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HCC)      Patient saw Dr. Cobian and got sputum studies.  Several days later though persistent and worsening SOB and went to

## 2025-07-02 ENCOUNTER — TELEPHONE (OUTPATIENT)
Facility: CLINIC | Age: 80
End: 2025-07-02

## 2025-07-02 DIAGNOSIS — J45.50 SEVERE PERSISTENT ASTHMA WITHOUT COMPLICATION (HCC): ICD-10-CM

## 2025-07-02 DIAGNOSIS — M05.10 RHEUMATOID LUNG DISEASE (HCC): ICD-10-CM

## 2025-07-02 RX ORDER — BUDESONIDE AND FORMOTEROL FUMARATE DIHYDRATE 160; 4.5 UG/1; UG/1
2 AEROSOL RESPIRATORY (INHALATION) 2 TIMES DAILY
Qty: 6 G | Refills: 0 | Status: SHIPPED | OUTPATIENT
Start: 2025-07-02

## 2025-07-02 RX ORDER — BUDESONIDE AND FORMOTEROL FUMARATE DIHYDRATE 160; 4.5 UG/1; UG/1
2 AEROSOL RESPIRATORY (INHALATION) 2 TIMES DAILY
Qty: 6 EACH | Refills: 1 | Status: SHIPPED | OUTPATIENT
Start: 2025-07-02

## 2025-07-02 NOTE — TELEPHONE ENCOUNTER
budesonide-formoterol (SYMBICORT) 160-4.5 MCG/ACT AERO   Please send a 90 day Rx to [facility], and pt also needs a 1 month supply to be sent to [facility] since she is out of medication. Please advise.

## 2025-07-17 ENCOUNTER — OFFICE VISIT (OUTPATIENT)
Facility: CLINIC | Age: 80
End: 2025-07-17

## 2025-07-17 VITALS
OXYGEN SATURATION: 95 % | RESPIRATION RATE: 16 BRPM | HEIGHT: 68 IN | SYSTOLIC BLOOD PRESSURE: 112 MMHG | WEIGHT: 262 LBS | HEART RATE: 75 BPM | BODY MASS INDEX: 39.71 KG/M2 | TEMPERATURE: 97 F | DIASTOLIC BLOOD PRESSURE: 57 MMHG

## 2025-07-17 DIAGNOSIS — I27.20 PULMONARY HTN (HCC): ICD-10-CM

## 2025-07-17 DIAGNOSIS — Z00.00 MEDICARE ANNUAL WELLNESS VISIT, SUBSEQUENT: ICD-10-CM

## 2025-07-17 DIAGNOSIS — E78.00 HIGH CHOLESTEROL: ICD-10-CM

## 2025-07-17 DIAGNOSIS — E66.01 OBESITY, MORBID (HCC): ICD-10-CM

## 2025-07-17 DIAGNOSIS — M48.062 SPINAL STENOSIS OF LUMBAR REGION WITH NEUROGENIC CLAUDICATION: ICD-10-CM

## 2025-07-17 DIAGNOSIS — J45.50 SEVERE PERSISTENT ASTHMA WITHOUT COMPLICATION (HCC): ICD-10-CM

## 2025-07-17 DIAGNOSIS — M06.9 RHEUMATOID ARTHRITIS, INVOLVING UNSPECIFIED SITE, UNSPECIFIED WHETHER RHEUMATOID FACTOR PRESENT (HCC): ICD-10-CM

## 2025-07-17 DIAGNOSIS — M15.0 PRIMARY GENERALIZED (OSTEO)ARTHRITIS: ICD-10-CM

## 2025-07-17 DIAGNOSIS — M05.10 RHEUMATOID LUNG DISEASE (HCC): ICD-10-CM

## 2025-07-17 DIAGNOSIS — Z86.718 HISTORY OF DVT OF LOWER EXTREMITY: ICD-10-CM

## 2025-07-17 DIAGNOSIS — I10 ESSENTIAL HYPERTENSION: Primary | ICD-10-CM

## 2025-07-17 DIAGNOSIS — I27.82 OTHER CHRONIC PULMONARY EMBOLISM WITHOUT ACUTE COR PULMONALE (HCC): ICD-10-CM

## 2025-07-17 ASSESSMENT — PATIENT HEALTH QUESTIONNAIRE - PHQ9
SUM OF ALL RESPONSES TO PHQ QUESTIONS 1-9: 0
SUM OF ALL RESPONSES TO PHQ QUESTIONS 1-9: 0
2. FEELING DOWN, DEPRESSED OR HOPELESS: NOT AT ALL
SUM OF ALL RESPONSES TO PHQ QUESTIONS 1-9: 0
1. LITTLE INTEREST OR PLEASURE IN DOING THINGS: NOT AT ALL
SUM OF ALL RESPONSES TO PHQ QUESTIONS 1-9: 0

## 2025-07-17 ASSESSMENT — ENCOUNTER SYMPTOMS
COUGH: 1
APNEA: 0
CHEST TIGHTNESS: 0

## 2025-07-17 ASSESSMENT — LIFESTYLE VARIABLES
HOW OFTEN DO YOU HAVE A DRINK CONTAINING ALCOHOL: NEVER
HOW MANY STANDARD DRINKS CONTAINING ALCOHOL DO YOU HAVE ON A TYPICAL DAY: PATIENT DOES NOT DRINK

## 2025-07-17 NOTE — PATIENT INSTRUCTIONS
Learning About Being Active as an Older Adult  Why is being active important as you get older?     Being active is one of the best things you can do for your health. And it's never too late to start. Being active--or getting active, if you aren't already--has definite benefits. It can:  Give you more energy,  Keep your mind sharp.  Improve balance to reduce your risk of falls.  Help you manage chronic illness with fewer medicines.  No matter how old you are, how fit you are, or what health problems you have, there is a form of activity that will work for you. And the more physical activity you can do, the better your overall health will be.  What kinds of activity can help you stay healthy?  Being more active will make your daily activities easier. Physical activity includes planned exercise and things you do in daily life. There are four types of activity:  Aerobic.  Doing aerobic activity makes your heart and lungs strong.  Includes walking, dancing, and gardening.  Aim for at least 2½ hours spread throughout the week.  It improves your energy and can help you sleep better.  Muscle-strengthening.  This type of activity can help maintain muscle and strengthen bones.  Includes climbing stairs, using resistance bands, and lifting or carrying heavy loads.  Aim for at least twice a week.  It can help protect the knees and other joints.  Stretching.  Stretching gives you better range of motion in joints and muscles.  Includes upper arm stretches, calf stretches, and gentle yoga.  Aim for at least twice a week, preferably after your muscles are warmed up from other activities.  It can help you function better in daily life.  Balancing.  This helps you stay coordinated and have good posture.  Includes heel-to-toe walking, elena chi, and certain types of yoga.  Aim for at least 3 days a week.  It can reduce your risk of falling.  Even if you have a hard time meeting the recommendations, it's better to be more active

## 2025-07-17 NOTE — PROGRESS NOTES
Chief Complaint   Patient presents with    Medicare AWV         \"Have you been to the ER, urgent care clinic since your last visit?  Hospitalized since your last visit?\"    NO    “Have you seen or consulted any other health care providers outside of [facility] since your last visit?”    NO            Click Here for Release of Records Request     Health Maintenance Due   Topic Date Due    Hepatitis B vaccine (2 of 3 - 19+ 3-dose series) 05/18/2009    Shingles vaccine (2 of 2) 09/29/2020    DTaP/Tdap/Td vaccine (2 - Td or Tdap) 09/22/2024    Lipids  11/01/2024    Annual Wellness Visit (Medicare)  11/01/2024    COVID-19 Vaccine (7 - 2024-25 season) 03/17/2025     
Medicare Annual Wellness Visit    Selam JOSEPH Jamies is here for Medicare AWV    Assessment & Plan   Essential hypertension  -     Comprehensive Metabolic Panel; Future  Pulmonary HTN (HCC)  Other chronic pulmonary embolism without acute cor pulmonale (HCC)  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HCC)  -     CBC with Auto Differential; Future  -     Sedimentation Rate; Future  -     C-Reactive Protein; Future  Severe persistent asthma without complication (HCC)  Primary generalized (osteo)arthritis  Rheumatoid arthritis, involving unspecified site, unspecified whether rheumatoid factor present (HCC)  History of DVT of lower extremity  High cholesterol  -     Lipid Panel; Future  Spinal stenosis of lumbar region with neurogenic claudication  Obesity, morbid (HCC)  Medicare annual wellness visit, subsequent       Return in about 3 months (around 10/17/2025).     Subjective     Patient's complete Health Risk Assessment and screening values have been reviewed and are found in Flowsheets. The following problems were reviewed today and where indicated follow up appointments were made and/or referrals ordered.    Positive Risk Factor Screenings with Interventions:              Inactivity:  On average, how many days per week do you engage in moderate to strenuous exercise (like a brisk walk)?: 0 days (!) Abnormal  On average, how many minutes do you engage in exercise at this level?: 0 min  Interventions:  Limited due to chronic conditions     Abnormal BMI (obese):  Body mass index is 39.84 kg/m². (!) Abnormal  Interventions:  low carbohydrate diet            Advanced Directives:  Do you have a Living Will?: (!) No    Intervention:  has NO advanced directive - information provided        Objective   Vitals:    07/17/25 0840   BP: (!) 112/57   Pulse: 75   Resp: 16   Temp: 97 °F (36.1 °C)   TempSrc: Oral   SpO2: 95%   Weight: 118.8 kg (262 lb)   Height: 1.727 m (5' 8\")      Body mass index is 39.84 kg/m².                 Allergies 
cholesterol  E78.00 Lipid Panel      10. Spinal stenosis of lumbar region with neurogenic claudication  M48.062       11. Obesity, morbid (HCC)  E66.01           1. Essential hypertension  Our goal is to normalize the blood pressure to decrease the risks of strokes and heart attacks. The patient is in agreement with the plan.   - Comprehensive Metabolic Panel; Future    2. Pulmonary HTN (HCC)  Seeing Dr. Cobian    3. Other chronic pulmonary embolism without acute cor pulmonale ([facility])  Taking Blood thinner    4. 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HCC)  Seeing Rheumatology, needs labs  - CBC with Auto Differential; Future  - Sedimentation Rate; Future  - C-Reactive Protein; Future    5. Severe persistent asthma without complication ([facility])  Stable at this time    6. Primary generalized (osteo)arthritis  Stable overall    7. Rheumatoid arthritis, involving unspecified site, unspecified whether rheumatoid factor present (HCC)  Seeing Rheumatlogy    8. History of DVT of lower extremity    9. High cholesterol  - Lipid Panel; Future    10. Spinal stenosis of lumbar region with neurogenic claudication    11. Obesity, morbid (HCC)  Stable.       Follow-up and Dispositions    Return in about 3 months (around 10/17/2025).           I have discussed the diagnosis with the patient and the intended plan as seen in the above orders.  Social history, medical history, and labs were reviewed.  The patient has received an after-visit summary and questions were answered concerning future plans.  I have discussed medication side effects and warnings with the patient as well.    Feng Richard MD  [facility]  07/17/25

## 2025-07-18 LAB
ALBUMIN SERPL-MCNC: 3.4 G/DL (ref 3.5–5)
ALBUMIN/GLOB SERPL: 0.8 (ref 1.1–2.2)
ALP SERPL-CCNC: 76 U/L (ref 45–117)
ALT SERPL-CCNC: 21 U/L (ref 12–78)
ANION GAP SERPL CALC-SCNC: 2 MMOL/L (ref 2–12)
AST SERPL-CCNC: 16 U/L (ref 15–37)
BASOPHILS # BLD: 0.04 K/UL (ref 0–0.1)
BASOPHILS NFR BLD: 0.4 % (ref 0–1)
BILIRUB SERPL-MCNC: 0.5 MG/DL (ref 0.2–1)
BUN SERPL-MCNC: 25 MG/DL (ref 6–20)
BUN/CREAT SERPL: 32 (ref 12–20)
CALCIUM SERPL-MCNC: 9.1 MG/DL (ref 8.5–10.1)
CHLORIDE SERPL-SCNC: 107 MMOL/L (ref 97–108)
CHOLEST SERPL-MCNC: 151 MG/DL
CO2 SERPL-SCNC: 31 MMOL/L (ref 21–32)
CREAT SERPL-MCNC: 0.77 MG/DL (ref 0.55–1.02)
CRP SERPL-MCNC: 0.84 MG/DL (ref 0–0.3)
DIFFERENTIAL METHOD BLD: ABNORMAL
EOSINOPHIL # BLD: 0.28 K/UL (ref 0–0.4)
EOSINOPHIL NFR BLD: 2.6 % (ref 0–7)
ERYTHROCYTE [DISTWIDTH] IN BLOOD BY AUTOMATED COUNT: 14.3 % (ref 11.5–14.5)
ERYTHROCYTE [SEDIMENTATION RATE] IN BLOOD: 66 MM/HR (ref 0–30)
GLOBULIN SER CALC-MCNC: 4.2 G/DL (ref 2–4)
GLUCOSE SERPL-MCNC: 86 MG/DL (ref 65–100)
HCT VFR BLD AUTO: 39.8 % (ref 35–47)
HDLC SERPL-MCNC: 73 MG/DL
HDLC SERPL: 2.1 (ref 0–5)
HGB BLD-MCNC: 11.2 G/DL (ref 11.5–16)
IMM GRANULOCYTES # BLD AUTO: 0.02 K/UL (ref 0–0.04)
IMM GRANULOCYTES NFR BLD AUTO: 0.2 % (ref 0–0.5)
LDLC SERPL CALC-MCNC: 63.8 MG/DL (ref 0–100)
LYMPHOCYTES # BLD: 3.01 K/UL (ref 0.8–3.5)
LYMPHOCYTES NFR BLD: 28.1 % (ref 12–49)
MCH RBC QN AUTO: 28.9 PG (ref 26–34)
MCHC RBC AUTO-ENTMCNC: 28.1 G/DL (ref 30–36.5)
MCV RBC AUTO: 102.6 FL (ref 80–99)
MONOCYTES # BLD: 0.7 K/UL (ref 0–1)
MONOCYTES NFR BLD: 6.5 % (ref 5–13)
NEUTS SEG # BLD: 6.65 K/UL (ref 1.8–8)
NEUTS SEG NFR BLD: 62.2 % (ref 32–75)
NRBC # BLD: 0 K/UL (ref 0–0.01)
NRBC BLD-RTO: 0 PER 100 WBC
PLATELET # BLD AUTO: 189 K/UL (ref 150–400)
PMV BLD AUTO: 12 FL (ref 8.9–12.9)
POTASSIUM SERPL-SCNC: 3.9 MMOL/L (ref 3.5–5.1)
PROT SERPL-MCNC: 7.6 G/DL (ref 6.4–8.2)
RBC # BLD AUTO: 3.88 M/UL (ref 3.8–5.2)
RBC MORPH BLD: ABNORMAL
SODIUM SERPL-SCNC: 140 MMOL/L (ref 136–145)
TRIGL SERPL-MCNC: 71 MG/DL
VLDLC SERPL CALC-MCNC: 14.2 MG/DL
WBC # BLD AUTO: 10.7 K/UL (ref 3.6–11)

## 2025-07-21 ENCOUNTER — RESULTS FOLLOW-UP (OUTPATIENT)
Facility: CLINIC | Age: 80
End: 2025-07-21

## 2025-07-30 ENCOUNTER — TELEPHONE (OUTPATIENT)
Facility: CLINIC | Age: 80
End: 2025-07-30

## 2025-07-30 DIAGNOSIS — I10 ESSENTIAL HYPERTENSION: ICD-10-CM

## 2025-07-30 DIAGNOSIS — E78.00 HIGH CHOLESTEROL: Primary | ICD-10-CM

## 2025-07-30 NOTE — TELEPHONE ENCOUNTER
Pt called stating she saw her Rheumatologist on yesterday and she would like for pt to have a LFT done due to pt starting a new medication and pt being yellow on yesterday.    Pt is inquiring if Dr Stein could put the order in here so she doesn't have to drive all the way back there to have it done?    Please advise...

## (undated) DEVICE — BITEBLOCK ENDOSCP 60FR MAXI WHT POLYETH STURDY W/ VELC WVN

## (undated) DEVICE — Device

## (undated) DEVICE — POLYP TRAP: Brand: TRAPEASE®

## (undated) DEVICE — CANNULA CUSH AD W/ 14FT TBG

## (undated) DEVICE — CATH IV AUTOGRD BC PNK 20GA 25 -- INSYTE

## (undated) DEVICE — SOLIDIFIER MEDC 1200ML -- CONVERT TO 356117

## (undated) DEVICE — KIT COLON W/ 1.1OZ LUB AND 2 END

## (undated) DEVICE — SNARE ENDOSCP M L240CM W27MM SHTH DIA2.4MM CHN 2.8MM OVL

## (undated) DEVICE — ELECTRODE,RADIOTRANSLUCENT,FOAM,3PK: Brand: MEDLINE

## (undated) DEVICE — SET GRAV CK VLV NEEDLESS ST 3 GANGED 4WAY STPCOCK HI FLO 10

## (undated) DEVICE — BAG BELONG PT PERS CLEAR HANDL

## (undated) DEVICE — 1200 GUARD II KIT W/5MM TUBE W/O VAC TUBE: Brand: GUARDIAN

## (undated) DEVICE — BAG SPEC BIOHZRD 10 X 10 IN --

## (undated) DEVICE — FORCEPS BX L240CM JAW DIA2.8MM L CAP W/ NDL MIC MESH TOOTH

## (undated) DEVICE — CONTAINER SPEC 20 ML LID NEUT BUFF FORMALIN 10 % POLYPR STS

## (undated) DEVICE — 3M™ CUROS™ DISINFECTING CAP FOR NEEDLELESS CONNECTORS 270/CARTON 20 CARTONS/CASE CFF1-270: Brand: CUROS™

## (undated) DEVICE — (D)SENSOR RMFG 02 PULS OXMTR -- DISC BY MFR USE ITEM 133445

## (undated) DEVICE — CUFF RMFG BP INF SZ 11 DISP -- LAWSON OEM ITEM 238915